# Patient Record
Sex: FEMALE | Race: WHITE | HISPANIC OR LATINO | Employment: FULL TIME | URBAN - METROPOLITAN AREA
[De-identification: names, ages, dates, MRNs, and addresses within clinical notes are randomized per-mention and may not be internally consistent; named-entity substitution may affect disease eponyms.]

---

## 2020-02-10 ENCOUNTER — APPOINTMENT (EMERGENCY)
Dept: RADIOLOGY | Facility: HOSPITAL | Age: 55
End: 2020-02-10
Payer: COMMERCIAL

## 2020-02-10 ENCOUNTER — HOSPITAL ENCOUNTER (OUTPATIENT)
Facility: HOSPITAL | Age: 55
Setting detail: OBSERVATION
Discharge: HOME | End: 2020-02-11
Attending: EMERGENCY MEDICINE | Admitting: INTERNAL MEDICINE
Payer: COMMERCIAL

## 2020-02-10 DIAGNOSIS — E66.01 MORBID OBESITY DUE TO EXCESS CALORIES (HCC): ICD-10-CM

## 2020-02-10 DIAGNOSIS — I10 UNCONTROLLED HYPERTENSION: Primary | ICD-10-CM

## 2020-02-10 DIAGNOSIS — R07.9 CHEST PAIN IN ADULT: ICD-10-CM

## 2020-02-10 DIAGNOSIS — R06.00 DOE (DYSPNEA ON EXERTION): ICD-10-CM

## 2020-02-10 PROBLEM — R68.89 EXERCISE INTOLERANCE: Status: ACTIVE | Noted: 2020-02-10

## 2020-02-10 LAB
ALBUMIN SERPL BCP-MCNC: 3 G/DL (ref 3.5–5)
ALP SERPL-CCNC: 77 U/L (ref 46–116)
ALT SERPL W P-5'-P-CCNC: 17 U/L (ref 12–78)
ANION GAP SERPL CALCULATED.3IONS-SCNC: 5 MMOL/L (ref 4–13)
APTT PPP: 26 SECONDS (ref 23–37)
AST SERPL W P-5'-P-CCNC: 15 U/L (ref 5–45)
BASOPHILS # BLD AUTO: 0.03 THOUSANDS/ΜL (ref 0–0.1)
BASOPHILS NFR BLD AUTO: 0 % (ref 0–1)
BILIRUB SERPL-MCNC: 0.42 MG/DL (ref 0.2–1)
BILIRUB UR QL STRIP: NEGATIVE
BUN SERPL-MCNC: 10 MG/DL (ref 5–25)
CALCIUM SERPL-MCNC: 8.8 MG/DL (ref 8.3–10.1)
CHLORIDE SERPL-SCNC: 107 MMOL/L (ref 100–108)
CLARITY UR: CLEAR
CO2 SERPL-SCNC: 26 MMOL/L (ref 21–32)
COLOR UR: YELLOW
CREAT SERPL-MCNC: 0.69 MG/DL (ref 0.6–1.3)
EOSINOPHIL # BLD AUTO: 0.17 THOUSAND/ΜL (ref 0–0.61)
EOSINOPHIL NFR BLD AUTO: 2 % (ref 0–6)
ERYTHROCYTE [DISTWIDTH] IN BLOOD BY AUTOMATED COUNT: 13.3 % (ref 11.6–15.1)
GFR SERPL CREATININE-BSD FRML MDRD: 99 ML/MIN/1.73SQ M
GLUCOSE SERPL-MCNC: 88 MG/DL (ref 65–140)
GLUCOSE UR STRIP-MCNC: NEGATIVE MG/DL
HCT VFR BLD AUTO: 36.3 % (ref 34.8–46.1)
HGB BLD-MCNC: 11.8 G/DL (ref 11.5–15.4)
HGB UR QL STRIP.AUTO: NEGATIVE
IMM GRANULOCYTES # BLD AUTO: 0.03 THOUSAND/UL (ref 0–0.2)
IMM GRANULOCYTES NFR BLD AUTO: 0 % (ref 0–2)
INR PPP: 0.98 (ref 0.84–1.19)
KETONES UR STRIP-MCNC: NEGATIVE MG/DL
LEUKOCYTE ESTERASE UR QL STRIP: NEGATIVE
LYMPHOCYTES # BLD AUTO: 2.53 THOUSANDS/ΜL (ref 0.6–4.47)
LYMPHOCYTES NFR BLD AUTO: 25 % (ref 14–44)
MCH RBC QN AUTO: 28.2 PG (ref 26.8–34.3)
MCHC RBC AUTO-ENTMCNC: 32.5 G/DL (ref 31.4–37.4)
MCV RBC AUTO: 87 FL (ref 82–98)
MONOCYTES # BLD AUTO: 1.04 THOUSAND/ΜL (ref 0.17–1.22)
MONOCYTES NFR BLD AUTO: 10 % (ref 4–12)
NEUTROPHILS # BLD AUTO: 6.39 THOUSANDS/ΜL (ref 1.85–7.62)
NEUTS SEG NFR BLD AUTO: 63 % (ref 43–75)
NITRITE UR QL STRIP: NEGATIVE
NRBC BLD AUTO-RTO: 0 /100 WBCS
NT-PROBNP SERPL-MCNC: 75 PG/ML
PH UR STRIP.AUTO: 5 [PH] (ref 4.5–8)
PLATELET # BLD AUTO: 254 THOUSANDS/UL (ref 149–390)
PMV BLD AUTO: 9.8 FL (ref 8.9–12.7)
POTASSIUM SERPL-SCNC: 4 MMOL/L (ref 3.5–5.3)
PROT SERPL-MCNC: 7.5 G/DL (ref 6.4–8.2)
PROT UR STRIP-MCNC: NEGATIVE MG/DL
PROTHROMBIN TIME: 12.6 SECONDS (ref 11.6–14.5)
RBC # BLD AUTO: 4.18 MILLION/UL (ref 3.81–5.12)
SODIUM SERPL-SCNC: 138 MMOL/L (ref 136–145)
SP GR UR STRIP.AUTO: 1.02 (ref 1–1.03)
TROPONIN I SERPL-MCNC: <0.02 NG/ML
TSH SERPL DL<=0.05 MIU/L-ACNC: 0.03 UIU/ML (ref 0.36–3.74)
UROBILINOGEN UR QL STRIP.AUTO: 0.2 E.U./DL
WBC # BLD AUTO: 10.19 THOUSAND/UL (ref 4.31–10.16)

## 2020-02-10 PROCEDURE — 81003 URINALYSIS AUTO W/O SCOPE: CPT

## 2020-02-10 PROCEDURE — 85730 THROMBOPLASTIN TIME PARTIAL: CPT | Performed by: EMERGENCY MEDICINE

## 2020-02-10 PROCEDURE — 85610 PROTHROMBIN TIME: CPT | Performed by: EMERGENCY MEDICINE

## 2020-02-10 PROCEDURE — 93005 ELECTROCARDIOGRAM TRACING: CPT

## 2020-02-10 PROCEDURE — 99285 EMERGENCY DEPT VISIT HI MDM: CPT

## 2020-02-10 PROCEDURE — 80053 COMPREHEN METABOLIC PANEL: CPT | Performed by: EMERGENCY MEDICINE

## 2020-02-10 PROCEDURE — 71046 X-RAY EXAM CHEST 2 VIEWS: CPT

## 2020-02-10 PROCEDURE — 83880 ASSAY OF NATRIURETIC PEPTIDE: CPT | Performed by: EMERGENCY MEDICINE

## 2020-02-10 PROCEDURE — 99285 EMERGENCY DEPT VISIT HI MDM: CPT | Performed by: EMERGENCY MEDICINE

## 2020-02-10 PROCEDURE — 84484 ASSAY OF TROPONIN QUANT: CPT | Performed by: EMERGENCY MEDICINE

## 2020-02-10 PROCEDURE — 36415 COLL VENOUS BLD VENIPUNCTURE: CPT | Performed by: EMERGENCY MEDICINE

## 2020-02-10 PROCEDURE — 85025 COMPLETE CBC W/AUTO DIFF WBC: CPT | Performed by: EMERGENCY MEDICINE

## 2020-02-10 PROCEDURE — 84443 ASSAY THYROID STIM HORMONE: CPT | Performed by: EMERGENCY MEDICINE

## 2020-02-10 RX ORDER — OMEGA-3S/DHA/EPA/FISH OIL/D3 300MG-1000
5000 CAPSULE ORAL DAILY
COMMUNITY
End: 2021-01-29

## 2020-02-10 RX ORDER — ACETAMINOPHEN 325 MG/1
975 TABLET ORAL ONCE
Status: COMPLETED | OUTPATIENT
Start: 2020-02-10 | End: 2020-02-10

## 2020-02-10 RX ORDER — LISINOPRIL AND HYDROCHLOROTHIAZIDE 12.5; 1 MG/1; MG/1
1 TABLET ORAL DAILY
COMMUNITY
End: 2020-02-11 | Stop reason: HOSPADM

## 2020-02-10 RX ORDER — NITROGLYCERIN 0.4 MG/1
0.4 TABLET SUBLINGUAL
COMMUNITY
End: 2021-01-29 | Stop reason: SDUPTHER

## 2020-02-10 RX ORDER — HYDROCHLOROTHIAZIDE 25 MG/1
25 TABLET ORAL DAILY
COMMUNITY
End: 2021-01-29 | Stop reason: ALTCHOICE

## 2020-02-10 RX ORDER — PYRIDOXINE HCL (VITAMIN B6) 100 MG
100 TABLET ORAL DAILY
COMMUNITY
End: 2021-01-29 | Stop reason: SDUPTHER

## 2020-02-10 RX ORDER — MULTIVIT WITH MINERALS/LUTEIN
1000 TABLET ORAL DAILY
COMMUNITY
End: 2022-02-22

## 2020-02-10 RX ORDER — FERROUS SULFATE 325(65) MG
325 TABLET ORAL DAILY
COMMUNITY
End: 2021-01-29 | Stop reason: SDUPTHER

## 2020-02-10 RX ORDER — METOPROLOL SUCCINATE 100 MG/1
100 TABLET, EXTENDED RELEASE ORAL DAILY
COMMUNITY
End: 2021-01-29 | Stop reason: DRUGHIGH

## 2020-02-10 RX ADMIN — ACETAMINOPHEN 975 MG: 325 TABLET ORAL at 23:49

## 2020-02-11 VITALS
HEIGHT: 62 IN | WEIGHT: 221.6 LBS | BODY MASS INDEX: 40.78 KG/M2 | RESPIRATION RATE: 20 BRPM | OXYGEN SATURATION: 97 % | SYSTOLIC BLOOD PRESSURE: 155 MMHG | DIASTOLIC BLOOD PRESSURE: 80 MMHG | TEMPERATURE: 97.8 F | HEART RATE: 67 BPM

## 2020-02-11 LAB
ANION GAP SERPL CALCULATED.3IONS-SCNC: 4 MMOL/L (ref 4–13)
ATRIAL RATE: 65 BPM
ATRIAL RATE: 66 BPM
ATRIAL RATE: 68 BPM
BASOPHILS # BLD AUTO: 0.03 THOUSANDS/ΜL (ref 0–0.1)
BASOPHILS NFR BLD AUTO: 0 % (ref 0–1)
BUN SERPL-MCNC: 11 MG/DL (ref 5–25)
CALCIUM SERPL-MCNC: 8.6 MG/DL (ref 8.3–10.1)
CHLORIDE SERPL-SCNC: 108 MMOL/L (ref 100–108)
CHOLEST SERPL-MCNC: 141 MG/DL (ref 50–200)
CO2 SERPL-SCNC: 25 MMOL/L (ref 21–32)
CREAT SERPL-MCNC: 0.7 MG/DL (ref 0.6–1.3)
EOSINOPHIL # BLD AUTO: 0.1 THOUSAND/ΜL (ref 0–0.61)
EOSINOPHIL NFR BLD AUTO: 1 % (ref 0–6)
ERYTHROCYTE [DISTWIDTH] IN BLOOD BY AUTOMATED COUNT: 13.3 % (ref 11.6–15.1)
GFR SERPL CREATININE-BSD FRML MDRD: 99 ML/MIN/1.73SQ M
GLUCOSE SERPL-MCNC: 110 MG/DL (ref 65–140)
GLUCOSE SERPL-MCNC: 77 MG/DL (ref 65–140)
HCT VFR BLD AUTO: 34.4 % (ref 34.8–46.1)
HDLC SERPL-MCNC: 50 MG/DL
HGB BLD-MCNC: 11.1 G/DL (ref 11.5–15.4)
IMM GRANULOCYTES # BLD AUTO: 0.02 THOUSAND/UL (ref 0–0.2)
IMM GRANULOCYTES NFR BLD AUTO: 0 % (ref 0–2)
LDLC SERPL CALC-MCNC: 63 MG/DL (ref 0–100)
LYMPHOCYTES # BLD AUTO: 1.9 THOUSANDS/ΜL (ref 0.6–4.47)
LYMPHOCYTES NFR BLD AUTO: 24 % (ref 14–44)
MAGNESIUM SERPL-MCNC: 1.9 MG/DL (ref 1.6–2.6)
MCH RBC QN AUTO: 28 PG (ref 26.8–34.3)
MCHC RBC AUTO-ENTMCNC: 32.3 G/DL (ref 31.4–37.4)
MCV RBC AUTO: 87 FL (ref 82–98)
MONOCYTES # BLD AUTO: 0.81 THOUSAND/ΜL (ref 0.17–1.22)
MONOCYTES NFR BLD AUTO: 10 % (ref 4–12)
NEUTROPHILS # BLD AUTO: 4.95 THOUSANDS/ΜL (ref 1.85–7.62)
NEUTS SEG NFR BLD AUTO: 65 % (ref 43–75)
NONHDLC SERPL-MCNC: 91 MG/DL
NRBC BLD AUTO-RTO: 0 /100 WBCS
P AXIS: 35 DEGREES
P AXIS: 48 DEGREES
P AXIS: 51 DEGREES
PLATELET # BLD AUTO: 241 THOUSANDS/UL (ref 149–390)
PMV BLD AUTO: 9.9 FL (ref 8.9–12.7)
POTASSIUM SERPL-SCNC: 3.7 MMOL/L (ref 3.5–5.3)
PR INTERVAL: 126 MS
PR INTERVAL: 132 MS
PR INTERVAL: 132 MS
QRS AXIS: -10 DEGREES
QRS AXIS: -11 DEGREES
QRS AXIS: -7 DEGREES
QRSD INTERVAL: 84 MS
QRSD INTERVAL: 86 MS
QRSD INTERVAL: 86 MS
QT INTERVAL: 426 MS
QT INTERVAL: 438 MS
QT INTERVAL: 446 MS
QTC INTERVAL: 446 MS
QTC INTERVAL: 463 MS
QTC INTERVAL: 465 MS
RBC # BLD AUTO: 3.97 MILLION/UL (ref 3.81–5.12)
SODIUM SERPL-SCNC: 137 MMOL/L (ref 136–145)
T WAVE AXIS: 19 DEGREES
T WAVE AXIS: 27 DEGREES
T WAVE AXIS: 33 DEGREES
T4 FREE SERPL-MCNC: 1.24 NG/DL (ref 0.76–1.46)
TRIGL SERPL-MCNC: 140 MG/DL
TROPONIN I SERPL-MCNC: <0.02 NG/ML
TROPONIN I SERPL-MCNC: <0.02 NG/ML
TSH SERPL DL<=0.05 MIU/L-ACNC: 0.02 UIU/ML (ref 0.36–3.74)
VENTRICULAR RATE: 65 BPM
VENTRICULAR RATE: 66 BPM
VENTRICULAR RATE: 68 BPM
WBC # BLD AUTO: 7.81 THOUSAND/UL (ref 4.31–10.16)

## 2020-02-11 PROCEDURE — 93010 ELECTROCARDIOGRAM REPORT: CPT | Performed by: INTERNAL MEDICINE

## 2020-02-11 PROCEDURE — 84443 ASSAY THYROID STIM HORMONE: CPT | Performed by: INTERNAL MEDICINE

## 2020-02-11 PROCEDURE — 80048 BASIC METABOLIC PNL TOTAL CA: CPT | Performed by: INTERNAL MEDICINE

## 2020-02-11 PROCEDURE — 99220 PR INITIAL OBSERVATION CARE/DAY 70 MINUTES: CPT | Performed by: INTERNAL MEDICINE

## 2020-02-11 PROCEDURE — 93005 ELECTROCARDIOGRAM TRACING: CPT

## 2020-02-11 PROCEDURE — 84439 ASSAY OF FREE THYROXINE: CPT | Performed by: NURSE PRACTITIONER

## 2020-02-11 PROCEDURE — 83735 ASSAY OF MAGNESIUM: CPT | Performed by: INTERNAL MEDICINE

## 2020-02-11 PROCEDURE — 82948 REAGENT STRIP/BLOOD GLUCOSE: CPT

## 2020-02-11 PROCEDURE — 80061 LIPID PANEL: CPT | Performed by: INTERNAL MEDICINE

## 2020-02-11 PROCEDURE — 84484 ASSAY OF TROPONIN QUANT: CPT | Performed by: INTERNAL MEDICINE

## 2020-02-11 PROCEDURE — 99217 PR OBSERVATION CARE DISCHARGE MANAGEMENT: CPT | Performed by: NURSE PRACTITIONER

## 2020-02-11 PROCEDURE — 85025 COMPLETE CBC W/AUTO DIFF WBC: CPT | Performed by: INTERNAL MEDICINE

## 2020-02-11 RX ORDER — ASCORBIC ACID 500 MG
1000 TABLET ORAL DAILY
Status: DISCONTINUED | OUTPATIENT
Start: 2020-02-11 | End: 2020-02-11 | Stop reason: HOSPADM

## 2020-02-11 RX ORDER — LISINOPRIL 20 MG/1
20 TABLET ORAL DAILY
Qty: 30 TABLET | Refills: 0 | Status: SHIPPED | OUTPATIENT
Start: 2020-02-12 | End: 2020-09-25

## 2020-02-11 RX ORDER — LISINOPRIL 20 MG/1
20 TABLET ORAL DAILY
Status: DISCONTINUED | OUTPATIENT
Start: 2020-02-11 | End: 2020-02-11 | Stop reason: HOSPADM

## 2020-02-11 RX ORDER — MAGNESIUM HYDROXIDE/ALUMINUM HYDROXICE/SIMETHICONE 120; 1200; 1200 MG/30ML; MG/30ML; MG/30ML
30 SUSPENSION ORAL EVERY 6 HOURS PRN
Status: DISCONTINUED | OUTPATIENT
Start: 2020-02-11 | End: 2020-02-11 | Stop reason: HOSPADM

## 2020-02-11 RX ORDER — NITROGLYCERIN 0.4 MG/1
0.4 TABLET SUBLINGUAL
Status: DISCONTINUED | OUTPATIENT
Start: 2020-02-11 | End: 2020-02-11 | Stop reason: HOSPADM

## 2020-02-11 RX ORDER — ONDANSETRON 2 MG/ML
4 INJECTION INTRAMUSCULAR; INTRAVENOUS EVERY 6 HOURS PRN
Status: DISCONTINUED | OUTPATIENT
Start: 2020-02-11 | End: 2020-02-11 | Stop reason: HOSPADM

## 2020-02-11 RX ORDER — SENNOSIDES 8.6 MG
1 TABLET ORAL DAILY PRN
Status: DISCONTINUED | OUTPATIENT
Start: 2020-02-11 | End: 2020-02-11 | Stop reason: HOSPADM

## 2020-02-11 RX ORDER — FERROUS SULFATE 325(65) MG
325 TABLET ORAL DAILY
Status: DISCONTINUED | OUTPATIENT
Start: 2020-02-11 | End: 2020-02-11 | Stop reason: HOSPADM

## 2020-02-11 RX ORDER — HYDROCHLOROTHIAZIDE 25 MG/1
25 TABLET ORAL DAILY
Status: DISCONTINUED | OUTPATIENT
Start: 2020-02-11 | End: 2020-02-11 | Stop reason: HOSPADM

## 2020-02-11 RX ORDER — HYDRALAZINE HYDROCHLORIDE 20 MG/ML
10 INJECTION INTRAMUSCULAR; INTRAVENOUS EVERY 4 HOURS PRN
Status: DISCONTINUED | OUTPATIENT
Start: 2020-02-11 | End: 2020-02-11 | Stop reason: HOSPADM

## 2020-02-11 RX ORDER — MELATONIN
5000 DAILY
Status: DISCONTINUED | OUTPATIENT
Start: 2020-02-11 | End: 2020-02-11 | Stop reason: HOSPADM

## 2020-02-11 RX ORDER — ACETAMINOPHEN 325 MG/1
650 TABLET ORAL EVERY 6 HOURS PRN
Status: DISCONTINUED | OUTPATIENT
Start: 2020-02-11 | End: 2020-02-11 | Stop reason: HOSPADM

## 2020-02-11 RX ORDER — METOPROLOL SUCCINATE 100 MG/1
100 TABLET, EXTENDED RELEASE ORAL DAILY
Status: DISCONTINUED | OUTPATIENT
Start: 2020-02-11 | End: 2020-02-11 | Stop reason: HOSPADM

## 2020-02-11 RX ORDER — MULTIVITAMIN WITH IRON
100 TABLET ORAL DAILY
Status: DISCONTINUED | OUTPATIENT
Start: 2020-02-11 | End: 2020-02-11 | Stop reason: HOSPADM

## 2020-02-11 RX ADMIN — METOPROLOL SUCCINATE 100 MG: 100 TABLET, EXTENDED RELEASE ORAL at 08:30

## 2020-02-11 RX ADMIN — FERROUS SULFATE TAB 325 MG (65 MG ELEMENTAL FE) 325 MG: 325 (65 FE) TAB at 08:30

## 2020-02-11 RX ADMIN — ONDANSETRON 4 MG: 2 INJECTION INTRAMUSCULAR; INTRAVENOUS at 07:11

## 2020-02-11 RX ADMIN — MELATONIN 5000 UNITS: at 08:29

## 2020-02-11 RX ADMIN — HYDROCHLOROTHIAZIDE 25 MG: 25 TABLET ORAL at 08:30

## 2020-02-11 RX ADMIN — OXYCODONE HYDROCHLORIDE AND ACETAMINOPHEN 1000 MG: 500 TABLET ORAL at 08:29

## 2020-02-11 RX ADMIN — Medication 100 MG: at 08:29

## 2020-02-11 RX ADMIN — LISINOPRIL 20 MG: 20 TABLET ORAL at 08:29

## 2020-02-11 NOTE — ASSESSMENT & PLAN NOTE
The patient's chest pain is very intermittent and is not coincidental with activity  However, will do troponins x3  With the patient's hypertension; chest pain could be worked up with outpatient stress testing? Andres Bailey

## 2020-02-11 NOTE — DISCHARGE SUMMARY
Discharge- Jaydon Ripper 1965, 47 y o  female MRN: [id number]    Unit/Bed#: CW2 212-01 Encounter: [id number]    Primary Care Provider: Chinedu White MD   Date and time admitted to hospital: 2/10/2020  8:00 PM    Chest pain in adult  Assessment & Plan  · The patient's chest pain is very intermittent and is not coincidental with activity  · Troponins negative   · Lipid panel unremarkable   · Electrolytes unremarkable   · TSH low put T4 normal; recommend continued monitoring with PCP  · Outpatient stress test at discharge  · Ambulatory referral to cardiology team  · Patient does smoke; encouraged cessation and provide ambulatory referral to pulmonology team at discharge    * Uncontrolled hypertension  Assessment & Plan  · We continue metoprolol succinate 100 mg daily; hydrochlorothiazide 25 mg tablet daily  She was also on lisinopril hydrochlorothiazide 10/12 5 however with the redundant dosing of hydrochlorothiazide; Changed to lisinopril however increasing it to 20 daily  · As blood pressure has significantly improved with new medication will order echocardiogram outpatient follow-up with Cardiology team  · Hydralazine 10 mg every 4 hours is ordered for systolic blood pressure greater than 165  · At discharge will discontinue combination medication as above in continue lisinopril with close follow-up with PCP and cardiology team     Exercise intolerance  Assessment & Plan  · Patient claims shortness of breath this afternoon but she was also coughing  · BNP noted to be normal   Weight gain approximately 15 lb within the past 6 months  · Most likely shortness of breath is secondary to exercise intolerance and would most likely need weight loss in the future      · TSH relatively low, T4 normal; recommend continued monitoring with PCP  · Lifestyle modifications with continued exercise and healthy diet    Morbid obesity due to excess calories ([address] )  Assessment & Plan  · Lifestyle modifications as above      Discharging Physician / Practitioner: SHARON Kellogg  PCP: Rodolfo Hines MD  Admission Date:   Admission Orders (From admission, onward)     Ordered        02/10/20 5053  Place in Observation  Once                   Discharge Date: 02/11/20    Resolved Problems  Date Reviewed: 2/11/2020    None          Consultations During Hospital Stay:  · none    Procedures Performed:   · none    Significant Findings / Test Results:   · none    Incidental Findings:   · none     Test Results Pending at Discharge (will require follow up):   · none     Outpatient Tests Requested:  · Stress test Exercise  · Echocardiogram    Complications: none    Reason for Admission: shortness of breath, HTN, and chest pain     Hospital Course:     Akila Otoole is a 47 y o  female patient with past medical history of hypertension who originally presented to the hospital on 2/10/2020 due to chest pain and shortness of breath  On admission there was some question about her medication compliance  She also had a notable  weight gain of 15 lb over the past 4-6 months  While seeing her endocrinologist as an outpatient her blood pressure was notably elevated to 180/110  Patient reports that it was only elevated due to her coughing fit  She did endorse orthopnea, leg swelling, and relatively sedentary lifestyle  Troponins negative  Lipid panel unremarkable  Patient does smoke; encouraged cessation  Recommended following up OP with Pulm  Referral given  Given BP elevated titrated medication and it did trend down appropriately  On my exam she was without SOB or chest pain, highly suspect secondary to hypertension  I did provide referral to cardiologist as well as stress test and ECHO for baseline prior to their visits  I recommended BP diary as well to take to visit for further medication titration  Patient endorsed understanding and was agreeable to discharge         Please see above list of diagnoses and related plan for additional information  Condition at Discharge: stable     Discharge Day Visit / Exam:     Subjective:  Denies SOB, chest pain, arm pain, neck pain, or TAVARES  Vitals: Blood Pressure: 143/90 (02/11/20 0710)  Pulse: 79 (02/11/20 0710)  Temperature: 97 8 °F (36 6 °C) (02/11/20 0710)  Temp Source: Oral (02/11/20 0710)  Respirations: 20 (02/11/20 0710)  Height: 5' 2" (157 5 cm) (02/11/20 0025)  Weight - Scale: 101 kg (221 lb 9 6 oz) (02/11/20 0527)  SpO2: 98 % (02/11/20 0710)  Exam:   Physical Exam   Constitutional: She is oriented to person, place, and time  She appears well-nourished  She is cooperative  No distress  Cardiovascular: Normal rate, regular rhythm, normal heart sounds and intact distal pulses  Pulses:       Radial pulses are 2+ on the right side, and 2+ on the left side  Dorsalis pedis pulses are 2+ on the right side, and 2+ on the left side  Posterior tibial pulses are 2+ on the right side, and 2+ on the left side  No peripheral edema noted  Pulmonary/Chest: Breath sounds normal  No respiratory distress  She has no wheezes  Abdominal: Soft  Bowel sounds are normal  She exhibits no distension  There is no tenderness  Neurological: She is alert and oriented to person, place, and time  Skin: Skin is warm and dry  Capillary refill takes less than 2 seconds  She is not diaphoretic  Psychiatric: She has a normal mood and affect  Her speech is normal and behavior is normal  Judgment normal    Vitals reviewed  Discussion with Family: discussed plan of care with son at the bedside as well as daughter over the phone   Discharge instructions/Information to patient and family:   See after visit summary for information provided to patient and family  Provisions for Follow-Up Care:  See after visit summary for information related to follow-up care and any pertinent home health orders        Disposition:     Home    Discharge Statement:  I spent 45 minutes discharging the patient  This time was spent on the day of discharge  I had direct contact with the patient on the day of discharge  Greater than 50% of the total time was spent examining patient, answering all patient questions, arranging and discussing plan of care with patient as well as directly providing post-discharge instructions  Additional time then spent on discharge activities  Discharge Medications:  See after visit summary for reconciled discharge medications provided to patient and family        ** Please Note: This note has been constructed using a voice recognition system **

## 2020-02-11 NOTE — ASSESSMENT & PLAN NOTE
We continue metoprolol succinate 100 mg daily; hydrochlorothiazide 25 mg tablet daily  She is also on lisinopril hydrochlorothiazide 10/12 5 however with the redundant dosing of hydrochlorothiazide; will change to lisinopril however increasing it to 20 daily  Echocardiogram regarding hypertensive heart disease  Hydralazine 10 mg every 4 hours is ordered for systolic blood pressure greater than 165

## 2020-02-11 NOTE — UTILIZATION REVIEW
Initial Clinical Review    Admission: Date/Time/Statement: Admission Orders (From admission, onward)     Ordered        02/10/20 4219  Place in Observation  Once                   Orders Placed This Encounter   Procedures    Place in Observation     Standing Status:   Standing     Number of Occurrences:   1     Order Specific Question:   Admitting Physician     Answer:   Germain  [[id number]]     Order Specific Question:   Level of Care     Answer:   Med Surg [[id number]]     ED Arrival Information     Expected Arrival Acuity Means of Arrival Escorted By Service Admission Type    - 2/10/2020 17:44 Urgent Walk-In Family Member Hospitalist Urgent    Arrival Complaint    blood pressure high        Chief Complaint   Patient presents with    Hypertension     Pt was sent from her doctors office for htn, last reading 188/124  Pt reports she has occasional headaches, and has been taking her BP meds as prescribed      Assessment/Plan: 47year old female, presented to the ED from home via car  Admitted as Observation due to HTN  The patient was in her endocrinologist's office today when her blood pressure was noted to be 180/110  However she was also coughing and therefore the blood pressure seem to be persistently high even as high as 200  That said, the patient was given additional dose of her beta-blocker  Currently her blood pressure stays at approximately 145 to 165  Despite shortness of breath with activity, she is able to lie flat on bed  At home, reportedly she uses 1 pillow at night  Patient does not think that her legs are becoming more swollen  About a 15# weight increase the past 4-6 months Her lifestyle is very sedentary and at work, 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  Continue metoprolol succinate 100 mg daily; hydrochlorothiazide 25 mg tablet daily  Change to lisinopril however increasing it to 20 daily  ECHO    Hydralazine 10 mg every 4 hours is ordered for systolic blood pressure greater than 165  Trop x3  ED Triage Vitals [02/10/20 1817]   Temperature Pulse Respirations Blood Pressure SpO2   98 9 °F (37 2 °C) 79 18 (!) 184/97 98 %      Temp Source Heart Rate Source Patient Position - Orthostatic VS BP Location FiO2 (%)   Oral Monitor Sitting Left arm --      Pain Score       No Pain        Wt Readings from Last 1 Encounters:   20 101 kg (221 lb 9 6 oz)     Additional Vital Signs:   Date/Time  Temp  Pulse  Resp  BP  MAP (mmHg)  SpO2  O2 Device  Patient Position - Orthostatic VS   20 10:02:48    67  20  155/80  105  97 %       20 07:10:25  97 8 °F (36 6 °C)  79  20  143/90  108  98 %  None (Room air)  Sitting   20 03:17:32    72    124/71  89  98 %       20 0100    76        96 %       20 0040              None (Room air)     20 00:25:47  97 9 °F (36 6 °C)  69  18  158/81  107  97 %  None (Room air)     02/10/20 2300    68  20  182/85Abnormal   120  96 %       02/10/20 2240    85  19  153/75    97 %  None (Room air)  Sitting   02/10/20 2021    74  18  152/109Abnormal     97 %    Sitting   02/10/20 2017    74  18  152/109Abnormal     97 %  None (Room air)  Sitting     Date and Time Eye Opening Best Verbal Response Best Motor Response [place] Coma Scale Score   20 0040 4 5 6 15   02/10/20 2021 4 5 6 15     2020 @ 0700  Chest X:  No acute cardiopulmonary disease      02/10/2020 @ 2124  EC, NSR    Pertinent Labs/Diagnostic Test Results:   Results from last 7 days   Lab Units 20  0423 02/10/20  2225   WBC Thousand/uL 7 81 10 19*   HEMOGLOBIN g/dL 11 1* 11 8   HEMATOCRIT % 34 4* 36 3   PLATELETS Thousands/uL 241 254   NEUTROS ABS Thousands/µL 4 95 6 39     Results from last 7 days   Lab Units 20  0423 02/10/20  2225   SODIUM mmol/L 137 138   POTASSIUM mmol/L 3 7 4 0   CHLORIDE mmol/L 108 107   CO2 mmol/L 25 26   ANION GAP mmol/L 4 5   BUN mg/dL 11 10   CREATININE mg/dL 0 70 0 69   EGFR ml/min/1 73sq m 99 99   CALCIUM mg/dL 8 6 8 8   MAGNESIUM mg/dL 1 9  --      Results from last 7 days   Lab Units 02/10/20  2225   AST U/L 15   ALT U/L 17   ALK PHOS U/L 77   TOTAL PROTEIN g/dL 7 5   ALBUMIN g/dL 3 0*   TOTAL BILIRUBIN mg/dL 0 42     Results from last 7 days   Lab Units 02/11/20  0537   POC GLUCOSE mg/dl 77     Results from last 7 days   Lab Units 02/11/20  0423 02/10/20  2225   GLUCOSE RANDOM mg/dL 110 88     Results from last 7 days   Lab Units 02/11/20  0407 02/11/20  0121 02/10/20  2225   TROPONIN I ng/mL <0 02 <0 02 <0 02     Results from last 7 days   Lab Units 02/10/20  2225   PROTIME seconds 12 6   INR  0 98   PTT seconds 26     Results from last 7 days   Lab Units 02/11/20  0423 02/10/20  2225   TSH 3RD GENERATON uIU/mL 0 020* 0 028*     Results from last 7 days   Lab Units 02/10/20  2226   NT-PRO BNP pg/mL 75     Results from last 7 days   Lab Units 02/10/20  2241   CLARITY UA  Clear   COLOR UA  Yellow   SPEC GRAV UA  1 020   PH UA  5 0   GLUCOSE UA mg/dl Negative   KETONES UA mg/dl Negative   BLOOD UA  Negative   PROTEIN UA mg/dl Negative   NITRITE UA  Negative   BILIRUBIN UA  Negative   UROBILINOGEN UA E U /dl 0 2   LEUKOCYTES UA  Negative     ED Treatment:   Medication Administration from 02/10/2020 1744 to 02/11/2020 0019       Date/Time Order Dose Route Action     02/10/2020 2349 acetaminophen (TYLENOL) tablet 975 mg 975 mg Oral Given        Past Medical History:   Diagnosis Date    Hypertension      Admitting Diagnosis: High blood pressure [I10]  TAVARES (dyspnea on exertion) [R06 09]  Uncontrolled hypertension [I10]  Morbid obesity due to excess calories ([address] ) [E66 01]  Age/Sex: 47 y o  female  Admission Orders:  Scheduled Medications:  Medications:  vitamin C 1,000 mg Oral Daily   cholecalciferol 5,000 Units Oral Daily   enoxaparin 40 mg Subcutaneous Daily   ferrous sulfate 325 mg Oral Daily   hydrochlorothiazide 25 mg Oral Daily   lisinopril 20 mg Oral Daily   metoprolol succinate 100 mg Oral Daily   pyridoxine 100 mg Oral Daily   Continuous IV Infusions:   PRN Meds:  acetaminophen 650 mg Oral Q6H PRN   aluminum-magnesium hydroxide-simethicone 30 mL Oral Q6H PRN   hydrALAZINE 10 mg Intravenous Q4H PRN    nitroglycerin 0 4 mg Sublingual Q5 Min PRN   ondansetron 4 mg Intravenous Q6H PRN x 1 dose   senna 1 tablet Oral Daily PRN     VTE Prophylaxis: Enoxaparin (Lovenox)  / sequential compression device   IP CONSULT TO NUTRITION SERVICES    02/11/2020  DC order entered - Home  Network Utilization Review Department  [email or web address]  ATTENTION: Please call with any questions or concerns to [phone] and carefully listen to the prompts so that you are directed to the right person  All voicemails are confidential   Julee Huggins all requests for admission clinical reviews, approved or denied determinations and any other requests to dedicated fax number below belonging to the campus where the patient is receiving treatment   List of dedicated fax numbers for the Facilities:  [address] [place] [address] DENIALS (Administrative/Medical Necessity) [phone]   [address] [place] [address] (Maternity/NICU/Pediatrics) [phone]   Jovany Courser [phone]   [facility] Sole [phone]   [facility] [phone]   [facility] [place] [phone]   [address] [place] [address] [phone]   [facility]  [phone]   [facility]  [address] [facility] [phone]

## 2020-02-11 NOTE — ASSESSMENT & PLAN NOTE
· The patient's chest pain is very intermittent and is not coincidental with activity     · Troponins negative   · Lipid panel unremarkable   · Electrolytes unremarkable   · TSH low put T4 normal; recommend continued monitoring with PCP  · Outpatient stress test at discharge  · Ambulatory referral to cardiology team  · Patient does smoke; encouraged cessation and provide ambulatory referral to pulmonology team at discharge

## 2020-02-11 NOTE — SOCIAL WORK
CM spoke to  pt, explained CM program/CM role  Patient resides w/ family in 2  floor  w/ 3 steps to reach, entrance   bed/bathroom main floor   pt independent with ADL's and ambulation but she feels he needs assistabnce  He  drives  PCP Dr Vivi Hernandez in [place],  pharm[place] for [place] [facility] He  HAS NOT HAD any mental health illness, drug and/or alcohol abuse  Employed  Family will provide transport home  Emergency contact [address] [facility] [phone]    CM reviewed d/c planning process including the following: identifying help at home, patient preference for d/c planning needs, Discharge Lounge, [facility] Meds to Bed program, availability of treatment team to discuss questions or concerns patient and/or family may have regarding understanding medications and recognizing signs and symptoms once discharged  CM also encouraged patient to follow up with all recommended appointments after discharge  Patient advised of importance for patient and family to participate in managing patients medical well being

## 2020-02-11 NOTE — H&P
H&P- Paty Skinner 1965, 47 y o  female MRN: [id number]    Unit/Bed#: ED 05 Encounter: [id number]    Primary Care Provider: Garland Mcleod MD   Date and time admitted to hospital: 2/10/2020  8:00 PM        * Uncontrolled hypertension  Assessment & Plan  We continue metoprolol succinate 100 mg daily; hydrochlorothiazide 25 mg tablet daily  She is also on lisinopril hydrochlorothiazide 10/12 5 however with the redundant dosing of hydrochlorothiazide; will change to lisinopril however increasing it to 20 daily  Echocardiogram regarding hypertensive heart disease  Hydralazine 10 mg every 4 hours is ordered for systolic blood pressure greater than 165  Chest pain in adult  Assessment & Plan  The patient's chest pain is very intermittent and is not coincidental with activity  However, will do troponins x3  With the patient's hypertension; chest pain could be worked up with outpatient stress testing?      Exercise intolerance  Assessment & Plan  Patient claims shortness of breath this afternoon but she was also coughing  BNP noted to be normal   Weight gain approximately 15 lb within the past 6 months  Most likely shortness of breath is secondary to exercise intolerance and would most likely need weight loss in the future  TSH relatively low  Would check free T4 tomorrow  Nutrition consult  Morbid obesity due to excess calories [facility])  Assessment & Plan  Nutrition consult  VTE Prophylaxis: Enoxaparin (Lovenox)  / sequential compression device   Code Status: No Order full Code as discussed  POLST: There is no POLST form on file for this patient (pre-hospital)    Anticipated Length of Stay:  Patient will be admitted on an Observation basis with an anticipated length of stay of  less than 2 midnights  Justification for Hospital Stay: Please see detailed plans noted above      Chief Complaint:     High blood pressure, shortness of breath  History of Present Illness:  Paty Skinner is a 47 y o  female who has a past medical history significant for hypertension however has been questionable about the compliance  Reportedly, she does take all these medications, there is also increase in weight gain approximately 15 lb within the past 4 to 6 months  That said, the patient has been having shortness of breath with note of elevated blood pressure  The patient was in her endocrinologist's office today when her blood pressure was noted to be 180/110  However she was also coughing and therefore the blood pressure seem to be persistently high even as high as 200  That said, the patient was given additional dose of her beta-blocker  Currently her blood pressure stays at approximately 145 to 165  Despite shortness of breath with activity, she is able to lie flat on bed  At home, reportedly she uses 1 pillow at night  Patient does not think that her legs are becoming more swollen  Her lifestyle is very sedentary and at work, 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  Currently, patient is tearful as she is told to stay in hospital   Otherwise, she denies any active shortness of breath  She is also able to speak in long sentences  She does not manifest with the air hunger nor is there is any abdominal breathing  She is able to demonstrate lying flat on bed with good ability to breathe  Review of Systems:    Constitutional:  Denies fever or chills   Eyes:  Denies change in visual acuity   HENT:  Denies nasal congestion or sore throat   Respiratory:  A while ago had some coughing fits while in endocrinology office; shortness of breath with strenuous activity  Cardiovascular:  Chest pain intermittent  Not associated with activity  Denies any increasing edema      GI:  Denies abdominal pain, nausea, vomiting, bloody stools or diarrhea   :  Denies dysuria   Musculoskeletal:  Denies back pain or joint pain   Integument:  Denies rash   Neurologic:  Denies headache, focal weakness or sensory changes   Endocrine:  Denies polyuria or polydipsia   Lymphatic:  Denies swollen glands   Psychiatric:  Denies depression or anxiety     Past Medical and Surgical History:   Past Medical History:   Diagnosis Date    Hypertension      Past Surgical History:   Procedure Laterality Date    KNEE ARTHROSCOPY W/ ACL RECONSTRUCTION         Meds/Allergies:    (Not in a hospital admission)  Ascorbic Acid (VITAMIN C) 1000 MG tablet Take 1,000 mg by mouth daily Wili Velazquez MD Reordered   Ordered as: ascorbic acid (VITAMIN C) tablet 1,000 mg - 1,000 mg, Oral, Daily, First dose on Tue 2/11/20 at 0900   cholecalciferol (VITAMIN D3) 400 units tablet Take 5,000 Units by mouth daily Wili Velazquez MD Reordered   Ordered as: cholecalciferol (VITAMIN D3) tablet 5,000 Units - 5,000 Units, Oral, Daily, First dose on Tue 2/11/20 at 0900 25 mcg = 1000 units of cholecalciferol (Vitamin D3)    ferrous sulfate 325 (65 Fe) mg tablet Take 325 mg by mouth daily Wili Velazquez MD Reordered   Ordered as: ferrous sulfate tablet 325 mg - 325 mg, Oral, Daily, First dose on Tue 2/11/20 at 0900 Food and drug interaction, teaching and documentation must be done; Administer 2 hours before or 4 hours after antacids; Avoid administration w/ cereals,dietary fiber,tea,coffee,eggs,or milk      hydrochlorothiazide (HYDRODIURIL) 25 mg tablet Take 25 mg by mouth daily Wili Velazquez MD Reordered   Ordered as: hydrochlorothiazide (HYDRODIURIL) tablet 25 mg - 25 mg, Oral, Daily, First dose on Tue 2/11/20 at 0900 LOOK ALIKE SOUND ALIKE Hold for systolic blood pressure less than (mmHg): 110   lisinopril-hydrochlorothiazide (PRINZIDE,ZESTORETIC) 10-12 5 MG per tablet Take 1 tablet by mouth daily Wili Velazquez MD Replaced   Ordered as: lisinopril (ZESTRIL) tablet 20 mg - 20 mg, Oral, Daily, First dose on Tue 2/11/20 at [address] [place] [address] for systolic blood pressure less than (mmHg): 110   metoprolol succinate (TOPROL-XL) 100 mg 24 hr tablet Take 100 mg by mouth daily Keyla Deleon MD Reordered   Ordered as: metoprolol succinate (TOPROL-XL) 24 hr tablet 100 mg - 100 mg, Oral, Daily, First dose on Tue 2/11/20 at 0900 Hold for heart rate less than 50 beats per minute  Do not crush or chew  LOOK ALIKE SOUND ALIKE MED Hold for systolic blood pressure less than (mmHg): 110   pyridoxine (B-6) 100 MG tablet Take 100 mg by mouth daily Keyla Deleon MD Reordered   Ordered as: pyridoxine (VITAMIN B6) tablet 100 mg - 100 mg, Oral, Daily, First dose on Tue 2/11/20 at 0900   nitroglycerin (NITROSTAT) 0 4 mg SL tablet Place 0 4 mg under the tongue every 5 (five) minutes as needed for chest pain Keyla Deleon MD Reordered   Ordered as: nitroglycerin (NITROSTAT) SL tablet 0 4 mg - 0 4 mg, Sublingual, Every 5 minutes PRN, chest pain, Starting Tue 2/11/20 at 0000 May administer up to 3 doses, then call physician  Allergies: No Known Allergies  History:  Marital Status:    Occupation:  Managerial work, desk job  Patient Pre-hospital Living Situation:  Lives at home with a roommate friend  Patient Pre-hospital Level of Mobility:  Ambulatory  Patient Pre-hospital Diet Restrictions:  Regular diet  Substance Use History:   Social History     Substance and Sexual Activity   Alcohol Use Not Currently     Social History     Tobacco Use   Smoking Status Never Smoker   Smokeless Tobacco Never Used     Social History     Substance and Sexual Activity   Drug Use Not on file       Family History:  History reviewed  No pertinent family history  Physical Exam:     Vitals:   Blood Pressure: 153/75 (02/10/20 2240)  Pulse: 85 (02/10/20 2240)  Temperature: 98 9 °F (37 2 °C) (02/10/20 1817)  Temp Source: Oral (02/10/20 1817)  Respirations: 19 (02/10/20 2240)  Weight - Scale: 102 kg (224 lb) (02/10/20 1817)  SpO2: 97 % (02/10/20 2240)    Constitutional:  Well developed, morbidly obese, no acute distress, tearful, no abdominal breathing    non-toxic appearance   Eyes:  PERRL, conjunctiva normal   HENT:  Atraumatic, external ears normal, nose normal, oropharynx moist, no pharyngeal exudates  Neck- normal range of motion, no tenderness, supple   Respiratory:  No respiratory distress, normal breath sounds, no rales, no wheezing   Cardiovascular:  Normal rate, normal rhythm, no murmurs, no gallops, no rubs   GI:  Soft, nondistended, normal bowel sounds, nontender, no organomegaly, no mass, no rebound, no guarding   :  No costovertebral angle tenderness   Musculoskeletal:  Not grossly edematous from fluid, no tenderness, no deformities  Back- no tenderness  Integument:  Well hydrated, no rash   Lymphatic:  No lymphadenopathy noted   Neurologic:  Alert &awake, communicative, CN 2-12 normal, normal motor function, normal sensory function, no focal deficits noted   Psychiatric:  Speech and behavior appropriate       Lab Results: I have personally reviewed pertinent reports  Results from last 7 days   Lab Units 02/10/20  2225   WBC Thousand/uL 10 19*   HEMOGLOBIN g/dL 11 8   HEMATOCRIT % 36 3   PLATELETS Thousands/uL 254   NEUTROS PCT % 63   LYMPHS PCT % 25   MONOS PCT % 10   EOS PCT % 2     Results from last 7 days   Lab Units 02/10/20  2225   POTASSIUM mmol/L 4 0   CHLORIDE mmol/L 107   CO2 mmol/L 26   BUN mg/dL 10   CREATININE mg/dL 0 69   CALCIUM mg/dL 8 8   ALK PHOS U/L 77   ALT U/L 17   AST U/L 15     Results from last 7 days   Lab Units 02/10/20  2225   INR  0 98       EKG:  Sinus rhythm with no acute ST T-wave changes however poor progression of QRS which may be indicative of beginning LVH    Imaging: Personally reviewed chest x-ray which is clear  No results found  ** Please Note: Dragon 360 Dictation voice to text software was used in the creation of this document   **

## 2020-02-11 NOTE — PLAN OF CARE
Problem: Potential for Falls  Goal: Patient will remain free of falls  Description  INTERVENTIONS:  - Assess patient frequently for physical needs  -  Identify cognitive and physical deficits and behaviors that affect risk of falls    -  Eldred fall precautions as indicated by assessment   - Educate patient/family on patient safety including physical limitations  - Instruct patient to call for assistance with activity based on assessment  - Modify environment to reduce risk of injury  - Consider OT/PT consult to assist with strengthening/mobility  Outcome: Progressing     Problem: CARDIOVASCULAR - ADULT  Goal: Maintains optimal cardiac output and hemodynamic stability  Description  INTERVENTIONS:  - Monitor I/O, vital signs and rhythm  - Monitor for S/S and trends of decreased cardiac output  - Administer and titrate ordered vasoactive medications to optimize hemodynamic stability  - Assess quality of pulses, skin color and temperature  - Assess for signs of decreased coronary artery perfusion  - Instruct patient to report change in severity of symptoms  Outcome: Progressing

## 2020-02-11 NOTE — ASSESSMENT & PLAN NOTE
· We continue metoprolol succinate 100 mg daily; hydrochlorothiazide 25 mg tablet daily  She was also on lisinopril hydrochlorothiazide 10/12 5 however with the redundant dosing of hydrochlorothiazide; Changed to lisinopril however increasing it to 20 daily  · As blood pressure has significantly improved with new medication will order echocardiogram outpatient follow-up with Cardiology team  · Hydralazine 10 mg every 4 hours is ordered for systolic blood pressure greater than 165     · At discharge will discontinue combination medication as above in continue lisinopril with close follow-up with PCP and cardiology team

## 2020-02-11 NOTE — ASSESSMENT & PLAN NOTE
· Patient claims shortness of breath this afternoon but she was also coughing  · BNP noted to be normal   Weight gain approximately 15 lb within the past 6 months  · Most likely shortness of breath is secondary to exercise intolerance and would most likely need weight loss in the future      · TSH relatively low, T4 normal; recommend continued monitoring with PCP  · Lifestyle modifications with continued exercise and healthy diet

## 2020-02-11 NOTE — ED ATTENDING ATTESTATION
2/10/2020  I, Sanjay Sigala MD, saw and evaluated the patient  I have discussed the patient with the resident/non-physician practitioner and agree with the resident's/non-physician practitioner's findings, Plan of Care, and MDM as documented in the resident's/non-physician practitioner's note, except where noted  All available labs and Radiology studies were reviewed  I was present for key portions of any procedure(s) performed by the resident/non-physician practitioner and I was immediately available to provide assistance  At this point I agree with the current assessment done in the Emergency Department  I have conducted an independent evaluation of this patient a history and physical is as follows:    OA: 46 y/o f with a h/o HTN with subclinical hypothyroidism who presents with HTN  BP was at her endocrinologist's office today and noted to have BP in the 180s/110-120  Pt otherwise denies any current CP but does admit to occasional CP for which she was previously prescribed NTG that she does not routinely take  Also with SOb/TAVARES which she experienced today while walking into the office  Otherwise she denies n/v/dizziness/lighteadheadedness  No visual changes  No focal numbness/weakness/tingling  Notes compliance with medications  Pt also took extra of her BP medications while at the endocrinologist office  Pe, well developed f in NAD, VSS, NC/AT, MMM, neck supple/FROM, RR, lungs CTAb, -w/r, no accessory muscle use, abd soft, NT/ND, +Bs, -r/g, trace b/l LE edema, + 2 distal pulses, capillary refill < 2 sec, AAO   A/p HTN with SOB, concern for HTN urgency, check labs, EKG, CXR, treat accordingly    ED Course         Critical Care Time  Procedures

## 2020-02-11 NOTE — DISCHARGE INSTR - AVS FIRST PAGE
· Please go to outpatient stress test when they call to arrange as they will call and assist with scheduling at discharge  · Recommend using numbers provided in discharge packet to call and establish Pulmonary and Cardiology primary teams; I attempted to look for [facility] affiliated team in [place] but was unable to do so  If you would like to call   [facility] general scheduling and ask if there are practices or look on [facility] Find A Provider online that is fine, but the numbers I provided are closest to you hometown  · Please continue to check BP regularly and keep diary of it to bring to Cardiology and PCP appointments for assistance with further BP medication titration     · Please continue to exercise and eat healthy in attempt for continued weight loss

## 2020-02-11 NOTE — ED PROVIDER NOTES
History  Chief Complaint   Patient presents with    Hypertension     Pt was sent from her doctors office for htn, last reading 188/124  Pt reports she has occasional headaches, and has been taking her BP meds as prescribed      This is a 70-year-old female with a history of chronic hypertension referred to the emergency department from her endocrinology office with concern for malignant hypertension  Reportedly when she showed up for her appointment today she was short of breath and fatigue on arrival   Initial vital signs revealed a blood pressure of 170/110  They reassessed her frequently during her visit and her pressure continued to rise up to 188/124  States that her shortness of breath went away  Patient admits to frequent episodes of shortness of breath, particularly with exertion  Occasional chest pain as well  Chest pain sometimes occurs at rest   She has had increased swelling in her lower extremities over the last several months  Notes weight gain  Denies any orthopnea  Denies any lightheadedness palpitations or near syncope  No abdominal pain or back pain  No other complaints on review of systems  No recent medication changes  Has not seen her primary care physician for any of these complaints  Has not seen a cardiologist in over a year  Has not had an echocardiogram several years  Prior to Admission Medications   Prescriptions Last Dose Informant Patient Reported? Taking?    Ascorbic Acid (VITAMIN C) 1000 MG tablet 2/10/2020 at Unknown time  Yes Yes   Sig: Take 1,000 mg by mouth daily   cholecalciferol (VITAMIN D3) 400 units tablet 2/10/2020 at Unknown time  Yes Yes   Sig: Take 5,000 Units by mouth daily   ferrous sulfate 325 (65 Fe) mg tablet 2/10/2020 at Unknown time  Yes Yes   Sig: Take 325 mg by mouth daily   hydrochlorothiazide (HYDRODIURIL) 25 mg tablet 2/10/2020 at Unknown time  Yes Yes   Sig: Take 25 mg by mouth daily   lisinopril-hydrochlorothiazide (PRINZIDE,ZESTORETIC) 10-12 5 MG per tablet 2/10/2020 at Unknown time  Yes Yes   Sig: Take 1 tablet by mouth daily   metoprolol succinate (TOPROL-XL) 100 mg 24 hr tablet 2/10/2020 at Unknown time  Yes Yes   Sig: Take 100 mg by mouth daily   nitroglycerin (NITROSTAT) 0 4 mg SL tablet Unknown at Unknown time  Yes No   Sig: Place 0 4 mg under the tongue every 5 (five) minutes as needed for chest pain   pyridoxine (B-6) 100 MG tablet 2/10/2020 at Unknown time  Yes Yes   Sig: Take 100 mg by mouth daily      Facility-Administered Medications: None       Past Medical History:   Diagnosis Date    Hypertension        Past Surgical History:   Procedure Laterality Date    KNEE ARTHROSCOPY W/ ACL RECONSTRUCTION         History reviewed  No pertinent family history  I have reviewed and agree with the history as documented  Social History     Tobacco Use    Smoking status: Former Smoker     Packs/day: 0 25     Years: 24 00     Pack years: 6 00     Types: Cigarettes    Smokeless tobacco: Never Used    Tobacco comment: quit 15+ years ago   Substance Use Topics    Alcohol use: Not Currently     Alcohol/week: 2 0 standard drinks     Types: 1 Glasses of wine, 1 Shots of liquor per week     Frequency: Monthly or less     Drinks per session: 1 or 2     Binge frequency: Less than monthly    Drug use: Never        Review of Systems   Constitutional: Negative for chills and fever  HENT: Negative for congestion and sore throat  Eyes: Negative for visual disturbance  Respiratory: Positive for shortness of breath  Negative for cough  Cardiovascular: Negative for chest pain and palpitations  Gastrointestinal: Negative for abdominal pain, diarrhea, nausea and vomiting  Genitourinary: Negative for difficulty urinating and dysuria  Musculoskeletal: Negative for myalgias  Skin: Negative for rash  Neurological: Negative for weakness, light-headedness, numbness and headaches         Physical Exam  ED Triage Vitals [02/10/20 25 125854 Temperature Pulse Respirations Blood Pressure SpO2   98 9 °F (37 2 °C) 79 18 (!) 184/97 98 %      Temp Source Heart Rate Source Patient Position - Orthostatic VS BP Location FiO2 (%)   Oral Monitor Sitting Left arm --      Pain Score       No Pain             Orthostatic Vital Signs  Vitals:    02/10/20 2017 02/10/20 2021 02/10/20 2240 02/11/20 0025   BP: (!) 152/109 (!) 152/109 153/75 158/81   Pulse: 74 74 85 69   Patient Position - Orthostatic VS: Sitting Sitting Sitting        Physical Exam   Constitutional: She is oriented to person, place, and time  She appears well-developed and well-nourished  No distress  HENT:   Head: Normocephalic and atraumatic  Mouth/Throat: Oropharynx is clear and moist    Eyes: Pupils are equal, round, and reactive to light  EOM are normal    Neck: Normal range of motion  Neck supple  Cardiovascular: Normal rate, regular rhythm, normal heart sounds and intact distal pulses  Pulmonary/Chest: Effort normal and breath sounds normal  No respiratory distress  Abdominal: Soft  Bowel sounds are normal  There is no tenderness  Musculoskeletal: Normal range of motion  She exhibits edema (2+)  She exhibits no tenderness  Neurological: She is alert and oriented to person, place, and time  Skin: Skin is warm and dry  Capillary refill takes less than 2 seconds  Psychiatric: She has a normal mood and affect  Nursing note and vitals reviewed        ED Medications  Medications   ascorbic acid (VITAMIN C) tablet 1,000 mg (has no administration in time range)   cholecalciferol (VITAMIN D3) tablet 5,000 Units (has no administration in time range)   ferrous sulfate tablet 325 mg (has no administration in time range)   hydrochlorothiazide (HYDRODIURIL) tablet 25 mg (has no administration in time range)   lisinopril (ZESTRIL) tablet 20 mg (has no administration in time range)   metoprolol succinate (TOPROL-XL) 24 hr tablet 100 mg (has no administration in time range)   nitroglycerin (NITROSTAT) SL tablet 0 4 mg (has no administration in time range)   pyridoxine (VITAMIN B6) tablet 100 mg (has no administration in time range)   hydrALAZINE (APRESOLINE) injection 10 mg (has no administration in time range)   senna (SENOKOT) tablet 8 6 mg (has no administration in time range)   ondansetron (ZOFRAN) injection 4 mg (has no administration in time range)   aluminum-magnesium hydroxide-simethicone (MYLANTA) 200-200-20 mg/5 mL oral suspension 30 mL (has no administration in time range)   enoxaparin (LOVENOX) subcutaneous injection 40 mg (has no administration in time range)   acetaminophen (TYLENOL) tablet 650 mg (has no administration in time range)   acetaminophen (TYLENOL) tablet 975 mg (975 mg Oral Given 2/10/20 2349)       Diagnostic Studies  Results Reviewed     Procedure Component Value Units Date/Time    TSH [[id number]]  (Abnormal) Collected:  02/10/20 2225    Lab Status:  Final result Specimen:  Blood from Arm, Right Updated:  02/10/20 2321     TSH 3RD GENERATON 0 028 uIU/mL     Narrative:       Patients undergoing fluorescein dye angiography may retain small amounts of fluorescein in the body for 48-72 hours post procedure  Samples containing fluorescein can produce falsely depressed TSH values  If the patient had this procedure,a specimen should be resubmitted post fluorescein clearance        Comprehensive metabolic panel [[id number]]  (Abnormal) Collected:  02/10/20 2225    Lab Status:  Final result Specimen:  Blood from Arm, Right Updated:  02/10/20 2315     Sodium 138 mmol/L      Potassium 4 0 mmol/L      Chloride 107 mmol/L      CO2 26 mmol/L      ANION GAP 5 mmol/L      BUN 10 mg/dL      Creatinine 0 69 mg/dL      Glucose 88 mg/dL      Calcium 8 8 mg/dL      AST 15 U/L      ALT 17 U/L      Alkaline Phosphatase 77 U/L      Total Protein 7 5 g/dL      Albumin 3 0 g/dL      Total Bilirubin 0 42 mg/dL      eGFR 99 ml/min/1 73sq m     Narrative:       Lex guidelines for Chronic Kidney Disease (CKD):     Stage 1 with normal or high GFR (GFR > 90 mL/min/1 73 square meters)    Stage 2 Mild CKD (GFR = 60-89 mL/min/1 73 square meters)    Stage 3A Moderate CKD (GFR = 45-59 mL/min/1 73 square meters)    Stage 3B Moderate CKD (GFR = 30-44 mL/min/1 73 square meters)    Stage 4 Severe CKD (GFR = 15-29 mL/min/1 73 square meters)    Stage 5 End Stage CKD (GFR <15 mL/min/1 73 square meters)  Note: GFR calculation is accurate only with a steady state creatinine    Troponin I [[id number]]  (Normal) Collected:  02/10/20 2225    Lab Status:  Final result Specimen:  Blood from Arm, Right Updated:  02/10/20 2306     Troponin I <0 02 ng/mL     NT-BNP PRO [[id number]]  (Normal) Collected:  02/10/20 2226    Lab Status:  Final result Specimen:  Blood from Arm, Right Updated:  02/10/20 2306     NT-proBNP 75 pg/mL     Protime-INR [[id number]]  (Normal) Collected:  02/10/20 2225    Lab Status:  Final result Specimen:  Blood from Arm, Right Updated:  02/10/20 2304     Protime 12 6 seconds      INR 0 98    APTT [[id number]]  (Normal) Collected:  02/10/20 2225    Lab Status:  Final result Specimen:  Blood from Arm, Right Updated:  02/10/20 2304     PTT 26 seconds     POCT urinalysis dipstick [[id number]]  (Normal) Resulted:  02/10/20 2240    Lab Status:  Final result Updated:  02/10/20 2240    Urine Macroscopic, POC [[id number]] Collected:  02/10/20 2241    Lab Status:  Final result Specimen:  Urine Updated:  02/10/20 2238     Color, UA Yellow     Clarity, UA Clear     pH, UA 5 0     Leukocytes, UA Negative     Nitrite, UA Negative     Protein, UA Negative mg/dl      Glucose, UA Negative mg/dl      Ketones, UA Negative mg/dl      Urobilinogen, UA 0 2 E U /dl      Bilirubin, UA Negative     Blood, UA Negative     Specific Gravity, UA 1 020    Narrative:       CLINITEK RESULT    CBC and differential [[id number]]  (Abnormal) Collected:  02/10/20 2225    Lab Status:  Final result Specimen:  Blood from Arm, Right Updated:  02/10/20 2235     WBC 10 19 Thousand/uL      RBC 4 18 Million/uL      Hemoglobin 11 8 g/dL      Hematocrit 36 3 %      MCV 87 fL      MCH 28 2 pg      MCHC 32 5 g/dL      RDW 13 3 %      MPV 9 8 fL      Platelets 389 Thousands/uL      nRBC 0 /100 WBCs      Neutrophils Relative 63 %      Immat GRANS % 0 %      Lymphocytes Relative 25 %      Monocytes Relative 10 %      Eosinophils Relative 2 %      Basophils Relative 0 %      Neutrophils Absolute 6 39 Thousands/µL      Immature Grans Absolute 0 03 Thousand/uL      Lymphocytes Absolute 2 53 Thousands/µL      Monocytes Absolute 1 04 Thousand/µL      Eosinophils Absolute 0 17 Thousand/µL      Basophils Absolute 0 03 Thousands/µL                  XR chest 2 views   ED Interpretation by Irene Hyman MD (02/10 5386)   No acute cardiopulmonary disease  Procedures  ECG 12 Lead Documentation Only  Date/Time: 2/11/2020 1:23 AM  Performed by: Irene Hyman MD  Authorized by: Irene Hyman MD     Comments: This EKG was interpreted by me  The EKG demonstrates Normal sinus rhythm, normal intervals and axis, normal QRS, no acute ST changes present  ED Course                               MDM  Number of Diagnoses or Management Options  TAVARES (dyspnea on exertion): Uncontrolled hypertension:   Diagnosis management comments: 72-year-old female presenting emergency department for evaluation of dyspnea on exertion lower extremity edema and hypertension that has been uncontrolled  Took an extra dose of her Bystolic medication at her endocrinology office prior to arrival to the emergency department  Blood pressure did trend down  Labs relatively unremarkable  Discussed further management with the patient and offered admission which the patient accepted  Discussed with Internal Medicine who agreed to admit          Disposition  Final diagnoses:   Uncontrolled hypertension   TAVARES (dyspnea on exertion)     Time reflects when diagnosis was documented in both MDM as applicable and the Disposition within this note     Time User Action Codes Description Comment    2/10/2020 11:58 PM Silvestre Bumps Add [I10] Uncontrolled hypertension     2/10/2020 11:58 PM Natasha Hollis Add [R06 09] TAVARES (dyspnea on exertion)     2/11/2020 12:04 AM Gennett Boast Add [E66 01] Morbid obesity due to excess calories [facility])       ED Disposition     ED Disposition Condition Date/Time Comment    Admit Stable Mon Feb 10, 2020 11:58 PM Case was discussed with FERNANDO and the patient's admission status was agreed to be Admission Status: observation status to the service of Dr Lea Roper   Follow-up Information    None         Current Discharge Medication List      CONTINUE these medications which have NOT CHANGED    Details   Ascorbic Acid (VITAMIN C) 1000 MG tablet Take 1,000 mg by mouth daily      cholecalciferol (VITAMIN D3) 400 units tablet Take 5,000 Units by mouth daily      ferrous sulfate 325 (65 Fe) mg tablet Take 325 mg by mouth daily      hydrochlorothiazide (HYDRODIURIL) 25 mg tablet Take 25 mg by mouth daily      lisinopril-hydrochlorothiazide (PRINZIDE,ZESTORETIC) 10-12 5 MG per tablet Take 1 tablet by mouth daily      metoprolol succinate (TOPROL-XL) 100 mg 24 hr tablet Take 100 mg by mouth daily      pyridoxine (B-6) 100 MG tablet Take 100 mg by mouth daily      nitroglycerin (NITROSTAT) 0 4 mg SL tablet Place 0 4 mg under the tongue every 5 (five) minutes as needed for chest pain           No discharge procedures on file  ED Provider  Attending physically available and evaluated Paty Skinner I managed the patient along with the ED Attending      Electronically Signed by         Irene Hyman MD  02/11/20 6086

## 2020-02-11 NOTE — PROGRESS NOTES
Pt not feeling too well today    Says she doesn't feel any better than when she came into the hospital     02/11/20 1000   Clinical Encounter Type   Visited With Patient   Routine Visit Introduction   Continue Visiting No   Temple Encounters   Temple Needs Prayer

## 2020-02-12 NOTE — UTILIZATION REVIEW
Notification of Discharge  This is a Notification of Discharge from our facility [address]  Please be advised that this patient has been discharge from our facility  Below you will find the admission and discharge date and time including the patients disposition  PRESENTATION DATE: 2/10/2020  8:00 PM  OBS ADMISSION DATE:   IP ADMISSION DATE: N/A N/A   DISCHARGE DATE: 2/11/2020 11:11 AM  DISPOSITION: Home/Self Care Home/Self Care   Admission Orders listed below:  Admission Orders (From admission, onward)     Ordered        02/10/20 9300  Place in Observation  Once                   Please contact the UR Department if additional information is required to close this patient's authorization/case  [address] Utilization Review Department  Main: [phone] x carefully listen to the prompts  All voicemails are confidential   [email or web address]  Send all requests for admission clinical reviews, approved or denied determinations and any other requests to dedicated fax number below belonging to the campus where the patient is receiving treatment   List of dedicated fax numbers:  [address] DENIALS (Administrative/Medical Necessity) [phone]   [address] (Maternity/NICU/Pediatrics) [phone]   Deneise Notice [phone]   Satinder Powers [phone]   Cornel Spann [phone]   Christiano Townsend [address] [phone]   [facility]  [phone]   [address] [facility]  [address] [facility] [address] [facility] [phone]

## 2020-03-24 LAB — HBA1C MFR BLD HPLC: 5.8 %

## 2020-04-04 LAB
25(OH)D3 SERPL-MCNC: 33 NG/ML (ref 30–100)
ALBUMIN SERPL-MCNC: 3.5 G/DL (ref 3.6–5.1)
ALBUMIN/GLOB SERPL: 1 (CALC) (ref 1–2.5)
ALP SERPL-CCNC: 79 U/L (ref 37–153)
ALT SERPL-CCNC: 16 U/L (ref 6–29)
APPEARANCE UR: CLEAR
AST SERPL-CCNC: 17 U/L (ref 10–35)
BACTERIA UR CULT: NORMAL
BACTERIA UR QL AUTO: NORMAL /HPF
BASOPHILS # BLD AUTO: 8 CELLS/UL (ref 0–200)
BASOPHILS NFR BLD AUTO: 0.1 %
BILIRUB SERPL-MCNC: 0.4 MG/DL (ref 0.2–1.2)
BILIRUB UR QL STRIP: NEGATIVE
BUN SERPL-MCNC: 18 MG/DL (ref 7–25)
BUN/CREAT SERPL: ABNORMAL (CALC) (ref 6–22)
CALCIUM SERPL-MCNC: 9.1 MG/DL (ref 8.6–10.4)
CALCIUM SERPL-MCNC: 9.1 MG/DL (ref 8.6–10.4)
CHLORIDE SERPL-SCNC: 99 MMOL/L (ref 98–110)
CHOLEST SERPL-MCNC: 151 MG/DL
CHOLEST/HDLC SERPL: 3.1 (CALC)
CO2 SERPL-SCNC: 31 MMOL/L (ref 20–32)
COLOR UR: YELLOW
CREAT SERPL-MCNC: 0.76 MG/DL (ref 0.5–1.05)
EOSINOPHIL # BLD AUTO: 108 CELLS/UL (ref 15–500)
EOSINOPHIL NFR BLD AUTO: 1.3 %
ERYTHROCYTE [DISTWIDTH] IN BLOOD BY AUTOMATED COUNT: 13.9 % (ref 11–15)
FERRITIN SERPL-MCNC: 10 NG/ML (ref 16–232)
FOLATE SERPL-MCNC: 8.7 NG/ML
GLOBULIN SER CALC-MCNC: 3.6 G/DL (CALC) (ref 1.9–3.7)
GLUCOSE SERPL-MCNC: 98 MG/DL (ref 65–99)
GLUCOSE UR QL STRIP: NEGATIVE
HAV AB SER QL IA: NORMAL
HBA1C MFR BLD: 5.8 % OF TOTAL HGB
HBV SURFACE AB SER QL IA: NORMAL
HCT VFR BLD AUTO: 37.7 % (ref 35–45)
HCV RNA SERPL NAA+PROBE-ACNC: <15 IU/ML
HCV RNA SERPL NAA+PROBE-LOG IU: <1.18 LOG IU/ML
HDLC SERPL-MCNC: 49 MG/DL
HGB BLD-MCNC: 12.2 G/DL (ref 11.7–15.5)
HGB UR QL STRIP: NEGATIVE
HYALINE CASTS #/AREA URNS LPF: NORMAL /LPF
IRON SATN MFR SERPL: 11 % (CALC) (ref 16–45)
IRON SERPL-MCNC: 42 MCG/DL (ref 45–160)
KETONES UR QL STRIP: NEGATIVE
LDLC SERPL CALC-MCNC: 82 MG/DL (CALC)
LEUKOCYTE ESTERASE UR QL STRIP: NEGATIVE
LYMPHOCYTES # BLD AUTO: 1735 CELLS/UL (ref 850–3900)
LYMPHOCYTES NFR BLD AUTO: 20.9 %
MCH RBC QN AUTO: 28 PG (ref 27–33)
MCHC RBC AUTO-ENTMCNC: 32.4 G/DL (ref 32–36)
MCV RBC AUTO: 86.7 FL (ref 80–100)
MEV IGG SER IA-ACNC: >300 AU/ML
MONOCYTES # BLD AUTO: 855 CELLS/UL (ref 200–950)
MONOCYTES NFR BLD AUTO: 10.3 %
MUV IGG SER IA-ACNC: <9 AU/ML
NEUTROPHILS # BLD AUTO: 5594 CELLS/UL (ref 1500–7800)
NEUTROPHILS NFR BLD AUTO: 67.4 %
NITRITE UR QL STRIP: NEGATIVE
NONHDLC SERPL-MCNC: 102 MG/DL (CALC)
PH UR STRIP: 7 [PH] (ref 5–8)
PLATELET # BLD AUTO: 303 THOUSAND/UL (ref 140–400)
PMV BLD REES-ECKER: 10.5 FL (ref 7.5–12.5)
POTASSIUM SERPL-SCNC: 3.3 MMOL/L (ref 3.5–5.3)
PROT SERPL-MCNC: 7.1 G/DL (ref 6.1–8.1)
PROT UR QL STRIP: NEGATIVE
PTH-INTACT SERPL-MCNC: 48 PG/ML (ref 14–64)
RBC # BLD AUTO: 4.35 MILLION/UL (ref 3.8–5.1)
RBC #/AREA URNS HPF: NORMAL /HPF
RUBV IGG SERPL IA-ACNC: 9.47 INDEX
SL AMB EGFR AFRICAN AMERICAN: 103 ML/MIN/1.73M2
SL AMB EGFR NON AFRICAN AMERICAN: 89 ML/MIN/1.73M2
SODIUM SERPL-SCNC: 138 MMOL/L (ref 135–146)
SP GR UR STRIP: 1.01 (ref 1–1.03)
SQUAMOUS #/AREA URNS HPF: NORMAL /HPF
TIBC SERPL-MCNC: 390 MCG/DL (CALC) (ref 250–450)
TRIGL SERPL-MCNC: 107 MG/DL
VIT B12 SERPL-MCNC: 594 PG/ML (ref 200–1100)
VIT B6 SERPL-MCNC: 5.5 NG/ML (ref 2.1–21.7)
VIT C SERPL-MCNC: 0.6 MG/DL (ref 0.3–2.7)
WBC # BLD AUTO: 8.3 THOUSAND/UL (ref 3.8–10.8)
WBC #/AREA URNS HPF: NORMAL /HPF

## 2020-04-17 RX ORDER — PYRIDOXINE HCL (VITAMIN B6) 100 MG
100 TABLET ORAL DAILY
COMMUNITY
End: 2020-04-20

## 2020-04-17 RX ORDER — AMLODIPINE BESYLATE 10 MG/1
10 TABLET ORAL DAILY
COMMUNITY
End: 2021-01-29 | Stop reason: SDUPTHER

## 2020-04-17 RX ORDER — METOPROLOL SUCCINATE 50 MG/1
1.5 TABLET, EXTENDED RELEASE ORAL DAILY
COMMUNITY
End: 2021-09-30 | Stop reason: SDUPTHER

## 2020-04-17 RX ORDER — FERROUS SULFATE 325(65) MG
325 TABLET ORAL
COMMUNITY
End: 2020-04-20

## 2020-04-17 RX ORDER — POTASSIUM CHLORIDE 750 MG/1
10 CAPSULE, EXTENDED RELEASE ORAL DAILY
COMMUNITY
End: 2020-04-20

## 2020-04-17 RX ORDER — BENZONATATE 100 MG/1
100 CAPSULE ORAL 3 TIMES DAILY PRN
COMMUNITY
End: 2020-06-19

## 2020-04-17 RX ORDER — NITROGLYCERIN 0.4 MG/1
0.4 TABLET SUBLINGUAL
COMMUNITY
End: 2021-01-29 | Stop reason: ALTCHOICE

## 2020-04-17 RX ORDER — CHLORTHALIDONE 25 MG/1
25 TABLET ORAL DAILY
COMMUNITY
End: 2021-01-29 | Stop reason: SDUPTHER

## 2020-04-17 RX ORDER — BUPROPION HYDROCHLORIDE 150 MG/1
150 TABLET ORAL DAILY
COMMUNITY
End: 2020-04-20 | Stop reason: SDUPTHER

## 2020-04-17 RX ORDER — MULTIVIT WITH MINERALS/LUTEIN
1000 TABLET ORAL DAILY
COMMUNITY
End: 2020-04-20

## 2020-04-20 ENCOUNTER — TELEMEDICINE (OUTPATIENT)
Dept: FAMILY MEDICINE CLINIC | Facility: CLINIC | Age: 55
End: 2020-04-20
Payer: COMMERCIAL

## 2020-04-20 DIAGNOSIS — E54 ASCORBIC ACID DEFICIENCY: ICD-10-CM

## 2020-04-20 DIAGNOSIS — Z28.39 IMMUNIZATION DEFICIENCY: ICD-10-CM

## 2020-04-20 DIAGNOSIS — E53.1 PYRIDOXINE DEFICIENCY: ICD-10-CM

## 2020-04-20 DIAGNOSIS — F41.1 GAD (GENERALIZED ANXIETY DISORDER): ICD-10-CM

## 2020-04-20 DIAGNOSIS — E05.90 SUBCLINICAL HYPERTHYROIDISM: Primary | ICD-10-CM

## 2020-04-20 DIAGNOSIS — E55.9 VITAMIN D DEFICIENCY: ICD-10-CM

## 2020-04-20 DIAGNOSIS — J45.41 MODERATE PERSISTENT REACTIVE AIRWAY DISEASE WITH ACUTE EXACERBATION: ICD-10-CM

## 2020-04-20 DIAGNOSIS — E61.1 IRON DEFICIENCY: ICD-10-CM

## 2020-04-20 DIAGNOSIS — E66.9 OBESITY (BMI 30-39.9): ICD-10-CM

## 2020-04-20 DIAGNOSIS — I10 ESSENTIAL HYPERTENSION: ICD-10-CM

## 2020-04-20 DIAGNOSIS — R73.03 PREDIABETES: ICD-10-CM

## 2020-04-20 PROBLEM — A04.8 HELICOBACTER PYLORI GASTROINTESTINAL TRACT INFECTION: Status: ACTIVE | Noted: 2020-04-20

## 2020-04-20 PROBLEM — N20.0 KIDNEY STONE: Status: ACTIVE | Noted: 2020-04-20

## 2020-04-20 PROCEDURE — 99214 OFFICE O/P EST MOD 30 MIN: CPT | Performed by: FAMILY MEDICINE

## 2020-04-20 RX ORDER — MONTELUKAST SODIUM 10 MG/1
10 TABLET ORAL
Qty: 30 TABLET | Refills: 1 | Status: SHIPPED | OUTPATIENT
Start: 2020-04-20 | End: 2020-05-19

## 2020-04-20 RX ORDER — BUPROPION HYDROCHLORIDE 150 MG/1
150 TABLET ORAL DAILY
Qty: 90 TABLET | Refills: 1 | Status: SHIPPED | OUTPATIENT
Start: 2020-04-20 | End: 2020-06-19 | Stop reason: SDUPTHER

## 2020-04-20 RX ORDER — MULTIVIT WITH MINERALS/LUTEIN
1000 TABLET ORAL DAILY
Qty: 90 TABLET | Refills: 1 | Status: SHIPPED | OUTPATIENT
Start: 2020-04-20 | End: 2021-01-29 | Stop reason: SDUPTHER

## 2020-04-20 RX ORDER — TOPIRAMATE 25 MG/1
25 TABLET ORAL
Qty: 30 TABLET | Refills: 1 | Status: SHIPPED | OUTPATIENT
Start: 2020-04-20 | End: 2020-05-19

## 2020-04-20 RX ORDER — FERROUS SULFATE TAB EC 324 MG (65 MG FE EQUIVALENT) 324 (65 FE) MG
324 TABLET DELAYED RESPONSE ORAL
Qty: 90 TABLET | Refills: 0 | Status: SHIPPED | OUTPATIENT
Start: 2020-04-20 | End: 2020-06-19 | Stop reason: SDUPTHER

## 2020-04-20 RX ORDER — PYRIDOXINE HCL (VITAMIN B6) 100 MG
100 TABLET ORAL DAILY
Qty: 90 TABLET | Refills: 1 | Status: SHIPPED | OUTPATIENT
Start: 2020-04-20 | End: 2022-02-22

## 2020-04-20 RX ORDER — POTASSIUM CHLORIDE 20 MEQ/1
20 TABLET, EXTENDED RELEASE ORAL DAILY
Qty: 30 TABLET | Refills: 5 | Status: SHIPPED | OUTPATIENT
Start: 2020-04-20 | End: 2020-10-24

## 2020-05-19 DIAGNOSIS — E66.9 OBESITY (BMI 30-39.9): ICD-10-CM

## 2020-05-19 DIAGNOSIS — J45.41 MODERATE PERSISTENT REACTIVE AIRWAY DISEASE WITH ACUTE EXACERBATION: ICD-10-CM

## 2020-05-19 RX ORDER — MONTELUKAST SODIUM 10 MG/1
TABLET ORAL
Qty: 30 TABLET | Refills: 1 | Status: SHIPPED | OUTPATIENT
Start: 2020-05-19 | End: 2020-08-19

## 2020-05-19 RX ORDER — TOPIRAMATE 25 MG/1
TABLET ORAL
Qty: 30 TABLET | Refills: 1 | Status: SHIPPED | OUTPATIENT
Start: 2020-05-19 | End: 2020-09-25

## 2020-05-20 ENCOUNTER — TELEMEDICINE (OUTPATIENT)
Dept: FAMILY MEDICINE CLINIC | Facility: CLINIC | Age: 55
End: 2020-05-20
Payer: COMMERCIAL

## 2020-05-20 VITALS — WEIGHT: 215 LBS | BODY MASS INDEX: 39.56 KG/M2 | HEIGHT: 62 IN

## 2020-05-20 DIAGNOSIS — J45.41 MODERATE PERSISTENT REACTIVE AIRWAY DISEASE WITH ACUTE EXACERBATION: ICD-10-CM

## 2020-05-20 DIAGNOSIS — E66.9 OBESITY (BMI 30-39.9): Primary | ICD-10-CM

## 2020-05-20 PROBLEM — J45.901 REACTIVE AIRWAY DISEASE WITH ACUTE EXACERBATION: Status: ACTIVE | Noted: 2020-05-20

## 2020-05-20 PROCEDURE — 99213 OFFICE O/P EST LOW 20 MIN: CPT | Performed by: FAMILY MEDICINE

## 2020-05-20 RX ORDER — PHENTERMINE HYDROCHLORIDE 37.5 MG/1
TABLET ORAL
Qty: 30 TABLET | Refills: 0 | Status: SHIPPED | OUTPATIENT
Start: 2020-05-20 | End: 2020-06-19 | Stop reason: SDUPTHER

## 2020-05-28 DIAGNOSIS — F39 MOOD DISORDER (HCC): Primary | ICD-10-CM

## 2020-05-28 RX ORDER — BUPROPION HYDROCHLORIDE 150 MG/1
150 TABLET ORAL DAILY
COMMUNITY
End: 2020-05-28 | Stop reason: SDUPTHER

## 2020-05-28 RX ORDER — BUPROPION HYDROCHLORIDE 150 MG/1
150 TABLET ORAL DAILY
Qty: 30 TABLET | Refills: 0 | Status: SHIPPED | OUTPATIENT
Start: 2020-05-28 | End: 2020-09-25

## 2020-06-04 PROCEDURE — 88305 TISSUE EXAM BY PATHOLOGIST: CPT | Performed by: STUDENT IN AN ORGANIZED HEALTH CARE EDUCATION/TRAINING PROGRAM

## 2020-06-04 PROCEDURE — 88312 SPECIAL STAINS GROUP 1: CPT | Performed by: STUDENT IN AN ORGANIZED HEALTH CARE EDUCATION/TRAINING PROGRAM

## 2020-06-08 DIAGNOSIS — L60.9 NAIL ABNORMALITY: Primary | ICD-10-CM

## 2020-06-08 PROCEDURE — 87102 FUNGUS ISOLATION CULTURE: CPT | Performed by: FAMILY MEDICINE

## 2020-06-12 ENCOUNTER — LAB REQUISITION (OUTPATIENT)
Dept: LAB | Facility: HOSPITAL | Age: 55
End: 2020-06-12
Payer: COMMERCIAL

## 2020-06-12 DIAGNOSIS — L60.9 NAIL DISORDER, UNSPECIFIED: ICD-10-CM

## 2020-06-16 DIAGNOSIS — B35.1 FUNGAL INFECTION OF NAIL: Primary | ICD-10-CM

## 2020-06-16 RX ORDER — CLOTRIMAZOLE 1 G/ML
1 SOLUTION TOPICAL 2 TIMES DAILY
Qty: 30 ML | Refills: 0 | Status: SHIPPED | OUTPATIENT
Start: 2020-06-16 | End: 2020-09-25

## 2020-06-19 ENCOUNTER — OFFICE VISIT (OUTPATIENT)
Dept: FAMILY MEDICINE CLINIC | Facility: CLINIC | Age: 55
End: 2020-06-19
Payer: COMMERCIAL

## 2020-06-19 VITALS
RESPIRATION RATE: 16 BRPM | SYSTOLIC BLOOD PRESSURE: 116 MMHG | DIASTOLIC BLOOD PRESSURE: 74 MMHG | WEIGHT: 209.6 LBS | TEMPERATURE: 97.5 F | HEART RATE: 80 BPM | BODY MASS INDEX: 37.14 KG/M2 | OXYGEN SATURATION: 98 % | HEIGHT: 63 IN

## 2020-06-19 DIAGNOSIS — Z23 ENCOUNTER FOR IMMUNIZATION: Primary | ICD-10-CM

## 2020-06-19 DIAGNOSIS — E66.9 OBESITY (BMI 30-39.9): ICD-10-CM

## 2020-06-19 DIAGNOSIS — E61.1 IRON DEFICIENCY: ICD-10-CM

## 2020-06-19 DIAGNOSIS — F41.1 GAD (GENERALIZED ANXIETY DISORDER): ICD-10-CM

## 2020-06-19 DIAGNOSIS — I10 ESSENTIAL HYPERTENSION: ICD-10-CM

## 2020-06-19 DIAGNOSIS — J45.41 MODERATE PERSISTENT REACTIVE AIRWAY DISEASE WITH ACUTE EXACERBATION: ICD-10-CM

## 2020-06-19 DIAGNOSIS — Z28.39 IMMUNIZATION DEFICIENCY: ICD-10-CM

## 2020-06-19 DIAGNOSIS — E05.90 SUBCLINICAL HYPERTHYROIDISM: ICD-10-CM

## 2020-06-19 DIAGNOSIS — B35.1 ONYCHOMYCOSIS OF TOENAIL: ICD-10-CM

## 2020-06-19 PROCEDURE — 99214 OFFICE O/P EST MOD 30 MIN: CPT | Performed by: FAMILY MEDICINE

## 2020-06-19 PROCEDURE — 90636 HEP A/HEP B VACC ADULT IM: CPT | Performed by: FAMILY MEDICINE

## 2020-06-19 PROCEDURE — 3078F DIAST BP <80 MM HG: CPT | Performed by: FAMILY MEDICINE

## 2020-06-19 PROCEDURE — 3008F BODY MASS INDEX DOCD: CPT | Performed by: FAMILY MEDICINE

## 2020-06-19 PROCEDURE — 3074F SYST BP LT 130 MM HG: CPT | Performed by: FAMILY MEDICINE

## 2020-06-19 PROCEDURE — 90707 MMR VACCINE SC: CPT | Performed by: FAMILY MEDICINE

## 2020-06-19 PROCEDURE — 90471 IMMUNIZATION ADMIN: CPT | Performed by: FAMILY MEDICINE

## 2020-06-19 PROCEDURE — 90472 IMMUNIZATION ADMIN EACH ADD: CPT | Performed by: FAMILY MEDICINE

## 2020-06-19 RX ORDER — TOPIRAMATE 50 MG/1
50 TABLET, FILM COATED ORAL
Qty: 30 TABLET | Refills: 1 | Status: SHIPPED | OUTPATIENT
Start: 2020-06-19 | End: 2020-08-19

## 2020-06-19 RX ORDER — PHENTERMINE HYDROCHLORIDE 37.5 MG/1
TABLET ORAL
Qty: 30 TABLET | Refills: 0 | Status: SHIPPED | OUTPATIENT
Start: 2020-06-19 | End: 2021-01-29 | Stop reason: SDUPTHER

## 2020-06-19 RX ORDER — TERBINAFINE HYDROCHLORIDE 250 MG/1
250 TABLET ORAL DAILY
Qty: 90 TABLET | Refills: 0 | Status: SHIPPED | OUTPATIENT
Start: 2020-06-19 | End: 2020-09-17

## 2020-06-19 RX ORDER — BUPROPION HYDROCHLORIDE 150 MG/1
150 TABLET ORAL DAILY
Qty: 90 TABLET | Refills: 1 | Status: SHIPPED | OUTPATIENT
Start: 2020-06-19 | End: 2021-01-29 | Stop reason: ALTCHOICE

## 2020-06-19 RX ORDER — FERROUS SULFATE TAB EC 324 MG (65 MG FE EQUIVALENT) 324 (65 FE) MG
324 TABLET DELAYED RESPONSE ORAL
Qty: 90 TABLET | Refills: 0 | Status: SHIPPED | OUTPATIENT
Start: 2020-06-19 | End: 2021-01-29

## 2020-07-13 LAB — FUNGUS SPEC CULT: NORMAL

## 2020-08-17 DIAGNOSIS — E66.9 OBESITY (BMI 30-39.9): ICD-10-CM

## 2020-08-17 DIAGNOSIS — J45.41 MODERATE PERSISTENT REACTIVE AIRWAY DISEASE WITH ACUTE EXACERBATION: ICD-10-CM

## 2020-08-19 RX ORDER — TOPIRAMATE 50 MG/1
TABLET, FILM COATED ORAL
Qty: 30 TABLET | Refills: 1 | Status: SHIPPED | OUTPATIENT
Start: 2020-08-19 | End: 2020-09-25 | Stop reason: SDUPTHER

## 2020-08-19 RX ORDER — MONTELUKAST SODIUM 10 MG/1
TABLET ORAL
Qty: 30 TABLET | Refills: 1 | Status: SHIPPED | OUTPATIENT
Start: 2020-08-19 | End: 2020-09-25

## 2020-09-25 ENCOUNTER — OFFICE VISIT (OUTPATIENT)
Dept: FAMILY MEDICINE CLINIC | Facility: CLINIC | Age: 55
End: 2020-09-25
Payer: COMMERCIAL

## 2020-09-25 VITALS
DIASTOLIC BLOOD PRESSURE: 84 MMHG | WEIGHT: 198.2 LBS | BODY MASS INDEX: 36.47 KG/M2 | RESPIRATION RATE: 16 BRPM | HEART RATE: 64 BPM | OXYGEN SATURATION: 98 % | HEIGHT: 62 IN | SYSTOLIC BLOOD PRESSURE: 122 MMHG | TEMPERATURE: 98.1 F

## 2020-09-25 DIAGNOSIS — E66.01 MORBID OBESITY DUE TO EXCESS CALORIES (HCC): ICD-10-CM

## 2020-09-25 DIAGNOSIS — I10 UNCONTROLLED HYPERTENSION: ICD-10-CM

## 2020-09-25 DIAGNOSIS — J45.41 MODERATE PERSISTENT REACTIVE AIRWAY DISEASE WITH ACUTE EXACERBATION: ICD-10-CM

## 2020-09-25 DIAGNOSIS — E66.9 OBESITY (BMI 30-39.9): ICD-10-CM

## 2020-09-25 DIAGNOSIS — E05.90 SUBCLINICAL HYPERTHYROIDISM: ICD-10-CM

## 2020-09-25 DIAGNOSIS — E04.1 THYROID NODULE: ICD-10-CM

## 2020-09-25 DIAGNOSIS — J45.909 ASTHMA DUE TO SEASONAL ALLERGIES: ICD-10-CM

## 2020-09-25 DIAGNOSIS — Z28.39 IMMUNIZATION DEFICIENCY: Primary | ICD-10-CM

## 2020-09-25 PROCEDURE — 3074F SYST BP LT 130 MM HG: CPT | Performed by: FAMILY MEDICINE

## 2020-09-25 PROCEDURE — 90707 MMR VACCINE SC: CPT | Performed by: FAMILY MEDICINE

## 2020-09-25 PROCEDURE — 90472 IMMUNIZATION ADMIN EACH ADD: CPT | Performed by: FAMILY MEDICINE

## 2020-09-25 PROCEDURE — 90471 IMMUNIZATION ADMIN: CPT | Performed by: FAMILY MEDICINE

## 2020-09-25 PROCEDURE — 90746 HEPB VACCINE 3 DOSE ADULT IM: CPT | Performed by: FAMILY MEDICINE

## 2020-09-25 PROCEDURE — 99214 OFFICE O/P EST MOD 30 MIN: CPT | Performed by: FAMILY MEDICINE

## 2020-09-25 PROCEDURE — 1036F TOBACCO NON-USER: CPT | Performed by: FAMILY MEDICINE

## 2020-09-25 PROCEDURE — 3725F SCREEN DEPRESSION PERFORMED: CPT | Performed by: FAMILY MEDICINE

## 2020-09-25 PROCEDURE — 3079F DIAST BP 80-89 MM HG: CPT | Performed by: FAMILY MEDICINE

## 2020-09-25 RX ORDER — POTASSIUM CHLORIDE 20 MEQ/1
TABLET, EXTENDED RELEASE ORAL
COMMUNITY
Start: 2020-08-27 | End: 2021-01-29 | Stop reason: SDUPTHER

## 2020-09-25 RX ORDER — TERBINAFINE HYDROCHLORIDE 250 MG/1
TABLET ORAL
COMMUNITY
Start: 2020-06-19 | End: 2021-01-29 | Stop reason: ALTCHOICE

## 2020-09-25 RX ORDER — AMLODIPINE BESYLATE 10 MG/1
TABLET ORAL
COMMUNITY
Start: 2020-08-30 | End: 2021-04-02 | Stop reason: SDUPTHER

## 2020-09-25 RX ORDER — PHENTERMINE HYDROCHLORIDE 37.5 MG/1
37.5 TABLET ORAL
Qty: 30 TABLET | Refills: 0 | Status: SHIPPED | OUTPATIENT
Start: 2020-09-25 | End: 2020-10-30 | Stop reason: SDUPTHER

## 2020-09-25 RX ORDER — CHLORTHALIDONE 25 MG/1
TABLET ORAL
COMMUNITY
Start: 2020-08-27 | End: 2021-01-29 | Stop reason: ALTCHOICE

## 2020-09-25 RX ORDER — MONTELUKAST SODIUM 10 MG/1
10 TABLET ORAL
Qty: 30 TABLET | Refills: 1 | Status: SHIPPED | OUTPATIENT
Start: 2020-09-25 | End: 2020-11-27

## 2020-09-25 RX ORDER — TOPIRAMATE 50 MG/1
TABLET, FILM COATED ORAL
Qty: 90 TABLET | Refills: 1 | Status: SHIPPED | OUTPATIENT
Start: 2020-09-25 | End: 2020-10-29

## 2020-09-25 NOTE — PROGRESS NOTES
Assessment/Plan:    Patient is doing well current weight is 198 lb  Will increase phentermine to whole tablet 37 5 mg daily topiramate remain at 50 mg since history of kidney stones  Continue terbinafine until she finished will need to recheck her nails and recheck her liver enzymes  Refer back to endocrinologist for evaluation for subclinical hyperthyroidism and history thyroid nodule  Her blood pressure stable  She is on metoprolol that can trigger her asthma attack consideration of stop the metoprolol and substitute that for spironolactone once endocrinology evaluations normal   Goal weight next month is 194 lb  MMR hep B vaccine given to her today 2nd hepatitis a and 3rd hepatitis-B vaccine will be given after January 2021 patient will get flu vaccine at work     I have spent 25 minutes with Patient  today in which greater than 50% of this time was spent in counseling/coordination of care regarding Risks and benefits of tx options  Problem List Items Addressed This Visit        Respiratory    Asthma due to seasonal allergies    Relevant Medications    montelukast (SINGULAIR) 10 mg tablet       Cardiovascular and Mediastinum    Uncontrolled hypertension    Relevant Medications    amLODIPine (NORVASC) 10 mg tablet    chlorthalidone 25 mg tablet       Other    Morbid obesity due to excess calories (HCC)    Immunization deficiency - Primary      Other Visit Diagnoses     Obesity (BMI 30-39  9)        Relevant Medications    topiramate (TOPAMAX) 50 MG tablet    phentermine (ADIPEX-P) 37 5 MG tablet    Moderate persistent reactive airway disease with acute exacerbation        Relevant Medications    montelukast (SINGULAIR) 10 mg tablet            Subjective:      Patient ID: Jeana Gonzalez is a 54 y o  female  26-year-old female follow-up morbid obesity hypertension onychomycosis of her toenails  She is taking terbinafine for 3 months and her nails are growing now nicely    Culture proven fungal infection  Her blood pressure is stable she is on chlorthalidone hydrochlorothiazide metoprolol low and amlodipine  Lisinopril gave her dry cough  She recently have a tickle in her throat and dry cough again but not as much as before  She tolerates phentermine half a tablet daily along with topiramate 50 mg daily her weight gain from 221 lb now down to 198 lb she is happy she complained of feeling fatigued in the morning that she often sleeps in ambulate for work since medication was started  She has not seen endocrinologist for thyroid issues yet  She had seen this subclinical hyperthyroidism she had radioactive uptake and show mildly hot nodule endocrine saw her and was working her up for this but her thyroid level to to freak T for has always been okay except for TSH is low suppressed currently she is not on any replacement for thyroid medication  Patient complain that has not had her period in the past 3 months recently she had her period and It is very heavy      The following portions of the patient's history were reviewed and updated as appropriate:   She has a past medical history of Asthma due to seasonal allergies (9/25/2020), Hypertension, and Immunization deficiency (9/25/2020)  ,  does not have any pertinent problems on file  ,   has a past surgical history that includes Knee arthroscopy w/ ACL reconstruction  ,  family history is not on file  ,   reports that she has quit smoking  Her smoking use included cigarettes  She has a 6 00 pack-year smoking history  She has never used smokeless tobacco  She reports previous alcohol use of about 2 0 standard drinks of alcohol per week  She reports that she does not use drugs  ,  has No Known Allergies     Current Outpatient Medications   Medication Sig Dispense Refill    amLODIPine (NORVASC) 10 mg tablet TK 1 T PO QD      chlorthalidone 25 mg tablet TK 1 T PO QD      hydrochlorothiazide (HYDRODIURIL) 25 mg tablet Take 25 mg by mouth daily      metoprolol succinate (TOPROL-XL) 100 mg 24 hr tablet Take 100 mg by mouth daily      potassium chloride (K-DUR,KLOR-CON) 20 mEq tablet TK 1 T PO D      terbinafine (LamISIL) 250 mg tablet TK 1 T PO D      topiramate (TOPAMAX) 50 MG tablet Take 1 tablet at dinner 90 tablet 1    Ascorbic Acid (VITAMIN C) 1000 MG tablet Take 1,000 mg by mouth daily      cholecalciferol (VITAMIN D3) 400 units tablet Take 5,000 Units by mouth daily      ferrous sulfate 325 (65 Fe) mg tablet Take 325 mg by mouth daily      montelukast (SINGULAIR) 10 mg tablet Take 1 tablet (10 mg total) by mouth daily at bedtime 30 tablet 1    nitroglycerin (NITROSTAT) 0 4 mg SL tablet Place 0 4 mg under the tongue every 5 (five) minutes as needed for chest pain      phentermine (ADIPEX-P) 37 5 MG tablet Take 1 tablet (37 5 mg total) by mouth daily in the early morning 30 tablet 0    pyridoxine (B-6) 100 MG tablet Take 100 mg by mouth daily       No current facility-administered medications for this visit  Review of Systems   Constitutional: Positive for fatigue  Negative for activity change, appetite change and unexpected weight change  HENT: Negative for ear pain, sore throat, trouble swallowing and voice change  Eyes: Negative for photophobia and visual disturbance  Respiratory: Negative for cough, chest tightness, shortness of breath and wheezing  Cardiovascular: Negative for chest pain, palpitations and leg swelling  Gastrointestinal: Negative for abdominal pain, constipation, diarrhea, nausea, rectal pain and vomiting  Endocrine: Negative for cold intolerance, polydipsia and polyuria  Genitourinary: Positive for menstrual problem  Negative for difficulty urinating, dysuria, flank pain and pelvic pain  Musculoskeletal: Negative for arthralgias, joint swelling and myalgias  Skin: Negative for color change and rash  Allergic/Immunologic: Negative for environmental allergies and immunocompromised state     Neurological: Negative for dizziness, weakness, numbness and headaches  Hematological: Negative for adenopathy  Does not bruise/bleed easily  Psychiatric/Behavioral: Negative for decreased concentration, dysphoric mood, self-injury, sleep disturbance and suicidal ideas  The patient is not nervous/anxious  Objective:  Vitals:    09/25/20 1441   BP: 122/84   BP Location: Left arm   Patient Position: Sitting   Cuff Size: Standard   Pulse: 64   Resp: 16   Temp: 98 1 °F (36 7 °C)   TempSrc: Tympanic   SpO2: 98%   Weight: 89 9 kg (198 lb 3 2 oz)   Height: 5' 2" (1 575 m)     Body mass index is 36 25 kg/m²  Physical Exam  Vitals signs and nursing note reviewed  Constitutional:       Appearance: She is well-developed  She is obese  HENT:      Head: Normocephalic and atraumatic  Neck:      Musculoskeletal: Normal range of motion and neck supple  Thyroid: No thyromegaly  Genitourinary:     Vagina: Normal    Neurological:      General: No focal deficit present  Mental Status: She is alert and oriented to person, place, and time  Psychiatric:         Mood and Affect: Mood normal          Behavior: Behavior normal          Thought Content:  Thought content normal          Judgment: Judgment normal

## 2020-10-24 DIAGNOSIS — I10 ESSENTIAL HYPERTENSION: ICD-10-CM

## 2020-10-24 RX ORDER — POTASSIUM CHLORIDE 20 MEQ/1
TABLET, EXTENDED RELEASE ORAL
Qty: 30 TABLET | Refills: 5 | Status: SHIPPED | OUTPATIENT
Start: 2020-10-24 | End: 2021-01-29 | Stop reason: ALTCHOICE

## 2020-10-29 DIAGNOSIS — E66.9 OBESITY (BMI 30-39.9): ICD-10-CM

## 2020-10-29 RX ORDER — TOPIRAMATE 50 MG/1
TABLET, FILM COATED ORAL
Qty: 30 TABLET | Refills: 1 | Status: SHIPPED | OUTPATIENT
Start: 2020-10-29 | End: 2021-04-02 | Stop reason: SDUPTHER

## 2020-10-30 DIAGNOSIS — E66.9 OBESITY (BMI 30-39.9): ICD-10-CM

## 2020-10-30 RX ORDER — PHENTERMINE HYDROCHLORIDE 37.5 MG/1
37.5 TABLET ORAL
Qty: 30 TABLET | Refills: 0 | Status: SHIPPED | OUTPATIENT
Start: 2020-10-30 | End: 2020-12-08 | Stop reason: SDUPTHER

## 2020-11-06 ENCOUNTER — OFFICE VISIT (OUTPATIENT)
Dept: FAMILY MEDICINE CLINIC | Facility: CLINIC | Age: 55
End: 2020-11-06
Payer: COMMERCIAL

## 2020-11-06 VITALS
SYSTOLIC BLOOD PRESSURE: 140 MMHG | OXYGEN SATURATION: 98 % | TEMPERATURE: 98.2 F | DIASTOLIC BLOOD PRESSURE: 90 MMHG | RESPIRATION RATE: 16 BRPM | HEART RATE: 73 BPM | HEIGHT: 62 IN | WEIGHT: 197.8 LBS | BODY MASS INDEX: 36.4 KG/M2

## 2020-11-06 DIAGNOSIS — B35.1 ONYCHOMYCOSIS OF TOENAIL: Primary | ICD-10-CM

## 2020-11-06 DIAGNOSIS — E61.1 IRON DEFICIENCY: ICD-10-CM

## 2020-11-06 DIAGNOSIS — R73.03 PREDIABETES: ICD-10-CM

## 2020-11-06 DIAGNOSIS — E05.90 SUBCLINICAL HYPERTHYROIDISM: ICD-10-CM

## 2020-11-06 DIAGNOSIS — E53.1 PYRIDOXINE DEFICIENCY: ICD-10-CM

## 2020-11-06 DIAGNOSIS — Z12.31 VISIT FOR SCREENING MAMMOGRAM: ICD-10-CM

## 2020-11-06 DIAGNOSIS — E55.9 VITAMIN D DEFICIENCY: ICD-10-CM

## 2020-11-06 DIAGNOSIS — E66.01 MORBID OBESITY DUE TO EXCESS CALORIES (HCC): ICD-10-CM

## 2020-11-06 DIAGNOSIS — E53.8 CYANOCOBALAMIN DEFICIENCY: ICD-10-CM

## 2020-11-06 DIAGNOSIS — E54 ASCORBIC ACID DEFICIENCY: ICD-10-CM

## 2020-11-06 PROCEDURE — 3008F BODY MASS INDEX DOCD: CPT | Performed by: FAMILY MEDICINE

## 2020-11-06 PROCEDURE — 3077F SYST BP >= 140 MM HG: CPT | Performed by: FAMILY MEDICINE

## 2020-11-06 PROCEDURE — 1036F TOBACCO NON-USER: CPT | Performed by: FAMILY MEDICINE

## 2020-11-06 PROCEDURE — 3080F DIAST BP >= 90 MM HG: CPT | Performed by: FAMILY MEDICINE

## 2020-11-06 PROCEDURE — 99214 OFFICE O/P EST MOD 30 MIN: CPT | Performed by: FAMILY MEDICINE

## 2020-11-06 RX ORDER — METFORMIN HYDROCHLORIDE 500 MG/1
500 TABLET, EXTENDED RELEASE ORAL
Qty: 30 TABLET | Refills: 1 | Status: SHIPPED | OUTPATIENT
Start: 2020-11-06 | End: 2021-01-04

## 2020-11-26 DIAGNOSIS — J45.41 MODERATE PERSISTENT REACTIVE AIRWAY DISEASE WITH ACUTE EXACERBATION: ICD-10-CM

## 2020-11-27 RX ORDER — MONTELUKAST SODIUM 10 MG/1
TABLET ORAL
Qty: 30 TABLET | Refills: 1 | Status: SHIPPED | OUTPATIENT
Start: 2020-11-27 | End: 2021-04-02 | Stop reason: ALTCHOICE

## 2020-12-08 DIAGNOSIS — E66.9 OBESITY (BMI 30-39.9): ICD-10-CM

## 2020-12-09 RX ORDER — PHENTERMINE HYDROCHLORIDE 37.5 MG/1
37.5 TABLET ORAL
Qty: 30 TABLET | Refills: 0 | Status: SHIPPED | OUTPATIENT
Start: 2020-12-09 | End: 2021-01-29 | Stop reason: SDUPTHER

## 2020-12-24 ENCOUNTER — LAB (OUTPATIENT)
Dept: LAB | Facility: HOSPITAL | Age: 55
End: 2020-12-24
Payer: COMMERCIAL

## 2021-01-04 DIAGNOSIS — E66.01 MORBID OBESITY DUE TO EXCESS CALORIES (HCC): ICD-10-CM

## 2021-01-04 RX ORDER — METFORMIN HYDROCHLORIDE 500 MG/1
TABLET, EXTENDED RELEASE ORAL
Qty: 30 TABLET | Refills: 1 | Status: SHIPPED | OUTPATIENT
Start: 2021-01-04 | End: 2021-03-04

## 2021-01-29 ENCOUNTER — OFFICE VISIT (OUTPATIENT)
Dept: FAMILY MEDICINE CLINIC | Facility: CLINIC | Age: 56
End: 2021-01-29
Payer: COMMERCIAL

## 2021-01-29 VITALS
SYSTOLIC BLOOD PRESSURE: 118 MMHG | DIASTOLIC BLOOD PRESSURE: 68 MMHG | WEIGHT: 198 LBS | OXYGEN SATURATION: 98 % | HEART RATE: 73 BPM | RESPIRATION RATE: 16 BRPM | BODY MASS INDEX: 36.44 KG/M2 | TEMPERATURE: 97 F | HEIGHT: 62 IN

## 2021-01-29 DIAGNOSIS — R63.8 INCREASED BMI: ICD-10-CM

## 2021-01-29 DIAGNOSIS — I10 ESSENTIAL HYPERTENSION: Primary | ICD-10-CM

## 2021-01-29 DIAGNOSIS — E55.9 VITAMIN D DEFICIENCY: ICD-10-CM

## 2021-01-29 DIAGNOSIS — E66.9 OBESITY (BMI 30-39.9): ICD-10-CM

## 2021-01-29 DIAGNOSIS — E53.1 PYRIDOXINE DEFICIENCY: ICD-10-CM

## 2021-01-29 DIAGNOSIS — E54 ASCORBIC ACID DEFICIENCY: ICD-10-CM

## 2021-01-29 PROBLEM — E05.90 SUBCLINICAL HYPERTHYROIDISM: Status: RESOLVED | Noted: 2020-09-25 | Resolved: 2021-01-29

## 2021-01-29 PROCEDURE — 1036F TOBACCO NON-USER: CPT | Performed by: FAMILY MEDICINE

## 2021-01-29 PROCEDURE — 99214 OFFICE O/P EST MOD 30 MIN: CPT | Performed by: FAMILY MEDICINE

## 2021-01-29 PROCEDURE — 3074F SYST BP LT 130 MM HG: CPT | Performed by: FAMILY MEDICINE

## 2021-01-29 PROCEDURE — 3078F DIAST BP <80 MM HG: CPT | Performed by: FAMILY MEDICINE

## 2021-01-29 PROCEDURE — 3008F BODY MASS INDEX DOCD: CPT | Performed by: FAMILY MEDICINE

## 2021-01-29 RX ORDER — PEN NEEDLE, DIABETIC 32GX 5/32"
NEEDLE, DISPOSABLE MISCELLANEOUS DAILY
Qty: 100 EACH | Refills: 1 | Status: SHIPPED | OUTPATIENT
Start: 2021-01-29 | End: 2021-04-02 | Stop reason: SDUPTHER

## 2021-01-29 RX ORDER — PHENTERMINE HYDROCHLORIDE 37.5 MG/1
37.5 TABLET ORAL
Qty: 30 TABLET | Refills: 0 | Status: SHIPPED | OUTPATIENT
Start: 2021-01-29 | End: 2021-03-03 | Stop reason: SDUPTHER

## 2021-01-29 NOTE — PROGRESS NOTES
BMI Counseling: Body mass index is 36 21 kg/m²  The BMI is above normal  Nutrition recommendations include decreasing portion sizes, encouraging healthy choices of fruits and vegetables, limiting drinks that contain sugar and reducing intake of cholesterol  Exercise recommendations include exercising 3-5 times per week  No pharmacotherapy was ordered  Assessment/Plan:    Reconcile home medication list   Stop potassium and Singulair since she is doing better  Her blood pressure is stable continue same dose regimen medication  In the future consider spironolactone to help lower her blood pressure and her weight  Resume phentermine topiramate  Continue metformin  Will start her on Saxenda to help with weight loss  When she tolerated well and results adequate will stop metformin  Hemoglobin A1c for her right now is 5 5  Will need to supplement B12 vitamin-C B6 vitamin-D in albumin with extra protein intake  Will see her back in 1 month next visit she will need hep a and hep B vaccine  I have spent 25 minutes with Patient  today in which greater than 50% of this time was spent in counseling/coordination of care regarding Diagnostic results  Problem List Items Addressed This Visit        Cardiovascular and Mediastinum    Essential hypertension - Primary       Other    Obesity (BMI 30-39  9)    Relevant Medications    phentermine (ADIPEX-P) 37 5 MG tablet    liraglutide (SAXENDA) injection    Insulin Pen Needle (BD Pen Needle Jaymie U/F) 32G X 4 MM MISC    Vitamin D deficiency    Ascorbic acid deficiency    Pyridoxine deficiency      Other Visit Diagnoses     Increased BMI                Subjective:      Patient ID: Hung Machado is a 54 y o  female  49-year-old female follow-up medical weight loss  She has ran out of phentermine 1 month ago she could get refill despite requesting through the pharmacy in office call  She is doing well on topiramate 50 mg daily    She had kidney stones so unable to tolerate more than 50 mg topiramate  She is on metformin for prediabetes  Discussed blood test results with patient today  Her TSH level remains suppressed but free T4 is normal   Her B12 is 461 is low  Patient has tried supplementation but has not gone up anymore  Her vitamin-C is low at 0 4  B6 is low at 2 6  Vitamin-D is low at 27 6  Her albumin level is low  She has not been able to make an appointment endocrine for thyroid issues  Blood pressure is stable she is on amlodipine and metoprolol  Her potassium level is 3 6  She is off hydrochlorothiazide  Her weight is stable  She did have history of H pylori in the past that was cure  The following portions of the patient's history were reviewed and updated as appropriate:   Past Medical History:  She has a past medical history of 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), Asthma due to seasonal allergies (9/25/2020), Depression, Essential hypertension (4/20/2020), JNAE (generalized anxiety disorder) (5/45/1805), Helicobacter pylori gastrointestinal tract infection (4/20/2020), History of Holter monitoring (12/22/2017), History of stress test (01/05/2018), Hypertension, Immunization deficiency (4/20/2020), Immunization deficiency (9/25/2020), Iron deficiency (4/20/2020), Kidney stone (4/20/2020), Obesity (BMI 30-39 9) (4/20/2020), Onychomycosis of toenail (6/19/2020), Prediabetes (4/20/2020), 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), Reactive airway disease with acute exacerbation (5/20/2020), Subclinical hyperthyroidism (4/20/2020), Subclinical hyperthyroidism (9/25/2020), Thyroid nodule (9/25/2020), and Vitamin D deficiency (4/20/2020)  ,  _______________________________________________________________________  Medical Problems:  does not have any pertinent problems on file ,  _______________________________________________________________________  Past Surgical History:   has a past surgical history that includes Tubal ligation; Colonoscopy (09/08/2017); EGD (09/01/2017); Knee surgery; and Knee arthroscopy w/ ACL reconstruction  ,  _______________________________________________________________________  Family History:  family history includes Cancer in her maternal uncle; Colon cancer in her maternal aunt; Diabetes in her father; Kidney disease in her maternal uncle ,  _______________________________________________________________________  Social History:   reports that she quit smoking about 7 years ago  Her smoking use included cigarettes  She has a 6 00 pack-year smoking history  She has never used smokeless tobacco  She reports previous alcohol use of about 2 0 standard drinks of alcohol per week  She reports that she does not use drugs  ,  _______________________________________________________________________  Allergies:  has No Known Allergies     _______________________________________________________________________  Current Outpatient Medications   Medication Sig Dispense Refill    amLODIPine (NORVASC) 10 mg tablet TK 1 T PO QD      metFORMIN (GLUCOPHAGE-XR) 500 mg 24 hr tablet TAKE 1 TABLET(500 MG) BY MOUTH DAILY WITH DINNER 30 tablet 1    metoprolol succinate (TOPROL-XL) 50 mg 24 hr tablet Take 1 5 tablets by mouth daily      montelukast (SINGULAIR) 10 mg tablet TAKE 1 TABLET(10 MG) BY MOUTH DAILY AT BEDTIME 30 tablet 1    topiramate (TOPAMAX) 50 MG tablet TAKE 1 TABLET BY MOUTH AT BEDTIME 30 tablet 1    Ascorbic Acid (VITAMIN C) 1000 MG tablet Take 1,000 mg by mouth daily      Insulin Pen Needle (BD Pen Needle Jaymie U/F) 32G X 4 MM MISC Use daily With saxenda 100 each 1    liraglutide (SAXENDA) injection Inject 0 1 mL (0 6 mg total) under the skin daily 3 mL 1    phentermine (ADIPEX-P) 37 5 MG tablet Take 1 tablet (37 5 mg total) by mouth daily in the early morning 30 tablet 0    pyridoxine (B-6) 100 MG tablet Take 1 tablet (100 mg total) by mouth daily (Patient not taking: Reported on 1/29/2021) 90 tablet 1     No current facility-administered medications for this visit       _______________________________________________________________________  Review of Systems   Constitutional: Negative for activity change, appetite change, fatigue and unexpected weight change  HENT: Negative for ear pain, sore throat, trouble swallowing and voice change  Eyes: Negative for photophobia and visual disturbance  Respiratory: Negative for cough, chest tightness, shortness of breath and wheezing  Cardiovascular: Negative for chest pain, palpitations and leg swelling  Gastrointestinal: Negative for abdominal pain, constipation, diarrhea, nausea, rectal pain and vomiting  Endocrine: Negative for cold intolerance, polydipsia and polyuria  Genitourinary: Negative for difficulty urinating, dysuria, flank pain, menstrual problem and pelvic pain  Musculoskeletal: Negative for arthralgias, joint swelling and myalgias  Skin: Negative for color change and rash  Allergic/Immunologic: Negative for environmental allergies and immunocompromised state  Neurological: Negative for dizziness, weakness, numbness and headaches  Hematological: Negative for adenopathy  Does not bruise/bleed easily  Psychiatric/Behavioral: Negative for decreased concentration, dysphoric mood, self-injury, sleep disturbance and suicidal ideas  The patient is not nervous/anxious  Objective:  Vitals:    01/29/21 1531   BP: 118/68   BP Location: Left arm   Patient Position: Sitting   Cuff Size: Large   Pulse: 73   Resp: 16   Temp: (!) 97 °F (36 1 °C)   TempSrc: Temporal   SpO2: 98%   Weight: 89 8 kg (198 lb)   Height: 5' 2" (1 575 m)     Body mass index is 36 21 kg/m²  Physical Exam  Vitals signs and nursing note reviewed  Constitutional:       Appearance: She is obese  Neurological:      General: No focal deficit present  Mental Status: She is alert and oriented to person, place, and time  Mental status is at baseline     Psychiatric: Mood and Affect: Mood normal          Behavior: Behavior normal          Thought Content:  Thought content normal          Judgment: Judgment normal

## 2021-02-05 ENCOUNTER — TELEPHONE (OUTPATIENT)
Dept: FAMILY MEDICINE CLINIC | Facility: CLINIC | Age: 56
End: 2021-02-05

## 2021-02-05 NOTE — TELEPHONE ENCOUNTER
Dean faxed request for refill of Monetlukast 10 mg tablets OD at bedtime #30  Last refilled 12/30/2020

## 2021-02-08 ENCOUNTER — TELEPHONE (OUTPATIENT)
Dept: FAMILY MEDICINE CLINIC | Facility: CLINIC | Age: 56
End: 2021-02-08

## 2021-02-08 NOTE — TELEPHONE ENCOUNTER
Prior authorization initiated and fax should be sent to our office in next 4 hours to complete for patient

## 2021-03-03 DIAGNOSIS — E66.9 OBESITY (BMI 30-39.9): ICD-10-CM

## 2021-03-03 RX ORDER — PHENTERMINE HYDROCHLORIDE 37.5 MG/1
37.5 TABLET ORAL
Qty: 30 TABLET | Refills: 0 | Status: SHIPPED | OUTPATIENT
Start: 2021-03-03 | End: 2021-04-02 | Stop reason: SDUPTHER

## 2021-03-04 DIAGNOSIS — E66.01 MORBID OBESITY DUE TO EXCESS CALORIES (HCC): ICD-10-CM

## 2021-03-04 RX ORDER — METFORMIN HYDROCHLORIDE 500 MG/1
TABLET, EXTENDED RELEASE ORAL
Qty: 30 TABLET | Refills: 1 | Status: SHIPPED | OUTPATIENT
Start: 2021-03-04 | End: 2021-04-02 | Stop reason: SDUPTHER

## 2021-03-05 ENCOUNTER — OFFICE VISIT (OUTPATIENT)
Dept: FAMILY MEDICINE CLINIC | Facility: CLINIC | Age: 56
End: 2021-03-05
Payer: COMMERCIAL

## 2021-03-05 VITALS
HEART RATE: 71 BPM | WEIGHT: 195.8 LBS | BODY MASS INDEX: 36.03 KG/M2 | OXYGEN SATURATION: 99 % | HEIGHT: 62 IN | TEMPERATURE: 97 F | SYSTOLIC BLOOD PRESSURE: 112 MMHG | DIASTOLIC BLOOD PRESSURE: 70 MMHG | RESPIRATION RATE: 16 BRPM

## 2021-03-05 DIAGNOSIS — Z23 NEED FOR VACCINATION: Primary | ICD-10-CM

## 2021-03-05 DIAGNOSIS — E66.9 OBESITY (BMI 30-39.9): ICD-10-CM

## 2021-03-05 DIAGNOSIS — R63.8 INCREASED BMI: ICD-10-CM

## 2021-03-05 DIAGNOSIS — Z28.39 IMMUNIZATION DEFICIENCY: ICD-10-CM

## 2021-03-05 DIAGNOSIS — E05.90 SUBCLINICAL HYPERTHYROIDISM: ICD-10-CM

## 2021-03-05 DIAGNOSIS — L70.0 ACNE VULGARIS: ICD-10-CM

## 2021-03-05 PROCEDURE — 90471 IMMUNIZATION ADMIN: CPT | Performed by: FAMILY MEDICINE

## 2021-03-05 PROCEDURE — 3074F SYST BP LT 130 MM HG: CPT | Performed by: FAMILY MEDICINE

## 2021-03-05 PROCEDURE — 90632 HEPA VACCINE ADULT IM: CPT | Performed by: FAMILY MEDICINE

## 2021-03-05 PROCEDURE — 99214 OFFICE O/P EST MOD 30 MIN: CPT | Performed by: FAMILY MEDICINE

## 2021-03-05 PROCEDURE — 3078F DIAST BP <80 MM HG: CPT | Performed by: FAMILY MEDICINE

## 2021-03-05 PROCEDURE — 90472 IMMUNIZATION ADMIN EACH ADD: CPT | Performed by: FAMILY MEDICINE

## 2021-03-05 PROCEDURE — 3008F BODY MASS INDEX DOCD: CPT | Performed by: FAMILY MEDICINE

## 2021-03-05 PROCEDURE — 90746 HEPB VACCINE 3 DOSE ADULT IM: CPT | Performed by: FAMILY MEDICINE

## 2021-03-05 RX ORDER — CLINDAMYCIN PHOSPHATE 10 MG/G
GEL TOPICAL 2 TIMES DAILY
Qty: 30 G | Refills: 0 | Status: SHIPPED | OUTPATIENT
Start: 2021-03-05 | End: 2021-06-04 | Stop reason: ALTCHOICE

## 2021-03-05 NOTE — PROGRESS NOTES
Assessment/Plan:  Goal weight is 180 lb right now she is 195 lb  Continue same plan will add on Saxenda  She patient has a coupon activated  Game plan once you hit 180 lb we will take her off metformin  Ideally with he off all oral medication and continue her on Saxenda  Hep a and hep B vaccine given to her today  Patient advised to follow-up with endocrinologist   Next visit she will need Pneumovax and Shingrix vaccine  I have spent 25 minutes with Patient  today in which greater than 50% of this time was spent in counseling/coordination of care regarding Risks and benefits of tx options  Problem List Items Addressed This Visit        Endocrine    Subclinical hyperthyroidism       Other    Obesity (BMI 30-39  9)    Immunization deficiency      Other Visit Diagnoses     Need for vaccination    -  Primary    Relevant Orders    HEPATITIS A VACCINE ADULT IM (Completed)    HEPATITIS B VACCINE ADULT IM (Completed)    Increased BMI        Acne vulgaris        Relevant Medications    clindamycin (CLINDAGEL) 1 % gel            Subjective:      Patient ID: Zenia Calvert is a 54 y o  female  27-year-old female follow-up medical weight loss  She lost 3 lb more from last visit  She is on metformin 500 mg daily  Topiramate 50 mg daily  Phentermine 37 5 mg daily  She has prediabetes  She was not able to get Saxenda coverage  Presentation was approved she had to pay 200 dollars out of pocket a month  She did not use a coupon she has yet  She complained of a cyst on her left upper eyelid  She start to have acne from wearing mask  She is not immune to hep a and hep B infection  She has subclinical hyperthyroidism still did not get to see endocrinologist due to busy schedule        The following portions of the patient's history were reviewed and updated as appropriate:   Past Medical History:  She has a past medical history of 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), Asthma due to seasonal allergies (9/25/2020), Depression, Essential hypertension (4/20/2020), JANE (generalized anxiety disorder) (5/11/0015), Helicobacter pylori gastrointestinal tract infection (4/20/2020), History of Holter monitoring (12/22/2017), History of stress test (01/05/2018), Hypertension, Immunization deficiency (4/20/2020), Immunization deficiency (9/25/2020), Iron deficiency (4/20/2020), Kidney stone (4/20/2020), Obesity (BMI 30-39 9) (4/20/2020), Onychomycosis of toenail (6/19/2020), Prediabetes (4/20/2020), 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), Reactive airway disease with acute exacerbation (5/20/2020), Subclinical hyperthyroidism (4/20/2020), Subclinical hyperthyroidism (9/25/2020), Thyroid nodule (9/25/2020), and Vitamin D deficiency (4/20/2020)  ,  _______________________________________________________________________  Medical Problems:  does not have any pertinent problems on file ,  _______________________________________________________________________  Past Surgical History:   has a past surgical history that includes Tubal ligation; Colonoscopy (09/08/2017); EGD (09/01/2017); Knee surgery; and Knee arthroscopy w/ ACL reconstruction  ,  _______________________________________________________________________  Family History:  family history includes Cancer in her maternal uncle; Colon cancer in her maternal aunt; Diabetes in her father; Kidney disease in her maternal uncle ,  _______________________________________________________________________  Social History:   reports that she quit smoking about 7 years ago  Her smoking use included cigarettes  She has a 6 00 pack-year smoking history  She has never used smokeless tobacco  She reports previous alcohol use of about 2 0 standard drinks of alcohol per week  She reports that she does not use drugs  ,  _______________________________________________________________________  Allergies:  has No Known Allergies     _______________________________________________________________________  Current Outpatient Medications   Medication Sig Dispense Refill    amLODIPine (NORVASC) 10 mg tablet TK 1 T PO QD      Ascorbic Acid (VITAMIN C) 1000 MG tablet Take 1,000 mg by mouth daily      Insulin Pen Needle (BD Pen Needle Jaymie U/F) 32G X 4 MM MISC Use daily With saxenda 100 each 1    metFORMIN (GLUCOPHAGE-XR) 500 mg 24 hr tablet TAKE 1 TABLET(500 MG) BY MOUTH DAILY WITH DINNER 30 tablet 1    metoprolol succinate (TOPROL-XL) 50 mg 24 hr tablet Take 1 5 tablets by mouth daily      phentermine (ADIPEX-P) 37 5 MG tablet Take 1 tablet (37 5 mg total) by mouth daily in the early morning 30 tablet 0    topiramate (TOPAMAX) 50 MG tablet TAKE 1 TABLET BY MOUTH AT BEDTIME 30 tablet 1    clindamycin (CLINDAGEL) 1 % gel Apply topically 2 (two) times a day 30 g 0    liraglutide (SAXENDA) injection Inject 0 1 mL (0 6 mg total) under the skin daily (Patient not taking: Reported on 3/5/2021) 3 mL 1    montelukast (SINGULAIR) 10 mg tablet TAKE 1 TABLET(10 MG) BY MOUTH DAILY AT BEDTIME (Patient not taking: Reported on 3/5/2021) 30 tablet 1    pyridoxine (B-6) 100 MG tablet Take 1 tablet (100 mg total) by mouth daily (Patient not taking: Reported on 1/29/2021) 90 tablet 1     No current facility-administered medications for this visit       _______________________________________________________________________  Review of Systems   Constitutional: Negative for activity change, appetite change, fatigue and unexpected weight change  HENT: Negative for ear pain, sore throat, trouble swallowing and voice change  Eyes: Positive for pain  Negative for photophobia and visual disturbance  Respiratory: Negative for cough, chest tightness, shortness of breath and wheezing  Cardiovascular: Negative for chest pain, palpitations and leg swelling     Gastrointestinal: Negative for abdominal pain, constipation, diarrhea, nausea, rectal pain and vomiting  Endocrine: Negative for cold intolerance, polydipsia and polyuria  Genitourinary: Negative for difficulty urinating, dysuria, flank pain, menstrual problem and pelvic pain  Musculoskeletal: Negative for arthralgias, joint swelling and myalgias  Skin: Negative for color change and rash  Allergic/Immunologic: Negative for environmental allergies and immunocompromised state  Neurological: Negative for dizziness, weakness, numbness and headaches  Hematological: Negative for adenopathy  Does not bruise/bleed easily  Psychiatric/Behavioral: Negative for decreased concentration, dysphoric mood, self-injury, sleep disturbance and suicidal ideas  The patient is not nervous/anxious  Objective:  Vitals:    03/05/21 1617   BP: 112/70   BP Location: Right arm   Patient Position: Sitting   Cuff Size: Standard   Pulse: 71   Resp: 16   Temp: (!) 97 °F (36 1 °C)   TempSrc: Temporal   SpO2: 99%   Weight: 88 8 kg (195 lb 12 8 oz)   Height: 5' 2" (1 575 m)     Body mass index is 35 81 kg/m²  Physical Exam  Vitals signs and nursing note reviewed  Constitutional:       Appearance: She is obese  HENT:      Head: Normocephalic  Ears:      Comments: Left upper eye lid raised follicle, mild tender on palpation  Pulmonary:      Effort: Pulmonary effort is normal    Skin:     General: Skin is warm and dry  Comments: Acne on face where mask touches face   Neurological:      General: No focal deficit present  Mental Status: She is alert and oriented to person, place, and time  Psychiatric:         Mood and Affect: Mood normal          Behavior: Behavior normal          Thought Content:  Thought content normal          Judgment: Judgment normal

## 2021-03-09 ENCOUNTER — TELEPHONE (OUTPATIENT)
Dept: FAMILY MEDICINE CLINIC | Facility: CLINIC | Age: 56
End: 2021-03-09

## 2021-03-09 NOTE — TELEPHONE ENCOUNTER
Pharmacist called and stated patient was trying to use coupon for [address] [facility] however it will only work if we change the quantity to 5 ml  Can you please send them a new rx with Dispense of 5 ml?

## 2021-03-10 DIAGNOSIS — E66.9 OBESITY (BMI 30-39.9): ICD-10-CM

## 2021-03-29 ENCOUNTER — RA CDI HCC (OUTPATIENT)
Dept: OTHER | Facility: HOSPITAL | Age: 56
End: 2021-03-29

## 2021-03-29 NOTE — PROGRESS NOTES
[facility]  coding oppertunities          Chart reviewed, no opportunity found: CHART REVIEWED, NO OPPORTUNITY FOUND

## 2021-04-02 ENCOUNTER — OFFICE VISIT (OUTPATIENT)
Dept: FAMILY MEDICINE CLINIC | Facility: CLINIC | Age: 56
End: 2021-04-02
Payer: COMMERCIAL

## 2021-04-02 VITALS
BODY MASS INDEX: 36.22 KG/M2 | WEIGHT: 196.8 LBS | OXYGEN SATURATION: 98 % | HEIGHT: 62 IN | HEART RATE: 72 BPM | TEMPERATURE: 97.2 F | DIASTOLIC BLOOD PRESSURE: 82 MMHG | SYSTOLIC BLOOD PRESSURE: 126 MMHG

## 2021-04-02 DIAGNOSIS — E66.01 MORBID OBESITY DUE TO EXCESS CALORIES (HCC): ICD-10-CM

## 2021-04-02 DIAGNOSIS — R73.03 PREDIABETES: ICD-10-CM

## 2021-04-02 DIAGNOSIS — E66.9 OBESITY (BMI 30-39.9): ICD-10-CM

## 2021-04-02 DIAGNOSIS — R63.8 INCREASED BMI: ICD-10-CM

## 2021-04-02 DIAGNOSIS — I10 ESSENTIAL HYPERTENSION: Primary | ICD-10-CM

## 2021-04-02 PROCEDURE — 1036F TOBACCO NON-USER: CPT | Performed by: FAMILY MEDICINE

## 2021-04-02 PROCEDURE — 3008F BODY MASS INDEX DOCD: CPT | Performed by: FAMILY MEDICINE

## 2021-04-02 PROCEDURE — 3079F DIAST BP 80-89 MM HG: CPT | Performed by: FAMILY MEDICINE

## 2021-04-02 PROCEDURE — 3074F SYST BP LT 130 MM HG: CPT | Performed by: FAMILY MEDICINE

## 2021-04-02 PROCEDURE — 99214 OFFICE O/P EST MOD 30 MIN: CPT | Performed by: FAMILY MEDICINE

## 2021-04-02 RX ORDER — METFORMIN HYDROCHLORIDE 500 MG/1
1000 TABLET, EXTENDED RELEASE ORAL
Qty: 30 TABLET | Refills: 2 | Status: SHIPPED | OUTPATIENT
Start: 2021-04-02 | End: 2021-05-13 | Stop reason: SDUPTHER

## 2021-04-02 RX ORDER — AMLODIPINE BESYLATE 5 MG/1
5 TABLET ORAL DAILY
Qty: 90 TABLET | Refills: 1 | Status: SHIPPED | OUTPATIENT
Start: 2021-04-02 | End: 2021-10-29 | Stop reason: SDUPTHER

## 2021-04-02 RX ORDER — TOPIRAMATE 50 MG/1
TABLET, FILM COATED ORAL
Qty: 30 TABLET | Refills: 1 | Status: SHIPPED | OUTPATIENT
Start: 2021-04-02 | End: 2021-06-04 | Stop reason: SDUPTHER

## 2021-04-02 RX ORDER — PEN NEEDLE, DIABETIC 32GX 5/32"
NEEDLE, DISPOSABLE MISCELLANEOUS DAILY
Qty: 100 EACH | Refills: 1 | Status: SHIPPED | OUTPATIENT
Start: 2021-04-02 | End: 2022-02-22

## 2021-04-02 RX ORDER — PHENTERMINE HYDROCHLORIDE 37.5 MG/1
37.5 TABLET ORAL
Qty: 30 TABLET | Refills: 0 | Status: SHIPPED | OUTPATIENT
Start: 2021-04-02 | End: 2021-05-13 | Stop reason: SDUPTHER

## 2021-04-02 NOTE — PROGRESS NOTES
Assessment/Plan:    Patient's weight gain 1 lb  Increase metformin to 1000 mg daily  Again will try for Saxenda  If insurance does not cover will recommend patient to see weight management for options  Continue phentermine topiramate for now  Also advised to follow-up with endocrinologist   Renew amlodipine 5 mg daily  Next visit after her COVID vaccine will offer shingle vaccine and Pneumovax  I have spent 25 minutes with Patient  today in which greater than 50% of this time was spent in counseling/coordination of care regarding Risks and benefits of tx options  Problem List Items Addressed This Visit        Cardiovascular and Mediastinum    Essential hypertension - Primary    Relevant Medications    amLODIPine (NORVASC) 5 mg tablet       Other    Morbid obesity due to excess calories (HCC)    Relevant Medications    metFORMIN (GLUCOPHAGE-XR) 500 mg 24 hr tablet    Obesity (BMI 30-39  9)    Relevant Medications    liraglutide (SAXENDA) injection    Insulin Pen Needle (BD Pen Needle Jaymie U/F) 32G X 4 MM MISC    phentermine (ADIPEX-P) 37 5 MG tablet    topiramate (TOPAMAX) 50 MG tablet    Prediabetes      Other Visit Diagnoses     Increased BMI                Subjective:      Patient ID: Mari Alamo is a 54 y o  female  77-year-old female follow-up medical weight loss  She gained 1 lb since last visit  Insurance never cover for [employer] she was not able to pick it up  She is on metformin 500 mg once a day phentermine 37 5 mg daily topiramate 50 mg daily  She has not been able to see endocrinologist for subclinical hyperthyroidism  No side effect with medication  She has prediabetes  She also ran out amlodipine for blood pressure  No headaches blood pressure is stable  She has had 1st dose COVID vaccine pfizer done today        The following portions of the patient's history were reviewed and updated as appropriate:   Past Medical History:  She has a past medical history of 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), Asthma due to seasonal allergies (9/25/2020), Depression, Essential hypertension (4/20/2020), JANE (generalized anxiety disorder) (4/83/9771), Helicobacter pylori gastrointestinal tract infection (4/20/2020), History of Holter monitoring (12/22/2017), History of stress test (01/05/2018), Hypertension, Immunization deficiency (4/20/2020), Immunization deficiency (9/25/2020), Iron deficiency (4/20/2020), Kidney stone (4/20/2020), Obesity (BMI 30-39 9) (4/20/2020), Onychomycosis of toenail (6/19/2020), Prediabetes (4/20/2020), 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), Reactive airway disease with acute exacerbation (5/20/2020), Subclinical hyperthyroidism (4/20/2020), Subclinical hyperthyroidism (9/25/2020), Thyroid nodule (9/25/2020), and Vitamin D deficiency (4/20/2020)  ,  _______________________________________________________________________  Medical Problems:  does not have any pertinent problems on file ,  _______________________________________________________________________  Past Surgical History:   has a past surgical history that includes Tubal ligation; Colonoscopy (09/08/2017); EGD (09/01/2017); Knee surgery; and Knee arthroscopy w/ ACL reconstruction  ,  _______________________________________________________________________  Family History:  family history includes Cancer in her maternal uncle; Colon cancer in her maternal aunt; Diabetes in her father; Kidney disease in her maternal uncle ,  _______________________________________________________________________  Social History:   reports that she quit smoking about 7 years ago  Her smoking use included cigarettes  She has a 6 00 pack-year smoking history  She has never used smokeless tobacco  She reports previous alcohol use of about 2 0 standard drinks of alcohol per week  She reports that she does not use drugs  ,  _______________________________________________________________________  Allergies:  has No Known Allergies     _______________________________________________________________________  Current Outpatient Medications   Medication Sig Dispense Refill    Ascorbic Acid (VITAMIN C) 1000 MG tablet Take 1,000 mg by mouth daily      clindamycin (CLINDAGEL) 1 % gel Apply topically 2 (two) times a day 30 g 0    Insulin Pen Needle (BD Pen Needle Jaymie U/F) 32G X 4 MM MISC Use daily With saxenda 100 each 1    metFORMIN (GLUCOPHAGE-XR) 500 mg 24 hr tablet Take 2 tablets (1,000 mg total) by mouth daily with dinner 30 tablet 2    metoprolol succinate (TOPROL-XL) 50 mg 24 hr tablet Take 1 5 tablets by mouth daily      phentermine (ADIPEX-P) 37 5 MG tablet Take 1 tablet (37 5 mg total) by mouth daily in the early morning 30 tablet 0    topiramate (TOPAMAX) 50 MG tablet TAKE 1 TABLET BY MOUTH AT BEDTIME 30 tablet 1    amLODIPine (NORVASC) 5 mg tablet Take 1 tablet (5 mg total) by mouth daily 90 tablet 1    liraglutide (SAXENDA) injection Inject 0 5 mL (3 mg total) under the skin daily 5 pen 3    pyridoxine (B-6) 100 MG tablet Take 1 tablet (100 mg total) by mouth daily (Patient not taking: Reported on 1/29/2021) 90 tablet 1     No current facility-administered medications for this visit       _______________________________________________________________________  Review of Systems   Constitutional: Positive for unexpected weight change  Negative for activity change, appetite change and fatigue  HENT: Negative for ear pain, sore throat, trouble swallowing and voice change  Eyes: Negative for photophobia and visual disturbance  Respiratory: Negative for cough, chest tightness, shortness of breath and wheezing  Cardiovascular: Negative for chest pain, palpitations and leg swelling  Gastrointestinal: Negative for abdominal pain, constipation, diarrhea, nausea, rectal pain and vomiting  Endocrine: Negative for cold intolerance, polydipsia and polyuria     Genitourinary: Negative for difficulty urinating, dysuria, flank pain, menstrual problem and pelvic pain  Musculoskeletal: Negative for arthralgias, joint swelling and myalgias  Skin: Negative for color change and rash  Allergic/Immunologic: Negative for environmental allergies and immunocompromised state  Neurological: Negative for dizziness, weakness, numbness and headaches  Hematological: Negative for adenopathy  Does not bruise/bleed easily  Psychiatric/Behavioral: Negative for decreased concentration, dysphoric mood, self-injury, sleep disturbance and suicidal ideas  The patient is not nervous/anxious  Objective:  Vitals:    04/02/21 1525   BP: 126/82   BP Location: Left arm   Patient Position: Sitting   Cuff Size: Standard   Pulse: 72   Temp: (!) 97 2 °F (36 2 °C)   TempSrc: Temporal   SpO2: 98%   Weight: 89 3 kg (196 lb 12 8 oz)   Height: 5' 2" (1 575 m)     Body mass index is 36 kg/m²  Physical Exam  Vitals signs and nursing note reviewed  Constitutional:       Appearance: She is obese  Cardiovascular:      Pulses: Normal pulses  Pulmonary:      Effort: Pulmonary effort is normal    Musculoskeletal: Normal range of motion  Neurological:      Mental Status: She is alert and oriented to person, place, and time  Psychiatric:         Mood and Affect: Mood normal          Behavior: Behavior normal          Thought Content:  Thought content normal          Judgment: Judgment normal

## 2021-04-07 ENCOUNTER — TELEPHONE (OUTPATIENT)
Dept: FAMILY MEDICINE CLINIC | Facility: CLINIC | Age: 56
End: 2021-04-07

## 2021-04-07 DIAGNOSIS — R11.2 NAUSEA AND VOMITING, INTRACTABILITY OF VOMITING NOT SPECIFIED, UNSPECIFIED VOMITING TYPE: Primary | ICD-10-CM

## 2021-04-07 RX ORDER — ONDANSETRON 4 MG/1
4 TABLET, FILM COATED ORAL EVERY 8 HOURS PRN
Qty: 20 TABLET | Refills: 0 | Status: SHIPPED | OUTPATIENT
Start: 2021-04-07 | End: 2021-05-13 | Stop reason: SDUPTHER

## 2021-04-07 NOTE — TELEPHONE ENCOUNTER
Thank you Juan All! I usually send this to my clinical team but I like to cc to original sender for thoroughness so if you are not comfortable relay message moving forward, just let clinical know  Thanks again!

## 2021-04-07 NOTE — TELEPHONE ENCOUNTER
I'll call in med to help prevent the nausea and vomitting  Decrease saxenda to every 2 days instead  Esthela tea also helps

## 2021-04-26 ENCOUNTER — RA CDI HCC (OUTPATIENT)
Dept: OTHER | Facility: HOSPITAL | Age: 56
End: 2021-04-26

## 2021-04-26 NOTE — PROGRESS NOTES
[facility]  coding opportunities          Chart reviewed, no opportunity found: CHART REVIEWED, NO OPPORTUNITY FOUND              Patients insurance company: [employer] (Medicare Advantage and Commercial)

## 2021-05-13 DIAGNOSIS — E66.9 OBESITY (BMI 30-39.9): ICD-10-CM

## 2021-05-13 DIAGNOSIS — R11.2 NAUSEA AND VOMITING, INTRACTABILITY OF VOMITING NOT SPECIFIED, UNSPECIFIED VOMITING TYPE: ICD-10-CM

## 2021-05-13 DIAGNOSIS — E66.01 MORBID OBESITY DUE TO EXCESS CALORIES (HCC): ICD-10-CM

## 2021-05-13 RX ORDER — ONDANSETRON 4 MG/1
4 TABLET, FILM COATED ORAL EVERY 8 HOURS PRN
Qty: 20 TABLET | Refills: 0 | Status: SHIPPED | OUTPATIENT
Start: 2021-05-13 | End: 2022-02-22

## 2021-05-13 RX ORDER — METFORMIN HYDROCHLORIDE 500 MG/1
1000 TABLET, EXTENDED RELEASE ORAL
Qty: 30 TABLET | Refills: 0 | Status: SHIPPED | OUTPATIENT
Start: 2021-05-13 | End: 2021-05-28

## 2021-05-13 RX ORDER — PHENTERMINE HYDROCHLORIDE 37.5 MG/1
37.5 TABLET ORAL
Qty: 30 TABLET | Refills: 0 | Status: SHIPPED | OUTPATIENT
Start: 2021-05-13 | End: 2021-06-04 | Stop reason: SDUPTHER

## 2021-05-19 DIAGNOSIS — E66.9 OBESITY (BMI 30-39.9): ICD-10-CM

## 2021-05-28 DIAGNOSIS — E66.01 MORBID OBESITY DUE TO EXCESS CALORIES (HCC): ICD-10-CM

## 2021-05-28 RX ORDER — METFORMIN HYDROCHLORIDE 500 MG/1
TABLET, EXTENDED RELEASE ORAL
Qty: 30 TABLET | Refills: 0 | Status: SHIPPED | OUTPATIENT
Start: 2021-05-28 | End: 2021-06-04 | Stop reason: SDUPTHER

## 2021-06-04 ENCOUNTER — TELEPHONE (OUTPATIENT)
Dept: OTHER | Facility: OTHER | Age: 56
End: 2021-06-04

## 2021-06-04 ENCOUNTER — OFFICE VISIT (OUTPATIENT)
Dept: FAMILY MEDICINE CLINIC | Facility: CLINIC | Age: 56
End: 2021-06-04
Payer: COMMERCIAL

## 2021-06-04 VITALS
OXYGEN SATURATION: 98 % | HEART RATE: 83 BPM | BODY MASS INDEX: 35.11 KG/M2 | WEIGHT: 190.8 LBS | SYSTOLIC BLOOD PRESSURE: 110 MMHG | TEMPERATURE: 98.5 F | HEIGHT: 62 IN | DIASTOLIC BLOOD PRESSURE: 72 MMHG

## 2021-06-04 DIAGNOSIS — Z23 NEED FOR PNEUMOCOCCAL VACCINATION: ICD-10-CM

## 2021-06-04 DIAGNOSIS — E66.01 MORBID OBESITY DUE TO EXCESS CALORIES (HCC): ICD-10-CM

## 2021-06-04 DIAGNOSIS — E05.90 SUBCLINICAL HYPERTHYROIDISM: Primary | ICD-10-CM

## 2021-06-04 DIAGNOSIS — R73.03 PREDIABETES: ICD-10-CM

## 2021-06-04 DIAGNOSIS — Z23 NEED FOR VACCINATION: ICD-10-CM

## 2021-06-04 DIAGNOSIS — Z23 NEED FOR SHINGLES VACCINE: ICD-10-CM

## 2021-06-04 DIAGNOSIS — J45.41 MODERATE PERSISTENT REACTIVE AIRWAY DISEASE WITH ACUTE EXACERBATION: ICD-10-CM

## 2021-06-04 DIAGNOSIS — E66.9 OBESITY (BMI 30-39.9): ICD-10-CM

## 2021-06-04 DIAGNOSIS — I10 ESSENTIAL HYPERTENSION: ICD-10-CM

## 2021-06-04 PROCEDURE — 90750 HZV VACC RECOMBINANT IM: CPT | Performed by: FAMILY MEDICINE

## 2021-06-04 PROCEDURE — 1036F TOBACCO NON-USER: CPT | Performed by: FAMILY MEDICINE

## 2021-06-04 PROCEDURE — 3074F SYST BP LT 130 MM HG: CPT | Performed by: FAMILY MEDICINE

## 2021-06-04 PROCEDURE — 3008F BODY MASS INDEX DOCD: CPT | Performed by: FAMILY MEDICINE

## 2021-06-04 PROCEDURE — 99214 OFFICE O/P EST MOD 30 MIN: CPT | Performed by: FAMILY MEDICINE

## 2021-06-04 PROCEDURE — 90732 PPSV23 VACC 2 YRS+ SUBQ/IM: CPT | Performed by: FAMILY MEDICINE

## 2021-06-04 PROCEDURE — 3078F DIAST BP <80 MM HG: CPT | Performed by: FAMILY MEDICINE

## 2021-06-04 PROCEDURE — 90471 IMMUNIZATION ADMIN: CPT | Performed by: FAMILY MEDICINE

## 2021-06-04 PROCEDURE — 90472 IMMUNIZATION ADMIN EACH ADD: CPT | Performed by: FAMILY MEDICINE

## 2021-06-04 RX ORDER — PHENTERMINE HYDROCHLORIDE 37.5 MG/1
37.5 TABLET ORAL
Qty: 30 TABLET | Refills: 0 | Status: SHIPPED | OUTPATIENT
Start: 2021-06-04 | End: 2021-07-08 | Stop reason: SDUPTHER

## 2021-06-04 RX ORDER — METFORMIN HYDROCHLORIDE 500 MG/1
1000 TABLET, EXTENDED RELEASE ORAL
Qty: 60 TABLET | Refills: 3 | Status: SHIPPED | OUTPATIENT
Start: 2021-06-04 | End: 2022-01-04

## 2021-06-04 RX ORDER — TOPIRAMATE 50 MG/1
TABLET, FILM COATED ORAL
Qty: 30 TABLET | Refills: 1 | Status: SHIPPED | OUTPATIENT
Start: 2021-06-04 | End: 2021-08-30

## 2021-06-04 NOTE — PROGRESS NOTES
Assessment/Plan:    Advised follow-up endocrinologist for subclinical hyperthyroidism  Her blood pressure is stable  Weight is down appropriately  Advised to resume Saxenda at 1 2 mg daily after 1 week if she does well then was 1 8 mg daily  Advised a smooth move tea to help with laxative  Will see her back in 1 month goal weight next month is 186 lb  Will need 2nd shingle vaccine in 2 months  Pneumovax every 5 years for asthma  I have spent 25 minutes with Patient  today in which greater than 50% of this time was spent in counseling/coordination of care regarding Risks and benefits of tx options, Intructions for management, Importance of tx compliance, Risk factor reductions and Impressions  Problem List Items Addressed This Visit        Endocrine    Subclinical hyperthyroidism - Primary       Respiratory    Reactive airway disease with acute exacerbation       Cardiovascular and Mediastinum    Essential hypertension       Other    Morbid obesity due to excess calories (HCC)    Relevant Medications    metFORMIN (GLUCOPHAGE-XR) 500 mg 24 hr tablet    Obesity (BMI 30-39  9)    Relevant Medications    topiramate (TOPAMAX) 50 MG tablet    phentermine (ADIPEX-P) 37 5 MG tablet    liraglutide (SAXENDA) injection    Prediabetes    Need for shingles vaccine    Need for pneumococcal vaccination      Other Visit Diagnoses     Need for vaccination        Relevant Orders    Zoster Vaccine Recombinant IM (Completed)    PNEUMOCOCCAL POLYSACCHARIDE VACCINE 23-VALENT =>1YO SQ IM (Completed)            Subjective:      Patient ID: Rommel Grimes is a 54 y o  female  24-year-old female follow-up medical weight loss  She is on metformin 1000 mg daily topiramate 50 mg daily phentermine 37 5 mg daily  She is on Saxenda 3 mg daily however the past 2 weeks she has been very constipated and loading and feeling sick so she stopped  Her weight now is 190 lb  Start weight is 219 lb    She has not seen endocrinologist yet scheduled for be in August   She needs shingle vaccine pneumonia vaccine  Her blood pressure is stable  The following portions of the patient's history were reviewed and updated as appropriate:   Past Medical History:  She has a past medical history of 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), Asthma due to seasonal allergies (9/25/2020), Depression, Essential hypertension (4/20/2020), JANE (generalized anxiety disorder) (2/88/7233), Helicobacter pylori gastrointestinal tract infection (4/20/2020), History of Holter monitoring (12/22/2017), History of stress test (01/05/2018), Hypertension, Immunization deficiency (4/20/2020), Immunization deficiency (9/25/2020), Iron deficiency (4/20/2020), Kidney stone (4/20/2020), Need for pneumococcal vaccination (6/4/2021), Need for shingles vaccine (6/4/2021), Obesity (BMI 30-39 9) (4/20/2020), Onychomycosis of toenail (6/19/2020), Prediabetes (4/20/2020), 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), Reactive airway disease with acute exacerbation (5/20/2020), Subclinical hyperthyroidism (4/20/2020), Subclinical hyperthyroidism (9/25/2020), Thyroid nodule (9/25/2020), and Vitamin D deficiency (4/20/2020)  ,  _______________________________________________________________________  Medical Problems:  does not have any pertinent problems on file ,  _______________________________________________________________________  Past Surgical History:   has a past surgical history that includes Tubal ligation; Colonoscopy (09/08/2017); EGD (09/01/2017); Knee surgery; and Knee arthroscopy w/ ACL reconstruction  ,  _______________________________________________________________________  Family History:  family history includes Cancer in her maternal uncle; Colon cancer in her maternal aunt; Diabetes in her father; Kidney disease in her maternal uncle ,  _______________________________________________________________________  Social History:   reports that she quit smoking about 7 years ago   Her smoking use included cigarettes  She has a 6 00 pack-year smoking history  She has never used smokeless tobacco  She reports previous alcohol use of about 2 0 standard drinks of alcohol per week  She reports that she does not use drugs  ,  _______________________________________________________________________  Allergies:  has No Known Allergies     _______________________________________________________________________  Current Outpatient Medications   Medication Sig Dispense Refill    amLODIPine (NORVASC) 5 mg tablet Take 1 tablet (5 mg total) by mouth daily 90 tablet 1    Ascorbic Acid (VITAMIN C) 1000 MG tablet Take 1,000 mg by mouth daily      Insulin Pen Needle (BD Pen Needle Jaymie U/F) 32G X 4 MM MISC Use daily With saxenda 100 each 1    liraglutide (SAXENDA) injection Inject 0 5 mL (3 mg total) under the skin daily 15 mL 3    metFORMIN (GLUCOPHAGE-XR) 500 mg 24 hr tablet Take 2 tablets (1,000 mg total) by mouth daily with dinner 60 tablet 3    metoprolol succinate (TOPROL-XL) 50 mg 24 hr tablet Take 1 5 tablets by mouth daily      ondansetron (ZOFRAN) 4 mg tablet Take 1 tablet (4 mg total) by mouth every 8 (eight) hours as needed for nausea or vomiting 20 tablet 0    phentermine (ADIPEX-P) 37 5 MG tablet Take 1 tablet (37 5 mg total) by mouth daily in the early morning 30 tablet 0    topiramate (TOPAMAX) 50 MG tablet TAKE 1 TABLET BY MOUTH AT BEDTIME 30 tablet 1    pyridoxine (B-6) 100 MG tablet Take 1 tablet (100 mg total) by mouth daily (Patient not taking: Reported on 1/29/2021) 90 tablet 1     No current facility-administered medications for this visit       _______________________________________________________________________  Review of Systems      Objective:  Vitals:    06/04/21 1433   BP: 110/72   BP Location: Left arm   Patient Position: Sitting   Cuff Size: Standard   Pulse: 83   Temp: 98 5 °F (36 9 °C)   TempSrc: Tympanic   SpO2: 98%   Weight: 86 5 kg (190 lb 12 8 oz)   Height: 5' 2" (1 575 m)     Body mass index is 34 9 kg/m²       Physical Exam

## 2021-06-04 NOTE — TELEPHONE ENCOUNTER
Pharmacy called saying it's too soon to refill medication and can someone in the office give us a call because the patient is new to us and we need more information about the patient

## 2021-07-08 ENCOUNTER — TELEPHONE (OUTPATIENT)
Dept: ADMINISTRATIVE | Facility: OTHER | Age: 56
End: 2021-07-08

## 2021-07-08 DIAGNOSIS — E66.9 OBESITY (BMI 30-39.9): ICD-10-CM

## 2021-07-08 RX ORDER — PHENTERMINE HYDROCHLORIDE 37.5 MG/1
37.5 TABLET ORAL
Qty: 30 TABLET | Refills: 0 | Status: SHIPPED | OUTPATIENT
Start: 2021-07-08 | End: 2021-08-26

## 2021-07-08 NOTE — LETTER
Procedure Request Form: Colonoscopy      Date Requested: 21  Patient: Tamara Lozano  Patient : 1965   Referring Provider: Arnulfo Caro, MD        Date of Procedure ______________________________       The above patient has informed us that they have completed their   most recent Colonoscopy at your facility  Please complete   this form and attach all corresponding procedure reports/results  Comments __________________________________________________________  ____________________________________________________________________  ____________________________________________________________________  ____________________________________________________________________    Facility Completing Procedure _________________________________________    Form Completed By (print name) _______________________________________      Signature __________________________________________________________      These reports are needed for  compliance    Please fax this completed form and a copy of the procedure report to our office located at [place] [address] as soon as possible to [phone] attention Veronica Willoughby: Phone [phone]    We thank you for your assistance in treating our mutual patient

## 2021-07-08 NOTE — LETTER
Procedure Request Form: Colonoscopy      Date Requested: 21  Patient: Chandrika Dye  Patient : 1965   Referring Provider: Patriciaann Forget, MD        Date of Procedure ______________________________       The above patient has informed us that they have completed their   most recent Colonoscopy at your facility  Please complete   this form and attach all corresponding procedure reports/results  Comments __________________________________________________________  ____________________________________________________________________  ____________________________________________________________________  ____________________________________________________________________    Facility Completing Procedure _________________________________________    Form Completed By (print name) _______________________________________      Signature __________________________________________________________      These reports are needed for  compliance    Please fax this completed form and a copy of the procedure report to our office located at [place] [address] as soon as possible to [phone] tony Madera: Phone [phone]    We thank you for your assistance in treating our mutual patient

## 2021-07-08 NOTE — LETTER
[facility] Medical Records Request for Care Gap Closure    Medical Records Fax: [phone]    Date Requested: 21  Patient: Luda Singletary  Patient : 1965   Requesting on behalf of Provider: MD Jorge Leung MD has requested the retrieval and updating of CRC: Colonoscopy  The office staff has provided us with the following information listed below  Prior to sending this request I have reviewed Epic Chart Review, completed an Epic Chart Search, and reviewed Care Everywhere documents  Estimated Date of Service: [id number]  Facility: [facility]  Specialty: Gastroenterlogy  Performing Provider: N/a  Additional Comments: N/a     Your assistance in completing this request is greatly appreciated  Please send document to [phone] tony Johnson: Phone [phone]       Sincerely,      Jan Schwartz MA

## 2021-07-08 NOTE — TELEPHONE ENCOUNTER
----- Message from Jhoana Allison sent at 7/7/2021 11:22 AM EDT -----  Regarding: care gap request  07/07/21 11:22 AM    Hello, our patient attached above has had CRC: Colonoscopy completed/performed  Please assist in updating the patient chart by making an External outreach to [facility] facility located in [place]  The date of service is [id number]      Thank you,  Abdoulaye May  [address] [place] [address]

## 2021-07-09 NOTE — TELEPHONE ENCOUNTER
Upon review of the In Basket request and the patient's chart, initial outreach has been made via fax, please see Contacts section for details       Thank you  Nadege Rand MA

## 2021-07-19 NOTE — TELEPHONE ENCOUNTER
Upon review of the In Basket request and the patient's chart, initial outreach has been made via fax, please see Contacts section for details       Thank you  James Diego MA

## 2021-07-27 NOTE — TELEPHONE ENCOUNTER
Upon review of the In Basket request and the patient's chart, initial outreach has been made via fax, please see Contacts section for details       Thank you  Francisco Orr MA

## 2021-07-28 NOTE — TELEPHONE ENCOUNTER
Upon review of the In Basket request we have found this is a duplicate request and no further action is needed  This request was completed upon initial request, the patient chart is up to date, and this message will now be closed  Any additional questions or concerns should be emailed to the Practice Liaisons via [email or web address] email, please do not reply via In Basket      Thank you  Francisco Orr MA

## 2021-07-30 ENCOUNTER — RA CDI HCC (OUTPATIENT)
Dept: OTHER | Facility: HOSPITAL | Age: 56
End: 2021-07-30

## 2021-07-30 NOTE — PROGRESS NOTES
[facility] 75  coding opportunities          Chart reviewed, no opportunity found: CHART REVIEWED, NO OPPORTUNITY FOUND                     Patients insurance company: [employer] (Medicare Advantage and Commercial)

## 2021-08-10 ENCOUNTER — VBI (OUTPATIENT)
Dept: ADMINISTRATIVE | Facility: OTHER | Age: 56
End: 2021-08-10

## 2021-08-19 DIAGNOSIS — E66.9 OBESITY (BMI 30-39.9): ICD-10-CM

## 2021-08-19 RX ORDER — PHENTERMINE HYDROCHLORIDE 37.5 MG/1
TABLET ORAL
Qty: 30 TABLET | Refills: 0 | OUTPATIENT
Start: 2021-08-19

## 2021-08-19 NOTE — TELEPHONE ENCOUNTER
Patient needs follow-up eval  Med order 7/8 currently 8/19  Quality 131: Pain Assessment And Follow-Up: Pain assessment using a standardized tool is documented as negative, no follow-up plan required Quality 130: Documentation Of Current Medications In The Medical Record: Current Medications Documented Additional Notes: Pt states on a scale from 0-10, pain level is 0. Pt declines singles vaccine. Quality 474: Zoster Vaccination Status: Shingrix vaccine was not administered for reasons documented by clinician (e.g. patient administered vaccine other than Shingrix, patient allergy or other medical reasons, patient declined or other patient reasons, vaccine not available or other system reasons) Detail Level: Detailed

## 2021-08-21 DIAGNOSIS — E66.9 OBESITY (BMI 30-39.9): ICD-10-CM

## 2021-08-23 NOTE — TELEPHONE ENCOUNTER
Please call this patient as per Dr Elisabeth Gómez and have them schedule an appt to be seen  Thanks!

## 2021-08-26 RX ORDER — PHENTERMINE HYDROCHLORIDE 37.5 MG/1
TABLET ORAL
Qty: 30 TABLET | Refills: 0 | Status: SHIPPED | OUTPATIENT
Start: 2021-08-26 | End: 2021-10-04

## 2021-08-27 DIAGNOSIS — E66.9 OBESITY (BMI 30-39.9): ICD-10-CM

## 2021-08-30 RX ORDER — TOPIRAMATE 50 MG/1
TABLET, FILM COATED ORAL
Qty: 30 TABLET | Refills: 1 | Status: SHIPPED | OUTPATIENT
Start: 2021-08-30 | End: 2022-01-06

## 2021-09-17 ENCOUNTER — VBI (OUTPATIENT)
Dept: ADMINISTRATIVE | Facility: OTHER | Age: 56
End: 2021-09-17

## 2021-09-30 DIAGNOSIS — I10 ESSENTIAL HYPERTENSION: Primary | ICD-10-CM

## 2021-09-30 RX ORDER — METOPROLOL SUCCINATE 50 MG/1
75 TABLET, EXTENDED RELEASE ORAL DAILY
Qty: 135 TABLET | Refills: 1 | Status: SHIPPED | OUTPATIENT
Start: 2021-09-30 | End: 2022-02-25 | Stop reason: SDUPTHER

## 2021-10-02 DIAGNOSIS — E66.9 OBESITY (BMI 30-39.9): ICD-10-CM

## 2021-10-04 RX ORDER — PHENTERMINE HYDROCHLORIDE 37.5 MG/1
TABLET ORAL
Qty: 30 TABLET | Refills: 0 | Status: SHIPPED | OUTPATIENT
Start: 2021-10-04 | End: 2021-12-30 | Stop reason: SDUPTHER

## 2021-10-12 ENCOUNTER — VBI (OUTPATIENT)
Dept: ADMINISTRATIVE | Facility: OTHER | Age: 56
End: 2021-10-12

## 2021-10-13 ENCOUNTER — TELEPHONE (OUTPATIENT)
Dept: GASTROENTEROLOGY | Facility: CLINIC | Age: 56
End: 2021-10-13

## 2021-10-21 ENCOUNTER — RA CDI HCC (OUTPATIENT)
Dept: OTHER | Facility: HOSPITAL | Age: 56
End: 2021-10-21

## 2021-10-29 ENCOUNTER — OFFICE VISIT (OUTPATIENT)
Dept: FAMILY MEDICINE CLINIC | Facility: CLINIC | Age: 56
End: 2021-10-29
Payer: COMMERCIAL

## 2021-10-29 VITALS
OXYGEN SATURATION: 98 % | TEMPERATURE: 97.6 F | DIASTOLIC BLOOD PRESSURE: 84 MMHG | HEIGHT: 62 IN | SYSTOLIC BLOOD PRESSURE: 126 MMHG | BODY MASS INDEX: 34.67 KG/M2 | HEART RATE: 93 BPM | WEIGHT: 188.4 LBS

## 2021-10-29 DIAGNOSIS — I10 ESSENTIAL HYPERTENSION: ICD-10-CM

## 2021-10-29 DIAGNOSIS — E66.01 MORBID OBESITY DUE TO EXCESS CALORIES (HCC): Primary | ICD-10-CM

## 2021-10-29 DIAGNOSIS — J45.41 MODERATE PERSISTENT REACTIVE AIRWAY DISEASE WITH ACUTE EXACERBATION: ICD-10-CM

## 2021-10-29 DIAGNOSIS — R63.8 INCREASED BMI: ICD-10-CM

## 2021-10-29 DIAGNOSIS — Z00.00 ANNUAL PHYSICAL EXAM: ICD-10-CM

## 2021-10-29 DIAGNOSIS — F43.0 ACUTE STRESS DISORDER: ICD-10-CM

## 2021-10-29 DIAGNOSIS — Z23 ENCOUNTER FOR IMMUNIZATION: ICD-10-CM

## 2021-10-29 DIAGNOSIS — E66.9 OBESITY (BMI 30-39.9): ICD-10-CM

## 2021-10-29 DIAGNOSIS — Z23 NEED FOR SHINGLES VACCINE: ICD-10-CM

## 2021-10-29 DIAGNOSIS — F33.9 DEPRESSION, RECURRENT (HCC): ICD-10-CM

## 2021-10-29 PROCEDURE — 90471 IMMUNIZATION ADMIN: CPT | Performed by: FAMILY MEDICINE

## 2021-10-29 PROCEDURE — 3725F SCREEN DEPRESSION PERFORMED: CPT | Performed by: FAMILY MEDICINE

## 2021-10-29 PROCEDURE — 90750 HZV VACC RECOMBINANT IM: CPT | Performed by: FAMILY MEDICINE

## 2021-10-29 PROCEDURE — 1036F TOBACCO NON-USER: CPT | Performed by: FAMILY MEDICINE

## 2021-10-29 PROCEDURE — 99396 PREV VISIT EST AGE 40-64: CPT | Performed by: FAMILY MEDICINE

## 2021-10-29 PROCEDURE — 3008F BODY MASS INDEX DOCD: CPT | Performed by: FAMILY MEDICINE

## 2021-10-29 RX ORDER — FERROUS SULFATE 325(65) MG
325 TABLET ORAL
COMMUNITY
End: 2022-02-22

## 2021-10-29 RX ORDER — AMLODIPINE BESYLATE 5 MG/1
5 TABLET ORAL DAILY
Qty: 90 TABLET | Refills: 1 | Status: SHIPPED | OUTPATIENT
Start: 2021-10-29 | End: 2022-06-10 | Stop reason: SDUPTHER

## 2021-11-18 ENCOUNTER — VBI (OUTPATIENT)
Dept: ADMINISTRATIVE | Facility: OTHER | Age: 56
End: 2021-11-18

## 2021-12-10 NOTE — ASSESSMENT & PLAN NOTE
1. I don't see any joint swelling currently. I think most of your pains are related to osteoarthritis and trochanteric bursitis (tenderness in the upper/outer hips). 2. Start taking meloxicam 15mg daily. Take it with food, every day. If it upsets your stomach, stop it and let me know. Make sure you stay hydrated while taking this. 3. Labs before starting the meloxicam. Sign up for [employer] and send me a message once you've had these done, so I can close the loop with you on next steps. 4. Let me know if you develop new joint swelling, breathing problems, progressive rashes, or other concerns. You're a return patient to us for up to 3 years. Patient claims shortness of breath this afternoon but she was also coughing  BNP noted to be normal   Weight gain approximately 15 lb within the past 6 months  Most likely shortness of breath is secondary to exercise intolerance and would most likely need weight loss in the future  TSH relatively low  Would check free T4 tomorrow  Nutrition consult

## 2021-12-29 ENCOUNTER — VBI (OUTPATIENT)
Dept: ADMINISTRATIVE | Facility: OTHER | Age: 56
End: 2021-12-29

## 2021-12-30 DIAGNOSIS — E66.9 OBESITY (BMI 30-39.9): ICD-10-CM

## 2021-12-30 RX ORDER — PHENTERMINE HYDROCHLORIDE 37.5 MG/1
37.5 TABLET ORAL EVERY MORNING
Qty: 30 TABLET | Refills: 0 | Status: SHIPPED | OUTPATIENT
Start: 2021-12-30 | End: 2022-02-25 | Stop reason: SDUPTHER

## 2022-01-03 DIAGNOSIS — E66.01 MORBID OBESITY DUE TO EXCESS CALORIES (HCC): ICD-10-CM

## 2022-01-04 RX ORDER — METFORMIN HYDROCHLORIDE 500 MG/1
TABLET, EXTENDED RELEASE ORAL
Qty: 60 TABLET | Refills: 3 | Status: SHIPPED | OUTPATIENT
Start: 2022-01-04 | End: 2022-06-10 | Stop reason: SDUPTHER

## 2022-01-06 DIAGNOSIS — E66.9 OBESITY (BMI 30-39.9): ICD-10-CM

## 2022-01-06 RX ORDER — TOPIRAMATE 50 MG/1
TABLET, FILM COATED ORAL
Qty: 30 TABLET | Refills: 1 | Status: SHIPPED | OUTPATIENT
Start: 2022-01-06 | End: 2022-02-08 | Stop reason: SDUPTHER

## 2022-01-14 ENCOUNTER — RA CDI HCC (OUTPATIENT)
Dept: OTHER | Facility: HOSPITAL | Age: 57
End: 2022-01-14

## 2022-01-14 NOTE — PROGRESS NOTES
[facility] 75  coding opportunities          Number of diagnosis code(s) already on the problem list added to FYI fla               Number of suggestions used: 1      Number of suggestions NOT actually used: 1     Patients insurance company: [employer] (Medicare Advantage and Commercial)     Visit status: Patient arrived for their scheduled appointment        [facility] 75  coding opportunities    Number of diagnosis code(s) already on the problem list added to [employer] fla       Patients insurance company: [id number] (Medicare Advantage and Commercial)       Appt on 22  Found in active problem list - Please review/recertify the BPA diagnoses for     E66 01: Morbid obesity due to excess calories ([place] [address] )    F33 9: Depression, recurrent ([place] [address] )  ---------------------------------  Telemedicine note - video visit

## 2022-01-21 ENCOUNTER — TELEMEDICINE (OUTPATIENT)
Dept: FAMILY MEDICINE CLINIC | Facility: CLINIC | Age: 57
End: 2022-01-21
Payer: COMMERCIAL

## 2022-01-21 VITALS — HEIGHT: 62 IN | BODY MASS INDEX: 34.6 KG/M2 | WEIGHT: 188 LBS

## 2022-01-21 DIAGNOSIS — E54 ASCORBIC ACID DEFICIENCY: ICD-10-CM

## 2022-01-21 DIAGNOSIS — E55.9 VITAMIN D DEFICIENCY: ICD-10-CM

## 2022-01-21 DIAGNOSIS — N20.0 KIDNEY STONE: ICD-10-CM

## 2022-01-21 DIAGNOSIS — E66.01 MORBID OBESITY DUE TO EXCESS CALORIES (HCC): ICD-10-CM

## 2022-01-21 DIAGNOSIS — R73.03 PREDIABETES: ICD-10-CM

## 2022-01-21 DIAGNOSIS — E53.8 CYANOCOBALAMIN DEFICIENCY: ICD-10-CM

## 2022-01-21 DIAGNOSIS — I10 ESSENTIAL HYPERTENSION: Primary | ICD-10-CM

## 2022-01-21 DIAGNOSIS — Z12.31 ENCOUNTER FOR SCREENING MAMMOGRAM FOR BREAST CANCER: ICD-10-CM

## 2022-01-21 DIAGNOSIS — E61.1 IRON DEFICIENCY: ICD-10-CM

## 2022-01-21 DIAGNOSIS — E53.1 PYRIDOXINE DEFICIENCY: ICD-10-CM

## 2022-01-21 DIAGNOSIS — E05.90 SUBCLINICAL HYPERTHYROIDISM: ICD-10-CM

## 2022-01-21 DIAGNOSIS — Z11.4 SCREENING FOR HIV (HUMAN IMMUNODEFICIENCY VIRUS): ICD-10-CM

## 2022-01-21 PROCEDURE — 3008F BODY MASS INDEX DOCD: CPT | Performed by: FAMILY MEDICINE

## 2022-01-21 PROCEDURE — 1036F TOBACCO NON-USER: CPT | Performed by: FAMILY MEDICINE

## 2022-01-21 PROCEDURE — 99214 OFFICE O/P EST MOD 30 MIN: CPT | Performed by: FAMILY MEDICINE

## 2022-01-21 RX ORDER — HYDROCODONE BITARTRATE AND ACETAMINOPHEN 5; 325 MG/1; MG/1
TABLET ORAL
COMMUNITY
End: 2022-02-22

## 2022-01-21 RX ORDER — NAPROXEN 500 MG/1
TABLET ORAL
COMMUNITY
End: 2022-04-29

## 2022-01-21 RX ORDER — ALBUTEROL SULFATE 90 UG/1
AEROSOL, METERED RESPIRATORY (INHALATION)
COMMUNITY
End: 2022-02-22

## 2022-01-21 RX ORDER — GUAIFENESIN AND CODEINE PHOSPHATE 100; 10 MG/5ML; MG/5ML
SOLUTION ORAL
COMMUNITY
End: 2022-02-22

## 2022-01-21 RX ORDER — TAMSULOSIN HYDROCHLORIDE 0.4 MG/1
CAPSULE ORAL
COMMUNITY
End: 2022-02-22

## 2022-01-21 NOTE — PROGRESS NOTES
Virtual Regular Visit    Verification of patient location:    Patient is located in the following state in which I hold an active license [place]      Assessment/Plan:      Advised to get blood work to check her thyroid cholesterol vitamins level  Advised to follow-up endocrinologist   Can do Saxenda alternate 2 4 mg daily and 3 mg daily twice a week  Continue phentermine topiramate  Try nasal saline spray and then step up to nasal steroid spray  Will need to see her back in the office in a month  Ultimate goal weight is 150 lb    Problem List Items Addressed This Visit        Endocrine    Subclinical hyperthyroidism    Relevant Orders    TSH, 3rd generation with Free T4 reflex    T3, free       Cardiovascular and Mediastinum    Essential hypertension - Primary    Relevant Orders    Comprehensive metabolic panel    Microalbumin,Urine       Genitourinary    Kidney stone    Relevant Medications    HYDROcodone-acetaminophen (NORCO) 5-325 mg per tablet    Other Relevant Orders    UA w Reflex to Microscopic w Reflex to Culture    Uric acid    PTH, intact       Other    Morbid obesity due to excess calories (HCC)    Relevant Orders    Lipid Panel with Direct LDL reflex    Vitamin D deficiency    Relevant Orders    Vitamin D 25 hydroxy    Ascorbic acid deficiency    Relevant Orders    Vitamin C    Iron deficiency    Relevant Orders    CBC and differential    Iron Panel (Includes Ferritin, Iron Sat%, Iron, and TIBC)    Prediabetes    Relevant Orders    Hemoglobin A1C    Pyridoxine deficiency    Relevant Orders    Vitamin B6      Other Visit Diagnoses     Screening for HIV (human immunodeficiency virus)        Relevant Orders    HIV 1/2 Antigen/Antibody (4th Generation) w Reflex [facility]    Encounter for screening mammogram for breast cancer        Relevant Orders    Mammo screening bilateral w 3d & cad    Cyanocobalamin deficiency        Relevant Orders    Vitamin B12               Reason for visit is   Chief Complaint Patient presents with    Follow-up     Weight     Virtual Regular Visit        Encounter provider Jeffrey Vincent MD    Provider located at [address] [place] [address]  [facility] [address] [facility] [address] [id number] [phone]      Recent Visits  No visits were found meeting these conditions  Showing recent visits within past 7 days and meeting all other requirements  Today's Visits  Date Type Provider Dept   01/21/22 Telemedicine Jeffrey Vincent MD Pg [id number] John Carl today's visits and meeting all other requirements  Future Appointments  No visits were found meeting these conditions  Showing future appointments within next 150 days and meeting all other requirements       The patient was identified by name and date of birth  Chelle Gonzalez was informed that this is a telemedicine visit and that the visit is being conducted through [facility] and patient was informed that this is a secure, HIPAA-compliant platform  She agrees to proceed     My office door was closed  No one else was in the room  She acknowledged consent and understanding of privacy and security of the video platform  The patient has agreed to participate and understands they can discontinue the visit at any time  Patient is aware this is a billable service  Subjective  Chelle Gonzalez is a 64 y o  female    71-year-old female follow-up medical weight loss  She is on phentermine 37 5 mg topiramate 50 Saxenda 2 4 mg daily  Higher dose it gave her GI upset  Her weight is steady 190 lb  She has not seen endocrinologist for subclinical hypothyroidism  She has what house in [facility]  Up-to-date with COVID vaccine  Has high blood pressure on amlodipine  She complained of runny nose constant when she blows her nose nothing coming out  No fever no chills no pressure         Past Medical History:   Diagnosis Date    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    Asthma due to seasonal allergies 9/25/2020    Depression     Essential hypertension 4/20/2020    JANE (generalized anxiety disorder) 9/32/6627    Helicobacter pylori gastrointestinal tract infection 4/20/2020    10/02/2017=egd 9/2017 Dr Brittaney Stewart History of Holter monitoring 12/22/2017    The baseline rhythm was of sinus origin with an average heart rate of 90 bpm (range: 72 - 129 bpm)  There were rare single APCs adn VPCs (less than 0 1 % total beats)  There were no sustained cardiac dysrhythmais  There were no significant symptomatic pauses   History of stress test 01/05/2018    Mildly abnormal exercise Myoview SPECT study with evidence of a fixed anterior defect  This is most likely related to breast attenuation artifact  There was no evidence of reversible myocardial ischemia  Gated wall motion analysis was normal with well preserved systolic function  The left ventricle was normal in size with calculated EF of 73%  No prior similar study is available for comparison       Hypertension     Immunization deficiency 4/20/2020    Hep a/b/mumps nonimmune    Immunization deficiency 9/25/2020    Hep a/b/mmr nonimmune 6/19/2020, 9/25/2020, 2/2021=hep a/b    Iron deficiency 4/20/2020    Kidney stone 4/20/2020    Need for pneumococcal vaccination 6/4/2021    Need for shingles vaccine 6/4/2021    Obesity (BMI 30-39 9) 4/20/2020    39 4, start wt 219 lb=goal 150 lb    Onychomycosis of toenail 6/19/2020    Prediabetes 4/20/2020    5 8    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    5    Reactive airway disease with acute exacerbation 5/20/2020    Subclinical hyperthyroidism 4/20/2020    Subclinical hyperthyroidism 9/25/2020    Thyroid nodule 9/25/2020    Vitamin D deficiency 4/20/2020    33       Past Surgical History:   Procedure Laterality Date    COLONOSCOPY  09/08/2017    3 polyps=next 2020 w Dr Brittaney Stewart EGD  09/01/2017    egd biopsy     KNEE ARTHROSCOPY W/ ACL RECONSTRUCTION      KNEE SURGERY      torn mcl, acl, meniscus left knee 2016  TUBAL LIGATION         Current Outpatient Medications   Medication Sig Dispense Refill    albuterol (PROVENTIL HFA,VENTOLIN HFA) 90 mcg/act inhaler Inhale      amLODIPine (NORVASC) 5 mg tablet Take 1 tablet (5 mg total) by mouth daily 90 tablet 1    Ascorbic Acid (VITAMIN C) 1000 MG tablet Take 1,000 mg by mouth daily (Patient not taking: Reported on 10/29/2021)      ferrous sulfate 325 (65 Fe) mg tablet Take 325 mg by mouth (Patient not taking: Reported on 10/29/2021)      guaifenesin-codeine (GUAIFENESIN AC) 100-10 MG/5ML liquid       HYDROcodone-acetaminophen (NORCO) 5-325 mg per tablet Take by mouth      Insulin Pen Needle (BD Pen Needle Jaymie U/F) 32G X 4 MM MISC Use daily With saxenda (Patient not taking: Reported on 10/29/2021) 100 each 1    liraglutide (SAXENDA) injection Inject 0 5 mL (3 mg total) under the skin daily 15 mL 3    metFORMIN (GLUCOPHAGE-XR) 500 mg 24 hr tablet TAKE 2 TABLETS(1000 MG) BY MOUTH DAILY WITH DINNER 60 tablet 3    metoprolol succinate (TOPROL-XL) 50 mg 24 hr tablet Take 1 5 tablets (75 mg total) by mouth daily 135 tablet 1    naproxen (NAPROSYN) 500 mg tablet Take by mouth      ondansetron (ZOFRAN) 4 mg tablet Take 1 tablet (4 mg total) by mouth every 8 (eight) hours as needed for nausea or vomiting (Patient not taking: Reported on 10/29/2021) 20 tablet 0    phentermine (ADIPEX-P) 37 5 MG tablet Take 1 tablet (37 5 mg total) by mouth every morning 30 tablet 0    pyridoxine (B-6) 100 MG tablet Take 1 tablet (100 mg total) by mouth daily (Patient not taking: Reported on 1/29/2021) 90 tablet 1    tamsulosin (FLOMAX) 0 4 mg       topiramate (TOPAMAX) 50 MG tablet TAKE 1 TABLET BY MOUTH AT BEDTIME 30 tablet 1     No current facility-administered medications for this visit  No Known Allergies    Review of Systems   Constitutional: Negative for activity change, appetite change, fatigue and unexpected weight change  HENT: Positive for rhinorrhea   Negative for ear pain, sore throat, trouble swallowing and voice change  Eyes: Negative for photophobia and visual disturbance  Respiratory: Negative for cough, chest tightness, shortness of breath and wheezing  Cardiovascular: Negative for chest pain, palpitations and leg swelling  Gastrointestinal: Negative for abdominal pain, constipation, diarrhea, nausea, rectal pain and vomiting  Endocrine: Negative for cold intolerance, polydipsia and polyuria  Genitourinary: Negative for difficulty urinating, dysuria, flank pain, menstrual problem and pelvic pain  Musculoskeletal: Negative for arthralgias, joint swelling and myalgias  Skin: Negative for color change and rash  Allergic/Immunologic: Negative for environmental allergies and immunocompromised state  Neurological: Negative for dizziness, weakness, numbness and headaches  Hematological: Negative for adenopathy  Does not bruise/bleed easily  Psychiatric/Behavioral: Negative for decreased concentration, dysphoric mood, self-injury, sleep disturbance and suicidal ideas  The patient is not nervous/anxious  Video Exam    Vitals:    01/21/22 1518   Weight: 85 3 kg (188 lb)   Height: 5' 2" (1 575 m)       Physical Exam  Vitals and nursing note reviewed  Constitutional:       Appearance: Normal appearance  She is obese  HENT:      Head: Normocephalic and atraumatic  Pulmonary:      Effort: Pulmonary effort is normal    Neurological:      Mental Status: She is alert and oriented to person, place, and time  Psychiatric:         Mood and Affect: Mood normal          Behavior: Behavior normal          Thought Content: Thought content normal          Judgment: Judgment normal           I spent 30 minutes directly with the patient during this visit    Jacinto Galeano verbally agrees to participate in [employer]   Pt is aware that [employer] could be limited without vital signs or the ability to perform a full hands-on physical Rigo Turner understands she or the provider may request at any time to terminate the video visit and request the patient to seek care or treatment in person

## 2022-02-03 DIAGNOSIS — F41.1 GAD (GENERALIZED ANXIETY DISORDER): ICD-10-CM

## 2022-02-03 DIAGNOSIS — N92.1 MENORRHAGIA WITH IRREGULAR CYCLE: ICD-10-CM

## 2022-02-03 DIAGNOSIS — F43.10 PTSD (POST-TRAUMATIC STRESS DISORDER): Primary | ICD-10-CM

## 2022-02-08 DIAGNOSIS — E66.9 OBESITY (BMI 30-39.9): ICD-10-CM

## 2022-02-08 NOTE — TELEPHONE ENCOUNTER
Pharmacy requests refill on patients behalf for topiramate 50 mg  Medication pended for provider approval

## 2022-02-09 RX ORDER — TOPIRAMATE 50 MG/1
50 TABLET, FILM COATED ORAL
Qty: 30 TABLET | Refills: 1 | Status: SHIPPED | OUTPATIENT
Start: 2022-02-09 | End: 2022-03-25 | Stop reason: SDUPTHER

## 2022-02-17 ENCOUNTER — RA CDI HCC (OUTPATIENT)
Dept: OTHER | Facility: HOSPITAL | Age: 57
End: 2022-02-17

## 2022-02-17 NOTE — PROGRESS NOTES
[place] [id number]  coding opportunities             Chart Reviewed * (Number of) Inbasket suggestions sent to Provider: 1                  Patients insurance company: [employer] (Medicare Advantage and Commercial)           F33 9: Depression, recurrent ([place] [id number] )

## 2022-02-21 NOTE — PROGRESS NOTES
Assessment/Plan:    No problem-specific Assessment & Plan notes found for this encounter  Problem List Items Addressed This Visit     None      Visit Diagnoses     Irregular bleeding    -  Primary    Relevant Medications    medroxyPROGESTERone (PROVERA) 10 mg tablet    Other Relevant Orders    Liquid-based pap, screening    Tissue Exam    hCG, quantitative (Completed)    US pelvis complete w transvaginal          We will also plan Provera 10 mg x 21 days to stop the bleeding then allow a withdrawal bleed  Will then plan pelvis US as well to evaluate uterine muscle and cavity  Pt had labs drawn this am including TFTs and CBC w iron studies  Will plan to add quant  Will follow up as needed based on results and pt to update us if having continued bleeding on the Provera  Subjective:      Patient ID: Dallin Babcock is a 64 y o  female  Pt complaint: Pt presents with bleeding complaints  Pt menstrual history:menarche 15years old  Periods over the last year since spring would be more sporadic now come and go and are very heavy and irregular since then  Now currently bleeding since late Decemeber  Flow varies from heavier to light  Today bleeding is very heavy  Changing a pad q 2 hours  Pt is having cramping  She is currently using Aleve without much relief of sx  Pt does note heavy clotting with menses as well  We reviewed possible etiologies of DUB and perimenopausal state  We discussed the option to proceed with an end biopsy today which pt would like to do  We will also plan Provera 10 mg x 21 days to stop the bleeding then allow a withdrawal bleed  Will then plan pelvis US as well to evaluate uterine muscle and cavity  Pt had labs drawn this am including TFTs and CBC w iron studies  Will plan to add quant  Sexually active:yes, new relationship    Current BC: none   Complaints w IC:none     Vaginal concerns/discharge:none     Previous paps: approx 10 years ago  Previous STDs:      :5Para:5  Types of deliveries:            Endometrial biopsy    Date/Time: 2022 2:35 PM  Performed by: Andres Castro PA-C  Authorized by: Andres Castro PA-C   Universal Protocol:  Consent: Verbal consent obtained  Risks and benefits: risks, benefits and alternatives were discussed  Consent given by: patient  Time out: Immediately prior to procedure a "time out" was called to verify the correct patient, procedure, equipment, support staff and site/side marked as required  Patient understanding: patient states understanding of the procedure being performed  Patient consent: the patient's understanding of the procedure matches consent given  Test results: test results available and properly labeled  Required items: required blood products, implants, devices, and special equipment available      Indication:     Indications: Other disorder of menstruation and other abnormal bleeding from female genital tract    Procedure:     Procedure: endometrial biopsy with Pipelle      A bivalve speculum was placed in the vagina: yes      Cervix cleaned and prepped: yes      A paracervical block was performed: no      An intracervical block was performed: no      The cervix was dilated: no      Uterus sounded: yes      Uterus sound depth (cm):  8    Specimen collected: specimen collected and sent to pathology      Patient tolerated procedure well with no complications: yes    Comments:     Procedure comments:  Pt counseled on need for endometrial biopsy  Aware of risk of risk of infection, uterine perforation and scarring, inability to obtain a sufficient sample and possible need for additional procedures  Pt aware I will call her and review results once available and determine follow up plan as needed  Pt agrees to procedure  All questions answered  Will plan to f/u based on results     Reviewed w pt possible etiologies including hormonal surge and normal endometrial proliferation, hyperplasia and carcinoma  The following portions of the patient's history were reviewed and updated as appropriate:   She  has a past medical history of 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), Asthma due to seasonal allergies (9/25/2020), Depression, Essential hypertension (4/20/2020), JANE (generalized anxiety disorder) (8/68/1033), Helicobacter pylori gastrointestinal tract infection (4/20/2020), History of Holter monitoring (12/22/2017), History of stress test (01/05/2018), Hypertension, Immunization deficiency (4/20/2020), Immunization deficiency (9/25/2020), Iron deficiency (4/20/2020), Kidney stone (4/20/2020), Need for pneumococcal vaccination (6/4/2021), Need for shingles vaccine (6/4/2021), Obesity (BMI 30-39 9) (4/20/2020), Onychomycosis of toenail (6/19/2020), Prediabetes (4/20/2020), 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), Reactive airway disease with acute exacerbation (5/20/2020), Subclinical hyperthyroidism (4/20/2020), Subclinical hyperthyroidism (9/25/2020), Thyroid nodule (9/25/2020), and Vitamin D deficiency (4/20/2020)    She   Patient Active Problem List    Diagnosis Date Noted    Acute stress disorder 10/29/2021    Depression, recurrent ([facility] [address] ) 10/29/2021    Need for shingles vaccine 06/04/2021    Need for pneumococcal vaccination 06/04/2021    Immunization deficiency 09/25/2020    Asthma due to seasonal allergies 09/25/2020    Thyroid nodule 09/25/2020    Onychomycosis of toenail 06/19/2020    Reactive airway disease with acute exacerbation 05/20/2020    Subclinical hyperthyroidism 04/20/2020    Obesity (BMI 30-39 9) 04/20/2020    Vitamin D deficiency 04/20/2020    Immunization deficiency 04/20/2020    Essential hypertension 04/20/2020    Kidney stone 04/20/2020    Ascorbic acid deficiency 04/20/2020    Iron deficiency 50/37/7099    Helicobacter pylori gastrointestinal tract infection 04/20/2020    JANE (generalized anxiety disorder) 04/20/2020    Prediabetes 04/20/2020    Pyridoxine deficiency 04/20/2020    Exercise intolerance 02/10/2020    Chest pain in adult 02/10/2020    Morbid obesity due to excess calories ([facility] [address] ) 02/10/2020    Primary osteoarthritis of right knee 05/29/2019    Other specified health status (CODE) 09/15/2016     She  has a past surgical history that includes Tubal ligation; Colonoscopy (09/08/2017); EGD (09/01/2017); Knee surgery; and Knee arthroscopy w/ ACL reconstruction  Her family history includes Cancer in her maternal uncle; Colon cancer in her maternal aunt; Diabetes in her father; Kidney disease in her maternal uncle  She  reports that she quit smoking about 8 years ago  Her smoking use included cigarettes  She has a 6 00 pack-year smoking history  She has never used smokeless tobacco  She reports previous alcohol use of about 2 0 standard drinks of alcohol per week  She reports that she does not use drugs  Current Outpatient Medications   Medication Sig Dispense Refill    liraglutide (SAXENDA) injection Inject 0 5 mL (3 mg total) under the skin daily 15 mL 3    metFORMIN (GLUCOPHAGE-XR) 500 mg 24 hr tablet TAKE 2 TABLETS(1000 MG) BY MOUTH DAILY WITH DINNER 60 tablet 3    naproxen (NAPROSYN) 500 mg tablet Take by mouth      phentermine (ADIPEX-P) 37 5 MG tablet Take 1 tablet (37 5 mg total) by mouth every morning 30 tablet 0    topiramate (TOPAMAX) 50 MG tablet Take 1 tablet (50 mg total) by mouth daily at bedtime 30 tablet 1    amLODIPine (NORVASC) 5 mg tablet Take 1 tablet (5 mg total) by mouth daily 90 tablet 1    medroxyPROGESTERone (PROVERA) 10 mg tablet Take 1 tablet (10 mg total) by mouth daily 21 tablet 0    metoprolol succinate (TOPROL-XL) 50 mg 24 hr tablet Take 1 5 tablets (75 mg total) by mouth daily 135 tablet 1     No current facility-administered medications for this visit       Current Outpatient Medications on File Prior to Visit   Medication Sig    liraglutide (SAXENDA) injection Inject 0 5 mL (3 mg total) under the skin daily    metFORMIN (GLUCOPHAGE-XR) 500 mg 24 hr tablet TAKE 2 TABLETS(1000 MG) BY MOUTH DAILY WITH DINNER    naproxen (NAPROSYN) 500 mg tablet Take by mouth    phentermine (ADIPEX-P) 37 5 MG tablet Take 1 tablet (37 5 mg total) by mouth every morning    topiramate (TOPAMAX) 50 MG tablet Take 1 tablet (50 mg total) by mouth daily at bedtime    amLODIPine (NORVASC) 5 mg tablet Take 1 tablet (5 mg total) by mouth daily    metoprolol succinate (TOPROL-XL) 50 mg 24 hr tablet Take 1 5 tablets (75 mg total) by mouth daily    [DISCONTINUED] albuterol (PROVENTIL HFA,VENTOLIN HFA) 90 mcg/act inhaler Inhale (Patient not taking: Reported on 2/22/2022 )    [DISCONTINUED] Ascorbic Acid (VITAMIN C) 1000 MG tablet Take 1,000 mg by mouth daily (Patient not taking: Reported on 10/29/2021)    [DISCONTINUED] clotrimazole 1 % external solution Apply 1 application topically 2 (two) times a day for 28 days Apply to affected nail (Patient not taking: Reported on 9/25/2020)    [DISCONTINUED] ferrous sulfate 325 (65 Fe) mg tablet Take 325 mg by mouth (Patient not taking: Reported on 10/29/2021)    [DISCONTINUED] guaifenesin-codeine (GUAIFENESIN AC) 100-10 MG/5ML liquid  (Patient not taking: Reported on 2/22/2022 )    [DISCONTINUED] HYDROcodone-acetaminophen (NORCO) 5-325 mg per tablet Take by mouth (Patient not taking: Reported on 2/22/2022 )    [DISCONTINUED] Insulin Pen Needle (BD Pen Needle Jaymie U/F) 32G X 4 MM MISC Use daily With saxenda (Patient not taking: Reported on 10/29/2021)    [DISCONTINUED] lisinopril (ZESTRIL) 20 mg tablet Take 1 tablet (20 mg total) by mouth daily    [DISCONTINUED] ondansetron (ZOFRAN) 4 mg tablet Take 1 tablet (4 mg total) by mouth every 8 (eight) hours as needed for nausea or vomiting (Patient not taking: Reported on 10/29/2021)    [DISCONTINUED] pyridoxine (B-6) 100 MG tablet Take 1 tablet (100 mg total) by mouth daily (Patient not taking: Reported on 1/29/2021)    [DISCONTINUED] tamsulosin [facility]) 0 4 mg  (Patient not taking: Reported on 2/22/2022 )     No current facility-administered medications on file prior to visit  She has No Known Allergies       Review of Systems   Respiratory: Negative  Gastrointestinal: Negative  Genitourinary: Positive for menstrual problem and pelvic pain  Neurological: Negative  Psychiatric/Behavioral: Negative  Objective:      /82 (BP Location: Right arm, Patient Position: Sitting, Cuff Size: Standard)   Ht 5' 2" (1 575 m)   Wt 86 2 kg (190 lb)   LMP  (LMP Unknown)   BMI 34 75 kg/m²          Physical Exam  Constitutional:       Appearance: She is normal weight  HENT:      Head: Normocephalic and atraumatic  Cardiovascular:      Rate and Rhythm: Normal rate and regular rhythm  Pulmonary:      Effort: Pulmonary effort is normal       Breath sounds: Normal breath sounds  Skin:     General: Skin is warm and dry  Neurological:      Mental Status: She is alert  Psychiatric:         Mood and Affect: Mood normal          Behavior: Behavior normal          Thought Content:  Thought content normal          Judgment: Judgment normal

## 2022-02-22 ENCOUNTER — APPOINTMENT (OUTPATIENT)
Dept: LAB | Facility: CLINIC | Age: 57
End: 2022-02-22
Payer: COMMERCIAL

## 2022-02-22 ENCOUNTER — HOSPITAL ENCOUNTER (OUTPATIENT)
Dept: ULTRASOUND IMAGING | Facility: HOSPITAL | Age: 57
Discharge: HOME | End: 2022-02-22
Payer: COMMERCIAL

## 2022-02-22 ENCOUNTER — OFFICE VISIT (OUTPATIENT)
Dept: OBGYN CLINIC | Facility: CLINIC | Age: 57
End: 2022-02-22
Payer: COMMERCIAL

## 2022-02-22 VITALS
DIASTOLIC BLOOD PRESSURE: 82 MMHG | WEIGHT: 190 LBS | HEIGHT: 62 IN | BODY MASS INDEX: 34.96 KG/M2 | SYSTOLIC BLOOD PRESSURE: 136 MMHG

## 2022-02-22 DIAGNOSIS — N20.0 KIDNEY STONE: ICD-10-CM

## 2022-02-22 DIAGNOSIS — N92.6 IRREGULAR BLEEDING: ICD-10-CM

## 2022-02-22 DIAGNOSIS — E55.9 VITAMIN D DEFICIENCY: ICD-10-CM

## 2022-02-22 DIAGNOSIS — E05.90 SUBCLINICAL HYPERTHYROIDISM: ICD-10-CM

## 2022-02-22 DIAGNOSIS — I10 ESSENTIAL HYPERTENSION: ICD-10-CM

## 2022-02-22 DIAGNOSIS — E66.01 MORBID OBESITY DUE TO EXCESS CALORIES (HCC): ICD-10-CM

## 2022-02-22 DIAGNOSIS — R73.03 PREDIABETES: ICD-10-CM

## 2022-02-22 DIAGNOSIS — E53.8 CYANOCOBALAMIN DEFICIENCY: ICD-10-CM

## 2022-02-22 DIAGNOSIS — E53.1 PYRIDOXINE DEFICIENCY: ICD-10-CM

## 2022-02-22 DIAGNOSIS — N92.6 IRREGULAR BLEEDING: Primary | ICD-10-CM

## 2022-02-22 DIAGNOSIS — E54 ASCORBIC ACID DEFICIENCY: ICD-10-CM

## 2022-02-22 DIAGNOSIS — Z11.4 SCREENING FOR HIV (HUMAN IMMUNODEFICIENCY VIRUS): ICD-10-CM

## 2022-02-22 DIAGNOSIS — E61.1 IRON DEFICIENCY: ICD-10-CM

## 2022-02-22 PROBLEM — M17.11 PRIMARY OSTEOARTHRITIS OF RIGHT KNEE: Status: ACTIVE | Noted: 2019-05-29

## 2022-02-22 LAB
25(OH)D3 SERPL-MCNC: 22.6 NG/ML (ref 30–100)
ALBUMIN SERPL BCP-MCNC: 2.9 G/DL (ref 3.5–5)
ALP SERPL-CCNC: 86 U/L (ref 46–116)
ALT SERPL W P-5'-P-CCNC: 20 U/L (ref 12–78)
ANION GAP SERPL CALCULATED.3IONS-SCNC: 3 MMOL/L (ref 4–13)
AST SERPL W P-5'-P-CCNC: 20 U/L (ref 5–45)
B-HCG SERPL-ACNC: <2 MIU/ML
BASOPHILS # BLD AUTO: 0.02 THOUSANDS/ΜL (ref 0–0.1)
BASOPHILS NFR BLD AUTO: 0 % (ref 0–1)
BILIRUB SERPL-MCNC: 0.55 MG/DL (ref 0.2–1)
BUN SERPL-MCNC: 17 MG/DL (ref 5–25)
CALCIUM ALBUM COR SERPL-MCNC: 9.4 MG/DL (ref 8.3–10.1)
CALCIUM SERPL-MCNC: 8.5 MG/DL (ref 8.3–10.1)
CHLORIDE SERPL-SCNC: 110 MMOL/L (ref 100–108)
CHOLEST SERPL-MCNC: 157 MG/DL
CO2 SERPL-SCNC: 25 MMOL/L (ref 21–32)
CREAT SERPL-MCNC: 0.77 MG/DL (ref 0.6–1.3)
EOSINOPHIL # BLD AUTO: 0.18 THOUSAND/ΜL (ref 0–0.61)
EOSINOPHIL NFR BLD AUTO: 3 % (ref 0–6)
ERYTHROCYTE [DISTWIDTH] IN BLOOD BY AUTOMATED COUNT: 12.8 % (ref 11.6–15.1)
EST. AVERAGE GLUCOSE BLD GHB EST-MCNC: 111 MG/DL
FERRITIN SERPL-MCNC: 6 NG/ML (ref 8–388)
GFR SERPL CREATININE-BSD FRML MDRD: 86 ML/MIN/1.73SQ M
GLUCOSE P FAST SERPL-MCNC: 108 MG/DL (ref 65–99)
HBA1C MFR BLD: 5.5 %
HCT VFR BLD AUTO: 32.1 % (ref 34.8–46.1)
HDLC SERPL-MCNC: 53 MG/DL
HGB BLD-MCNC: 10.3 G/DL (ref 11.5–15.4)
IMM GRANULOCYTES # BLD AUTO: 0.02 THOUSAND/UL (ref 0–0.2)
IMM GRANULOCYTES NFR BLD AUTO: 0 % (ref 0–2)
IRON SATN MFR SERPL: 13 % (ref 15–50)
IRON SERPL-MCNC: 49 UG/DL (ref 50–170)
LDLC SERPL CALC-MCNC: 83 MG/DL (ref 0–100)
LYMPHOCYTES # BLD AUTO: 1.33 THOUSANDS/ΜL (ref 0.6–4.47)
LYMPHOCYTES NFR BLD AUTO: 23 % (ref 14–44)
MCH RBC QN AUTO: 28.9 PG (ref 26.8–34.3)
MCHC RBC AUTO-ENTMCNC: 32.1 G/DL (ref 31.4–37.4)
MCV RBC AUTO: 90 FL (ref 82–98)
MONOCYTES # BLD AUTO: 0.54 THOUSAND/ΜL (ref 0.17–1.22)
MONOCYTES NFR BLD AUTO: 9 % (ref 4–12)
NEUTROPHILS # BLD AUTO: 3.66 THOUSANDS/ΜL (ref 1.85–7.62)
NEUTS SEG NFR BLD AUTO: 65 % (ref 43–75)
NRBC BLD AUTO-RTO: 0 /100 WBCS
PLATELET # BLD AUTO: 309 THOUSANDS/UL (ref 149–390)
PMV BLD AUTO: 10.2 FL (ref 8.9–12.7)
POTASSIUM SERPL-SCNC: 3.9 MMOL/L (ref 3.5–5.3)
PROT SERPL-MCNC: 7.2 G/DL (ref 6.4–8.2)
PTH-INTACT SERPL-MCNC: 111.3 PG/ML (ref 18.4–80.1)
RBC # BLD AUTO: 3.57 MILLION/UL (ref 3.81–5.12)
SODIUM SERPL-SCNC: 138 MMOL/L (ref 136–145)
T3FREE SERPL-MCNC: 2.68 PG/ML (ref 2.3–4.2)
T4 FREE SERPL-MCNC: 1.29 NG/DL (ref 0.76–1.46)
TIBC SERPL-MCNC: 378 UG/DL (ref 250–450)
TRIGL SERPL-MCNC: 105 MG/DL
TSH SERPL DL<=0.05 MIU/L-ACNC: 0.01 UIU/ML (ref 0.36–3.74)
URATE SERPL-MCNC: 4.4 MG/DL (ref 2–6.8)
VIT B12 SERPL-MCNC: 594 PG/ML (ref 100–900)
WBC # BLD AUTO: 5.75 THOUSAND/UL (ref 4.31–10.16)

## 2022-02-22 PROCEDURE — 76856 US EXAM PELVIC COMPLETE: CPT

## 2022-02-22 PROCEDURE — 87389 HIV-1 AG W/HIV-1&-2 AB AG IA: CPT

## 2022-02-22 PROCEDURE — 80061 LIPID PANEL: CPT

## 2022-02-22 PROCEDURE — 84702 CHORIONIC GONADOTROPIN TEST: CPT

## 2022-02-22 PROCEDURE — 82728 ASSAY OF FERRITIN: CPT

## 2022-02-22 PROCEDURE — 36415 COLL VENOUS BLD VENIPUNCTURE: CPT

## 2022-02-22 PROCEDURE — G0145 SCR C/V CYTO,THINLAYER,RESCR: HCPCS | Performed by: PATHOLOGY

## 2022-02-22 PROCEDURE — 83540 ASSAY OF IRON: CPT

## 2022-02-22 PROCEDURE — G0476 HPV COMBO ASSAY CA SCREEN: HCPCS | Performed by: PHYSICIAN ASSISTANT

## 2022-02-22 PROCEDURE — 85025 COMPLETE CBC W/AUTO DIFF WBC: CPT

## 2022-02-22 PROCEDURE — 82306 VITAMIN D 25 HYDROXY: CPT

## 2022-02-22 PROCEDURE — 82180 ASSAY OF ASCORBIC ACID: CPT

## 2022-02-22 PROCEDURE — 84207 ASSAY OF VITAMIN B-6: CPT

## 2022-02-22 PROCEDURE — 84550 ASSAY OF BLOOD/URIC ACID: CPT

## 2022-02-22 PROCEDURE — 84439 ASSAY OF FREE THYROXINE: CPT

## 2022-02-22 PROCEDURE — 83970 ASSAY OF PARATHORMONE: CPT

## 2022-02-22 PROCEDURE — 58100 BIOPSY OF UTERUS LINING: CPT | Performed by: PHYSICIAN ASSISTANT

## 2022-02-22 PROCEDURE — 83550 IRON BINDING TEST: CPT

## 2022-02-22 PROCEDURE — 76830 TRANSVAGINAL US NON-OB: CPT

## 2022-02-22 PROCEDURE — 88305 TISSUE EXAM BY PATHOLOGIST: CPT | Performed by: PATHOLOGY

## 2022-02-22 PROCEDURE — 80053 COMPREHEN METABOLIC PANEL: CPT

## 2022-02-22 PROCEDURE — G0124 SCREEN C/V THIN LAYER BY MD: HCPCS | Performed by: PATHOLOGY

## 2022-02-22 PROCEDURE — 82607 VITAMIN B-12: CPT

## 2022-02-22 PROCEDURE — 84443 ASSAY THYROID STIM HORMONE: CPT

## 2022-02-22 PROCEDURE — 83036 HEMOGLOBIN GLYCOSYLATED A1C: CPT

## 2022-02-22 PROCEDURE — 84481 FREE ASSAY (FT-3): CPT

## 2022-02-22 RX ORDER — MEDROXYPROGESTERONE ACETATE 10 MG/1
10 TABLET ORAL DAILY
Qty: 21 TABLET | Refills: 0 | Status: SHIPPED | OUTPATIENT
Start: 2022-02-22 | End: 2022-03-25 | Stop reason: SDUPTHER

## 2022-02-23 LAB — HIV 1+2 AB+HIV1 P24 AG SERPL QL IA: NORMAL

## 2022-02-24 LAB
HPV HR 12 DNA CVX QL NAA+PROBE: NEGATIVE
HPV16 DNA CVX QL NAA+PROBE: NEGATIVE
HPV18 DNA CVX QL NAA+PROBE: NEGATIVE

## 2022-02-25 ENCOUNTER — OFFICE VISIT (OUTPATIENT)
Dept: FAMILY MEDICINE CLINIC | Facility: CLINIC | Age: 57
End: 2022-02-25
Payer: COMMERCIAL

## 2022-02-25 VITALS
HEIGHT: 62 IN | RESPIRATION RATE: 17 BRPM | HEART RATE: 96 BPM | WEIGHT: 191 LBS | SYSTOLIC BLOOD PRESSURE: 136 MMHG | DIASTOLIC BLOOD PRESSURE: 82 MMHG | BODY MASS INDEX: 35.15 KG/M2 | TEMPERATURE: 98.3 F | OXYGEN SATURATION: 98 %

## 2022-02-25 DIAGNOSIS — E66.01 MORBID OBESITY DUE TO EXCESS CALORIES (HCC): ICD-10-CM

## 2022-02-25 DIAGNOSIS — F41.1 GAD (GENERALIZED ANXIETY DISORDER): ICD-10-CM

## 2022-02-25 DIAGNOSIS — E05.90 SUBCLINICAL HYPERTHYROIDISM: Primary | ICD-10-CM

## 2022-02-25 DIAGNOSIS — E66.9 OBESITY (BMI 30-39.9): ICD-10-CM

## 2022-02-25 DIAGNOSIS — N92.0 MENORRHAGIA WITH REGULAR CYCLE: ICD-10-CM

## 2022-02-25 DIAGNOSIS — J45.41 MODERATE PERSISTENT REACTIVE AIRWAY DISEASE WITH ACUTE EXACERBATION: ICD-10-CM

## 2022-02-25 DIAGNOSIS — D50.9 IRON DEFICIENCY ANEMIA, UNSPECIFIED IRON DEFICIENCY ANEMIA TYPE: ICD-10-CM

## 2022-02-25 DIAGNOSIS — L02.92 FURUNCLE: ICD-10-CM

## 2022-02-25 DIAGNOSIS — F43.0 ACUTE STRESS DISORDER: ICD-10-CM

## 2022-02-25 DIAGNOSIS — F33.9 DEPRESSION, RECURRENT (HCC): ICD-10-CM

## 2022-02-25 DIAGNOSIS — R79.89 HIGH SERUM PARATHYROID HORMONE (PTH): ICD-10-CM

## 2022-02-25 DIAGNOSIS — E61.1 IRON DEFICIENCY: ICD-10-CM

## 2022-02-25 DIAGNOSIS — I10 ESSENTIAL HYPERTENSION: ICD-10-CM

## 2022-02-25 PROCEDURE — 1036F TOBACCO NON-USER: CPT | Performed by: FAMILY MEDICINE

## 2022-02-25 PROCEDURE — 3008F BODY MASS INDEX DOCD: CPT | Performed by: FAMILY MEDICINE

## 2022-02-25 PROCEDURE — 99215 OFFICE O/P EST HI 40 MIN: CPT | Performed by: FAMILY MEDICINE

## 2022-02-25 RX ORDER — METOPROLOL SUCCINATE 50 MG/1
75 TABLET, EXTENDED RELEASE ORAL DAILY
Qty: 135 TABLET | Refills: 1 | Status: SHIPPED | OUTPATIENT
Start: 2022-02-25 | End: 2022-06-10 | Stop reason: SDUPTHER

## 2022-02-25 RX ORDER — PHENTERMINE HYDROCHLORIDE 37.5 MG/1
37.5 TABLET ORAL EVERY MORNING
Qty: 30 TABLET | Refills: 0 | Status: SHIPPED | OUTPATIENT
Start: 2022-02-25 | End: 2022-03-25 | Stop reason: SDUPTHER

## 2022-02-25 RX ORDER — BUPROPION HYDROCHLORIDE 150 MG/1
150 TABLET ORAL EVERY MORNING
Qty: 30 TABLET | Refills: 1 | Status: SHIPPED | OUTPATIENT
Start: 2022-02-25 | End: 2022-03-25 | Stop reason: SDUPTHER

## 2022-02-25 RX ORDER — CLINDAMYCIN PHOSPHATE 10 MG/G
GEL TOPICAL 2 TIMES DAILY
Qty: 30 G | Refills: 0 | Status: SHIPPED | OUTPATIENT
Start: 2022-02-25 | End: 2022-08-05

## 2022-02-25 NOTE — PROGRESS NOTES
BMI Counseling: Body mass index is 34 93 kg/m²  The BMI is above normal  Nutrition recommendations include decreasing portion sizes, encouraging healthy choices of fruits and vegetables, limiting drinks that contain sugar and reducing intake of cholesterol  Exercise recommendations include exercising 3-5 times per week  No pharmacotherapy was ordered  Rationale for BMI follow-up plan is due to patient being overweight or obese  Assessment/Plan:    Discussed blood test results patient detail  Hemoglobin is 10 3 low iron is low  Vitamin-D also low PTH level is high TSH level is suppressed but free T4-T3 is normal   B12 is normal 594 LDL is 83 triglycerides 105 A1c is 5 5  Recommendation to follow-up with gyn for evaluation  Advised iron supplementation twice a day if not improved recommendation for GI for iron infusion  Continue blood pressure medicine  Add on Wellbutrin for her mood advised to follow up Psychiatry she has PTSD  For social support  Will monitor her weight  Will start antibiotic ointment for her infected hair follicle  Close monitor needed  Follow-up with endocrinologist for subclinical hyperthyroidism and high PTH level  I have spent 40 minutes with Patient  today in which greater than 50% of this time was spent in counseling/coordination of care regarding Diagnostic results, Prognosis, Risks and benefits of tx options and Intructions for management  Problem List Items Addressed This Visit        Endocrine    Subclinical hyperthyroidism - Primary    Relevant Medications    metoprolol succinate (TOPROL-XL) 50 mg 24 hr tablet       Respiratory    Reactive airway disease with acute exacerbation       Cardiovascular and Mediastinum    Essential hypertension    Relevant Medications    metoprolol succinate (TOPROL-XL) 50 mg 24 hr tablet       Other    Morbid obesity due to excess calories (HCC)    Obesity (BMI 30-39  9)    Relevant Medications    phentermine (ADIPEX-P) 37 5 MG tablet    Iron deficiency    JANE (generalized anxiety disorder)    Relevant Medications    buPROPion (Wellbutrin XL) 150 mg 24 hr tablet    phentermine (ADIPEX-P) 37 5 MG tablet    Acute stress disorder    Relevant Medications    buPROPion (Wellbutrin XL) 150 mg 24 hr tablet    phentermine (ADIPEX-P) 37 5 MG tablet    Depression, recurrent (HCC)    Relevant Medications    buPROPion (Wellbutrin XL) 150 mg 24 hr tablet    phentermine (ADIPEX-P) 37 5 MG tablet    High serum parathyroid hormone (PTH)    Menorrhagia with regular cycle    Iron deficiency anemia      Other Visit Diagnoses     Furuncle        Relevant Medications    clindamycin (CLINDAGEL) 1 % gel            Subjective:      Patient ID: Juan Daniel Faust is a 64 y o  female  59-year-old female follow-up mood medical weight loss hypertension anemia subclinical hyperthyroidism  Since last visit there is a lot going on she in a lot of stress at work  She was on sertraline the past did not do well with medication and then she was on Wellbutrin she did well but then she went off medication when she felt better  She has poor social support she has no friends and she can not talk to anyone about how she feels  She was referred to Psychiatry but was not able to get an appointment  She works very far driving is a lot for her  She has subclinical hyperthyroidism used to slowly endocrinologist in the past but has not been follow-up  Has upcoming appointment soon  She is on Saxenda 2 4 mg daily for weight loss it make her stomach upset so she has not been using as much  She continue on phentermine topiramate metformin  Weight steady at 190 lb  She is up-to-date with COVID vaccine  The past month she has heavy menstrual cycle she saw gyn recently was placed on Provera and that helped she has upcoming ultrasound of the pelvic  She is on amlodipine and metoprolol for blood pressure    History of kidney stone in the past   History of prediabetes in the past  Patient complained of infected hair follicle her right abdomen has been draining no fever no chills      The following portions of the patient's history were reviewed and updated as appropriate:   Past Medical History:  She has a past medical history of 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), Asthma due to seasonal allergies (9/25/2020), Depression, Essential hypertension (4/20/2020), JANE (generalized anxiety disorder) (0/43/8182), Helicobacter pylori gastrointestinal tract infection (4/20/2020), History of Holter monitoring (12/22/2017), History of stress test (01/05/2018), Hypertension, Immunization deficiency (4/20/2020), Immunization deficiency (9/25/2020), Iron deficiency (4/20/2020), Kidney stone (4/20/2020), Need for pneumococcal vaccination (6/4/2021), Need for shingles vaccine (6/4/2021), Obesity (BMI 30-39 9) (4/20/2020), Onychomycosis of toenail (6/19/2020), Prediabetes (4/20/2020), 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), Reactive airway disease with acute exacerbation (5/20/2020), Subclinical hyperthyroidism (4/20/2020), Subclinical hyperthyroidism (9/25/2020), Thyroid nodule (9/25/2020), and Vitamin D deficiency (4/20/2020)  ,  _______________________________________________________________________  Medical Problems:  does not have any pertinent problems on file ,  _______________________________________________________________________  Past Surgical History:   has a past surgical history that includes Tubal ligation; Colonoscopy (09/08/2017); EGD (09/01/2017); Knee surgery; and Knee arthroscopy w/ ACL reconstruction  ,  _______________________________________________________________________  Family History:  family history includes Cancer in her maternal uncle; Colon cancer in her maternal aunt; Diabetes in her father; Kidney disease in her maternal uncle ,  _______________________________________________________________________  Social History:   reports that she quit smoking about 8 years ago   Her smoking use included cigarettes  She has a 6 00 pack-year smoking history  She has never used smokeless tobacco  She reports previous alcohol use of about 2 0 standard drinks of alcohol per week  She reports that she does not use drugs  ,  _______________________________________________________________________  Allergies:  has No Known Allergies     _______________________________________________________________________  Current Outpatient Medications   Medication Sig Dispense Refill    amLODIPine (NORVASC) 5 mg tablet Take 1 tablet (5 mg total) by mouth daily 90 tablet 1    buPROPion (Wellbutrin XL) 150 mg 24 hr tablet Take 1 tablet (150 mg total) by mouth every morning 30 tablet 1    clindamycin (CLINDAGEL) 1 % gel Apply topically 2 (two) times a day Apply to furuncle on abdomen 30 g 0    liraglutide (SAXENDA) injection Inject 0 5 mL (3 mg total) under the skin daily 15 mL 3    medroxyPROGESTERone (PROVERA) 10 mg tablet Take 1 tablet (10 mg total) by mouth daily 21 tablet 0    metFORMIN (GLUCOPHAGE-XR) 500 mg 24 hr tablet TAKE 2 TABLETS(1000 MG) BY MOUTH DAILY WITH DINNER 60 tablet 3    metoprolol succinate (TOPROL-XL) 50 mg 24 hr tablet Take 1 5 tablets (75 mg total) by mouth daily 135 tablet 1    naproxen (NAPROSYN) 500 mg tablet Take by mouth      phentermine (ADIPEX-P) 37 5 MG tablet Take 1 tablet (37 5 mg total) by mouth every morning 30 tablet 0    topiramate (TOPAMAX) 50 MG tablet Take 1 tablet (50 mg total) by mouth daily at bedtime 30 tablet 1     No current facility-administered medications for this visit      _______________________________________________________________________  Review of Systems   Constitutional: Negative for activity change, appetite change, fatigue and unexpected weight change  HENT: Negative for ear pain, sore throat, trouble swallowing and voice change  Eyes: Negative for photophobia and visual disturbance     Respiratory: Negative for cough, chest tightness, shortness of breath and wheezing  Cardiovascular: Negative for chest pain, palpitations and leg swelling  Gastrointestinal: Negative for abdominal pain, constipation, diarrhea, nausea, rectal pain and vomiting  Endocrine: Negative for cold intolerance, polydipsia and polyuria  Genitourinary: Negative for difficulty urinating, dysuria, flank pain, menstrual problem and pelvic pain  Musculoskeletal: Negative for arthralgias, joint swelling and myalgias  Skin: Negative for color change and rash  Allergic/Immunologic: Negative for environmental allergies and immunocompromised state  Neurological: Negative for dizziness, weakness, numbness and headaches  Hematological: Negative for adenopathy  Does not bruise/bleed easily  Psychiatric/Behavioral: Positive for decreased concentration and sleep disturbance  Negative for dysphoric mood, self-injury and suicidal ideas  The patient is nervous/anxious  Objective:  Vitals:    02/25/22 1343   BP: 136/82   BP Location: Left arm   Patient Position: Sitting   Cuff Size: Standard   Pulse: 96   Resp: 17   Temp: 98 3 °F (36 8 °C)   TempSrc: Tympanic   SpO2: 98%   Weight: 86 6 kg (191 lb)   Height: 5' 2" (1 575 m)     Body mass index is 34 93 kg/m²  Physical Exam  Vitals and nursing note reviewed  Constitutional:       Appearance: Normal appearance  She is well-developed  She is obese  HENT:      Head: Normocephalic and atraumatic  Right Ear: Tympanic membrane, ear canal and external ear normal       Left Ear: Tympanic membrane, ear canal and external ear normal       Nose: Nose normal       Mouth/Throat:      Mouth: Mucous membranes are dry  Pharynx: Oropharynx is clear  No oropharyngeal exudate  Eyes:      Pupils: Pupils are equal, round, and reactive to light  Neck:      Thyroid: No thyromegaly  Cardiovascular:      Rate and Rhythm: Normal rate and regular rhythm  Heart sounds: Normal heart sounds  No murmur heard        Pulmonary:      Effort: Pulmonary effort is normal  No respiratory distress  Breath sounds: Normal breath sounds  No wheezing or rales  Abdominal:      General: Abdomen is flat  Bowel sounds are normal  There is no distension  Palpations: Abdomen is soft  Tenderness: There is no abdominal tenderness  There is no guarding  Genitourinary:     Vagina: Normal    Musculoskeletal:         General: No tenderness  Normal range of motion  Cervical back: Normal range of motion and neck supple  Skin:     General: Skin is warm and dry  Findings: No rash  Comments: Furuncle noted on the right lower quadrant abdomen   Neurological:      General: No focal deficit present  Mental Status: She is alert and oriented to person, place, and time  Mental status is at baseline  Psychiatric:         Mood and Affect: Mood normal          Behavior: Behavior normal          Thought Content:  Thought content normal          Judgment: Judgment normal

## 2022-02-26 LAB — VIT C SERPL-MCNC: 0.3 MG/DL (ref 0.4–2)

## 2022-02-27 LAB — VIT B6 SERPL-MCNC: 5.5 UG/L (ref 2–32.8)

## 2022-03-03 LAB
LAB AP GYN PRIMARY INTERPRETATION: NORMAL
Lab: NORMAL
PATH INTERP SPEC-IMP: NORMAL

## 2022-03-07 ENCOUNTER — TELEMEDICINE (OUTPATIENT)
Dept: FAMILY MEDICINE CLINIC | Facility: CLINIC | Age: 57
End: 2022-03-07
Payer: COMMERCIAL

## 2022-03-07 ENCOUNTER — TELEPHONE (OUTPATIENT)
Dept: FAMILY MEDICINE CLINIC | Facility: CLINIC | Age: 57
End: 2022-03-07

## 2022-03-07 VITALS — BODY MASS INDEX: 35.15 KG/M2 | HEIGHT: 62 IN | WEIGHT: 191 LBS

## 2022-03-07 DIAGNOSIS — Z20.828 EXPOSURE TO INFLUENZA: Primary | ICD-10-CM

## 2022-03-07 DIAGNOSIS — J20.9 BRONCHITIS WITH BRONCHOSPASM: ICD-10-CM

## 2022-03-07 PROCEDURE — 3008F BODY MASS INDEX DOCD: CPT | Performed by: FAMILY MEDICINE

## 2022-03-07 PROCEDURE — 99213 OFFICE O/P EST LOW 20 MIN: CPT | Performed by: FAMILY MEDICINE

## 2022-03-07 RX ORDER — PREDNISONE 20 MG/1
40 TABLET ORAL DAILY
Qty: 14 TABLET | Refills: 0 | Status: SHIPPED | OUTPATIENT
Start: 2022-03-07 | End: 2022-03-14

## 2022-03-07 RX ORDER — OSELTAMIVIR PHOSPHATE 75 MG/1
75 CAPSULE ORAL 2 TIMES DAILY
Qty: 10 CAPSULE | Refills: 0 | Status: SHIPPED | OUTPATIENT
Start: 2022-03-07 | End: 2022-03-12

## 2022-03-07 RX ORDER — ALBUTEROL SULFATE 90 UG/1
2 AEROSOL, METERED RESPIRATORY (INHALATION) EVERY 4 HOURS PRN
Qty: 18 G | Refills: 1 | Status: SHIPPED | OUTPATIENT
Start: 2022-03-07

## 2022-03-07 NOTE — LETTER
March 7, 2022     Patient: Lesvia Lomax   YOB: 1965   Date of Visit: 3/7/2022       To Whom it May Concern:    Lesvia Lomax is under my professional care  She was seen in my office on 3/7/2022  She may return to work on 3/14/2022  please excuse Violetta Driver from work 3/7 to 3/11  If you have any questions or concerns, please don't hesitate to call           Sincerely,          Cristin Hayes MD        CC: No Recipients
normal...

## 2022-03-08 NOTE — PROGRESS NOTES
Virtual Regular Visit    Verification of patient location:    Patient is located in the following state in which I hold an active license [place]      Assessment/Plan:    Pt w bronchitis/bronchospasm, exposure to grand daughter w influenza A +  Pt w risk factor, prone to bronchitis/pneumonia=start on tamflue/inhaler/prednisone  Recheck in 72 h if not improved will need oral abx  Up todate w influenza and covid vaccine  Note out of work 3/7 to 3/11=back to work 3/14  Problem List Items Addressed This Visit        Other    Exposure to influenza - Primary    Relevant Medications    oseltamivir (TAMIFLU) 75 mg capsule      Other Visit Diagnoses     Bronchitis with bronchospasm        Relevant Medications    predniSONE 20 mg tablet    albuterol (Proventil HFA) 90 mcg/act inhaler    fluticasone-salmeterol (Advair Diskus) 100-50 mcg/dose inhaler               Reason for visit is   Chief Complaint   Patient presents with    Headache    Nasal Congestion    Virtual Regular Visit        Encounter provider Adrianne Wright MD    Provider located at [address] [place] [address] [place] [address] [phone]      Recent Visits  No visits were found meeting these conditions  Showing recent visits within past 7 days and meeting all other requirements  Today's Visits  Date Type Provider Dept   03/07/22 Telemedicine Adrianne Wright MD [facility]   03/07/22 Telephone Adrianne Wright MD [facility] [address] [facility] today's visits and meeting all other requirements  Future Appointments  No visits were found meeting these conditions  Showing future appointments within next 150 days and meeting all other requirements       The patient was identified by name and date of birth  Lory Lovely was informed that this is a telemedicine visit and that the visit is being conducted through [address] [place] [address] Now and patient was informed that this is a secure, HIPAA-compliant platform  She agrees to proceed     My office door was closed  No one else was in the room  She acknowledged consent and understanding of privacy and security of the video platform  The patient has agreed to participate and understands they can discontinue the visit at any time  Patient is aware this is a billable service  Subjective  Dallin Babcock is a 64 y o  female    Headache   This is a new problem  The current episode started yesterday  The problem occurs constantly  The problem has been rapidly worsening  The pain is located in the bilateral region  The pain radiates to the face  The pain quality is not similar to prior headaches  The quality of the pain is described as throbbing  The pain is at a severity of 8/10  The pain is mild  Associated symptoms include coughing, a fever and sinus pressure  Pertinent negatives include no abdominal pain, dizziness, ear pain, nausea, numbness, photophobia, sore throat, vomiting or weakness  The symptoms are aggravated by coughing and activity  She has tried acetaminophen for the symptoms  The treatment provided no relief  Her past medical history is significant for hypertension  Past Medical History:   Diagnosis Date    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    Asthma due to seasonal allergies 9/25/2020    Depression     Essential hypertension 4/20/2020    JANE (generalized anxiety disorder) 7/97/9622    Helicobacter pylori gastrointestinal tract infection 4/20/2020    10/02/2017=egd 9/2017 Dr Blayne Bright History of Holter monitoring 12/22/2017    The baseline rhythm was of sinus origin with an average heart rate of 90 bpm (range: 72 - 129 bpm)  There were rare single APCs adn VPCs (less than 0 1 % total beats)  There were no sustained cardiac dysrhythmais  There were no significant symptomatic pauses   History of stress test 01/05/2018    Mildly abnormal exercise Myoview SPECT study with evidence of a fixed anterior defect   This is most likely related to breast attenuation artifact  There was no evidence of reversible myocardial ischemia  Gated wall motion analysis was normal with well preserved systolic function  The left ventricle was normal in size with calculated EF of 73%  No prior similar study is available for comparison   Hypertension     Immunization deficiency 4/20/2020    Hep a/b/mumps nonimmune    Immunization deficiency 9/25/2020    Hep a/b/mmr nonimmune 6/19/2020, 9/25/2020, 2/2021=hep a/b    Iron deficiency 4/20/2020    Kidney stone 4/20/2020    Need for pneumococcal vaccination 6/4/2021    Need for shingles vaccine 6/4/2021    Obesity (BMI 30-39 9) 4/20/2020    39 4, start wt 219 lb=goal 150 lb    Onychomycosis of toenail 6/19/2020    Prediabetes 4/20/2020    5 8    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    5    Reactive airway disease with acute exacerbation 5/20/2020    Subclinical hyperthyroidism 4/20/2020    Subclinical hyperthyroidism 9/25/2020    Thyroid nodule 9/25/2020    Vitamin D deficiency 4/20/2020    33       Past Surgical History:   Procedure Laterality Date    COLONOSCOPY  09/08/2017    3 polyps=next 2020 w Dr Kwaku Marquez EGD  09/01/2017    egd biopsy     KNEE ARTHROSCOPY W/ ACL RECONSTRUCTION      KNEE SURGERY      torn mcl, acl, meniscus left knee 2016    TUBAL LIGATION         Current Outpatient Medications   Medication Sig Dispense Refill    albuterol (Proventil HFA) 90 mcg/act inhaler Inhale 2 puffs every 4 (four) hours as needed for wheezing or shortness of breath 18 g 1    amLODIPine (NORVASC) 5 mg tablet Take 1 tablet (5 mg total) by mouth daily 90 tablet 1    buPROPion (Wellbutrin XL) 150 mg 24 hr tablet Take 1 tablet (150 mg total) by mouth every morning 30 tablet 1    clindamycin (CLINDAGEL) 1 % gel Apply topically 2 (two) times a day Apply to furuncle on abdomen 30 g 0    fluticasone-salmeterol (Advair Diskus) 100-50 mcg/dose inhaler Inhale 1 puff 2 (two) times a day Rinse mouth after use   60 blister 1  liraglutide (SAXENDA) injection Inject 0 5 mL (3 mg total) under the skin daily 15 mL 3    medroxyPROGESTERone (PROVERA) 10 mg tablet Take 1 tablet (10 mg total) by mouth daily 21 tablet 0    metFORMIN (GLUCOPHAGE-XR) 500 mg 24 hr tablet TAKE 2 TABLETS(1000 MG) BY MOUTH DAILY WITH DINNER 60 tablet 3    metoprolol succinate (TOPROL-XL) 50 mg 24 hr tablet Take 1 5 tablets (75 mg total) by mouth daily 135 tablet 1    naproxen (NAPROSYN) 500 mg tablet Take by mouth      oseltamivir (TAMIFLU) 75 mg capsule Take 1 capsule (75 mg total) by mouth 2 (two) times a day for 5 days 10 capsule 0    phentermine (ADIPEX-P) 37 5 MG tablet Take 1 tablet (37 5 mg total) by mouth every morning 30 tablet 0    predniSONE 20 mg tablet Take 2 tablets (40 mg total) by mouth daily for 7 days 14 tablet 0    topiramate (TOPAMAX) 50 MG tablet Take 1 tablet (50 mg total) by mouth daily at bedtime 30 tablet 1     No current facility-administered medications for this visit  No Known Allergies    Review of Systems   Constitutional: Positive for fatigue and fever  Negative for activity change, appetite change and unexpected weight change  HENT: Positive for postnasal drip, sinus pressure and sinus pain  Negative for ear pain, sore throat, trouble swallowing and voice change  Eyes: Negative for photophobia and visual disturbance  Respiratory: Positive for cough, shortness of breath and wheezing  Negative for chest tightness  Cardiovascular: Negative for chest pain, palpitations and leg swelling  Gastrointestinal: Negative for abdominal pain, constipation, diarrhea, nausea, rectal pain and vomiting  Endocrine: Negative for cold intolerance, polydipsia and polyuria  Genitourinary: Negative for difficulty urinating, dysuria, flank pain, menstrual problem and pelvic pain  Musculoskeletal: Negative for arthralgias, joint swelling and myalgias  Skin: Negative for color change and rash     Allergic/Immunologic: Negative for environmental allergies and immunocompromised state  Neurological: Negative for dizziness, weakness, numbness and headaches  Hematological: Negative for adenopathy  Does not bruise/bleed easily  Psychiatric/Behavioral: Negative for decreased concentration, dysphoric mood, self-injury, sleep disturbance and suicidal ideas  The patient is not nervous/anxious  Video Exam    Vitals:    03/07/22 1608   Weight: 86 6 kg (191 lb)   Height: 5' 2" (1 575 m)       Physical Exam  Vitals and nursing note reviewed  Constitutional:       Appearance: She is ill-appearing  HENT:      Head: Normocephalic and atraumatic  Pulmonary:      Effort: Pulmonary effort is normal    Neurological:      Mental Status: She is oriented to person, place, and time  Psychiatric:         Mood and Affect: Mood normal          Behavior: Behavior normal          Thought Content: Thought content normal          Judgment: Judgment normal           I spent 20 minutes directly with the patient during this visit    Jacinto Galeano verbally agrees to participate in [employer]  Pt is aware that [employer] could be limited without vital signs or the ability to perform a full hands-on physical [place] Amaya understands she or the provider may request at any time to terminate the video visit and request the patient to seek care or treatment in person

## 2022-03-18 ENCOUNTER — RA CDI HCC (OUTPATIENT)
Dept: OTHER | Facility: HOSPITAL | Age: 57
End: 2022-03-18

## 2022-03-18 NOTE — PROGRESS NOTES
[facility]  coding opportunities       Chart reviewed, no opportunity found: CHART REVIEWED, NO OPPORTUNITY FOUND        Patients Insurance        Commercial Insurance: [address] [place] [address]

## 2022-03-25 ENCOUNTER — TELEMEDICINE (OUTPATIENT)
Dept: FAMILY MEDICINE CLINIC | Facility: CLINIC | Age: 57
End: 2022-03-25
Payer: COMMERCIAL

## 2022-03-25 DIAGNOSIS — N92.0 MENORRHAGIA WITH REGULAR CYCLE: ICD-10-CM

## 2022-03-25 DIAGNOSIS — Z20.828 EXPOSURE TO INFLUENZA: ICD-10-CM

## 2022-03-25 DIAGNOSIS — N92.6 IRREGULAR BLEEDING: ICD-10-CM

## 2022-03-25 DIAGNOSIS — F41.1 GAD (GENERALIZED ANXIETY DISORDER): ICD-10-CM

## 2022-03-25 DIAGNOSIS — E66.9 OBESITY (BMI 30-39.9): ICD-10-CM

## 2022-03-25 DIAGNOSIS — E66.01 MORBID OBESITY DUE TO EXCESS CALORIES (HCC): ICD-10-CM

## 2022-03-25 DIAGNOSIS — F43.0 ACUTE STRESS DISORDER: ICD-10-CM

## 2022-03-25 DIAGNOSIS — D50.9 IRON DEFICIENCY ANEMIA, UNSPECIFIED IRON DEFICIENCY ANEMIA TYPE: ICD-10-CM

## 2022-03-25 DIAGNOSIS — J32.9 SINUSITIS, UNSPECIFIED CHRONICITY, UNSPECIFIED LOCATION: Primary | ICD-10-CM

## 2022-03-25 PROCEDURE — 1036F TOBACCO NON-USER: CPT | Performed by: FAMILY MEDICINE

## 2022-03-25 PROCEDURE — 99214 OFFICE O/P EST MOD 30 MIN: CPT | Performed by: FAMILY MEDICINE

## 2022-03-25 RX ORDER — IPRATROPIUM BROMIDE 42 UG/1
2 SPRAY, METERED NASAL 4 TIMES DAILY
Qty: 15 ML | Refills: 1 | Status: SHIPPED | OUTPATIENT
Start: 2022-03-25

## 2022-03-25 RX ORDER — MEDROXYPROGESTERONE ACETATE 10 MG/1
10 TABLET ORAL DAILY
Qty: 21 TABLET | Refills: 0 | Status: SHIPPED | OUTPATIENT
Start: 2022-03-25 | End: 2022-06-10

## 2022-03-25 RX ORDER — PHENTERMINE HYDROCHLORIDE 37.5 MG/1
37.5 TABLET ORAL EVERY MORNING
Qty: 30 TABLET | Refills: 0 | Status: SHIPPED | OUTPATIENT
Start: 2022-03-25 | End: 2022-06-10

## 2022-03-25 RX ORDER — TOPIRAMATE 50 MG/1
50 TABLET, FILM COATED ORAL
Qty: 30 TABLET | Refills: 1 | Status: SHIPPED | OUTPATIENT
Start: 2022-03-25

## 2022-03-25 RX ORDER — BUPROPION HYDROCHLORIDE 300 MG/1
300 TABLET ORAL EVERY MORNING
Qty: 30 TABLET | Refills: 1 | Status: SHIPPED | OUTPATIENT
Start: 2022-03-25 | End: 2022-04-29 | Stop reason: SDUPTHER

## 2022-03-25 NOTE — Clinical Note
Please have pt seen in office in 1 m f/u mood/weight Subjective   HPI Comments: This patient is a very nice 53-year-old female who states that she walked into a coworker's office today and was stretching by placing her hands up towards the ceiling when she suddenly became somewhat dizzy.  She had no headache no vision changes no neck pain or discomfort.  No chest pain, no palpitations, no shortness of breath.  She reports that she was able to lower herself down to a seated position and that the episode lasted only 5 seconds.  Afterwards the dizziness went away.  She is not had an episode like this in the past.  She denies any history of coronary artery disease, dysrhythmias, palpitations.  She has a family history of CAD but no history of Brugada's syndrome.  Patient reports that she currently feels well and actually apologized for being here for such a silly complaint.  The patient know that this was perfectly reasonable and that he would take care of her.  Patient is had no recent fevers chills, change in medication, abdominal pain, nausea vomiting or diarrhea.  No recent travel.  She reports that she works in a very stressful environment.  She woke up approximately 2 days ago with an injected right eye that is nonpainful and has not caused vision abnormalities.  No recent vomiting.  In summary, this is a 53-year-old female who complains of a dizzy episode without syncope that occurred earlier this morning and resolved after approximate 5 seconds.  She is here for evaluation accordingly.    Past Medical Hx: HTN, DM, HLD, anxiety    Family Hx: CAD (maternal and paternal), negative for brugada syndrome    Patient is a 53 y.o. female presenting with dizziness.   History provided by:  Patient  Dizziness   Quality:  Lightheadedness  Severity:  Moderate  Onset quality:  Sudden  Duration: lasted 10 seconds.  Progression:  Resolved  Context: standing up    Associated symptoms: no blood in stool, no chest pain, no diarrhea, no headaches, no hearing loss, no nausea, no  palpitations, no shortness of breath and no vomiting        Review of Systems   Constitutional: Negative.  Negative for chills, fatigue, fever and unexpected weight change.   HENT: Negative for dental problem, ear pain, hearing loss and sinus pressure.    Eyes: Positive for redness (right). Negative for pain and visual disturbance.   Respiratory: Negative for chest tightness and shortness of breath.    Cardiovascular: Negative for chest pain, palpitations and leg swelling.   Gastrointestinal: Negative for blood in stool, diarrhea, nausea and vomiting.   Genitourinary: Negative for difficulty urinating, dysuria, frequency, hematuria and urgency.   Musculoskeletal: Negative for myalgias, neck pain and neck stiffness.   Neurological: Positive for dizziness. Negative for seizures, syncope, speech difficulty, light-headedness and headaches.   Psychiatric/Behavioral: Negative for confusion.   All other systems reviewed and are negative.      Past Medical History   Diagnosis Date   • Anxiety    • Benign essential hypertension 6/9/2016   • Cervical cancer      NO TS -HYSTERECTOMY    • Depression    • Diabetes mellitus    • Hyperlipidemia    • Hypertension    • Malignant neoplasm of cervix uteri    • Tobacco abuse 8/31/2016   • Vitamin D deficiency 6/9/2016       Allergies   Allergen Reactions   • Tetracycline        Past Surgical History   Procedure Laterality Date   • Hysterectomy     • Hemorrhoidectomy     • Oophorectomy         Family History   Problem Relation Age of Onset   • Diabetes Mother    • Hypertension Mother    • Hyperlipidemia Mother    • Coronary artery disease Mother      coronary arteriosclerosis   • Hyperlipidemia Father    • Hypertension Father    • Colon cancer Other        Social History     Social History   • Marital status:      Spouse name: N/A   • Number of children: N/A   • Years of education: N/A     Social History Main Topics   • Smoking status: Current Every Day Smoker     Packs/day: 1.00      Years: 36.00     Types: Cigarettes   • Smokeless tobacco: None   • Alcohol use 12.6 oz/week     21 Cans of beer per week   • Drug use: No   • Sexual activity: Not Asked     Other Topics Concern   • None     Social History Narrative         Objective   Physical Exam   Constitutional: She is oriented to person, place, and time. She appears well-developed.  Non-toxic appearance. No distress.   HENT:   Head: Normocephalic and atraumatic.   Right Ear: Tympanic membrane, external ear and ear canal normal.   Left Ear: Tympanic membrane, external ear and ear canal normal.   Nose: Nose normal. No nasal septal hematoma.   Mouth/Throat: Oropharynx is clear and moist. Mucous membranes are not dry. No oral lesions. No trismus in the jaw. No dental abscesses or uvula swelling. No posterior oropharyngeal erythema or tonsillar abscesses. No tonsillar exudate.   Eyes: EOM are normal. Pupils are equal, round, and reactive to light. Right conjunctiva is not injected. Left conjunctiva is not injected.   Subconjunctival hemorrhage on the right.   Neck: Normal range of motion. Neck supple. No JVD present. No tracheal tenderness present. No rigidity. Normal range of motion present.   Cardiovascular: Normal rate, regular rhythm and normal heart sounds.  Exam reveals no gallop and no friction rub.    Pulmonary/Chest: Effort normal and breath sounds normal. She has no wheezes. She has no rales. She exhibits no tenderness.   Abdominal: Soft. Bowel sounds are normal. She exhibits no distension, no pulsatile midline mass and no mass. There is no tenderness. There is no rigidity, no rebound, no guarding and no tenderness at McBurney's point.   No signs of acute abdomen.  No pain at McBurney's point.  No pulsatile abdominal mass.   Musculoskeletal: Normal range of motion. She exhibits no edema, tenderness or deformity.   Lymphadenopathy:     She has no cervical adenopathy.   Neurological: She is alert and oriented to person, place, and time.  She has normal strength. She displays no tremor. No cranial nerve deficit. She exhibits normal muscle tone.   5/5 strength bilaterally with flexion and extension of fingers, wrist, elbows, knees and hips as well as plantar and dorsiflexion of the foot.   Skin: Skin is warm and dry. No rash noted. No erythema.   No diaphoresis, lesions, nevi, petechia, purpura   Psychiatric: She has a normal mood and affect. Her speech is normal and behavior is normal. Judgment and thought content normal. She is attentive.   Nursing note and vitals reviewed.      Procedures         ED Course  ED Course   Comment By Time   I had a very nice conversation with the patient.  We talked about presyncope in the multiple potential causes.  We talked about the differential diagnosis and the extreme importance of following up with her primary care physician and also with cardiology as instructed should symptoms recur.  We talked about dysrhythmias, CNS related etiologies and other potential causes.  Patient very happy with the plan and able to articulate an understanding of the workup and the likely disposition. Sumit Servin MD 02/08 1140   Dr. Servin at bedside, reexamining patient. Updated patient on all available labs and studies. Treasure Ross 02/08 1259   I reexamined the patient personally at 1300.  She is feeling very well and is quite anxious to be discharged home.  I talked to her about her CMP which showed an albumin of 4.9 and a glucose of 234, otherwise no normalities.  We talked about her normal CBC her normal troponin her magnesium 1.6.  We talked about her EKG findings as well as her normal chest x-ray.  The patient will be referred to cardiology and also instructed to follow-up with her primary care physician Dr. Chandni Perales this week.  Patient is an extremely appreciative for care without question or complaint.  She'll be diagnosis presyncope and hyperglycemia.  She will also be diagnosed with subconjunctival  hemorrhage Sumit Servin MD 02/08 1307       Recent Results (from the past 24 hour(s))   POC Glucose Fingerstick    Collection Time: 02/08/17 11:45 AM   Result Value Ref Range    Glucose 250 (H) 70 - 130 mg/dL   Comprehensive Metabolic Panel    Collection Time: 02/08/17 11:46 AM   Result Value Ref Range    Glucose 234 (H) 70 - 100 mg/dL    BUN 18 9 - 23 mg/dL    Creatinine 0.80 0.60 - 1.30 mg/dL    Sodium 138 132 - 146 mmol/L    Potassium 3.8 3.5 - 5.5 mmol/L    Chloride 104 99 - 109 mmol/L    CO2 27.0 20.0 - 31.0 mmol/L    Calcium 10.0 8.7 - 10.4 mg/dL    Total Protein 7.6 5.7 - 8.2 g/dL    Albumin 4.90 (H) 3.20 - 4.80 g/dL    ALT (SGPT) 20 7 - 40 U/L    AST (SGOT) 20 0 - 33 U/L    Alkaline Phosphatase 68 25 - 100 U/L    Total Bilirubin 0.4 0.3 - 1.2 mg/dL    eGFR Non African Amer 75 >60 mL/min/1.73    Globulin 2.7 gm/dL    A/G Ratio 1.8 1.5 - 2.5 g/dL    BUN/Creatinine Ratio 22.5 7.0 - 25.0    Anion Gap 7.0 3.0 - 11.0 mmol/L   Magnesium    Collection Time: 02/08/17 11:46 AM   Result Value Ref Range    Magnesium 1.6 1.3 - 2.7 mg/dL   Light Blue Top    Collection Time: 02/08/17 11:46 AM   Result Value Ref Range    Extra Tube hold for add-on    Green Top (Gel)    Collection Time: 02/08/17 11:46 AM   Result Value Ref Range    Extra Tube Hold for add-ons.    Lavender Top    Collection Time: 02/08/17 11:46 AM   Result Value Ref Range    Extra Tube hold for add-on    Gold Top - SST    Collection Time: 02/08/17 11:46 AM   Result Value Ref Range    Extra Tube Hold for add-ons.    CBC Auto Differential    Collection Time: 02/08/17 11:46 AM   Result Value Ref Range    WBC 6.66 3.50 - 10.80 10*3/mm3    RBC 4.46 3.89 - 5.14 10*6/mm3    Hemoglobin 14.4 11.5 - 15.5 g/dL    Hematocrit 42.7 34.5 - 44.0 %    MCV 95.7 80.0 - 99.0 fL    MCH 32.3 (H) 27.0 - 31.0 pg    MCHC 33.7 32.0 - 36.0 g/dL    RDW 12.6 11.3 - 14.5 %    RDW-SD 43.6 37.0 - 54.0 fl    MPV 9.8 6.0 - 12.0 fL    Platelets 324 150 - 450 10*3/mm3    Neutrophil % 52.2  41.0 - 71.0 %    Lymphocyte % 36.8 24.0 - 44.0 %    Monocyte % 7.7 0.0 - 12.0 %    Eosinophil % 2.7 0.0 - 3.0 %    Basophil % 0.6 0.0 - 1.0 %    Immature Grans % 0.0 0.0 - 0.6 %    Neutrophils, Absolute 3.48 1.50 - 8.30 10*3/mm3    Lymphocytes, Absolute 2.45 0.60 - 4.80 10*3/mm3    Monocytes, Absolute 0.51 0.00 - 1.00 10*3/mm3    Eosinophils, Absolute 0.18 0.10 - 0.30 10*3/mm3    Basophils, Absolute 0.04 0.00 - 0.20 10*3/mm3    Immature Grans, Absolute 0.00 0.00 - 0.03 10*3/mm3   POC Troponin, Rapid    Collection Time: 02/08/17 11:51 AM   Result Value Ref Range    Troponin I 0.00 0.00 - 0.07 ng/mL     Note: In addition to lab results from this visit, the labs listed above may include labs taken at another facility or during a different encounter within the last 24 hours. Please correlate lab times with ED admission and discharge times for further clarification of the services performed during this visit.    XR Chest 2 View   Final Result   No active disease.       D:  02/08/2017   E:  02/08/2017       This report was finalized on 2/8/2017 1:22 PM by Dr. Alex Weiss MD.            Vitals:    02/08/17 1127 02/08/17 1200 02/08/17 1230 02/08/17 1348   BP: (!) 165/102 144/91 138/90 140/89   BP Location:    Left arm   Patient Position: Standing   Lying   Pulse: 105   96   Resp:    16   Temp:    98.6 °F (37 °C)   TempSrc:    Oral   SpO2:  93% 97% 98%   Weight:       Height:         Medications - No data to display  ECG/EMG Results (last 24 hours)     Procedure Component Value Units Date/Time    ECG 12 Lead [[id number]] Collected:  02/08/17 1125     Updated:  02/08/17 1132                      [facility]    Final diagnoses:   Pre-syncope   Subconjunctival hemorrhage of right eye   Hyperglycemia     EMR Dragon/Transcription disclaimer:   Much of this encounter note is an electronic transcription/translation of spoken language to printed text. The electronic translation of spoken language may permit erroneous, or at times,  nonsensical words or phrases to be inadvertently transcribed; Although I have reviewed the note for such errors, some may still exist.     Documentation assistance provided by anderson Ross.  Information recorded by the anderson was done at my direction and has been verified and validated by me.     Treasure Ross  02/08/17 1138       Treasure Ross  02/08/17 1145       Treasure Ross  02/08/17 1300       Sumit Servin MD  02/08/17 9097

## 2022-03-25 NOTE — PROGRESS NOTES
Virtual Regular Visit    Verification of patient location:    Patient is located in the following state in which I hold an active license [place]      Assessment/Plan:    Notify patient's gyn about patient's heavy menses  Will start patient again on Provera 10 milligram daily for 21 days  Patient advised to call gyn office for long-term treatment  Will increase Wellbutrin to 300 milligram daily in the morning to help with energy level depression and weight loss  In a month if not improved will try Lexapro to help with sleep and mood  Will try Atrovent for her nose to help with sinus and congestion  Patient follow-up in office in 1 month for weight  Ideal weight is 150 pounds  Problem List Items Addressed This Visit        Other    Morbid obesity due to excess calories ([facility] [address] )    Obesity (BMI 30-39  9)    Relevant Medications    phentermine (ADIPEX-P) 37 5 MG tablet    liraglutide (SAXENDA) injection    topiramate (TOPAMAX) 50 MG tablet    JANE (generalized anxiety disorder)    Relevant Medications    phentermine (ADIPEX-P) 37 5 MG tablet    liraglutide (SAXENDA) injection    buPROPion (Wellbutrin XL) 300 mg 24 hr tablet    Acute stress disorder    Relevant Medications    phentermine (ADIPEX-P) 37 5 MG tablet    liraglutide (SAXENDA) injection    buPROPion (Wellbutrin XL) 300 mg 24 hr tablet    Menorrhagia with regular cycle    Iron deficiency anemia    Exposure to influenza      Other Visit Diagnoses     Sinusitis, unspecified chronicity, unspecified location    -  Primary    Relevant Medications    ipratropium (ATROVENT) 0 06 % nasal spray               Reason for visit is   Chief Complaint   Patient presents with    Follow-up    Virtual Regular Visit        Encounter provider Ayleen Triplett MD    Provider located at [address] [place] [address] [place] [address] [phone]      Recent Visits  No visits were found meeting these conditions    Showing recent visits within past 7 days and meeting all other requirements  Today's Visits  Date Type Provider Dept   03/25/22 Telemedicine Marlyn Cano MD [id number] [place] today's visits and meeting all other requirements  Future Appointments  No visits were found meeting these conditions  Showing future appointments within next 150 days and meeting all other requirements       The patient was identified by name and date of birth  Rosalino Arvizu was informed that this is a telemedicine visit and that the visit is being conducted through [address] [employer] and patient was informed that this is a secure, HIPAA-compliant platform  She agrees to proceed     My office door was closed  No one else was in the room  She acknowledged consent and understanding of privacy and security of the video platform  The patient has agreed to participate and understands they can discontinue the visit at any time  Patient is aware this is a billable service  Subjective  Rosalino Arvizu is a 64 y o  female    59-year-old female follow-up medical weight loss  She is on Saxenda 2 4 milligram daily  At higher doses cause nausea and vomiting  She is on phentermine topiramate  She just recovered from influenza flu both  her grandchildren have it  Now residual sinus does not improved with Flonase  Her mood is still the same despite being on Wellbutrin 150 milligram daily  No side effect with medication  She still feeling very nervous anxious and crying a lot  She was not able to get in to see psychiatrist at all because there was no availability  She is on waiting list   No suicidal homicide ideation  She start having her menses again very heavy soak through a heavy pad every 15 minutes  She saw gyn in the past had  endometrial biopsy that was benign ultrasound pelvic also benign  Menses ceases After using Provera 10 milligram daily for 21 days but now her menses returned  She was not able to get in to see gyn    She has very low iron and she is taking iron supplement  Past Medical History:   Diagnosis Date    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    Asthma due to seasonal allergies 9/25/2020    Depression     Essential hypertension 4/20/2020    JANE (generalized anxiety disorder) 1/06/0496    Helicobacter pylori gastrointestinal tract infection 4/20/2020    10/02/2017=egd 9/2017 Dr Osiel Cardozo History of Holter monitoring 12/22/2017    The baseline rhythm was of sinus origin with an average heart rate of 90 bpm (range: 72 - 129 bpm)  There were rare single APCs adn VPCs (less than 0 1 % total beats)  There were no sustained cardiac dysrhythmais  There were no significant symptomatic pauses   History of stress test 01/05/2018    Mildly abnormal exercise Myoview SPECT study with evidence of a fixed anterior defect  This is most likely related to breast attenuation artifact  There was no evidence of reversible myocardial ischemia  Gated wall motion analysis was normal with well preserved systolic function  The left ventricle was normal in size with calculated EF of 73%  No prior similar study is available for comparison       Hypertension     Immunization deficiency 4/20/2020    Hep a/b/mumps nonimmune    Immunization deficiency 9/25/2020    Hep a/b/mmr nonimmune 6/19/2020, 9/25/2020, 2/2021=hep a/b    Iron deficiency 4/20/2020    Kidney stone 4/20/2020    Need for pneumococcal vaccination 6/4/2021    Need for shingles vaccine 6/4/2021    Obesity (BMI 30-39 9) 4/20/2020    39 4, start wt 219 lb=goal 150 lb    Onychomycosis of toenail 6/19/2020    Prediabetes 4/20/2020    5 8    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    5    Reactive airway disease with acute exacerbation 5/20/2020    Subclinical hyperthyroidism 4/20/2020    Subclinical hyperthyroidism 9/25/2020    Thyroid nodule 9/25/2020    Vitamin D deficiency 4/20/2020    33       Past Surgical History:   Procedure Laterality Date    COLONOSCOPY  09/08/2017    3 polyps=next 2020 w Dr Vipul Gomez EGD  09/01/2017    egd biopsy     KNEE ARTHROSCOPY W/ ACL RECONSTRUCTION      KNEE SURGERY      torn mcl, acl, meniscus left knee 2016    TUBAL LIGATION         Current Outpatient Medications   Medication Sig Dispense Refill    albuterol (Proventil HFA) 90 mcg/act inhaler Inhale 2 puffs every 4 (four) hours as needed for wheezing or shortness of breath 18 g 1    amLODIPine (NORVASC) 5 mg tablet Take 1 tablet (5 mg total) by mouth daily 90 tablet 1    buPROPion (Wellbutrin XL) 300 mg 24 hr tablet Take 1 tablet (300 mg total) by mouth every morning 30 tablet 1    clindamycin (CLINDAGEL) 1 % gel Apply topically 2 (two) times a day Apply to furuncle on abdomen 30 g 0    fluticasone-salmeterol (Advair Diskus) 100-50 mcg/dose inhaler Inhale 1 puff 2 (two) times a day Rinse mouth after use  60 blister 1    ipratropium (ATROVENT) 0 06 % nasal spray 2 sprays into each nostril 4 (four) times a day 15 mL 1    liraglutide (SAXENDA) injection Inject 0 5 mL (3 mg total) under the skin daily 15 mL 3    medroxyPROGESTERone (PROVERA) 10 mg tablet Take 1 tablet (10 mg total) by mouth daily 21 tablet 0    metFORMIN (GLUCOPHAGE-XR) 500 mg 24 hr tablet TAKE 2 TABLETS(1000 MG) BY MOUTH DAILY WITH DINNER 60 tablet 3    metoprolol succinate (TOPROL-XL) 50 mg 24 hr tablet Take 1 5 tablets (75 mg total) by mouth daily 135 tablet 1    naproxen (NAPROSYN) 500 mg tablet Take by mouth      phentermine (ADIPEX-P) 37 5 MG tablet Take 1 tablet (37 5 mg total) by mouth every morning 30 tablet 0    topiramate (TOPAMAX) 50 MG tablet Take 1 tablet (50 mg total) by mouth daily at bedtime 30 tablet 1     No current facility-administered medications for this visit  No Known Allergies    Review of Systems   Constitutional: Negative for activity change, appetite change, fatigue and unexpected weight change  HENT: Negative for ear pain, sore throat, trouble swallowing and voice change      Eyes: Negative for photophobia and visual disturbance  Respiratory: Negative for cough, chest tightness, shortness of breath and wheezing  Cardiovascular: Negative for chest pain, palpitations and leg swelling  Gastrointestinal: Negative for abdominal pain, constipation, diarrhea, nausea, rectal pain and vomiting  Endocrine: Negative for cold intolerance, polydipsia and polyuria  Genitourinary: Positive for menstrual problem  Negative for difficulty urinating, dysuria, flank pain and pelvic pain  Musculoskeletal: Negative for arthralgias, joint swelling and myalgias  Skin: Negative for color change and rash  Allergic/Immunologic: Negative for environmental allergies and immunocompromised state  Neurological: Negative for dizziness, weakness, numbness and headaches  Hematological: Negative for adenopathy  Does not bruise/bleed easily  Psychiatric/Behavioral: Positive for sleep disturbance  Negative for decreased concentration, dysphoric mood, self-injury and suicidal ideas  The patient is nervous/anxious  Video Exam    There were no vitals filed for this visit  Physical Exam  Vitals and nursing note reviewed  Constitutional:       Appearance: She is obese  HENT:      Head: Normocephalic and atraumatic  Pulmonary:      Effort: Pulmonary effort is normal       Breath sounds: Normal breath sounds  Neurological:      General: No focal deficit present  Mental Status: She is alert and oriented to person, place, and time  Mental status is at baseline  Psychiatric:         Mood and Affect: Mood normal          Behavior: Behavior normal          Thought Content: Thought content normal          Judgment: Judgment normal           I spent 30 minutes directly with the patient during this visit    Jacinto Galeano verbally agrees to participate in [employer]   Pt is aware that Virtual Care Services could be limited without vital signs or the ability to perform a full hands-on physical Mindy Brush understands she or the provider may request at any time to terminate the video visit and request the patient to seek care or treatment in person

## 2022-03-28 NOTE — PROGRESS NOTES
Spoke w pt this am  She states that her bleeding has almost stopped on the Provera  We reviewed options to decreased likelihood of recurrence of menorrhagia  We reviewed previous US and biopsy results  At this point will plan to transition to a progesterone only pill for longer term usage  Will call abck with additional questions or recurrence of bleeding

## 2022-04-01 ENCOUNTER — OFFICE VISIT (OUTPATIENT)
Dept: ENDOCRINOLOGY | Facility: CLINIC | Age: 57
End: 2022-04-01
Payer: COMMERCIAL

## 2022-04-01 VITALS
SYSTOLIC BLOOD PRESSURE: 134 MMHG | BODY MASS INDEX: 35.77 KG/M2 | WEIGHT: 194.4 LBS | HEART RATE: 84 BPM | HEIGHT: 62 IN | DIASTOLIC BLOOD PRESSURE: 90 MMHG

## 2022-04-01 DIAGNOSIS — E66.9 OBESITY (BMI 30-39.9): ICD-10-CM

## 2022-04-01 DIAGNOSIS — E05.90 SUBCLINICAL HYPERTHYROIDISM: ICD-10-CM

## 2022-04-01 DIAGNOSIS — E21.3 HYPERPARATHYROIDISM (HCC): ICD-10-CM

## 2022-04-01 DIAGNOSIS — E55.9 VITAMIN D DEFICIENCY: ICD-10-CM

## 2022-04-01 DIAGNOSIS — E04.1 THYROID NODULE: Primary | ICD-10-CM

## 2022-04-01 DIAGNOSIS — R73.03 PREDIABETES: ICD-10-CM

## 2022-04-01 PROCEDURE — 1036F TOBACCO NON-USER: CPT | Performed by: INTERNAL MEDICINE

## 2022-04-01 PROCEDURE — 99204 OFFICE O/P NEW MOD 45 MIN: CPT | Performed by: INTERNAL MEDICINE

## 2022-04-01 NOTE — PROGRESS NOTES
New consult note      CC: hypothyroidism    History of Present Illness:   64 yr female with HTN, HLD, perimenopausal symptoms, Obesity on weight loss medications, 1 episode of nephrolithiasis s/p laparoscopic extraction 2020, bilateral thyroid nodules associated with subclinical hyperthyroidism and elevated PTH  No History of external radiation, recent Iodine loading or radiological diagnostic studies  No difficulty with swallowing or breathing or change in voice  Thyroid nodule Hx: ENAMORADO uptake - 2018 and 2/3/20 nml uptake at 6 hrs and 24 hrs but potential hot nodules noted  US thyroid 2013 showed multiple sub-centimeter thyroid nodules bilaterally but only 1 nodule 1cm rt side  Family Hx of thyroid cancers:    Patient Active Problem List   Diagnosis    Exercise intolerance    Chest pain in adult    Morbid obesity due to excess calories ([address] )    Immunization deficiency    Asthma due to seasonal allergies    Thyroid nodule    Subclinical hyperthyroidism    Obesity (BMI 30-39  9)    Vitamin D deficiency    Immunization deficiency    Essential hypertension    Kidney stone    Ascorbic acid deficiency    Iron deficiency    Helicobacter pylori gastrointestinal tract infection    JANE (generalized anxiety disorder)    Prediabetes    Pyridoxine deficiency    Reactive airway disease with acute exacerbation    Onychomycosis of toenail    Need for shingles vaccine    Need for pneumococcal vaccination    Acute stress disorder    Depression, recurrent ([address] )    Other specified health status (CODE)    Primary osteoarthritis of right knee    High serum parathyroid hormone (PTH)    Menorrhagia with regular cycle    Iron deficiency anemia    Exposure to influenza     Past Medical History:   Diagnosis Date    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    Asthma due to seasonal allergies 9/25/2020    Depression     Essential hypertension 4/20/2020    JANE (generalized anxiety disorder) 4/20/2020    Helicobacter pylori gastrointestinal tract infection 4/20/2020    10/02/2017=egd 9/2017 Dr Man Rogers History of Holter monitoring 12/22/2017    The baseline rhythm was of sinus origin with an average heart rate of 90 bpm (range: 72 - 129 bpm)  There were rare single APCs adn VPCs (less than 0 1 % total beats)  There were no sustained cardiac dysrhythmais  There were no significant symptomatic pauses   History of stress test 01/05/2018    Mildly abnormal exercise Myoview SPECT study with evidence of a fixed anterior defect  This is most likely related to breast attenuation artifact  There was no evidence of reversible myocardial ischemia  Gated wall motion analysis was normal with well preserved systolic function  The left ventricle was normal in size with calculated EF of 73%  No prior similar study is available for comparison       Hypertension     Immunization deficiency 4/20/2020    Hep a/b/mumps nonimmune    Immunization deficiency 9/25/2020    Hep a/b/mmr nonimmune 6/19/2020, 9/25/2020, 2/2021=hep a/b    Iron deficiency 4/20/2020    Kidney stone 4/20/2020    Need for pneumococcal vaccination 6/4/2021    Need for shingles vaccine 6/4/2021    Obesity (BMI 30-39 9) 4/20/2020    39 4, start wt 219 lb=goal 150 lb    Onychomycosis of toenail 6/19/2020    Prediabetes 4/20/2020    5 8    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    5    Reactive airway disease with acute exacerbation 5/20/2020    Subclinical hyperthyroidism 4/20/2020    Subclinical hyperthyroidism 9/25/2020    Thyroid nodule 9/25/2020    Vitamin D deficiency 4/20/2020    33      Past Surgical History:   Procedure Laterality Date    COLONOSCOPY  09/08/2017    3 polyps=next 2020 w Dr Man Rogers EGD  09/01/2017    egd biopsy     KNEE ARTHROSCOPY W/ ACL RECONSTRUCTION      KNEE SURGERY      torn mcl, acl, meniscus left knee 2016    TUBAL LIGATION        Family History   Problem Relation Age of Onset    Diabetes Father     Colon cancer Maternal Aunt     Cancer Maternal Uncle     Kidney disease Maternal Uncle      Social History     Tobacco Use    Smoking status: Former Smoker     Packs/day: 0 25     Years: 24 00     Pack years: 6 00     Types: Cigarettes     Quit date:      Years since quittin 2    Smokeless tobacco: Never Used    Tobacco comment: Has smoked since age: 16- As per [place]    Substance Use Topics    Alcohol use: Not Currently     Alcohol/week: 2 0 standard drinks     Types: 1 Glasses of wine, 1 Shots of liquor per week     Comment: Occasional      No Known Allergies      Review of Systems   Constitutional: Positive for fatigue  HENT: Negative  Eyes: Negative  Respiratory: Negative  Cardiovascular: Negative  Gastrointestinal: Negative  Endocrine: Negative  Musculoskeletal: Negative  Skin: Negative  Allergic/Immunologic: Negative  Neurological: Negative  Hematological: Negative  Psychiatric/Behavioral: Negative            Current Outpatient Medications:     albuterol (Proventil HFA) 90 mcg/act inhaler, Inhale 2 puffs every 4 (four) hours as needed for wheezing or shortness of breath, Disp: 18 g, Rfl: 1    amLODIPine (NORVASC) 5 mg tablet, Take 1 tablet (5 mg total) by mouth daily, Disp: 90 tablet, Rfl: 1    clindamycin (CLINDAGEL) 1 % gel, Apply topically 2 (two) times a day Apply to furuncle on abdomen, Disp: 30 g, Rfl: 0    ipratropium (ATROVENT) 0 06 % nasal spray, 2 sprays into each nostril 4 (four) times a day, Disp: 15 mL, Rfl: 1    metFORMIN (GLUCOPHAGE-XR) 500 mg 24 hr tablet, TAKE 2 TABLETS(1000 MG) BY MOUTH DAILY WITH DINNER, Disp: 60 tablet, Rfl: 3    metoprolol succinate (TOPROL-XL) 50 mg 24 hr tablet, Take 1 5 tablets (75 mg total) by mouth daily, Disp: 135 tablet, Rfl: 1    naproxen (NAPROSYN) 500 mg tablet, Take by mouth, Disp: , Rfl:     norethindrone (Shellie) 0 35 MG tablet, Take 1 tablet (0 35 mg total) by mouth daily, Disp: 90 tablet, Rfl: 1    phentermine (ADIPEX-P) 37 5 MG tablet, Take 1 tablet (37 5 mg total) by mouth every morning, Disp: 30 tablet, Rfl: 0    topiramate (TOPAMAX) 50 MG tablet, Take 1 tablet (50 mg total) by mouth daily at bedtime, Disp: 30 tablet, Rfl: 1    buPROPion (Wellbutrin XL) 300 mg 24 hr tablet, Take 1 tablet (300 mg total) by mouth every morning, Disp: 30 tablet, Rfl: 1    fluticasone-salmeterol (Advair Diskus) 100-50 mcg/dose inhaler, Inhale 1 puff 2 (two) times a day Rinse mouth after use  (Patient not taking: Reported on 4/1/2022 ), Disp: 60 blister, Rfl: 1    liraglutide (SAXENDA) injection, Inject 0 5 mL (3 mg total) under the skin daily, Disp: 15 mL, Rfl: 3    medroxyPROGESTERone (PROVERA) 10 mg tablet, Take 1 tablet (10 mg total) by mouth daily (Patient not taking: Reported on 4/1/2022 ), Disp: 21 tablet, Rfl: 0    Physical Exam:  Body mass index is 35 56 kg/m²  /90   Pulse 84   Ht 5' 2" (1 575 m)   Wt 88 2 kg (194 lb 6 4 oz)   LMP  (LMP Unknown)   BMI 35 56 kg/m²        Physical Exam  Constitutional:       Appearance: She is well-developed  HENT:      Head: Normocephalic  Eyes:      Pupils: Pupils are equal, round, and reactive to light  Neck:      Thyroid: No thyromegaly  Comments: No thyromegaly  Cardiovascular:      Rate and Rhythm: Normal rate  Heart sounds: Normal heart sounds  Pulmonary:      Effort: Pulmonary effort is normal       Breath sounds: Normal breath sounds  Abdominal:      General: Bowel sounds are normal       Palpations: Abdomen is soft  Musculoskeletal:         General: No deformity  Cervical back: Normal range of motion  Skin:     Capillary Refill: Capillary refill takes less than 2 seconds  Coloration: Skin is not pale  Findings: No rash  Neurological:      Mental Status: She is alert and oriented to person, place, and time           Labs:     Lab Results   Component Value Date    TQD5KNFLWZTH 0 010 (L) 02/22/2022       Lab Results   Component Value Date    CREATININE 0 77 02/22/2022    CREATININE 0 92 12/24/2020    CREATININE 0 76 03/24/2020    BUN 17 02/22/2022    K 3 9 02/22/2022     (H) 02/22/2022    CO2 25 02/22/2022     eGFR   Date Value Ref Range Status   02/22/2022 86 ml/min/1 73sq m Final       Lab Results   Component Value Date    ALT 20 02/22/2022    AST 20 02/22/2022    ALKPHOS 86 02/22/2022       Lab Results   Component Value Date    CHOLESTEROL 157 02/22/2022    CHOLESTEROL 151 03/24/2020    CHOLESTEROL 141 02/11/2020     Lab Results   Component Value Date    HDL 53 02/22/2022    HDL 49 (L) 03/24/2020    HDL 50 02/11/2020     Lab Results   Component Value Date    TRIG 105 02/22/2022    TRIG 107 03/24/2020    TRIG 140 02/11/2020     Lab Results   Component Value Date    [place] 91 02/11/2020         Impression:  1  Thyroid nodule    2  Subclinical hyperthyroidism    3  Vitamin D deficiency    4  Prediabetes    5  Obesity (BMI 30-39 9)    6  Hyperparathyroidism ([facility] [address] )         Plan:    Diagnoses and all orders for this visit:    Thyroid nodule associated with Subclinical hyperthyroidism  She has previously documented hot nodules in 2018 associated with suppressed TSH for a few years with normal fT4 and T3  Most recent TSH was 0 010uIU/mL  She is asymptomatic  Today we discussed differentials and complications associated with subclinical hyperthyroid state  Will repeat listed labs to r/o Graves and iatrogenic hyperthyroidism  Stimulants can suppress TSH  She may need a repeat uptake scan with or without ENAMORADO ablation  Follow up in 3 months with repeat testing as listed  -     T4, free; Future  -     TSH, 3rd generation; Future  -     Thyroglobulin; Future  -     Thyroid Antibodies Panel; Future  -     US thyroid; Future  -     Thyroid stimulating immunoglobulin; Future    Vitamin D deficiency  Advised to take 2000IU daily and repeat labs in 3 months  Obesity (BMI 30-39 9)   on weight loss medications saxenda and phentermine/topamax combination being followed by PCP  Hyperparathyroidism ([facility] [address] )  Suspect secondary as PTH was elevated in context of low vitD  Advised to take 1200-1500mg oral calcium and 2000IU vitD3 daily and repeat labs in 3 months  Note hx of nephrolithiasis unclear if it was a calcium stone  No HCTZ use presently  If PTH remains elevated associated with a normal calcium and vitaminD in spite of adequate intake, will test for primary hyperparathyroidism      -     Phosphorus; Future  -     PTH, intact; Future  -     Vitamin D 25 hydroxy; Future  -     Comprehensive metabolic panel; Future      I have spent 60 minutes with patient today in which greater than 50% of this time was spent in counseling/coordination of care  All questioned fully answered  She will call me if any problems arise  Educated/ Counseled patient on diagnostic test results, prognosis, risk vs benefit of treatment options, importance of treatment compliance, healthy life and lifestyle choices        [address]

## 2022-04-29 ENCOUNTER — OFFICE VISIT (OUTPATIENT)
Dept: FAMILY MEDICINE CLINIC | Facility: CLINIC | Age: 57
End: 2022-04-29
Payer: COMMERCIAL

## 2022-04-29 VITALS
HEART RATE: 92 BPM | HEIGHT: 62 IN | SYSTOLIC BLOOD PRESSURE: 146 MMHG | DIASTOLIC BLOOD PRESSURE: 98 MMHG | TEMPERATURE: 98.2 F | BODY MASS INDEX: 35.33 KG/M2 | OXYGEN SATURATION: 99 % | WEIGHT: 192 LBS

## 2022-04-29 DIAGNOSIS — I10 ESSENTIAL HYPERTENSION: Primary | ICD-10-CM

## 2022-04-29 DIAGNOSIS — F43.0 ACUTE STRESS DISORDER: ICD-10-CM

## 2022-04-29 DIAGNOSIS — F33.9 DEPRESSION, RECURRENT (HCC): ICD-10-CM

## 2022-04-29 DIAGNOSIS — F41.1 GAD (GENERALIZED ANXIETY DISORDER): ICD-10-CM

## 2022-04-29 DIAGNOSIS — R11.0 NAUSEA: ICD-10-CM

## 2022-04-29 DIAGNOSIS — J01.90 SUBACUTE SINUSITIS, UNSPECIFIED LOCATION: ICD-10-CM

## 2022-04-29 PROBLEM — Z28.39 IMMUNIZATION DEFICIENCY: Status: RESOLVED | Noted: 2020-09-25 | Resolved: 2022-04-29

## 2022-04-29 PROBLEM — R07.9 CHEST PAIN IN ADULT: Status: RESOLVED | Noted: 2020-02-10 | Resolved: 2022-04-29

## 2022-04-29 PROBLEM — Z23 NEED FOR PNEUMOCOCCAL VACCINATION: Status: RESOLVED | Noted: 2021-06-04 | Resolved: 2022-04-29

## 2022-04-29 PROBLEM — Z28.39 IMMUNIZATION DEFICIENCY: Status: RESOLVED | Noted: 2020-04-20 | Resolved: 2022-04-29

## 2022-04-29 PROBLEM — Z20.828 EXPOSURE TO INFLUENZA: Status: RESOLVED | Noted: 2022-03-07 | Resolved: 2022-04-29

## 2022-04-29 PROBLEM — Z23 NEED FOR SHINGLES VACCINE: Status: RESOLVED | Noted: 2021-06-04 | Resolved: 2022-04-29

## 2022-04-29 PROBLEM — R68.89 EXERCISE INTOLERANCE: Status: RESOLVED | Noted: 2020-02-10 | Resolved: 2022-04-29

## 2022-04-29 PROCEDURE — 99215 OFFICE O/P EST HI 40 MIN: CPT | Performed by: FAMILY MEDICINE

## 2022-04-29 PROCEDURE — 3008F BODY MASS INDEX DOCD: CPT | Performed by: INTERNAL MEDICINE

## 2022-04-29 RX ORDER — ONDANSETRON 4 MG/1
4 TABLET, ORALLY DISINTEGRATING ORAL EVERY 6 HOURS PRN
Qty: 20 TABLET | Refills: 1 | Status: SHIPPED | OUTPATIENT
Start: 2022-04-29

## 2022-04-29 RX ORDER — AZITHROMYCIN 250 MG/1
TABLET, FILM COATED ORAL
Qty: 6 TABLET | Refills: 0 | Status: SHIPPED | OUTPATIENT
Start: 2022-04-29 | End: 2022-05-03

## 2022-04-29 RX ORDER — BUPROPION HYDROCHLORIDE 300 MG/1
300 TABLET ORAL EVERY MORNING
Qty: 30 TABLET | Refills: 1 | Status: SHIPPED | OUTPATIENT
Start: 2022-04-29 | End: 2022-06-10 | Stop reason: SDUPTHER

## 2022-04-29 NOTE — PROGRESS NOTES
Assessment/Plan:    Put patient on antibiotic InCase sinus get worse  Will call in Zofran to help with nausea so she can go up to 3 mg daily  Saxenda  Stop phentermine since that can in interfere with results of blood work and ultrasound  Advice   patient to resume Wellbutrin for her mood  If not better will recommend to switch to Lexapro  Will see her back in 6 weeks med check for weight loss  If not improved recommend to see medical weight loss team   Patient's blood pressure is high in the office today  She work night shift did not get to sleep get  Patient advised to be compliant with medication blood pressure will monitor for her  I have spent 40 minutes with Patient  today in which greater than 50% of this time was spent in counseling/coordination of care regarding Prognosis, Risks and benefits of tx options and Intructions for management  Problem List Items Addressed This Visit        Cardiovascular and Mediastinum    Essential hypertension - Primary       Other    JANE (generalized anxiety disorder)    Relevant Medications    buPROPion (Wellbutrin XL) 300 mg 24 hr tablet    Acute stress disorder    Relevant Medications    buPROPion (Wellbutrin XL) 300 mg 24 hr tablet    Depression, recurrent (HCC)    Relevant Medications    buPROPion (Wellbutrin XL) 300 mg 24 hr tablet      Other Visit Diagnoses     Subacute sinusitis, unspecified location        Relevant Medications    azithromycin (ZITHROMAX) 250 mg tablet    Nausea        Relevant Medications    ondansetron (Zofran ODT) 4 mg disintegrating tablet            Subjective:      Patient ID: Jaydon Roper is a 64 y o  female  59-year-old female follow-up medical weight loss  She is on saxenda 2 4 mg daily  Patient is also on phentermine topiramate metformin  Last visit Wellbutrin was increased to 300 mg daily to help with her mood 30 she level and weight loss  She did not  the medicine the pharmacy that was not available    She recently follow-up with endocrinologist for subclinical hypothyroidism and nodule  She has upcoming blood work and ultrasound  Her menstrual cycle has stop being heavy but she still have bleeding every day  She only took progesterone for 1 day and then switch over to no S3 drawn  She complained of feeling congested closes drip and sinus drainage yellow green for the past week  She has asthma prone for bronchitis  The following portions of the patient's history were reviewed and updated as appropriate:   Past Medical History:  She has a past medical history of 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), Asthma due to seasonal allergies (9/25/2020), Depression, Essential hypertension (4/20/2020), JANE (generalized anxiety disorder) (4/04/9074), Helicobacter pylori gastrointestinal tract infection (4/20/2020), History of Holter monitoring (12/22/2017), History of stress test (01/05/2018), Hypertension, Immunization deficiency (4/20/2020), Immunization deficiency (9/25/2020), Iron deficiency (4/20/2020), Kidney stone (4/20/2020), Need for pneumococcal vaccination (6/4/2021), Need for shingles vaccine (6/4/2021), Obesity (BMI 30-39 9) (4/20/2020), Onychomycosis of toenail (6/19/2020), Prediabetes (4/20/2020), 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), Reactive airway disease with acute exacerbation (5/20/2020), Subclinical hyperthyroidism (4/20/2020), Subclinical hyperthyroidism (9/25/2020), Thyroid nodule (9/25/2020), and Vitamin D deficiency (4/20/2020)  ,  _______________________________________________________________________  Medical Problems:  does not have any pertinent problems on file ,  _______________________________________________________________________  Past Surgical History:   has a past surgical history that includes Tubal ligation; Colonoscopy (09/08/2017); EGD (09/01/2017);  Knee surgery; and Knee arthroscopy w/ ACL reconstruction  ,  _______________________________________________________________________  Family History:  family history includes Cancer in her maternal uncle; Colon cancer in her maternal aunt; Diabetes in her father; Kidney disease in her maternal uncle ,  _______________________________________________________________________  Social History:   reports that she quit smoking about 8 years ago  Her smoking use included cigarettes  She has a 6 00 pack-year smoking history  She has never used smokeless tobacco  She reports previous alcohol use of about 2 0 standard drinks of alcohol per week  She reports that she does not use drugs  ,  _______________________________________________________________________  Allergies:  has No Known Allergies     _______________________________________________________________________  Current Outpatient Medications   Medication Sig Dispense Refill    amLODIPine (NORVASC) 5 mg tablet Take 1 tablet (5 mg total) by mouth daily 90 tablet 1    buPROPion (Wellbutrin XL) 300 mg 24 hr tablet Take 1 tablet (300 mg total) by mouth every morning 30 tablet 1    clindamycin (CLINDAGEL) 1 % gel Apply topically 2 (two) times a day Apply to furuncle on abdomen 30 g 0    ipratropium (ATROVENT) 0 06 % nasal spray 2 sprays into each nostril 4 (four) times a day 15 mL 1    liraglutide (SAXENDA) injection Inject 0 5 mL (3 mg total) under the skin daily 15 mL 3    metFORMIN (GLUCOPHAGE-XR) 500 mg 24 hr tablet TAKE 2 TABLETS(1000 MG) BY MOUTH DAILY WITH DINNER 60 tablet 3    metoprolol succinate (TOPROL-XL) 50 mg 24 hr tablet Take 1 5 tablets (75 mg total) by mouth daily 135 tablet 1    norethindrone (Shellie) 0 35 MG tablet Take 1 tablet (0 35 mg total) by mouth daily 90 tablet 1    phentermine (ADIPEX-P) 37 5 MG tablet Take 1 tablet (37 5 mg total) by mouth every morning 30 tablet 0    topiramate (TOPAMAX) 50 MG tablet Take 1 tablet (50 mg total) by mouth daily at bedtime 30 tablet 1    albuterol (Proventil HFA) 90 mcg/act inhaler Inhale 2 puffs every 4 (four) hours as needed for wheezing or shortness of breath (Patient not taking: Reported on 4/29/2022 ) 18 g 1    azithromycin (ZITHROMAX) 250 mg tablet 2 tabs day 1, 1 tab day 2 to day 5 6 tablet 0    medroxyPROGESTERone (PROVERA) 10 mg tablet Take 1 tablet (10 mg total) by mouth daily (Patient not taking: Reported on 4/1/2022 ) 21 tablet 0    ondansetron (Zofran ODT) 4 mg disintegrating tablet Take 1 tablet (4 mg total) by mouth every 6 (six) hours as needed for nausea or vomiting 20 tablet 1     No current facility-administered medications for this visit      _______________________________________________________________________  Review of Systems   Constitutional: Negative for activity change, appetite change, fatigue and unexpected weight change  HENT: Positive for postnasal drip, sinus pressure and sinus pain  Negative for ear pain, sore throat, trouble swallowing and voice change  Eyes: Negative for photophobia and visual disturbance  Respiratory: Positive for cough  Negative for chest tightness, shortness of breath and wheezing  Cardiovascular: Negative for chest pain, palpitations and leg swelling  Gastrointestinal: Negative for abdominal pain, constipation, diarrhea, nausea, rectal pain and vomiting  Endocrine: Negative for cold intolerance, polydipsia and polyuria  Genitourinary: Negative for difficulty urinating, dysuria, flank pain, menstrual problem and pelvic pain  Musculoskeletal: Negative for arthralgias, joint swelling and myalgias  Skin: Negative for color change and rash  Allergic/Immunologic: Negative for environmental allergies and immunocompromised state  Neurological: Negative for dizziness, weakness, numbness and headaches  Hematological: Negative for adenopathy  Does not bruise/bleed easily     Psychiatric/Behavioral: Negative for decreased concentration, dysphoric mood, self-injury, sleep disturbance and suicidal ideas  The patient is not nervous/anxious  Objective:  Vitals:    04/29/22 1556   BP: 146/98   BP Location: Left arm   Patient Position: Sitting   Cuff Size: Large   Pulse: 92   Temp: 98 2 °F (36 8 °C)   TempSrc: Tympanic   SpO2: 99%   Weight: 87 1 kg (192 lb)   Height: 5' 2" (1 575 m)     Body mass index is 35 12 kg/m²  Physical Exam  Vitals and nursing note reviewed  Constitutional:       Appearance: Normal appearance  She is well-developed  She is obese  HENT:      Head: Normocephalic and atraumatic  Right Ear: Tympanic membrane, ear canal and external ear normal       Left Ear: Tympanic membrane, ear canal and external ear normal       Nose: Congestion and rhinorrhea present  Mouth/Throat:      Mouth: Mucous membranes are dry  Pharynx: No oropharyngeal exudate  Eyes:      Extraocular Movements: Extraocular movements intact  Pupils: Pupils are equal, round, and reactive to light  Neck:      Thyroid: No thyromegaly  Cardiovascular:      Rate and Rhythm: Normal rate and regular rhythm  Pulses: Normal pulses  Heart sounds: Normal heart sounds  No murmur heard  Pulmonary:      Effort: Pulmonary effort is normal  No respiratory distress  Breath sounds: Normal breath sounds  No wheezing or rales  Abdominal:      General: Bowel sounds are normal  There is no distension  Palpations: Abdomen is soft  Tenderness: There is no abdominal tenderness  There is no guarding  Genitourinary:     Vagina: Normal    Musculoskeletal:         General: No tenderness  Normal range of motion  Cervical back: Normal range of motion and neck supple  Skin:     General: Skin is warm and dry  Capillary Refill: Capillary refill takes less than 2 seconds  Findings: No rash  Neurological:      General: No focal deficit present  Mental Status: She is alert and oriented to person, place, and time  Mental status is at baseline     Psychiatric: Mood and Affect: Mood normal          Behavior: Behavior normal          Thought Content:  Thought content normal          Judgment: Judgment normal

## 2022-05-27 ENCOUNTER — VBI (OUTPATIENT)
Dept: ADMINISTRATIVE | Facility: OTHER | Age: 57
End: 2022-05-27

## 2022-06-03 ENCOUNTER — TELEPHONE (OUTPATIENT)
Dept: FAMILY MEDICINE CLINIC | Facility: CLINIC | Age: 57
End: 2022-06-03

## 2022-06-06 ENCOUNTER — TELEMEDICINE (OUTPATIENT)
Dept: FAMILY MEDICINE CLINIC | Facility: CLINIC | Age: 57
End: 2022-06-06
Payer: COMMERCIAL

## 2022-06-06 DIAGNOSIS — R49.1 LOST VOICE: ICD-10-CM

## 2022-06-06 DIAGNOSIS — J02.0 PHARYNGITIS DUE TO STREPTOCOCCUS SPECIES: Primary | ICD-10-CM

## 2022-06-06 DIAGNOSIS — R05.9 COUGH: ICD-10-CM

## 2022-06-06 PROCEDURE — 99442 PR PHYS/QHP TELEPHONE EVALUATION 11-20 MIN: CPT | Performed by: NURSE PRACTITIONER

## 2022-06-06 RX ORDER — AMOXICILLIN 875 MG/1
875 TABLET, COATED ORAL 2 TIMES DAILY
Qty: 10 TABLET | Refills: 0 | Status: SHIPPED | OUTPATIENT
Start: 2022-06-06 | End: 2022-06-11

## 2022-06-06 NOTE — PROGRESS NOTES
Virtual Brief Visit    Patient is located in the following state in which I hold an active license [place]      Assessment/Plan:    Pt presents in office via virtual visit   C/o sore throat congestion and lost her voice   Symptoms started about 1 week ago   Seemed like allergies   Nasal congestion and now has a cough   Denies any chest pain or shortness of breath denies any issues swallowing no rashes or hives   She has tested for COVID and negative     Discussed salt water gargles   Tylenol as needed for pain and headaches   I have added Amoxicillin   Allergy medications   Hydrate well and take meds with food   Add vitamin C D zinc and follow up in 1 week if note better   If worsening of symptoms call us or ER     Problem List Items Addressed This Visit    None     Visit Diagnoses     Pharyngitis due to Streptococcus species    -  Primary    Relevant Medications    amoxicillin (AMOXIL) 875 mg tablet    Cough        Lost voice              Recent Visits  Date Type Provider Dept   06/03/22 Telephone Luis Mireles MD [id number] [place] recent visits within past 7 days and meeting all other requirements  Today's Visits  Date Type Provider Dept   06/06/22 Telemedicine Marline Zuluaga, [address] [facility] today's visits and meeting all other requirements  Future Appointments  No visits were found meeting these conditions    Showing future appointments within next 150 days and meeting all other requirements         I spent 15 minutes directly with the patient during this visit          Virtual Brief Visit    Patient is located in the following state in which I hold an active license [place]      Assessment/Plan:    Problem List Items Addressed This Visit    None     Visit Diagnoses     Pharyngitis due to Streptococcus species    -  Primary    Relevant Medications    amoxicillin (AMOXIL) 875 mg tablet    Cough        Lost voice              Recent Visits  Date Type Provider Dept   06/03/22 Telephone Dima Man MD  [facility] [place]   Showing recent visits within past 7 days and meeting all other requirements  Today's Visits  Date Type Provider Dept   06/06/22 Telemedicine Jannette Vides, [address] [facility] today's visits and meeting all other requirements  Future Appointments  No visits were found meeting these conditions    Showing future appointments within next 150 days and meeting all other requirements         I spent 15 minutes directly with the patient during this visit

## 2022-06-10 ENCOUNTER — OFFICE VISIT (OUTPATIENT)
Dept: FAMILY MEDICINE CLINIC | Facility: CLINIC | Age: 57
End: 2022-06-10
Payer: COMMERCIAL

## 2022-06-10 VITALS
RESPIRATION RATE: 18 BRPM | SYSTOLIC BLOOD PRESSURE: 144 MMHG | HEIGHT: 62 IN | OXYGEN SATURATION: 98 % | TEMPERATURE: 98.8 F | DIASTOLIC BLOOD PRESSURE: 86 MMHG | HEART RATE: 73 BPM | WEIGHT: 195 LBS | BODY MASS INDEX: 35.88 KG/M2

## 2022-06-10 DIAGNOSIS — E66.01 MORBID OBESITY DUE TO EXCESS CALORIES (HCC): Primary | ICD-10-CM

## 2022-06-10 DIAGNOSIS — F41.1 GAD (GENERALIZED ANXIETY DISORDER): ICD-10-CM

## 2022-06-10 DIAGNOSIS — F33.9 DEPRESSION, RECURRENT (HCC): ICD-10-CM

## 2022-06-10 DIAGNOSIS — I10 ESSENTIAL HYPERTENSION: ICD-10-CM

## 2022-06-10 DIAGNOSIS — R42 VERTIGO: ICD-10-CM

## 2022-06-10 DIAGNOSIS — F43.0 ACUTE STRESS DISORDER: ICD-10-CM

## 2022-06-10 PROCEDURE — 99215 OFFICE O/P EST HI 40 MIN: CPT | Performed by: FAMILY MEDICINE

## 2022-06-10 RX ORDER — PREDNISONE 20 MG/1
20 TABLET ORAL DAILY
Qty: 7 TABLET | Refills: 0 | Status: SHIPPED | OUTPATIENT
Start: 2022-06-10 | End: 2022-06-17

## 2022-06-10 RX ORDER — ESCITALOPRAM OXALATE 5 MG/1
5 TABLET ORAL DAILY
Qty: 30 TABLET | Refills: 1 | Status: SHIPPED | OUTPATIENT
Start: 2022-06-10 | End: 2022-08-05 | Stop reason: SDUPTHER

## 2022-06-10 RX ORDER — AMLODIPINE BESYLATE 10 MG/1
10 TABLET ORAL DAILY
Qty: 90 TABLET | Refills: 1 | Status: SHIPPED | OUTPATIENT
Start: 2022-06-10 | End: 2022-09-08

## 2022-06-10 RX ORDER — BUPROPION HYDROCHLORIDE 300 MG/1
300 TABLET ORAL EVERY MORNING
Qty: 30 TABLET | Refills: 1
Start: 2022-06-10

## 2022-06-10 RX ORDER — METOPROLOL SUCCINATE 50 MG/1
75 TABLET, EXTENDED RELEASE ORAL DAILY
Qty: 135 TABLET | Refills: 1 | Status: SHIPPED | OUTPATIENT
Start: 2022-06-10 | End: 2022-09-08

## 2022-06-10 RX ORDER — METFORMIN HYDROCHLORIDE 500 MG/1
1000 TABLET, EXTENDED RELEASE ORAL
Qty: 60 TABLET | Refills: 3 | Status: SHIPPED | OUTPATIENT
Start: 2022-06-10

## 2022-06-10 NOTE — PROGRESS NOTES
Assessment/Plan:    Patient with vertigo nystagmus on exam   Will put on prednisone to help since she has pharyngitis suspect eustachian tube dysfunction  When she fly for vacation she needs to take allergy medicine  Advised increased saxenda to 1 8+ does up to 3 mg  Increase metformin to 1000 mg daily  For her mood taper off Wellbutrin in 2 weeks and start Lexapro 5 mg daily at night to help her sleep and mood  Increase amlodipine to 10 mg daily continue metoprolol her blood pressure will see her back in 1 month sooner if needed  I have spent 40 minutes with Patient  today in which greater than 50% of this time was spent in counseling/coordination of care regarding Prognosis, Risks and benefits of tx options and Intructions for management  Problem List Items Addressed This Visit        Cardiovascular and Mediastinum    Essential hypertension    Relevant Medications    amLODIPine (NORVASC) 10 mg tablet    metoprolol succinate (TOPROL-XL) 50 mg 24 hr tablet       Other    Morbid obesity due to excess calories (HCC) - Primary    Relevant Medications    metFORMIN (GLUCOPHAGE-XR) 500 mg 24 hr tablet    JANE (generalized anxiety disorder)    Relevant Medications    buPROPion (Wellbutrin XL) 300 mg 24 hr tablet    escitalopram (LEXAPRO) 5 mg tablet    Acute stress disorder    Relevant Medications    buPROPion (Wellbutrin XL) 300 mg 24 hr tablet    escitalopram (LEXAPRO) 5 mg tablet    Depression, recurrent (HCC)    Relevant Medications    buPROPion (Wellbutrin XL) 300 mg 24 hr tablet    escitalopram (LEXAPRO) 5 mg tablet    Vertigo    Relevant Medications    predniSONE 20 mg tablet            Subjective:      Patient ID: Rosalva Quintana is a 64 y o  female  59-year-old female follow-up medical weight loss and anxiety and depression  She is on Wellbutrin 300 mg daily did not notice any improvement in her mood she is to have trouble with sleeping at night    She follow-up with endocrinologist for subclinical hyperthyroidism  For medical weight loss she stop the phentermine because it may impair with thyroid ultrasound and testing  She unable to increase Saxenda to more than 1 8 mg without causing nausea  She is on topiramate and metformin  Her weight is going up  For blood pressures she is on metoprolol and amlodipine  She has pharyngitis last week she was put on amoxicillin yesterday she had a vertigo when she gets up the room is spinning no fever no chills  Today nothing with vertigo  No numbness tingling no weakness in her arms or legs no facial droop  She planned to go vacation [facility] next week  She is up-to-date with COVID vaccine and booster  She is taking medication for contraception to help with menstrual bleeding  It is improving and she no longer had heavy menstruation        The following portions of the patient's history were reviewed and updated as appropriate:   Past Medical History:  She has a past medical history of 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), Asthma due to seasonal allergies (9/25/2020), Depression, Essential hypertension (4/20/2020), JANE (generalized anxiety disorder) (2/82/8447), Helicobacter pylori gastrointestinal tract infection (4/20/2020), History of Holter monitoring (12/22/2017), History of stress test (01/05/2018), Hypertension, Immunization deficiency (4/20/2020), Immunization deficiency (9/25/2020), Iron deficiency (4/20/2020), Kidney stone (4/20/2020), Need for pneumococcal vaccination (6/4/2021), Need for shingles vaccine (6/4/2021), Obesity (BMI 30-39 9) (4/20/2020), Onychomycosis of toenail (6/19/2020), Prediabetes (4/20/2020), 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), Reactive airway disease with acute exacerbation (5/20/2020), Subclinical hyperthyroidism (4/20/2020), Subclinical hyperthyroidism (9/25/2020), Thyroid nodule (9/25/2020), and Vitamin D deficiency (4/20/2020)  ,  _______________________________________________________________________  Medical Problems:  does not have any pertinent problems on file ,  _______________________________________________________________________  Past Surgical History:   has a past surgical history that includes Tubal ligation; Colonoscopy (09/08/2017); EGD (09/01/2017); Knee surgery; and Knee arthroscopy w/ ACL reconstruction  ,  _______________________________________________________________________  Family History:  family history includes Cancer in her maternal uncle; Colon cancer in her maternal aunt; Diabetes in her father; Kidney disease in her maternal uncle ,  _______________________________________________________________________  Social History:   reports that she quit smoking about 8 years ago  Her smoking use included cigarettes  She has a 6 00 pack-year smoking history  She has never used smokeless tobacco  She reports previous alcohol use of about 2 0 standard drinks of alcohol per week  She reports that she does not use drugs  ,  _______________________________________________________________________  Allergies:  has No Known Allergies     _______________________________________________________________________  Current Outpatient Medications   Medication Sig Dispense Refill    amLODIPine (NORVASC) 10 mg tablet Take 1 tablet (10 mg total) by mouth daily 90 tablet 1    buPROPion (Wellbutrin XL) 300 mg 24 hr tablet Take 1 tablet (300 mg total) by mouth every morning 1 tab every other day then stop after 2 weeks 30 tablet 1    escitalopram (LEXAPRO) 5 mg tablet Take 1 tablet (5 mg total) by mouth daily At bedtime for mood/sleep 30 tablet 1    metFORMIN (GLUCOPHAGE-XR) 500 mg 24 hr tablet Take 2 tablets (1,000 mg total) by mouth daily with dinner 60 tablet 3    metoprolol succinate (TOPROL-XL) 50 mg 24 hr tablet Take 1 5 tablets (75 mg total) by mouth daily 135 tablet 1    predniSONE 20 mg tablet Take 1 tablet (20 mg total) by mouth daily for 7 days For vertigo 7 tablet 0    albuterol (Proventil HFA) 90 mcg/act inhaler Inhale 2 puffs every 4 (four) hours as needed for wheezing or shortness of breath (Patient not taking: Reported on 4/29/2022 ) 18 g 1    amoxicillin (AMOXIL) 875 mg tablet Take 1 tablet (875 mg total) by mouth 2 (two) times a day for 5 days 10 tablet 0    clindamycin (CLINDAGEL) 1 % gel Apply topically 2 (two) times a day Apply to furuncle on abdomen 30 g 0    ipratropium (ATROVENT) 0 06 % nasal spray 2 sprays into each nostril 4 (four) times a day 15 mL 1    liraglutide (SAXENDA) injection Inject 0 5 mL (3 mg total) under the skin daily 15 mL 3    norethindrone (Shellie) 0 35 MG tablet Take 1 tablet (0 35 mg total) by mouth daily 90 tablet 1    ondansetron (Zofran ODT) 4 mg disintegrating tablet Take 1 tablet (4 mg total) by mouth every 6 (six) hours as needed for nausea or vomiting 20 tablet 1    topiramate (TOPAMAX) 50 MG tablet Take 1 tablet (50 mg total) by mouth daily at bedtime 30 tablet 1     No current facility-administered medications for this visit      _______________________________________________________________________  Review of Systems   Constitutional: Negative for activity change, appetite change, fatigue and unexpected weight change  HENT: Negative for ear pain, sore throat, trouble swallowing and voice change  Eyes: Negative for photophobia and visual disturbance  Respiratory: Negative for cough, chest tightness, shortness of breath and wheezing  Cardiovascular: Negative for chest pain, palpitations and leg swelling  Gastrointestinal: Negative for abdominal pain, constipation, diarrhea, nausea, rectal pain and vomiting  Endocrine: Negative for cold intolerance, polydipsia and polyuria  Genitourinary: Negative for difficulty urinating, dysuria, flank pain, menstrual problem and pelvic pain  Musculoskeletal: Negative for arthralgias, joint swelling and myalgias     Skin: Negative for color change and rash  Allergic/Immunologic: Negative for environmental allergies and immunocompromised state  Neurological: Positive for dizziness  Negative for weakness, numbness and headaches  Hematological: Negative for adenopathy  Does not bruise/bleed easily  Psychiatric/Behavioral: Negative for decreased concentration, dysphoric mood, self-injury, sleep disturbance and suicidal ideas  The patient is not nervous/anxious  Objective:  Vitals:    06/10/22 1610   BP: 144/86   Pulse: 73   Resp: 18   Temp: 98 8 °F (37 1 °C)   SpO2: 98%   Weight: 88 5 kg (195 lb)   Height: 5' 2" (1 575 m)     Body mass index is 35 67 kg/m²  Physical Exam  Vitals and nursing note reviewed  Constitutional:       Appearance: Normal appearance  She is well-developed  She is obese  HENT:      Head: Normocephalic and atraumatic  Right Ear: Tympanic membrane, ear canal and external ear normal       Left Ear: Tympanic membrane, ear canal and external ear normal       Nose: Nose normal       Mouth/Throat:      Mouth: Mucous membranes are dry  Pharynx: Oropharynx is clear  No oropharyngeal exudate  Eyes:      Extraocular Movements: Extraocular movements intact  Pupils: Pupils are equal, round, and reactive to light  Comments: Nystagmus noted BL   Neck:      Thyroid: No thyromegaly  Cardiovascular:      Rate and Rhythm: Normal rate and regular rhythm  Heart sounds: Normal heart sounds  No murmur heard  Pulmonary:      Effort: Pulmonary effort is normal  No respiratory distress  Breath sounds: Normal breath sounds  No wheezing or rales  Abdominal:      General: Bowel sounds are normal  There is no distension  Palpations: Abdomen is soft  Tenderness: There is no abdominal tenderness  There is no guarding  Genitourinary:     Vagina: Normal    Musculoskeletal:         General: No tenderness  Normal range of motion        Cervical back: Normal range of motion and neck supple  Skin:     General: Skin is warm and dry  Capillary Refill: Capillary refill takes less than 2 seconds  Findings: No rash  Neurological:      General: No focal deficit present  Mental Status: She is alert and oriented to person, place, and time  Mental status is at baseline  Psychiatric:         Mood and Affect: Mood normal          Behavior: Behavior normal          Thought Content:  Thought content normal          Judgment: Judgment normal

## 2022-06-23 ENCOUNTER — TELEMEDICINE (OUTPATIENT)
Dept: FAMILY MEDICINE CLINIC | Facility: CLINIC | Age: 57
End: 2022-06-23
Payer: COMMERCIAL

## 2022-06-23 VITALS — WEIGHT: 195 LBS | HEIGHT: 62 IN | BODY MASS INDEX: 35.88 KG/M2

## 2022-06-23 DIAGNOSIS — J45.909 ASTHMA DUE TO SEASONAL ALLERGIES: ICD-10-CM

## 2022-06-23 DIAGNOSIS — U07.1 COVID-19: ICD-10-CM

## 2022-06-23 DIAGNOSIS — E05.90 SUBCLINICAL HYPERTHYROIDISM: ICD-10-CM

## 2022-06-23 DIAGNOSIS — E66.01 MORBID OBESITY DUE TO EXCESS CALORIES (HCC): ICD-10-CM

## 2022-06-23 DIAGNOSIS — R73.03 PREDIABETES: ICD-10-CM

## 2022-06-23 DIAGNOSIS — R79.89 HIGH SERUM PARATHYROID HORMONE (PTH): ICD-10-CM

## 2022-06-23 DIAGNOSIS — I10 ESSENTIAL HYPERTENSION: ICD-10-CM

## 2022-06-23 DIAGNOSIS — U07.1 COVID: Primary | ICD-10-CM

## 2022-06-23 DIAGNOSIS — J45.41 MODERATE PERSISTENT REACTIVE AIRWAY DISEASE WITH ACUTE EXACERBATION: ICD-10-CM

## 2022-06-23 DIAGNOSIS — D50.9 IRON DEFICIENCY ANEMIA, UNSPECIFIED IRON DEFICIENCY ANEMIA TYPE: ICD-10-CM

## 2022-06-23 PROCEDURE — 1036F TOBACCO NON-USER: CPT | Performed by: FAMILY MEDICINE

## 2022-06-23 PROCEDURE — 3008F BODY MASS INDEX DOCD: CPT | Performed by: FAMILY MEDICINE

## 2022-06-23 PROCEDURE — 99214 OFFICE O/P EST MOD 30 MIN: CPT | Performed by: FAMILY MEDICINE

## 2022-06-23 RX ORDER — AZITHROMYCIN 250 MG/1
500 TABLET, FILM COATED ORAL EVERY 24 HOURS
Qty: 6 TABLET | Refills: 0 | Status: SHIPPED | OUTPATIENT
Start: 2022-06-23 | End: 2022-06-26

## 2022-06-23 RX ORDER — ACETAMINOPHEN 325 MG/1
650 TABLET ORAL ONCE AS NEEDED
Status: CANCELLED | OUTPATIENT
Start: 2022-06-25

## 2022-06-23 RX ORDER — SODIUM CHLORIDE 9 MG/ML
20 INJECTION, SOLUTION INTRAVENOUS ONCE
Status: CANCELLED | OUTPATIENT
Start: 2022-06-25

## 2022-06-23 RX ORDER — ONDANSETRON 2 MG/ML
4 INJECTION INTRAMUSCULAR; INTRAVENOUS ONCE AS NEEDED
Status: CANCELLED | OUTPATIENT
Start: 2022-06-25

## 2022-06-23 RX ORDER — FLUTICASONE PROPIONATE AND SALMETEROL XINAFOATE 115; 21 UG/1; UG/1
2 AEROSOL, METERED RESPIRATORY (INHALATION) 2 TIMES DAILY
Qty: 12 G | Refills: 1 | Status: SHIPPED | OUTPATIENT
Start: 2022-06-23 | End: 2022-06-23 | Stop reason: SDUPTHER

## 2022-06-23 RX ORDER — ALBUTEROL SULFATE 90 UG/1
3 AEROSOL, METERED RESPIRATORY (INHALATION) ONCE AS NEEDED
Status: CANCELLED | OUTPATIENT
Start: 2022-06-25

## 2022-06-23 RX ORDER — BEBTELOVIMAB 87.5 MG/ML
175 INJECTION, SOLUTION INTRAVENOUS ONCE
Status: CANCELLED | OUTPATIENT
Start: 2022-06-25

## 2022-06-23 RX ORDER — FLUTICASONE PROPIONATE AND SALMETEROL XINAFOATE 115; 21 UG/1; UG/1
2 AEROSOL, METERED RESPIRATORY (INHALATION) 2 TIMES DAILY
Qty: 12 G | Refills: 1 | Status: SHIPPED | OUTPATIENT
Start: 2022-06-23 | End: 2022-08-05

## 2022-06-23 NOTE — PROGRESS NOTES
COVID-19 Outpatient Progress Note    Assessment/Plan:      covid + rapid home test today, [place], start symptoms on 6/20/2022 chills/headaches/sweating/cough/fatigue/myalgia  Came home this morning=home test +covid  Had covid vaccine and 1st booster  Has multiple comorbidies  Offer paxlovid vs MAB  Pt has gastritis/malabsorption=wants MAB for best absorption=aware cost  Schedule for mab at [facility]=6/25 at 8 am  Start asa 325 mg daily and azith/ advair bid X 30 days, protein/hydration  Out of work until 6/29    Problem List Items Addressed This Visit        Endocrine    Subclinical hyperthyroidism       Respiratory    Reactive airway disease with acute exacerbation    Relevant Medications    azithromycin (ZITHROMAX) 250 mg tablet    fluticasone-salmeterol (Advair HFA) 115-21 MCG/ACT inhaler    Asthma due to seasonal allergies    Relevant Medications    fluticasone-salmeterol (Advair HFA) 115-21 MCG/ACT inhaler       Cardiovascular and Mediastinum    Essential hypertension       Other    Morbid obesity due to excess calories (HCC)    Prediabetes    High serum parathyroid hormone (PTH)    Iron deficiency anemia    COVID - Primary    Relevant Medications    azithromycin (ZITHROMAX) 250 mg tablet    fluticasone-salmeterol (Advair HFA) 115-21 MCG/ACT inhaler    COVID-19         Disposition:       Patient meets criteria for Bebtelovimab infusion  They were counseled in regards to risks, benefits, and side effects of this infusion  Levell Julee is an investigational medicine used to treat mild-to-moderate symptoms of COVID-19 in adults and children (15years of age and older weighing at least 80 pounds (40 kg)) with positive results of direct SARS-CoV-2 viral testing, and who are at high risk of progression to severe COVID-19, including hospitalization or death, and for whom other COVID-19 treatment options approved or authorized by FDA are not available or clinically appropriate   Levell Julee is investigational because it is still being studied  There is limited information about the safety and effectiveness of using bebtelovimab to treat people with mild-to-moderate COVID-19  The FDA has authorized the emergency use of bebtelovimab for the treatment of COVID-19 under an Emergency Use Authorization (EUA)  Smitha Pals is not authorized for use in people who:  - are likely to be infected with a SARS-CoV-2 variant that is not able to be treated by bebtelovimab based on the circulating variants in your area (ask your health care provider about FDA and CDCs latest information on circulating variants by geographic area), or  - are hospitalized due to COVID-19, or  - require oxygen therapy and/or respiratory support due to COVID-19, or  - require an increase in baseline oxygen flow rate and/or respiratory support due to [address] and are on chronic oxygen therapy and/or respiratory support due to underlying nonCOVID-19 related comorbidity  How will I receive Bebtelovimab? Smitha Pals will be given as an injection through a vein (intravenously or IV) over at least 30 seconds  You will be observed by your healthcare provider for at least 1 hour after you receive bebtelovimab  Possible side effects of Bebtelovimab: Allergic reactions can happen during and after infusion with bebtelovimab  Possible reactions include: fever, chills, nausea, headache, shortness of breath, low or high blood pressure, rapid or slow heart rate, chest discomfort or pain, weakness, confusion, feeling tired, wheezing, swelling of your lips, face, or throat, rash including hives, itching, muscle aches, dizziness, and sweating  These reactions may be severe or life threatening  Worsening symptoms after treatment: You may experience new or worsening symptoms after infusion, including fever, difficulty breathing, rapid or slow heart rate, tiredness, weakness or confusion   If these occur, contact your healthcare provider or seek immediate medical attention as some of these events have required hospitalization  It is unknown if these events are related to treatment or are due to the progression of COVID19  The side effects of getting any medicine by vein may include brief pain, bleeding, bruising of the skin, soreness, swelling, and possible infection at the infusion site  These are not all the possible side effects of bebtelovimab  Not a lot of people have been given bebtelovimab  Serious and unexpected side effects may happen  Bebtelovimab are still being studied so it is possible that all of the risks are not known at this time  It is possible that bebtelovimab could interfere with your body's own ability to fight off a future infection of SARS-CoV-2  Similarly, bebtelovimab may reduce your bodys immune response to a vaccine for SARS-CoV-2  Specific studies have not been conducted to address these possible risks  Talk to your healthcare provider if you have any questions  Emergency Use Authorization:    [facility] FDA has made bebtelovimab available under an emergency access mechanism called an EUA  The EUA is supported by a [place] of Health and Human Service (HHS) declaration that circumstances exist to justify the emergency use of drugs and biological products during the COVID-19 pandemic  Bebtelovimab have not undergone the same type of review as an FDA-approved or cleared product  The FDA may issue an EUA when certain criteria are met, which includes that there are no adequate, approved, and available alternatives  In addition, the FDA decision is based on the totality of scientific evidence available showing that it is reasonable to believe that the product meets certain criteria for safety, performance, and labeling and may be effective in treatment of patients during the COVID-19 pandemic   All of these criteria must be met to allow for the product to be used in the treatment of patients during the COVID-19 pandemic  The EUA for bebtelovimab together is in effect for the duration of the COVID-19 declaration justifying emergency use of these products, unless terminated or revoked (after which the product may no longer be used)  What if I am pregnant or breastfeeding? There is no experience treating pregnant women or breastfeeding mothers with bebtelovimab  For a mother and unborn baby, the benefit of receiving bebtelovimab may be greater than the risk from the treatment  If you are pregnant or breastfeeding, discuss your options and specific situation with your healthcare provider  How do I report side effects with Bebtelovimab? Contact your healthcare provider if you have any side effects that bother you or do not go away  Report side effects to [employer] at www [employer] gov/[email or web address], or call 6-309-VWS-3266 or to [address] [place] [address]  as shown below  Email: [email or web address] [email or web address]   Fax number: [phone]   Telephone number: [phone] ([phone])    Full fact sheet document for patients can be found at: [employer]    The patient consents to proceed with bebtelovimab infusion  I have spent 30 minutes directly with the patient  Greater than 50% of this time was spent in counseling/coordination of care regarding: diagnostic results, prognosis, risks and benefits of treatment options, instructions for management, patient and family education, importance of treatment compliance, risk factor reductions and impressions  Encounter provider Rodney Alvarez MD    Provider located at [address] [place] [id number] [phone]    Recent Visits  No visits were found meeting these conditions    Showing recent visits within past 7 days and meeting all other requirements  Today's Visits  Date Type Provider Dept   06/23/22 Telemedicine Rodney Alvarez MD  Primary Care [address] [facility] today's visits and meeting all other requirements  Future Appointments  No visits were found meeting these conditions  Showing future appointments within next 150 days and meeting all other requirements     This virtual check-in was done via [email or web address] and patient was informed that this is a secure, HIPAA-compliant platform  She agrees to proceed  Patient agrees to participate in a virtual check in via telephone or video visit instead of presenting to the office to address urgent/immediate medical needs  Patient is aware this is a billable service  After connecting through [facility], the patient was identified by name and date of birth  Anayeli Aviles was informed that this was a telemedicine visit and that the exam was being conducted confidentially over secure lines  My office door was closed  Anayeli Aviles acknowledged consent and understanding of privacy and security of the telemedicine visit  I informed the patient that I have reviewed her record in Epic and presented the opportunity for her to ask any questions regarding the visit today  The patient agreed to participate  Verification of patient location:  Patient is located in the following state in which I hold an active license: [place]    Subjective:   Anayeli Aviles is a 64 y o  female who is concerned about COVID-19   Patient's symptoms include fever, chills, fatigue, malaise, nasal congestion, rhinorrhea, sore throat, cough, shortness of breath, myalgias and headache      - Date of symptom onset: 6/20/2022  - Date of exposure: 6/19/2022      COVID-19 vaccination status: Fully vaccinated with booster    Exposure:   Contact with a person who is under investigation (PUI) for or who is positive for COVID-19 within the last 14 days?: Yes    Hospitalized recently for fever and/or lower respiratory symptoms?: No      Currently a healthcare worker that is involved in direct patient care?: No      Works in a special setting where the risk of COVID-19 transmission may be high? (this may include long-term care, correctional and intermediate facilities; homeless shelters; assisted-living facilities and group homes ): No      Resident in a special setting where the risk of COVID-19 transmission may be high? 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    Asthma due to seasonal allergies 9/25/2020    Depression     Essential hypertension 4/20/2020    JANE (generalized anxiety disorder) 0/65/2963    Helicobacter pylori gastrointestinal tract infection 4/20/2020    10/02/2017=egd 9/2017 Dr Adamaris Reyes History of Holter monitoring 12/22/2017    The baseline rhythm was of sinus origin with an average heart rate of 90 bpm (range: 72 - 129 bpm)  There were rare single APCs adn VPCs (less than 0 1 % total beats)  There were no sustained cardiac dysrhythmais  There were no significant symptomatic pauses   History of stress test 01/05/2018    Mildly abnormal exercise Myoview SPECT study with evidence of a fixed anterior defect  This is most likely related to breast attenuation artifact  There was no evidence of reversible myocardial ischemia  Gated wall motion analysis was normal with well preserved systolic function  The left ventricle was normal in size with calculated EF of 73%  No prior similar study is available for comparison   Hypertension     Immunization deficiency 4/20/2020    Hep a/b/mumps nonimmune    Immunization deficiency 9/25/2020    Hep a/b/mmr nonimmune 6/19/2020, 9/25/2020, 2/2021=hep a/b    Iron deficiency 4/20/2020    Kidney stone 4/20/2020    Need for pneumococcal vaccination 6/4/2021    Need for shingles vaccine 6/4/2021    Obesity (BMI 30-39  9) 4/20/2020    39 4, start wt 219 lb=goal 150 lb    Onychomycosis of toenail 6/19/2020    Prediabetes 4/20/2020    5 8    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    5    Reactive airway disease with acute exacerbation 5/20/2020    Subclinical hyperthyroidism 4/20/2020    Subclinical hyperthyroidism 9/25/2020    Thyroid nodule 9/25/2020    Vitamin D deficiency 4/20/2020    33     Past Surgical History:   Procedure Laterality Date    COLONOSCOPY  09/08/2017    3 polyps=next 2020 w Dr Mayra Shukla EGD  09/01/2017    egd biopsy     KNEE ARTHROSCOPY W/ ACL RECONSTRUCTION      KNEE SURGERY      torn mcl, acl, meniscus left knee 2016    TUBAL LIGATION       Current Outpatient Medications   Medication Sig Dispense Refill    azithromycin (ZITHROMAX) 250 mg tablet Take 2 tablets (500 mg total) by mouth every 24 hours for 3 days 6 tablet 0    fluticasone-salmeterol (Advair HFA) 115-21 MCG/ACT inhaler Inhale 2 puffs 2 (two) times a day Rinse mouth after use   12 g 1    albuterol (Proventil HFA) 90 mcg/act inhaler Inhale 2 puffs every 4 (four) hours as needed for wheezing or shortness of breath (Patient not taking: Reported on 4/29/2022 ) 18 g 1    amLODIPine (NORVASC) 10 mg tablet Take 1 tablet (10 mg total) by mouth daily 90 tablet 1    buPROPion (Wellbutrin XL) 300 mg 24 hr tablet Take 1 tablet (300 mg total) by mouth every morning 1 tab every other day then stop after 2 weeks 30 tablet 1    clindamycin (CLINDAGEL) 1 % gel Apply topically 2 (two) times a day Apply to furuncle on abdomen 30 g 0    escitalopram (LEXAPRO) 5 mg tablet Take 1 tablet (5 mg total) by mouth daily At bedtime for mood/sleep 30 tablet 1    ipratropium (ATROVENT) 0 06 % nasal spray 2 sprays into each nostril 4 (four) times a day 15 mL 1    liraglutide (SAXENDA) injection Inject 0 5 mL (3 mg total) under the skin daily 15 mL 3    metFORMIN (GLUCOPHAGE-XR) 500 mg 24 hr tablet Take 2 tablets (1,000 mg total) by mouth daily with dinner 60 tablet 3  metoprolol succinate (TOPROL-XL) 50 mg 24 hr tablet Take 1 5 tablets (75 mg total) by mouth daily 135 tablet 1    norethindrone (Shellie) 0 35 MG tablet Take 1 tablet (0 35 mg total) by mouth daily 90 tablet 1    ondansetron (Zofran ODT) 4 mg disintegrating tablet Take 1 tablet (4 mg total) by mouth every 6 (six) hours as needed for nausea or vomiting 20 tablet 1    topiramate (TOPAMAX) 50 MG tablet Take 1 tablet (50 mg total) by mouth daily at bedtime 30 tablet 1     No current facility-administered medications for this visit  No Known Allergies    Review of Systems   Constitutional: Positive for chills, fatigue and fever  HENT: Positive for congestion, rhinorrhea and sore throat  Respiratory: Positive for cough and shortness of breath  Musculoskeletal: Positive for myalgias  Neurological: Positive for headaches  Objective:    Vitals:    06/23/22 1131   Weight: 88 5 kg (195 lb)   Height: 5' 2" (1 575 m)       Physical Exam  Vitals and nursing note reviewed  Constitutional:       Appearance: Normal appearance  She is obese  She is ill-appearing  HENT:      Head: Normocephalic and atraumatic  Pulmonary:      Effort: Pulmonary effort is normal    Neurological:      General: No focal deficit present  Mental Status: She is alert and oriented to person, place, and time  Mental status is at baseline  Psychiatric:         Mood and Affect: Mood normal          Behavior: Behavior normal          Thought Content: Thought content normal          Judgment: Judgment normal          VIRTUAL VISIT DISCLAIMER    Goran Moore verbally agrees to participate in [employer]  Pt is aware that [employer] could be limited without vital signs or the ability to perform a full hands-on physical Holly Rivas understands she or the provider may request at any time to terminate the video visit and request the patient to seek care or treatment in person

## 2022-06-23 NOTE — LETTER
June 23, 2022    Patient: Maurine Habermann  YOB: 1965  Date of Last Encounter: 6/10/2022      To whom it may concern:     Maurine Habermann has tested positive for COVID-19 (Coronavirus)   She may return to work on 6/29/2022,   Sincerely,         Dominga Sebastian MD

## 2022-06-24 DIAGNOSIS — U07.1 COVID: Primary | ICD-10-CM

## 2022-06-24 RX ORDER — PREDNISONE 20 MG/1
40 TABLET ORAL DAILY
Qty: 14 TABLET | Refills: 0 | Status: SHIPPED | OUTPATIENT
Start: 2022-06-24 | End: 2022-07-01

## 2022-06-25 ENCOUNTER — HOSPITAL ENCOUNTER (OUTPATIENT)
Dept: INFUSION CENTER | Facility: HOSPITAL | Age: 57
Discharge: HOME | End: 2022-06-25
Attending: FAMILY MEDICINE
Payer: COMMERCIAL

## 2022-06-25 VITALS
SYSTOLIC BLOOD PRESSURE: 115 MMHG | HEART RATE: 68 BPM | RESPIRATION RATE: 18 BRPM | DIASTOLIC BLOOD PRESSURE: 71 MMHG | OXYGEN SATURATION: 98 % | TEMPERATURE: 98 F

## 2022-06-25 DIAGNOSIS — I10 ESSENTIAL HYPERTENSION: Primary | ICD-10-CM

## 2022-06-25 DIAGNOSIS — U07.1 COVID: ICD-10-CM

## 2022-06-25 DIAGNOSIS — J45.909 ASTHMA DUE TO SEASONAL ALLERGIES: ICD-10-CM

## 2022-06-25 DIAGNOSIS — D50.9 IRON DEFICIENCY ANEMIA, UNSPECIFIED IRON DEFICIENCY ANEMIA TYPE: ICD-10-CM

## 2022-06-25 DIAGNOSIS — U07.1 COVID-19: ICD-10-CM

## 2022-06-25 DIAGNOSIS — E66.01 MORBID OBESITY DUE TO EXCESS CALORIES (HCC): ICD-10-CM

## 2022-06-25 DIAGNOSIS — J45.41 MODERATE PERSISTENT REACTIVE AIRWAY DISEASE WITH ACUTE EXACERBATION: ICD-10-CM

## 2022-06-25 DIAGNOSIS — E05.90 SUBCLINICAL HYPERTHYROIDISM: ICD-10-CM

## 2022-06-25 DIAGNOSIS — R79.89 HIGH SERUM PARATHYROID HORMONE (PTH): ICD-10-CM

## 2022-06-25 PROCEDURE — M0222 HB BEBTELOVIMAB INJECTION: HCPCS | Performed by: FAMILY MEDICINE

## 2022-06-25 RX ORDER — BEBTELOVIMAB 87.5 MG/ML
175 INJECTION, SOLUTION INTRAVENOUS ONCE
Status: COMPLETED | OUTPATIENT
Start: 2022-06-25 | End: 2022-06-25

## 2022-06-25 RX ORDER — SODIUM CHLORIDE 9 MG/ML
20 INJECTION, SOLUTION INTRAVENOUS ONCE
Status: CANCELLED | OUTPATIENT
Start: 2022-06-25

## 2022-06-25 RX ORDER — ONDANSETRON 2 MG/ML
4 INJECTION INTRAMUSCULAR; INTRAVENOUS ONCE AS NEEDED
Status: DISCONTINUED | OUTPATIENT
Start: 2022-06-25 | End: 2022-06-28 | Stop reason: HOSPADM

## 2022-06-25 RX ORDER — BEBTELOVIMAB 87.5 MG/ML
175 INJECTION, SOLUTION INTRAVENOUS ONCE
Status: CANCELLED | OUTPATIENT
Start: 2022-06-25

## 2022-06-25 RX ORDER — ACETAMINOPHEN 325 MG/1
650 TABLET ORAL ONCE AS NEEDED
Status: DISCONTINUED | OUTPATIENT
Start: 2022-06-25 | End: 2022-06-28 | Stop reason: HOSPADM

## 2022-06-25 RX ORDER — SODIUM CHLORIDE 9 MG/ML
20 INJECTION, SOLUTION INTRAVENOUS ONCE
Status: DISCONTINUED | OUTPATIENT
Start: 2022-06-25 | End: 2022-06-28 | Stop reason: HOSPADM

## 2022-06-25 RX ORDER — ONDANSETRON 2 MG/ML
4 INJECTION INTRAMUSCULAR; INTRAVENOUS ONCE AS NEEDED
Status: CANCELLED | OUTPATIENT
Start: 2022-06-25

## 2022-06-25 RX ORDER — ACETAMINOPHEN 325 MG/1
650 TABLET ORAL ONCE AS NEEDED
Status: CANCELLED | OUTPATIENT
Start: 2022-06-25

## 2022-06-25 RX ORDER — ALBUTEROL SULFATE 90 UG/1
3 AEROSOL, METERED RESPIRATORY (INHALATION) ONCE AS NEEDED
Status: DISCONTINUED | OUTPATIENT
Start: 2022-06-25 | End: 2022-06-28 | Stop reason: HOSPADM

## 2022-06-25 RX ORDER — ALBUTEROL SULFATE 90 UG/1
3 AEROSOL, METERED RESPIRATORY (INHALATION) ONCE AS NEEDED
Status: CANCELLED | OUTPATIENT
Start: 2022-06-25

## 2022-06-25 RX ADMIN — BEBTELOVIMAB 175 MG: 87.5 INJECTION, SOLUTION INTRAVENOUS at 08:17

## 2022-07-11 ENCOUNTER — HOSPITAL ENCOUNTER (OUTPATIENT)
Dept: RADIOLOGY | Facility: HOSPITAL | Age: 57
Discharge: HOME | End: 2022-07-11
Attending: INTERNAL MEDICINE
Payer: COMMERCIAL

## 2022-07-11 DIAGNOSIS — E04.1 THYROID NODULE: ICD-10-CM

## 2022-07-11 PROCEDURE — 76536 US EXAM OF HEAD AND NECK: CPT

## 2022-07-16 ENCOUNTER — APPOINTMENT (OUTPATIENT)
Dept: LAB | Age: 57
End: 2022-07-16
Payer: COMMERCIAL

## 2022-07-16 DIAGNOSIS — E55.9 VITAMIN D DEFICIENCY: ICD-10-CM

## 2022-07-16 DIAGNOSIS — E21.3 HYPERPARATHYROIDISM (HCC): ICD-10-CM

## 2022-07-16 DIAGNOSIS — E04.1 THYROID NODULE: ICD-10-CM

## 2022-07-16 LAB
25(OH)D3 SERPL-MCNC: 39.9 NG/ML (ref 30–100)
ALBUMIN SERPL BCP-MCNC: 3.2 G/DL (ref 3.5–5)
ALP SERPL-CCNC: 101 U/L (ref 46–116)
ALT SERPL W P-5'-P-CCNC: 28 U/L (ref 12–78)
ANION GAP SERPL CALCULATED.3IONS-SCNC: 5 MMOL/L (ref 4–13)
AST SERPL W P-5'-P-CCNC: 24 U/L (ref 5–45)
BILIRUB SERPL-MCNC: 0.34 MG/DL (ref 0.2–1)
BILIRUB UR QL STRIP: NEGATIVE
BUN SERPL-MCNC: 19 MG/DL (ref 5–25)
CALCIUM ALBUM COR SERPL-MCNC: 9.8 MG/DL (ref 8.3–10.1)
CALCIUM SERPL-MCNC: 9.2 MG/DL (ref 8.3–10.1)
CHLORIDE SERPL-SCNC: 109 MMOL/L (ref 100–108)
CLARITY UR: CLEAR
CO2 SERPL-SCNC: 25 MMOL/L (ref 21–32)
COLOR UR: NORMAL
CREAT SERPL-MCNC: 0.97 MG/DL (ref 0.6–1.3)
CREAT UR-MCNC: 115 MG/DL
GFR SERPL CREATININE-BSD FRML MDRD: 65 ML/MIN/1.73SQ M
GLUCOSE P FAST SERPL-MCNC: 92 MG/DL (ref 65–99)
GLUCOSE UR STRIP-MCNC: NEGATIVE MG/DL
HGB UR QL STRIP.AUTO: NEGATIVE
KETONES UR STRIP-MCNC: NEGATIVE MG/DL
LEUKOCYTE ESTERASE UR QL STRIP: NEGATIVE
MICROALBUMIN UR-MCNC: 5.8 MG/L (ref 0–20)
MICROALBUMIN/CREAT 24H UR: 5 MG/G CREATININE (ref 0–30)
NITRITE UR QL STRIP: NEGATIVE
PH UR STRIP.AUTO: 6 [PH]
PHOSPHATE SERPL-MCNC: 3.7 MG/DL (ref 2.7–4.5)
POTASSIUM SERPL-SCNC: 4.2 MMOL/L (ref 3.5–5.3)
PROT SERPL-MCNC: 7.8 G/DL (ref 6.4–8.2)
PROT UR STRIP-MCNC: NEGATIVE MG/DL
PTH-INTACT SERPL-MCNC: 71.3 PG/ML (ref 18.4–80.1)
SODIUM SERPL-SCNC: 139 MMOL/L (ref 136–145)
SP GR UR STRIP.AUTO: 1.01 (ref 1–1.03)
T4 FREE SERPL-MCNC: 1.15 NG/DL (ref 0.76–1.46)
TSH SERPL DL<=0.05 MIU/L-ACNC: <0.007 UIU/ML (ref 0.45–4.5)
UROBILINOGEN UR STRIP-ACNC: <2 MG/DL

## 2022-07-16 PROCEDURE — 84100 ASSAY OF PHOSPHORUS: CPT

## 2022-07-16 PROCEDURE — 36415 COLL VENOUS BLD VENIPUNCTURE: CPT

## 2022-07-16 PROCEDURE — 84439 ASSAY OF FREE THYROXINE: CPT

## 2022-07-16 PROCEDURE — 84443 ASSAY THYROID STIM HORMONE: CPT

## 2022-07-16 PROCEDURE — 83970 ASSAY OF PARATHORMONE: CPT

## 2022-07-16 PROCEDURE — 84432 ASSAY OF THYROGLOBULIN: CPT

## 2022-07-16 PROCEDURE — 86800 THYROGLOBULIN ANTIBODY: CPT

## 2022-07-16 PROCEDURE — 86376 MICROSOMAL ANTIBODY EACH: CPT

## 2022-07-16 PROCEDURE — 80053 COMPREHEN METABOLIC PANEL: CPT

## 2022-07-16 PROCEDURE — 84445 ASSAY OF TSI GLOBULIN: CPT

## 2022-07-16 PROCEDURE — 82043 UR ALBUMIN QUANTITATIVE: CPT

## 2022-07-16 PROCEDURE — 82306 VITAMIN D 25 HYDROXY: CPT

## 2022-07-16 PROCEDURE — 81003 URINALYSIS AUTO W/O SCOPE: CPT | Performed by: FAMILY MEDICINE

## 2022-07-16 PROCEDURE — 82570 ASSAY OF URINE CREATININE: CPT

## 2022-07-17 LAB
THYROPEROXIDASE AB SERPL-ACNC: 9 IU/ML (ref 0–34)
TSI SER-ACNC: <0.1 IU/L (ref 0–0.55)

## 2022-07-19 LAB
THYROGLOB AB SERPL-ACNC: <1 IU/ML (ref 0–0.9)
THYROGLOB AB SERPL-ACNC: <1 IU/ML (ref 0–0.9)
THYROGLOB SERPL-MCNC: 19.3 NG/ML (ref 1.5–38.5)

## 2022-07-25 ENCOUNTER — RA CDI HCC (OUTPATIENT)
Dept: OTHER | Facility: HOSPITAL | Age: 57
End: 2022-07-25

## 2022-08-01 ENCOUNTER — OFFICE VISIT (OUTPATIENT)
Dept: ENDOCRINOLOGY | Facility: CLINIC | Age: 57
End: 2022-08-01
Payer: COMMERCIAL

## 2022-08-01 VITALS
HEIGHT: 62 IN | DIASTOLIC BLOOD PRESSURE: 70 MMHG | WEIGHT: 206 LBS | HEART RATE: 76 BPM | BODY MASS INDEX: 37.91 KG/M2 | SYSTOLIC BLOOD PRESSURE: 122 MMHG

## 2022-08-01 DIAGNOSIS — E55.9 VITAMIN D DEFICIENCY: ICD-10-CM

## 2022-08-01 DIAGNOSIS — E05.90 SUBCLINICAL HYPERTHYROIDISM: ICD-10-CM

## 2022-08-01 DIAGNOSIS — E66.01 MORBID OBESITY DUE TO EXCESS CALORIES (HCC): ICD-10-CM

## 2022-08-01 DIAGNOSIS — R53.83 FATIGUE, UNSPECIFIED TYPE: ICD-10-CM

## 2022-08-01 DIAGNOSIS — E04.1 THYROID NODULE: Primary | ICD-10-CM

## 2022-08-01 PROCEDURE — 99214 OFFICE O/P EST MOD 30 MIN: CPT | Performed by: INTERNAL MEDICINE

## 2022-08-01 NOTE — PROGRESS NOTES
Follow-up Patient Progress Note      CC: hypothyroidism     History of Present Illness:   64 yr female with HTN, HLD, perimenopausal symptoms, Obesity on weight loss medications, 1 episode of nephrolithiasis s/p laparoscopic extraction 2020, bilateral thyroid nodules associated with subclinical hyperthyroidism and elevated PTH  Last visit was 4/1/22  Since last visit, she gained 10 lbs  Today she is mostly concerned about persistent fatigue  No History of external radiation, recent Iodine loading or radiological diagnostic studies  No difficulty with swallowing or breathing or change in voice      Thyroid nodule Hx: ENAMORADO uptake - 2018 and 2/3/20 nml uptake at 6 hrs and 24 hrs but potential hot nodules noted  US thyroid 2013 showed multiple sub-centimeter thyroid nodules bilaterally but only 1 nodule 1cm rt side  Patient Active Problem List   Diagnosis    Morbid obesity due to excess calories ([address] )    Asthma due to seasonal allergies    Thyroid nodule    Subclinical hyperthyroidism    Obesity (BMI 30-39  9)    Vitamin D deficiency    Essential hypertension    Kidney stone    Ascorbic acid deficiency    Iron deficiency    Helicobacter pylori gastrointestinal tract infection    JANE (generalized anxiety disorder)    Prediabetes    Pyridoxine deficiency    Reactive airway disease with acute exacerbation    Onychomycosis of toenail    Acute stress disorder    Depression, recurrent (HCC)    Primary osteoarthritis of right knee    High serum parathyroid hormone (PTH)    Menorrhagia with regular cycle    Iron deficiency anemia    Vertigo    COVID    COVID-19     Past Medical History:   Diagnosis Date    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    Asthma due to seasonal allergies 9/25/2020    Depression     Essential hypertension 4/20/2020    JANE (generalized anxiety disorder) 2/10/7278    Helicobacter pylori gastrointestinal tract infection 4/20/2020    10/02/2017=egd 9/2017 Dr Sheldon Gallo History of Holter monitoring 12/22/2017    The baseline rhythm was of sinus origin with an average heart rate of 90 bpm (range: 72 - 129 bpm)  There were rare single APCs adn VPCs (less than 0 1 % total beats)  There were no sustained cardiac dysrhythmais  There were no significant symptomatic pauses   History of stress test 01/05/2018    Mildly abnormal exercise Myoview SPECT study with evidence of a fixed anterior defect  This is most likely related to breast attenuation artifact  There was no evidence of reversible myocardial ischemia  Gated wall motion analysis was normal with well preserved systolic function  The left ventricle was normal in size with calculated EF of 73%  No prior similar study is available for comparison       Hypertension     Immunization deficiency 4/20/2020    Hep a/b/mumps nonimmune    Immunization deficiency 9/25/2020    Hep a/b/mmr nonimmune 6/19/2020, 9/25/2020, 2/2021=hep a/b    Iron deficiency 4/20/2020    Kidney stone 4/20/2020    Need for pneumococcal vaccination 6/4/2021    Need for shingles vaccine 6/4/2021    Obesity (BMI 30-39 9) 4/20/2020    39 4, start wt 219 lb=goal 150 lb    Onychomycosis of toenail 6/19/2020    Prediabetes 4/20/2020    5 8    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    5    Reactive airway disease with acute exacerbation 5/20/2020    Subclinical hyperthyroidism 4/20/2020    Subclinical hyperthyroidism 9/25/2020    Thyroid nodule 9/25/2020    Vitamin D deficiency 4/20/2020    33      Past Surgical History:   Procedure Laterality Date    COLONOSCOPY  09/08/2017    3 polyps=next 2020 w Dr Machelle Mendosa EGD  09/01/2017    egd biopsy     KNEE ARTHROSCOPY W/ ACL RECONSTRUCTION      KNEE SURGERY      torn mcl, acl, meniscus left knee 2016    TUBAL LIGATION        Family History   Problem Relation Age of Onset    Diabetes Father     Colon cancer Maternal Aunt     Cancer Maternal Uncle     Kidney disease Maternal Uncle      Social History     Tobacco Use    Smoking status: Former Smoker     Packs/day: 0 25     Years: 24 00     Pack years: 6 00     Types: Cigarettes     Quit date:      Years since quittin 5    Smokeless tobacco: Never Used    Tobacco comment: Has smoked since age: 16- As per [place]    Substance Use Topics    Alcohol use: Not Currently     Alcohol/week: 2 0 standard drinks     Types: 1 Glasses of wine, 1 Shots of liquor per week     Comment: Occasional      No Known Allergies      Current Outpatient Medications:     amLODIPine (NORVASC) 10 mg tablet, Take 1 tablet (10 mg total) by mouth daily, Disp: 90 tablet, Rfl: 1    buPROPion (Wellbutrin XL) 300 mg 24 hr tablet, Take 1 tablet (300 mg total) by mouth every morning 1 tab every other day then stop after 2 weeks, Disp: 30 tablet, Rfl: 1    escitalopram (LEXAPRO) 5 mg tablet, Take 1 tablet (5 mg total) by mouth daily At bedtime for mood/sleep, Disp: 30 tablet, Rfl: 1    ipratropium (ATROVENT) 0 06 % nasal spray, 2 sprays into each nostril 4 (four) times a day, Disp: 15 mL, Rfl: 1    liraglutide (SAXENDA) injection, Inject 0 5 mL (3 mg total) under the skin daily, Disp: 15 mL, Rfl: 3    metFORMIN (GLUCOPHAGE-XR) 500 mg 24 hr tablet, Take 2 tablets (1,000 mg total) by mouth daily with dinner, Disp: 60 tablet, Rfl: 3    metoprolol succinate (TOPROL-XL) 50 mg 24 hr tablet, Take 1 5 tablets (75 mg total) by mouth daily, Disp: 135 tablet, Rfl: 1    norethindrone (Shellie) 0 35 MG tablet, Take 1 tablet (0 35 mg total) by mouth daily, Disp: 90 tablet, Rfl: 1    ondansetron (Zofran ODT) 4 mg disintegrating tablet, Take 1 tablet (4 mg total) by mouth every 6 (six) hours as needed for nausea or vomiting, Disp: 20 tablet, Rfl: 1    topiramate (TOPAMAX) 50 MG tablet, Take 1 tablet (50 mg total) by mouth daily at bedtime, Disp: 30 tablet, Rfl: 1    albuterol (Proventil HFA) 90 mcg/act inhaler, Inhale 2 puffs every 4 (four) hours as needed for wheezing or shortness of breath (Patient not taking: No sig reported), Disp: 18 g, Rfl: 1    clindamycin (CLINDAGEL) 1 % gel, Apply topically 2 (two) times a day Apply to furuncle on abdomen, Disp: 30 g, Rfl: 0    fluticasone-salmeterol (Advair HFA) 115-21 MCG/ACT inhaler, Inhale 2 puffs 2 (two) times a day Rinse mouth after use , Disp: 12 g, Rfl: 1    Review of Systems   Constitutional: Positive for fatigue  HENT: Negative  Eyes: Negative  Respiratory: Negative  Cardiovascular: Negative  Gastrointestinal: Negative  Endocrine: Negative  Musculoskeletal: Negative  Skin: Negative  Allergic/Immunologic: Negative  Neurological: Negative  Hematological: Negative  Psychiatric/Behavioral: Negative  Physical Exam:  Body mass index is 37 68 kg/m²  /70 (BP Location: Left arm, Patient Position: Sitting, Cuff Size: Large)   Pulse 76   Ht 5' 2" (1 575 m)   Wt 93 4 kg (206 lb)   LMP  (LMP Unknown)   BMI 37 68 kg/m²    Vitals:    08/01/22 0820   Weight: 93 4 kg (206 lb)        Physical Exam  Constitutional:       Appearance: She is well-developed  HENT:      Head: Normocephalic  Eyes:      Pupils: Pupils are equal, round, and reactive to light  Neck:      Thyroid: No thyromegaly  Cardiovascular:      Rate and Rhythm: Normal rate  Heart sounds: Normal heart sounds  Pulmonary:      Effort: Pulmonary effort is normal       Breath sounds: Normal breath sounds  Abdominal:      General: Bowel sounds are normal       Palpations: Abdomen is soft  Musculoskeletal:         General: No deformity  Cervical back: Normal range of motion  Skin:     Capillary Refill: Capillary refill takes less than 2 seconds  Coloration: Skin is not pale  Findings: No rash  Neurological:      Mental Status: She is alert and oriented to person, place, and time           Labs:   Lab Results   Component Value Date    HGBA1C 5 5 02/22/2022       Lab Results   Component Value Date    ODX9AZXGAUWG <0 007 (L) 07/16/2022 Lab Results   Component Value Date    CREATININE 0 97 07/16/2022    CREATININE 0 77 02/22/2022    CREATININE 0 92 12/24/2020    BUN 19 07/16/2022    K 4 2 07/16/2022     (H) 07/16/2022    CO2 25 07/16/2022     eGFR   Date Value Ref Range Status   07/16/2022 65 ml/min/1 73sq m Final       Lab Results   Component Value Date    ALT 28 07/16/2022    AST 24 07/16/2022    ALKPHOS 101 07/16/2022       Lab Results   Component Value Date    CHOLESTEROL 157 02/22/2022    CHOLESTEROL 151 03/24/2020    CHOLESTEROL 141 02/11/2020     Lab Results   Component Value Date    HDL 53 02/22/2022    HDL 49 (L) 03/24/2020    HDL 50 02/11/2020     Lab Results   Component Value Date    TRIG 105 02/22/2022    TRIG 107 03/24/2020    TRIG 140 02/11/2020     Lab Results   Component Value Date    [place] 91 02/11/2020         Impression:  1  Thyroid nodule    2  Subclinical hyperthyroidism    3  Vitamin D deficiency    4  Morbid obesity due to excess calories ([facility] [address] )         Plan:    Diagnoses and all orders for this visit:    Thyroid nodule  Stable subcentimeter nodules bilateral thyroid lobes  No nodule meets criteria for biopsy  Monitor with US surveillance and repeat labs  -     T3; Future  -     TSH, 3rd generation; Future  -     T4, free; Future    Subclinical hyperthyroidism  She has labs suggesting hyperthyroidism with a suppressed TSH but normal fT4 and T3  TSI was negative  Thyroid antibodies negative  Prior uptake scans suggestive of toxic adenomas but not clear  Last uptake was 2020  Discussed options of repeat testing vs ablation with ENAMORADO using I131 for suspected toxic nodules  Patient does not have clear symptoms of hyperthyroidism and reports more fatigue and weight gain  Will review next visit after repeat labs  Vitamin D deficiency  Improved with replacement and associated with improved PTH to normal range as well      Morbid obesity due to excess calories ([address] )  -     SALIVARY CORTISOL,THREE SPECIMENS; Future  -     Ambulatory referral to Sleep Medicine; Future    Fatigue, unspecified type  -     Iron Panel (Includes Ferritin, Iron Sat%, Iron, and TIBC); Future  -     CBC and differential; Future  -     Comprehensive metabolic panel; Future  -     C-reactive protein- Clinic Collect  -     Sedimentation rate, automated; Future        I have spent 35  minutes with patient today in which greater than 50% of this time was spent in counseling/coordination of care  Discussed with the patient and all questioned fully answered  She will call me if any problems arise  Educated/ Counseled patient on diagnostic test results, prognosis, risk vs benefit of treatment options, importance of treatment compliance, healthy life and lifestyle choices        [address]

## 2022-08-05 ENCOUNTER — OFFICE VISIT (OUTPATIENT)
Dept: FAMILY MEDICINE CLINIC | Facility: CLINIC | Age: 57
End: 2022-08-05
Payer: COMMERCIAL

## 2022-08-05 VITALS
WEIGHT: 204.6 LBS | OXYGEN SATURATION: 96 % | HEIGHT: 62 IN | HEART RATE: 80 BPM | RESPIRATION RATE: 16 BRPM | TEMPERATURE: 96.9 F | SYSTOLIC BLOOD PRESSURE: 140 MMHG | BODY MASS INDEX: 37.65 KG/M2 | DIASTOLIC BLOOD PRESSURE: 80 MMHG

## 2022-08-05 DIAGNOSIS — E66.9 OBESITY (BMI 30-39.9): Primary | ICD-10-CM

## 2022-08-05 DIAGNOSIS — F41.1 GAD (GENERALIZED ANXIETY DISORDER): ICD-10-CM

## 2022-08-05 DIAGNOSIS — F43.0 ACUTE STRESS DISORDER: ICD-10-CM

## 2022-08-05 DIAGNOSIS — I10 ESSENTIAL HYPERTENSION: ICD-10-CM

## 2022-08-05 DIAGNOSIS — F33.9 DEPRESSION, RECURRENT (HCC): ICD-10-CM

## 2022-08-05 DIAGNOSIS — U07.1 COVID-19: ICD-10-CM

## 2022-08-05 DIAGNOSIS — E66.01 MORBID OBESITY DUE TO EXCESS CALORIES (HCC): ICD-10-CM

## 2022-08-05 DIAGNOSIS — E05.90 SUBCLINICAL HYPERTHYROIDISM: ICD-10-CM

## 2022-08-05 PROCEDURE — 99214 OFFICE O/P EST MOD 30 MIN: CPT | Performed by: FAMILY MEDICINE

## 2022-08-05 RX ORDER — PHENTERMINE HYDROCHLORIDE 37.5 MG/1
TABLET ORAL
Qty: 30 TABLET | Refills: 0 | Status: SHIPPED | OUTPATIENT
Start: 2022-08-05 | End: 2022-09-23 | Stop reason: SDUPTHER

## 2022-08-05 RX ORDER — ESCITALOPRAM OXALATE 10 MG/1
10 TABLET ORAL DAILY
Qty: 30 TABLET | Refills: 1 | Status: SHIPPED | OUTPATIENT
Start: 2022-08-05 | End: 2022-09-23 | Stop reason: SDUPTHER

## 2022-08-05 NOTE — PROGRESS NOTES
Assessment/Plan:    Advised patient to take more protein to help with fatigue from COVID  Increase Lexapro to 10 mg daily to help with mood  Refer to medical weight loss to help with losing weight  Add on phentermine half a tab in the morning to help with weight loss  Will see her back in 6 weeks follow-up patient needs to see gyn for birth control option to help with heavy menstruation  I have spent 30 minutes with Patient  today in which greater than 50% of this time was spent in counseling/coordination of care regarding Diagnostic results, Prognosis, Risks and benefits of tx options and Intructions for management  Problem List Items Addressed This Visit        Endocrine    Subclinical hyperthyroidism       Cardiovascular and Mediastinum    Essential hypertension       Other    Morbid obesity due to excess calories (HCC)    Obesity (BMI 30-39 9) - Primary    Relevant Medications    phentermine (ADIPEX-P) 37 5 MG tablet    Other Relevant Orders    Ambulatory Referral to Weight Management    JANE (generalized anxiety disorder)    Relevant Medications    escitalopram (LEXAPRO) 10 mg tablet    phentermine (ADIPEX-P) 37 5 MG tablet    Acute stress disorder    Relevant Medications    escitalopram (LEXAPRO) 10 mg tablet    phentermine (ADIPEX-P) 37 5 MG tablet    Depression, recurrent (HCC)    Relevant Medications    escitalopram (LEXAPRO) 10 mg tablet    phentermine (ADIPEX-P) 37 5 MG tablet    COVID-19            Subjective:      Patient ID: Juju Snyder is a 64 y o  female  40-year-old female follow-up medical weight loss mood and COVID infection  Due to her risk factor she was given monoclonal antibody  Since then she has been having lot of joint pain  And fatigue  She also follow-up with endocrinologist hyperthyroidism subclinical   There was no medication recommended follow-up in 6 months  Thyroid ultrasound shows small nodules-stable for the past 12 years    Her mood is better on Lexapro 5 mg daily  Insurance stopped covering for [address] [place] [address] because she has not been able to lose weight  Her blood pressure is stable on amlodipine and metoprolol  She has heavy menstruation follow-up with gyn she is on birth control progesterone base and her bleeding has stopped  Endocrine attribute weight gain from birth control  The following portions of the patient's history were reviewed and updated as appropriate:   Past Medical History:  She has a past medical history of 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), Asthma due to seasonal allergies (9/25/2020), Depression, Essential hypertension (4/20/2020), JANE (generalized anxiety disorder) (9/06/5066), Helicobacter pylori gastrointestinal tract infection (4/20/2020), History of Holter monitoring (12/22/2017), History of stress test (01/05/2018), Hypertension, Immunization deficiency (4/20/2020), Immunization deficiency (9/25/2020), Iron deficiency (4/20/2020), Kidney stone (4/20/2020), Need for pneumococcal vaccination (6/4/2021), Need for shingles vaccine (6/4/2021), Obesity (BMI 30-39 9) (4/20/2020), Onychomycosis of toenail (6/19/2020), Prediabetes (4/20/2020), 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), Reactive airway disease with acute exacerbation (5/20/2020), Subclinical hyperthyroidism (4/20/2020), Subclinical hyperthyroidism (9/25/2020), Thyroid nodule (9/25/2020), and Vitamin D deficiency (4/20/2020)  ,  _______________________________________________________________________  Medical Problems:  does not have any pertinent problems on file ,  _______________________________________________________________________  Past Surgical History:   has a past surgical history that includes Tubal ligation; Colonoscopy (09/08/2017); EGD (09/01/2017); Knee surgery; and Knee arthroscopy w/ ACL reconstruction  ,  _______________________________________________________________________  Family History:  family history includes Cancer in her maternal uncle; Colon cancer in her maternal aunt; Diabetes in her father; Kidney disease in her maternal uncle ,  _______________________________________________________________________  Social History:   reports that she quit smoking about 8 years ago  Her smoking use included cigarettes  She has a 6 00 pack-year smoking history  She has never used smokeless tobacco  She reports previous alcohol use of about 2 0 standard drinks of alcohol per week  She reports that she does not use drugs  ,  _______________________________________________________________________  Allergies:  has No Known Allergies     _______________________________________________________________________  Current Outpatient Medications   Medication Sig Dispense Refill    amLODIPine (NORVASC) 10 mg tablet Take 1 tablet (10 mg total) by mouth daily 90 tablet 1    buPROPion (Wellbutrin XL) 300 mg 24 hr tablet Take 1 tablet (300 mg total) by mouth every morning 1 tab every other day then stop after 2 weeks 30 tablet 1    escitalopram (LEXAPRO) 10 mg tablet Take 1 tablet (10 mg total) by mouth daily At bedtime for mood/sleep 30 tablet 1    ipratropium (ATROVENT) 0 06 % nasal spray 2 sprays into each nostril 4 (four) times a day 15 mL 1    liraglutide (SAXENDA) injection Inject 0 5 mL (3 mg total) under the skin daily 15 mL 3    metFORMIN (GLUCOPHAGE-XR) 500 mg 24 hr tablet Take 2 tablets (1,000 mg total) by mouth daily with dinner 60 tablet 3    metoprolol succinate (TOPROL-XL) 50 mg 24 hr tablet Take 1 5 tablets (75 mg total) by mouth daily 135 tablet 1    norethindrone (Shellie) 0 35 MG tablet Take 1 tablet (0 35 mg total) by mouth daily 90 tablet 1    ondansetron (Zofran ODT) 4 mg disintegrating tablet Take 1 tablet (4 mg total) by mouth every 6 (six) hours as needed for nausea or vomiting 20 tablet 1    phentermine (ADIPEX-P) 37 5 MG tablet 0 5 tab daily in morning 30 tablet 0    topiramate (TOPAMAX) 50 MG tablet Take 1 tablet (50 mg total) by mouth daily at bedtime 30 tablet 1    albuterol (Proventil HFA) 90 mcg/act inhaler Inhale 2 puffs every 4 (four) hours as needed for wheezing or shortness of breath (Patient not taking: No sig reported) 18 g 1     No current facility-administered medications for this visit      _______________________________________________________________________  Review of Systems   Constitutional: Positive for fatigue  Negative for activity change, appetite change and unexpected weight change  HENT: Negative for ear pain, sore throat, trouble swallowing and voice change  Eyes: Negative for photophobia and visual disturbance  Respiratory: Negative for cough, chest tightness, shortness of breath and wheezing  Cardiovascular: Negative for chest pain, palpitations and leg swelling  Gastrointestinal: Negative for abdominal pain, constipation, diarrhea, nausea, rectal pain and vomiting  Endocrine: Negative for cold intolerance, polydipsia and polyuria  Genitourinary: Negative for difficulty urinating, dysuria, flank pain, menstrual problem and pelvic pain  Musculoskeletal: Positive for arthralgias  Negative for joint swelling and myalgias  Skin: Negative for color change and rash  Allergic/Immunologic: Negative for environmental allergies and immunocompromised state  Neurological: Negative for dizziness, weakness, numbness and headaches  Hematological: Negative for adenopathy  Does not bruise/bleed easily  Psychiatric/Behavioral: Negative for decreased concentration, dysphoric mood, self-injury, sleep disturbance and suicidal ideas  The patient is not nervous/anxious  Objective:  Vitals:    08/05/22 1457   BP: 140/80   BP Location: Left arm   Patient Position: Sitting   Cuff Size: Large   Pulse: 80   Resp: 16   Temp: (!) 96 9 °F (36 1 °C)   TempSrc: Temporal   SpO2: 96%   Weight: 92 8 kg (204 lb 9 6 oz)   Height: 5' 2" (1 575 m)     Body mass index is 37 42 kg/m²  Physical Exam  Vitals and nursing note reviewed  Constitutional:       Appearance: Normal appearance  She is obese  HENT:      Head: Normocephalic and atraumatic  Pulmonary:      Effort: Pulmonary effort is normal    Neurological:      Mental Status: She is alert and oriented to person, place, and time  Psychiatric:         Mood and Affect: Mood normal          Behavior: Behavior normal          Thought Content:  Thought content normal          Judgment: Judgment normal

## 2022-08-15 NOTE — PROGRESS NOTES
[facility]  coding opportunities       Chart reviewed, no opportunity found: CHART REVIEWED, NO OPPORTUNITY FOUND        Patients Insurance        Commercial Insurance: [address] [place] [id number] Render Risk Assessment In Note?: no Recommendation Preamble: The following recommendations were made during the visit: Detail Level: Zone Recommendations (Free Text): [employer]

## 2022-09-01 ENCOUNTER — VBI (OUTPATIENT)
Dept: ADMINISTRATIVE | Facility: OTHER | Age: 57
End: 2022-09-01

## 2022-09-23 ENCOUNTER — TELEMEDICINE (OUTPATIENT)
Dept: FAMILY MEDICINE CLINIC | Facility: CLINIC | Age: 57
End: 2022-09-23
Payer: COMMERCIAL

## 2022-09-23 DIAGNOSIS — E66.9 OBESITY (BMI 30-39.9): ICD-10-CM

## 2022-09-23 DIAGNOSIS — E66.01 MORBID OBESITY DUE TO EXCESS CALORIES (HCC): ICD-10-CM

## 2022-09-23 DIAGNOSIS — I10 ESSENTIAL HYPERTENSION: Primary | ICD-10-CM

## 2022-09-23 DIAGNOSIS — E05.90 SUBCLINICAL HYPERTHYROIDISM: ICD-10-CM

## 2022-09-23 DIAGNOSIS — F43.0 ACUTE STRESS DISORDER: ICD-10-CM

## 2022-09-23 DIAGNOSIS — F33.9 DEPRESSION, RECURRENT (HCC): ICD-10-CM

## 2022-09-23 DIAGNOSIS — F41.1 GAD (GENERALIZED ANXIETY DISORDER): ICD-10-CM

## 2022-09-23 PROCEDURE — 99214 OFFICE O/P EST MOD 30 MIN: CPT | Performed by: FAMILY MEDICINE

## 2022-09-23 RX ORDER — ESCITALOPRAM OXALATE 20 MG/1
20 TABLET ORAL DAILY
Qty: 30 TABLET | Refills: 1 | Status: SHIPPED | OUTPATIENT
Start: 2022-09-23

## 2022-09-23 RX ORDER — TOPIRAMATE 100 MG/1
100 TABLET, FILM COATED ORAL
Qty: 30 TABLET | Refills: 1 | Status: SHIPPED | OUTPATIENT
Start: 2022-09-23

## 2022-09-23 RX ORDER — PHENTERMINE HYDROCHLORIDE 37.5 MG/1
TABLET ORAL
Qty: 30 TABLET | Refills: 0 | Status: SHIPPED | OUTPATIENT
Start: 2022-09-23

## 2022-09-23 NOTE — PROGRESS NOTES
Virtual Regular Visit    Verification of patient location:    Patient is located in the following state in which I hold an active license [place]      Assessment/Plan:      Patient advised to check her blood pressure if normal then we can increase phentermine dosage to 1 tablet daily  Right now will continue phentermine half a tab in the morning  Increase topiramate 100 mg daily continue metformin 1000 mg daily  Increase Lexapro to 20 mg daily to help with mood  Will see her back in 1 month at that time if her mood still not improved will switch her to Trintellix  Refer back to medical  weight loss  Problem List Items Addressed This Visit        Endocrine    Subclinical hyperthyroidism       Cardiovascular and Mediastinum    Essential hypertension - Primary       Other    Morbid obesity due to excess calories (HCC)    Obesity (BMI 30-39  9)    Relevant Medications    topiramate (TOPAMAX) 100 mg tablet    phentermine (ADIPEX-P) 37 5 MG tablet    JANE (generalized anxiety disorder)    Relevant Medications    escitalopram (LEXAPRO) 20 mg tablet    phentermine (ADIPEX-P) 37 5 MG tablet    Acute stress disorder    Relevant Medications    escitalopram (LEXAPRO) 20 mg tablet    phentermine (ADIPEX-P) 37 5 MG tablet    Depression, recurrent (HCC)    Relevant Medications    escitalopram (LEXAPRO) 20 mg tablet    phentermine (ADIPEX-P) 37 5 MG tablet               Reason for visit is   Chief Complaint   Patient presents with    Virtual Regular Visit        Encounter provider Cristin Moran MD    Provider located at [address] [place] [address] [id number] [phone]      Recent Visits  No visits were found meeting these conditions    Showing recent visits within past 7 days and meeting all other requirements  Today's Visits  Date Type Provider Dept   09/23/22 Telemedicine Crsitin Moran [place] [id number] [place] today's visits and meeting all other requirements  Future Appointments  No visits were found meeting these conditions  Showing future appointments within next 150 days and meeting all other requirements       The patient was identified by name and date of birth  Lesvia Lomax was informed that this is a telemedicine visit and that the visit is being conducted through [address] and patient was informed this is a secure, HIPAA-complaint platform  She agrees to proceed     My office door was closed  No one else was in the room  She acknowledged consent and understanding of privacy and security of the video platform  The patient has agreed to participate and understands they can discontinue the visit at any time  Patient is aware this is a billable service  Subjective  Lesvia Lomax is a 62 y o  female    59-year-old female follow-up medical weight loss  Insurance did not cover for [address] [place] [address] because she did not achieve the 5% weight loss in 3 months  She is on metformin 1000 mg daily topiramate 50 mg daily phentermine half a tablet daily in the morning  She did not notice any change in weight  She is on  Lexapro 10 mg daily for mood did not notice any improvement either  She try Wellbutrin did not help  Her work schedule has been hectic she has not been able to follow-up with gyn and  weight management  Past Medical History:   Diagnosis Date    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    Asthma due to seasonal allergies 9/25/2020    Depression     Essential hypertension 4/20/2020    JANE (generalized anxiety disorder) 4/20/8210    Helicobacter pylori gastrointestinal tract infection 4/20/2020    10/02/2017=egd 9/2017 Dr Riaz Bauman History of Holter monitoring 12/22/2017    The baseline rhythm was of sinus origin with an average heart rate of 90 bpm (range: 72 - 129 bpm)  There were rare single APCs adn VPCs (less than 0 1 % total beats)  There were no sustained cardiac dysrhythmais   There were no significant symptomatic pauses   History of stress test 01/05/2018    Mildly abnormal exercise Myoview SPECT study with evidence of a fixed anterior defect  This is most likely related to breast attenuation artifact  There was no evidence of reversible myocardial ischemia  Gated wall motion analysis was normal with well preserved systolic function  The left ventricle was normal in size with calculated EF of 73%  No prior similar study is available for comparison       Hypertension     Immunization deficiency 4/20/2020    Hep a/b/mumps nonimmune    Immunization deficiency 9/25/2020    Hep a/b/mmr nonimmune 6/19/2020, 9/25/2020, 2/2021=hep a/b    Iron deficiency 4/20/2020    Kidney stone 4/20/2020    Need for pneumococcal vaccination 6/4/2021    Need for shingles vaccine 6/4/2021    Obesity (BMI 30-39 9) 4/20/2020    39 4, start wt 219 lb=goal 150 lb    Onychomycosis of toenail 6/19/2020    Prediabetes 4/20/2020    5 8    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    5    Reactive airway disease with acute exacerbation 5/20/2020    Subclinical hyperthyroidism 4/20/2020    Subclinical hyperthyroidism 9/25/2020    Thyroid nodule 9/25/2020    Vitamin D deficiency 4/20/2020    33       Past Surgical History:   Procedure Laterality Date    COLONOSCOPY  09/08/2017    3 polyps=next 2020 w Dr Chung Racer EGD  09/01/2017    egd biopsy     KNEE ARTHROSCOPY W/ ACL RECONSTRUCTION      KNEE SURGERY      torn mcl, acl, meniscus left knee 2016    TUBAL LIGATION         Current Outpatient Medications   Medication Sig Dispense Refill    escitalopram (LEXAPRO) 20 mg tablet Take 1 tablet (20 mg total) by mouth daily At bedtime for mood/sleep 30 tablet 1    phentermine (ADIPEX-P) 37 5 MG tablet 0 5 tab daily in morning 30 tablet 0    topiramate (TOPAMAX) 100 mg tablet Take 1 tablet (100 mg total) by mouth daily at bedtime 30 tablet 1    albuterol (Proventil HFA) 90 mcg/act inhaler Inhale 2 puffs every 4 (four) hours as needed for wheezing or shortness of breath (Patient not taking: No sig reported) 18 g 1    amLODIPine (NORVASC) 10 mg tablet Take 1 tablet (10 mg total) by mouth daily 90 tablet 1    ipratropium (ATROVENT) 0 06 % nasal spray 2 sprays into each nostril 4 (four) times a day 15 mL 1    liraglutide (SAXENDA) injection Inject 0 5 mL (3 mg total) under the skin daily 15 mL 3    metFORMIN (GLUCOPHAGE-XR) 500 mg 24 hr tablet Take 2 tablets (1,000 mg total) by mouth daily with dinner 60 tablet 3    metoprolol succinate (TOPROL-XL) 50 mg 24 hr tablet Take 1 5 tablets (75 mg total) by mouth daily 135 tablet 1    norethindrone (Shellie) 0 35 MG tablet Take 1 tablet (0 35 mg total) by mouth daily 90 tablet 1    ondansetron (Zofran ODT) 4 mg disintegrating tablet Take 1 tablet (4 mg total) by mouth every 6 (six) hours as needed for nausea or vomiting 20 tablet 1     No current facility-administered medications for this visit  No Known Allergies    Review of Systems   Constitutional: Positive for unexpected weight change  Negative for activity change, appetite change and fatigue  HENT: Negative for ear pain, sore throat, trouble swallowing and voice change  Eyes: Negative for photophobia and visual disturbance  Respiratory: Negative for cough, chest tightness, shortness of breath and wheezing  Cardiovascular: Negative for chest pain, palpitations and leg swelling  Gastrointestinal: Negative for abdominal pain, constipation, diarrhea, nausea, rectal pain and vomiting  Endocrine: Negative for cold intolerance, polydipsia and polyuria  Genitourinary: Negative for difficulty urinating, dysuria, flank pain, menstrual problem and pelvic pain  Musculoskeletal: Negative for arthralgias, joint swelling and myalgias  Skin: Negative for color change and rash  Allergic/Immunologic: Negative for environmental allergies and immunocompromised state  Neurological: Negative for dizziness, weakness, numbness and headaches  Hematological: Negative for adenopathy  Does not bruise/bleed easily  Psychiatric/Behavioral: Positive for sleep disturbance  Negative for decreased concentration, dysphoric mood, self-injury and suicidal ideas  The patient is nervous/anxious  Video Exam    There were no vitals filed for this visit  Physical Exam  Vitals and nursing note reviewed  Constitutional:       Appearance: Normal appearance  HENT:      Head: Normocephalic and atraumatic  Neurological:      Mental Status: She is alert and oriented to person, place, and time  Psychiatric:         Mood and Affect: Mood normal          Behavior: Behavior normal          Thought Content:  Thought content normal          Judgment: Judgment normal           I spent 30 minutes directly with the patient during this visit

## 2022-10-09 DIAGNOSIS — E66.01 MORBID OBESITY DUE TO EXCESS CALORIES (HCC): ICD-10-CM

## 2022-10-10 RX ORDER — METFORMIN HYDROCHLORIDE 500 MG/1
TABLET, EXTENDED RELEASE ORAL
Qty: 60 TABLET | Refills: 3 | Status: SHIPPED | OUTPATIENT
Start: 2022-10-10

## 2022-10-11 ENCOUNTER — VBI (OUTPATIENT)
Dept: ADMINISTRATIVE | Facility: OTHER | Age: 57
End: 2022-10-11

## 2022-10-27 ENCOUNTER — TELEPHONE (OUTPATIENT)
Dept: PSYCHIATRY | Facility: CLINIC | Age: 57
End: 2022-10-27

## 2022-10-27 NOTE — TELEPHONE ENCOUNTER
Called Patient in regards to determine if services are still needed for med mgmt   Unable to lvm to voicemail is full,

## 2022-11-02 NOTE — TELEPHONE ENCOUNTER
Called Patient in regards to determine if services are still needed for med mgmt   Lvm for patient to contact intake department to updated wait list

## 2022-11-15 ENCOUNTER — CONSULT (OUTPATIENT)
Dept: PULMONOLOGY | Facility: CLINIC | Age: 57
End: 2022-11-15

## 2022-11-15 ENCOUNTER — APPOINTMENT (OUTPATIENT)
Dept: LAB | Facility: CLINIC | Age: 57
End: 2022-11-15

## 2022-11-15 VITALS
SYSTOLIC BLOOD PRESSURE: 124 MMHG | HEIGHT: 62 IN | HEART RATE: 72 BPM | DIASTOLIC BLOOD PRESSURE: 70 MMHG | WEIGHT: 204 LBS | TEMPERATURE: 97.4 F | BODY MASS INDEX: 37.54 KG/M2 | OXYGEN SATURATION: 98 %

## 2022-11-15 DIAGNOSIS — J45.30 MILD PERSISTENT ASTHMA WITHOUT COMPLICATION: ICD-10-CM

## 2022-11-15 DIAGNOSIS — I10 ESSENTIAL HYPERTENSION: ICD-10-CM

## 2022-11-15 DIAGNOSIS — E66.01 MORBID OBESITY DUE TO EXCESS CALORIES (HCC): ICD-10-CM

## 2022-11-15 DIAGNOSIS — G47.33 OSA (OBSTRUCTIVE SLEEP APNEA): Primary | ICD-10-CM

## 2022-11-15 DIAGNOSIS — R53.83 FATIGUE, UNSPECIFIED TYPE: ICD-10-CM

## 2022-11-15 DIAGNOSIS — E66.09 CLASS 2 OBESITY DUE TO EXCESS CALORIES WITHOUT SERIOUS COMORBIDITY WITH BODY MASS INDEX (BMI) OF 37.0 TO 37.9 IN ADULT: ICD-10-CM

## 2022-11-15 DIAGNOSIS — E04.1 THYROID NODULE: ICD-10-CM

## 2022-11-15 PROBLEM — E66.812 CLASS 2 OBESITY DUE TO EXCESS CALORIES WITHOUT SERIOUS COMORBIDITY WITH BODY MASS INDEX (BMI) OF 37.0 TO 37.9 IN ADULT: Status: ACTIVE | Noted: 2020-02-10

## 2022-11-15 LAB
ALBUMIN SERPL BCP-MCNC: 2.9 G/DL (ref 3.5–5)
ALP SERPL-CCNC: 106 U/L (ref 46–116)
ALT SERPL W P-5'-P-CCNC: 22 U/L (ref 12–78)
ANION GAP SERPL CALCULATED.3IONS-SCNC: 8 MMOL/L (ref 4–13)
AST SERPL W P-5'-P-CCNC: 22 U/L (ref 5–45)
BASOPHILS # BLD AUTO: 0.02 THOUSANDS/ÂΜL (ref 0–0.1)
BASOPHILS NFR BLD AUTO: 0 % (ref 0–1)
BILIRUB SERPL-MCNC: 0.45 MG/DL (ref 0.2–1)
BUN SERPL-MCNC: 18 MG/DL (ref 5–25)
CALCIUM ALBUM COR SERPL-MCNC: 9.6 MG/DL (ref 8.3–10.1)
CALCIUM SERPL-MCNC: 8.7 MG/DL (ref 8.3–10.1)
CHLORIDE SERPL-SCNC: 110 MMOL/L (ref 96–108)
CO2 SERPL-SCNC: 20 MMOL/L (ref 21–32)
CREAT SERPL-MCNC: 0.92 MG/DL (ref 0.6–1.3)
CRP SERPL QL: <3 MG/L
EOSINOPHIL # BLD AUTO: 0.19 THOUSAND/ÂΜL (ref 0–0.61)
EOSINOPHIL NFR BLD AUTO: 3 % (ref 0–6)
ERYTHROCYTE [DISTWIDTH] IN BLOOD BY AUTOMATED COUNT: 15.9 % (ref 11.6–15.1)
ERYTHROCYTE [SEDIMENTATION RATE] IN BLOOD: 37 MM/HOUR (ref 0–29)
FERRITIN SERPL-MCNC: 5 NG/ML (ref 8–388)
GFR SERPL CREATININE-BSD FRML MDRD: 69 ML/MIN/1.73SQ M
GLUCOSE P FAST SERPL-MCNC: 87 MG/DL (ref 65–99)
HCT VFR BLD AUTO: 36.5 % (ref 34.8–46.1)
HGB BLD-MCNC: 11.6 G/DL (ref 11.5–15.4)
IMM GRANULOCYTES # BLD AUTO: 0.01 THOUSAND/UL (ref 0–0.2)
IMM GRANULOCYTES NFR BLD AUTO: 0 % (ref 0–2)
IRON SATN MFR SERPL: 15 % (ref 15–50)
IRON SERPL-MCNC: 60 UG/DL (ref 50–170)
LYMPHOCYTES # BLD AUTO: 2.06 THOUSANDS/ÂΜL (ref 0.6–4.47)
LYMPHOCYTES NFR BLD AUTO: 32 % (ref 14–44)
MCH RBC QN AUTO: 26.4 PG (ref 26.8–34.3)
MCHC RBC AUTO-ENTMCNC: 31.8 G/DL (ref 31.4–37.4)
MCV RBC AUTO: 83 FL (ref 82–98)
MONOCYTES # BLD AUTO: 0.83 THOUSAND/ÂΜL (ref 0.17–1.22)
MONOCYTES NFR BLD AUTO: 13 % (ref 4–12)
NEUTROPHILS # BLD AUTO: 3.3 THOUSANDS/ÂΜL (ref 1.85–7.62)
NEUTS SEG NFR BLD AUTO: 52 % (ref 43–75)
NRBC BLD AUTO-RTO: 0 /100 WBCS
PLATELET # BLD AUTO: 321 THOUSANDS/UL (ref 149–390)
PMV BLD AUTO: 10.2 FL (ref 8.9–12.7)
POTASSIUM SERPL-SCNC: 4 MMOL/L (ref 3.5–5.3)
PROT SERPL-MCNC: 7.8 G/DL (ref 6.4–8.4)
RBC # BLD AUTO: 4.4 MILLION/UL (ref 3.81–5.12)
SODIUM SERPL-SCNC: 138 MMOL/L (ref 135–147)
T3 SERPL-MCNC: 0.9 NG/ML (ref 0.6–1.8)
T4 FREE SERPL-MCNC: 1.02 NG/DL (ref 0.76–1.46)
TIBC SERPL-MCNC: 406 UG/DL (ref 250–450)
TSH SERPL DL<=0.05 MIU/L-ACNC: 0.02 UIU/ML (ref 0.45–4.5)
WBC # BLD AUTO: 6.41 THOUSAND/UL (ref 4.31–10.16)

## 2022-11-15 NOTE — PROGRESS NOTES
Assessment/Plan:    CAMERON (obstructive sleep apnea)  Dina Terrazas has history of snoring and daytime sleepiness and tiredness  Her sleep is interrupted and she wakes up frequently  She has increased frequency of micturition  She has occasional headache  She wakes up not refreshed  She usually goes to sleep around 8:00 a m  and wakes up around 2:30  She has history of hypertension and hypothyroidism as comorbidities  She has not been sleep tested before  On clinical examination, she was obese and had oropharyngeal crowding  She likely has significant obstructive sleep apnea  I have ordered on overnight home sleep study to confirm this  I had a long discussion with her and her daughter and answered all their questions  I have advised her regarding driving  Mild persistent asthma without complication  She has history of asthma and uses albuterol inhaler on an as-needed basis  Currently her exercise tolerance is unrestricted and she has occasional cough and no significant wheeze  Her chest was clear to auscultation  She has history of allergies  Class 2 obesity due to excess calories without serious comorbidity with body mass index (BMI) of 37 0 to 37 9 in adult  She is obese and understands the need for weight reduction  She understands that weight reduction can significantly improve her sleep apnea and other symptoms  Essential hypertension  She has history of hypertension and currently this is controlled with amlodipine  Diagnoses and all orders for this visit:    CAMERON (obstructive sleep apnea)  -     Home Study; Future    Mild persistent asthma without complication    Class 2 obesity due to excess calories without serious comorbidity with body mass index (BMI) of 37 0 to 37 9 in adult    Essential hypertension    Morbid obesity due to excess calories [facility])  -     Ambulatory referral to Sleep Medicine          Subjective:      Patient ID: Juju Snyder is a 62 y o  female  Clive Taylor was referred for evaluation for sleep breathing disorder  She has history of snoring and daytime sleepiness and tiredness  Her sleep is interrupted and she wakes up frequently  She has increased frequency of micturition  She has occasional headache  She wakes up not refreshed  She usually goes to sleep around 8:00 a m  and wakes up around 2:30  She has history of hypertension and hypothyroidism as comorbidities  She has not been sleep tested before  No chest pain or palpitations  She has history of mild asthma and allergies  She has been using albuterol inhaler on an as-needed basis  Currently her exercise tolerance is unrestricted  She has occasional cough and no significant wheeze  No chest pain or palpitations  No swelling of feet  She is obese and understands the need for weight reduction  The following portions of the patient's history were reviewed and updated as appropriate: allergies, current medications, past family history, past medical history, past social history, past surgical history and problem list     Review of Systems   Constitutional: Positive for fatigue  Negative for fever  HENT: Positive for trouble swallowing  Negative for hearing loss, rhinorrhea, sneezing, sore throat and voice change  Eyes: Negative for visual disturbance  Respiratory: Positive for cough and shortness of breath  Negative for wheezing  Cardiovascular: Positive for palpitations  Negative for chest pain and leg swelling  Gastrointestinal: Negative for abdominal pain, constipation, diarrhea, nausea and vomiting  Heart burn   Genitourinary: Negative for dysuria, frequency and urgency  Musculoskeletal: Positive for arthralgias  Negative for gait problem  Skin: Negative for rash  Allergic/Immunologic: Negative for environmental allergies  Neurological: Positive for headaches  Negative for dizziness, syncope and light-headedness  Psychiatric/Behavioral: Positive for sleep disturbance  Negative for agitation and confusion  The patient is not nervous/anxious  Objective:      /70   Pulse 72   Temp (!) 97 4 °F (36 3 °C)   Ht 5' 2" (1 575 m)   Wt 92 5 kg (204 lb)   LMP  (LMP Unknown)   SpO2 98%   BMI 37 31 kg/m²          Physical Exam  Vitals reviewed  Constitutional:       General: She is not in acute distress  Appearance: She is obese  She is not ill-appearing, toxic-appearing or diaphoretic  HENT:      Head: Normocephalic  Mouth/Throat:      Mouth: Mucous membranes are moist    Eyes:      General: No scleral icterus  Conjunctiva/sclera: Conjunctivae normal    Cardiovascular:      Rate and Rhythm: Normal rate and regular rhythm  Heart sounds: Normal heart sounds  No murmur heard  Pulmonary:      Effort: Pulmonary effort is normal  No respiratory distress  Breath sounds: Normal breath sounds  No stridor  No wheezing, rhonchi or rales  Chest:      Chest wall: No tenderness  Abdominal:      General: Bowel sounds are normal       Palpations: Abdomen is soft  Tenderness: There is no abdominal tenderness  There is no guarding  Musculoskeletal:      Cervical back: No rigidity  Right lower leg: No edema  Left lower leg: No edema  Lymphadenopathy:      Cervical: No cervical adenopathy  Skin:     Coloration: Skin is not jaundiced or pale  Findings: No rash  Neurological:      Mental Status: She is alert and oriented to person, place, and time  Gait: Gait normal    Psychiatric:         Mood and Affect: Mood normal          Behavior: Behavior normal          Thought Content:  Thought content normal          Judgment: Judgment normal

## 2022-11-15 NOTE — ASSESSMENT & PLAN NOTE
She is obese and understands the need for weight reduction  She understands that weight reduction can significantly improve her sleep apnea and other symptoms

## 2022-11-15 NOTE — ASSESSMENT & PLAN NOTE
She has history of asthma and uses albuterol inhaler on an as-needed basis  Currently her exercise tolerance is unrestricted and she has occasional cough and no significant wheeze  Her chest was clear to auscultation  She has history of allergies

## 2022-11-15 NOTE — ASSESSMENT & PLAN NOTE
Irlanda Worley has history of snoring and daytime sleepiness and tiredness  Her sleep is interrupted and she wakes up frequently  She has increased frequency of micturition  She has occasional headache  She wakes up not refreshed  She usually goes to sleep around 8:00 a m  and wakes up around 2:30  She has history of hypertension and hypothyroidism as comorbidities  She has not been sleep tested before  On clinical examination, she was obese and had oropharyngeal crowding  She likely has significant obstructive sleep apnea  I have ordered on overnight home sleep study to confirm this  I had a long discussion with her and her daughter and answered all their questions  I have advised her regarding driving

## 2022-11-16 ENCOUNTER — HOSPITAL ENCOUNTER (OUTPATIENT)
Dept: SLEEP CENTER | Facility: CLINIC | Age: 57
Discharge: HOME | End: 2022-11-16

## 2022-11-16 DIAGNOSIS — G47.33 OSA (OBSTRUCTIVE SLEEP APNEA): ICD-10-CM

## 2022-11-19 DIAGNOSIS — N93.8 DUB (DYSFUNCTIONAL UTERINE BLEEDING): ICD-10-CM

## 2022-11-20 RX ORDER — ACETAMINOPHEN AND CODEINE PHOSPHATE 120; 12 MG/5ML; MG/5ML
SOLUTION ORAL
Qty: 28 TABLET | Refills: 5 | Status: SHIPPED | OUTPATIENT
Start: 2022-11-20

## 2022-11-21 DIAGNOSIS — F41.1 GAD (GENERALIZED ANXIETY DISORDER): ICD-10-CM

## 2022-11-21 DIAGNOSIS — E66.9 OBESITY (BMI 30-39.9): ICD-10-CM

## 2022-11-21 DIAGNOSIS — F33.9 DEPRESSION, RECURRENT (HCC): ICD-10-CM

## 2022-11-21 DIAGNOSIS — F43.0 ACUTE STRESS DISORDER: ICD-10-CM

## 2022-11-22 RX ORDER — ESCITALOPRAM OXALATE 20 MG/1
20 TABLET ORAL DAILY
Qty: 30 TABLET | Refills: 1 | Status: SHIPPED | OUTPATIENT
Start: 2022-11-22

## 2022-11-22 RX ORDER — TOPIRAMATE 100 MG/1
TABLET, FILM COATED ORAL
Qty: 30 TABLET | Refills: 1 | Status: SHIPPED | OUTPATIENT
Start: 2022-11-22

## 2022-11-23 DIAGNOSIS — G47.33 OSA (OBSTRUCTIVE SLEEP APNEA): Primary | ICD-10-CM

## 2022-11-23 LAB

## 2022-11-23 NOTE — PROGRESS NOTES
The sleep study results discussed with patient  Auto CPAP ordered  Advised to use the CPAP regularly and adequately  Advised to follow up in the pulmonary office after 45 days of CPAP use

## 2022-11-30 LAB

## 2022-12-06 ENCOUNTER — TELEPHONE (OUTPATIENT)
Dept: PULMONOLOGY | Facility: CLINIC | Age: 57
End: 2022-12-06

## 2022-12-06 NOTE — TELEPHONE ENCOUNTER
Abdirahman Portillo from [facility] called stating that they got the order but did not receive the home sleep study results   Please fax them to

## 2022-12-08 ENCOUNTER — VBI (OUTPATIENT)
Dept: ADMINISTRATIVE | Facility: OTHER | Age: 57
End: 2022-12-08

## 2022-12-08 DIAGNOSIS — I10 ESSENTIAL HYPERTENSION: ICD-10-CM

## 2022-12-08 RX ORDER — METOPROLOL SUCCINATE 50 MG/1
TABLET, EXTENDED RELEASE ORAL
Qty: 135 TABLET | Refills: 1 | Status: SHIPPED | OUTPATIENT
Start: 2022-12-08

## 2022-12-09 LAB

## 2022-12-15 LAB

## 2022-12-19 LAB

## 2022-12-27 ENCOUNTER — APPOINTMENT (EMERGENCY)
Dept: RADIOLOGY | Facility: HOSPITAL | Age: 57
End: 2022-12-27

## 2022-12-27 ENCOUNTER — HOSPITAL ENCOUNTER (EMERGENCY)
Facility: HOSPITAL | Age: 57
Discharge: HOME | End: 2022-12-27
Attending: EMERGENCY MEDICINE

## 2022-12-27 ENCOUNTER — APPOINTMENT (EMERGENCY)
Dept: VASCULAR ULTRASOUND | Facility: HOSPITAL | Age: 57
End: 2022-12-27

## 2022-12-27 VITALS
TEMPERATURE: 99.1 F | OXYGEN SATURATION: 97 % | DIASTOLIC BLOOD PRESSURE: 79 MMHG | HEART RATE: 96 BPM | RESPIRATION RATE: 18 BRPM | WEIGHT: 204 LBS | SYSTOLIC BLOOD PRESSURE: 162 MMHG | BODY MASS INDEX: 37.31 KG/M2

## 2022-12-27 DIAGNOSIS — J10.1 INFLUENZA A: Primary | ICD-10-CM

## 2022-12-27 LAB
ANION GAP SERPL CALCULATED.3IONS-SCNC: 7 MMOL/L (ref 4–13)
BASOPHILS # BLD AUTO: 0.01 THOUSANDS/ÂΜL (ref 0–0.1)
BASOPHILS NFR BLD AUTO: 0 % (ref 0–1)
BUN SERPL-MCNC: 10 MG/DL (ref 5–25)
CALCIUM SERPL-MCNC: 8.8 MG/DL (ref 8.4–10.2)
CHLORIDE SERPL-SCNC: 105 MMOL/L (ref 96–108)
CO2 SERPL-SCNC: 24 MMOL/L (ref 21–32)
CREAT SERPL-MCNC: 0.74 MG/DL (ref 0.6–1.3)
EOSINOPHIL # BLD AUTO: 0.07 THOUSAND/ÂΜL (ref 0–0.61)
EOSINOPHIL NFR BLD AUTO: 1 % (ref 0–6)
ERYTHROCYTE [DISTWIDTH] IN BLOOD BY AUTOMATED COUNT: 16.7 % (ref 11.6–15.1)
FLUAV RNA RESP QL NAA+PROBE: POSITIVE
FLUBV RNA RESP QL NAA+PROBE: NEGATIVE
GFR SERPL CREATININE-BSD FRML MDRD: 90 ML/MIN/1.73SQ M
GLUCOSE SERPL-MCNC: 102 MG/DL (ref 65–140)
HCT VFR BLD AUTO: 34.6 % (ref 34.8–46.1)
HGB BLD-MCNC: 11.4 G/DL (ref 11.5–15.4)
IMM GRANULOCYTES # BLD AUTO: 0.01 THOUSAND/UL (ref 0–0.2)
IMM GRANULOCYTES NFR BLD AUTO: 0 % (ref 0–2)
LYMPHOCYTES # BLD AUTO: 1.39 THOUSANDS/ÂΜL (ref 0.6–4.47)
LYMPHOCYTES NFR BLD AUTO: 23 % (ref 14–44)
MCH RBC QN AUTO: 26.7 PG (ref 26.8–34.3)
MCHC RBC AUTO-ENTMCNC: 32.9 G/DL (ref 31.4–37.4)
MCV RBC AUTO: 81 FL (ref 82–98)
MONOCYTES # BLD AUTO: 1.22 THOUSAND/ÂΜL (ref 0.17–1.22)
MONOCYTES NFR BLD AUTO: 21 % (ref 4–12)
NEUTROPHILS # BLD AUTO: 3.23 THOUSANDS/ÂΜL (ref 1.85–7.62)
NEUTS SEG NFR BLD AUTO: 55 % (ref 43–75)
NRBC BLD AUTO-RTO: 0 /100 WBCS
PLATELET # BLD AUTO: 237 THOUSANDS/UL (ref 149–390)
PMV BLD AUTO: 9.8 FL (ref 8.9–12.7)
POTASSIUM SERPL-SCNC: 3.6 MMOL/L (ref 3.5–5.3)
RBC # BLD AUTO: 4.27 MILLION/UL (ref 3.81–5.12)
RSV RNA RESP QL NAA+PROBE: NEGATIVE
SARS-COV-2 RNA RESP QL NAA+PROBE: NEGATIVE
SODIUM SERPL-SCNC: 136 MMOL/L (ref 135–147)
WBC # BLD AUTO: 5.93 THOUSAND/UL (ref 4.31–10.16)

## 2022-12-27 NOTE — DISCHARGE INSTRUCTIONS
Use Tylenol every 4 hours or Motrin every 6 hours; you can alternate the 2 medications taking something every 3 hours for pain or fever  Get rest and drink plenty of fluids  You can use over-the-counter antihistamines like Claritin, Zyrtec with Flonase for nasal congestion symptoms  Dayquil, Nyquil may help with symptoms  If no improvement follow-up with your doctor in next few days

## 2022-12-27 NOTE — ED PROVIDER NOTES
History  Chief Complaint   Patient presents with   • Cough     Pt reports cough, body aches, chills and congestion since yesterday  Past Medical History:  Asthma, Depression, HTN, JANE, Obesity, Reactive airway disease with acute exacerbation, Subclinical hyperthyroidism, Thyroid nodule   Past Surgical History: KNEE ARTHROSCOPY W/ ACL RECONSTRUCTION-torn mcl, acl, meniscus left knee , TUBAL LIGATION      Presents to ED c/o 2-day history of congestion, sore throat, dry cough, tactile fever/chills, myalgias  On exam patient's left lower extremity/calf was swollen compared to the right, when I asked patient about it she said approximately 4 days ago she was walking around the mall and began having leg pain/cramping,  but it seemed better after rest; then started to have URI sx, myalgias yesterday  NO cp, no sob, no abd pain, no NVD, no bowel changes            Prior to Admission Medications   Prescriptions Last Dose Informant Patient Reported? Taking?    albuterol (Proventil HFA) 90 mcg/act inhaler   No No   Sig: Inhale 2 puffs every 4 (four) hours as needed for wheezing or shortness of breath   amLODIPine (NORVASC) 10 mg tablet   No No   Sig: Take 1 tablet (10 mg total) by mouth daily   escitalopram (LEXAPRO) 20 mg tablet   No No   Sig: TAKE 1 TABLET (20 MG TOTAL) BY MOUTH DAILY AT BEDTIME FOR MOOD/SLEEP   ipratropium (ATROVENT) 0 06 % nasal spray   No No   Si sprays into each nostril 4 (four) times a day   liraglutide (SAXENDA) injection   No No   Sig: Inject 0 5 mL (3 mg total) under the skin daily   Patient not taking: Reported on 11/15/2022   metFORMIN (GLUCOPHAGE-XR) 500 mg 24 hr tablet   No No   Sig: TAKE 2 TABLETS BY MOUTH DAILY WITH DINNER   metoprolol succinate (TOPROL-XL) 50 mg 24 hr tablet   No No   Sig: TAKE 1 AND 1/2 TABLETS BY MOUTH DAILY   norethindrone (MICRONOR) 0 35 MG tablet   No No   Sig: TAKE 1 TABLET BY MOUTH EVERY DAY   ondansetron (Zofran ODT) 4 mg disintegrating tablet   No No Sig: Take 1 tablet (4 mg total) by mouth every 6 (six) hours as needed for nausea or vomiting   phentermine (ADIPEX-P) 37 5 MG tablet   No No   Si 5 tab daily in morning   topiramate (TOPAMAX) 100 mg tablet   No No   Sig: TAKE 1 TABLET BY MOUTH DAILY AT BEDTIME      Facility-Administered Medications: None       Past Medical History:   Diagnosis Date   • Ascorbic acid deficiency 2020   • Asthma due to seasonal allergies 2020   • Depression    • Essential hypertension 2020   • JANE (generalized anxiety disorder) 0/15/5829   • Helicobacter pylori gastrointestinal tract infection 2020    10/02/2017=egd 2017 Dr Emily Robledo   • History of Holter monitoring 2017    The baseline rhythm was of sinus origin with an average heart rate of 90 bpm (range: 72 - 129 bpm)  There were rare single APCs adn VPCs (less than 0 1 % total beats)  There were no sustained cardiac dysrhythmais  There were no significant symptomatic pauses  • History of stress test 2018    Mildly abnormal exercise Myoview SPECT study with evidence of a fixed anterior defect  This is most likely related to breast attenuation artifact  There was no evidence of reversible myocardial ischemia  Gated wall motion analysis was normal with well preserved systolic function  The left ventricle was normal in size with calculated EF of 73%  No prior similar study is available for comparison      • Hypertension    • Immunization deficiency 2020    Hep a/b/mumps nonimmune   • Immunization deficiency 2020    Hep a/b/mmr nonimmune 2020, 2020, 2021=hep a/b   • Iron deficiency 2020   • Kidney stone 2020   • Need for pneumococcal vaccination 2021   • Need for shingles vaccine 2021   • Obesity (BMI 30-39 9) 2020    39 4, start wt 219 lb=goal 150 lb   • Onychomycosis of toenail 2020   • Prediabetes 2020    5 8   • Pyridoxine deficiency 2020    5   • Reactive airway disease with acute exacerbation 2020   • Subclinical hyperthyroidism 2020   • Subclinical hyperthyroidism 2020   • Thyroid nodule 2020   • Vitamin D deficiency 2020    33       Past Surgical History:   Procedure Laterality Date   • COLONOSCOPY  2017    3 polyps=next  w Dr Christie Saldivar   • EGD  2017    egd biopsy    • KNEE ARTHROSCOPY W/ ACL RECONSTRUCTION     • KNEE SURGERY      torn mcl, acl, meniscus left knee    • TUBAL LIGATION         Family History   Problem Relation Age of Onset   • Diabetes Father    • Colon cancer Maternal Aunt    • Cancer Maternal Uncle    • Kidney disease Maternal Uncle      I have reviewed and agree with the history as documented  E-Cigarette/Vaping   • E-Cigarette Use Never User      E-Cigarette/Vaping Substances   • Nicotine No    • THC No    • CBD No    • Flavoring No      Social History     Tobacco Use   • Smoking status: Former     Packs/day: 0 25     Years: 24 00     Pack years: 6 00     Types: Cigarettes     Quit date:      Years since quittin 9   • Smokeless tobacco: Never   • Tobacco comments:     Has smoked since age: 16- As per [place]    Vaping Use   • Vaping Use: Never used   Substance Use Topics   • Alcohol use: Not Currently     Alcohol/week: 2 0 standard drinks     Types: 1 Glasses of wine, 1 Shots of liquor per week     Comment: Occasional    • Drug use: Never       Review of Systems   Constitutional: Positive for chills  Negative for fever  HENT: Positive for congestion and rhinorrhea  Negative for hearing loss, nosebleeds, postnasal drip, sore throat and trouble swallowing  Eyes: Negative for visual disturbance  Respiratory: Positive for cough  Negative for shortness of breath  Cardiovascular: Negative for chest pain and leg swelling  Gastrointestinal: Negative for abdominal pain and vomiting  Genitourinary: Negative for dysuria and frequency  Musculoskeletal: Positive for myalgias  Negative for arthralgias     Skin: Negative for rash  Neurological: Negative for dizziness, weakness and numbness  Psychiatric/Behavioral: Negative for behavioral problems  All other systems reviewed and are negative  Physical Exam  Physical Exam  Vitals and nursing note reviewed  Constitutional:       General: She is in acute distress (mild)  Appearance: She is well-developed  She is obese  HENT:      Head: Normocephalic and atraumatic  Right Ear: Tympanic membrane, ear canal and external ear normal       Left Ear: Tympanic membrane, ear canal and external ear normal       Nose: Congestion and rhinorrhea (clear) present  Mouth/Throat:      Mouth: Mucous membranes are moist       Pharynx: Oropharynx is clear  No oropharyngeal exudate or posterior oropharyngeal erythema  Eyes:      Conjunctiva/sclera: Conjunctivae normal    Cardiovascular:      Rate and Rhythm: Normal rate and regular rhythm  Pulmonary:      Effort: Pulmonary effort is normal  No respiratory distress  Breath sounds: Normal breath sounds  Abdominal:      General: Bowel sounds are normal       Palpations: Abdomen is soft  Tenderness: There is no abdominal tenderness  Musculoskeletal:         General: Swelling and tenderness present  Normal range of motion  Cervical back: Normal range of motion  Comments: LLE: mild diffuse left calf swelling compared to right, mild pain with palpable, no palpable cord, distal NV intact, no skin changes   Skin:     General: Skin is warm and dry  Capillary Refill: Capillary refill takes less than 2 seconds  Findings: No bruising, erythema or rash  Neurological:      Mental Status: She is alert and oriented to person, place, and time  Motor: No weakness        Gait: Gait normal    Psychiatric:         Behavior: Behavior normal          Vital Signs  ED Triage Vitals [12/27/22 1355]   Temperature Pulse Respirations Blood Pressure SpO2   99 1 °F (37 3 °C) 96 18 162/79 97 %      Temp Source Heart Rate Source Patient Position - Orthostatic VS BP Location FiO2 (%)   Oral Monitor Sitting Left arm --      Pain Score       6           Vitals:    12/27/22 1355   BP: 162/79   Pulse: 96   Patient Position - Orthostatic VS: Sitting         Visual Acuity      ED Medications  Medications - No data to display    Diagnostic Studies  Results Reviewed     Procedure Component Value Units Date/Time    FLU/RSV/COVID - if FLU/RSV clinically relevant [[id number]]  (Abnormal) Collected: 12/27/22 1431    Lab Status: Final result Specimen: Nares from Nose Updated: 12/27/22 1517     SARS-CoV-2 Negative     INFLUENZA A PCR Positive     INFLUENZA B PCR Negative     RSV PCR Negative    Narrative:      FOR PEDIATRIC PATIENTS - copy/paste COVID Guidelines URL to browser: [email or web address]  [email or web address]    SARS-CoV-2 assay is a Nucleic Acid Amplification assay intended for the  qualitative detection of nucleic acid from SARS-CoV-2 in nasopharyngeal  swabs  Results are for the presumptive identification of SARS-CoV-2 RNA  Positive results are indicative of infection with SARS-CoV-2, the virus  causing COVID-19, but do not rule out bacterial infection or co-infection  with other viruses  Laboratories within the [place] and its  territories are required to report all positive results to the appropriate  public health authorities  Negative results do not preclude SARS-CoV-2  infection and should not be used as the sole basis for treatment or other  patient management decisions  Negative results must be combined with  clinical observations, patient history, and epidemiological information  This test has not been FDA cleared or approved  This test has been authorized by FDA under an Emergency Use Authorization  (EUA)   This test is only authorized for the duration of time the  declaration that circumstances exist justifying the authorization of the  emergency use of an in vitro diagnostic tests for detection of SARS-CoV-2  virus and/or diagnosis of COVID-19 infection under section 564(b)(1) of  the Act, 21 U  S C  215CKM-4(R)(6), unless the authorization is terminated  or revoked sooner  The test has been validated but independent review by FDA  and CLIA is pending  Test performed using [employer] GeneXpert: This RT-PCR assay targets N2,  a region unique to SARS-CoV-2  A conserved region in the E-gene was chosen  for pan-Sarbecovirus detection which includes SARS-CoV-2  According to CMS-2020-01-R, this platform meets the definition of high-throughput technology      Basic metabolic panel [[id number]] Collected: 12/27/22 1431    Lab Status: Final result Specimen: Blood from Hand, Right Updated: 12/27/22 1455     Sodium 136 mmol/L      Potassium 3 6 mmol/L      Chloride 105 mmol/L      CO2 24 mmol/L      ANION GAP 7 mmol/L      BUN 10 mg/dL      Creatinine 0 74 mg/dL      Glucose 102 mg/dL      Calcium 8 8 mg/dL      eGFR 90 ml/min/1 73sq m     Narrative:      [place] guidelines for Chronic Kidney Disease (CKD):   •  Stage 1 with normal or high GFR (GFR > 90 mL/min/1 73 square meters)  •  Stage 2 Mild CKD (GFR = 60-89 mL/min/1 73 square meters)  •  Stage 3A Moderate CKD (GFR = 45-59 mL/min/1 73 square meters)  •  Stage 3B Moderate CKD (GFR = 30-44 mL/min/1 73 square meters)  •  Stage 4 Severe CKD (GFR = 15-29 mL/min/1 73 square meters)  •  Stage 5 End Stage CKD (GFR <15 mL/min/1 73 square meters)  Note: GFR calculation is accurate only with a steady state creatinine    CBC and differential [[id number]]  (Abnormal) Collected: 12/27/22 1431    Lab Status: Final result Specimen: Blood from Hand, Right Updated: 12/27/22 1438     WBC 5 93 Thousand/uL      RBC 4 27 Million/uL      Hemoglobin 11 4 g/dL      Hematocrit 34 6 %      MCV 81 fL      MCH 26 7 pg      MCHC 32 9 g/dL      RDW 16 7 %      MPV 9 8 fL      Platelets 824 Thousands/uL      nRBC 0 /100 WBCs Neutrophils Relative 55 %      Immat GRANS % 0 %      Lymphocytes Relative 23 %      Monocytes Relative 21 %      Eosinophils Relative 1 %      Basophils Relative 0 %      Neutrophils Absolute 3 23 Thousands/µL      Immature Grans Absolute 0 01 Thousand/uL      Lymphocytes Absolute 1 39 Thousands/µL      Monocytes Absolute 1 22 Thousand/µL      Eosinophils Absolute 0 07 Thousand/µL      Basophils Absolute 0 01 Thousands/µL                  VAS lower limb venous duplex study, unilateral/limited   Final Result by Andres Baires MD (12/27 1650)      XR chest 2 views   ED Interpretation by Antonette Engle PA-C (12/27 1502)   nad      Final Result by Beata Stephenson MD (12/27 1630)      No acute cardiopulmonary disease  Workstation performed: [id number]                    Procedures  Procedures         ED Course  ED Course as of 12/27/22 1708   Tue Dec 27, 2022   1502 No DVT                                             MDM    Disposition  Final diagnoses:   Influenza A     Time reflects when diagnosis was documented in both MDM as applicable and the Disposition within this note     Time User Action Codes Description Comment    12/27/2022  3:27 PM Jane Hicks Add [J10 1] Influenza A       ED Disposition     ED Disposition   Discharge    Condition   Stable    Date/Time   Tue Dec 27, 2022  3:27 PM    8 [facility] discharge to home/self care                 Follow-up Information     Follow up With Specialties Details Why Contact Info    Cristin Clements MD Family Medicine  As needed [address] [place] [address]   [phone]            Discharge Medication List as of 12/27/2022  3:28 PM      CONTINUE these medications which have NOT CHANGED    Details   albuterol (Proventil HFA) 90 mcg/act inhaler Inhale 2 puffs every 4 (four) hours as needed for wheezing or shortness of breath, Starting Mon 3/7/2022, Normal      amLODIPine (NORVASC) 10 mg tablet Take 1 tablet (10 mg total) by mouth daily, Starting Fri 6/10/2022, Until Thu 9/8/2022, Normal      escitalopram (LEXAPRO) 20 mg tablet TAKE 1 TABLET (20 MG TOTAL) BY MOUTH DAILY AT BEDTIME FOR MOOD/SLEEP, Starting Tue 11/22/2022, Normal      ipratropium (ATROVENT) 0 06 % nasal spray 2 sprays into each nostril 4 (four) times a day, Starting Fri 3/25/2022, Normal      liraglutide (SAXENDA) injection Inject 0 5 mL (3 mg total) under the skin daily, Starting Fri 3/25/2022, Normal      metFORMIN (GLUCOPHAGE-XR) 500 mg 24 hr tablet TAKE 2 TABLETS BY MOUTH DAILY WITH DINNER, Normal      metoprolol succinate (TOPROL-XL) 50 mg 24 hr tablet TAKE 1 AND 1/2 TABLETS BY MOUTH DAILY, Normal      norethindrone (MICRONOR) 0 35 MG tablet TAKE 1 TABLET BY MOUTH EVERY DAY, Normal      ondansetron (Zofran ODT) 4 mg disintegrating tablet Take 1 tablet (4 mg total) by mouth every 6 (six) hours as needed for nausea or vomiting, Starting Fri 4/29/2022, Normal      phentermine (ADIPEX-P) 37 5 MG tablet 0 5 tab daily in morning, Normal      topiramate (TOPAMAX) 100 mg tablet TAKE 1 TABLET BY MOUTH DAILY AT BEDTIME, Normal             No discharge procedures on file      PDMP Review     None          ED Provider  Electronically Signed by           Katelin Villarreal PA-C  12/27/22 2341

## 2022-12-27 NOTE — Clinical Note
Yamilet Kasper was seen and treated in our emergency department on 12/27/2022  Other - See Comments        Diagnosis:     Mervin Munoz    She may return on this date:     Pt FLU A +  Able to return to work when fever free for 24 hours and feeling better  If you have any questions or concerns, please don't hesitate to call        Aryan Arrieta PA-C    ______________________________           _______________          _______________  Hospital Representative                              Date                                Time

## 2022-12-29 ENCOUNTER — TELEPHONE (OUTPATIENT)
Dept: FAMILY MEDICINE CLINIC | Facility: CLINIC | Age: 57
End: 2022-12-29

## 2022-12-29 DIAGNOSIS — R05.1 ACUTE COUGH: ICD-10-CM

## 2022-12-29 NOTE — TELEPHONE ENCOUNTER
Pt called in stating she went to [facility] and was diagnosed with the flu on Tuesday  Pt wants to know if something can be called in for her as her cough is really bad that it is hurting her ribs and she has shortness of breathe

## 2022-12-30 ENCOUNTER — TELEPHONE (OUTPATIENT)
Dept: FAMILY MEDICINE CLINIC | Facility: CLINIC | Age: 57
End: 2022-12-30

## 2022-12-30 ENCOUNTER — OFFICE VISIT (OUTPATIENT)
Dept: CARDIOLOGY CLINIC | Facility: CLINIC | Age: 57
End: 2022-12-30

## 2022-12-30 VITALS
HEART RATE: 80 BPM | DIASTOLIC BLOOD PRESSURE: 78 MMHG | BODY MASS INDEX: 39.9 KG/M2 | HEIGHT: 62 IN | TEMPERATURE: 98.8 F | SYSTOLIC BLOOD PRESSURE: 138 MMHG | OXYGEN SATURATION: 98 % | WEIGHT: 216.8 LBS

## 2022-12-30 DIAGNOSIS — R06.02 SHORTNESS OF BREATH: ICD-10-CM

## 2022-12-30 DIAGNOSIS — R60.0 BILATERAL LOWER EXTREMITY EDEMA: Primary | ICD-10-CM

## 2022-12-30 DIAGNOSIS — I10 ESSENTIAL HYPERTENSION: ICD-10-CM

## 2022-12-30 DIAGNOSIS — E66.09 CLASS 2 OBESITY DUE TO EXCESS CALORIES WITHOUT SERIOUS COMORBIDITY WITH BODY MASS INDEX (BMI) OF 39.0 TO 39.9 IN ADULT: ICD-10-CM

## 2022-12-30 DIAGNOSIS — R05.1 ACUTE COUGH: Primary | ICD-10-CM

## 2022-12-30 RX ORDER — CHLORTHALIDONE 25 MG/1
25 TABLET ORAL DAILY
Qty: 30 TABLET | Refills: 2 | Status: SHIPPED | OUTPATIENT
Start: 2022-12-30

## 2022-12-30 RX ORDER — BENZONATATE 100 MG/1
100 CAPSULE ORAL 3 TIMES DAILY PRN
Qty: 20 CAPSULE | Refills: 0 | Status: SHIPPED | OUTPATIENT
Start: 2022-12-30 | End: 2022-12-30 | Stop reason: SDUPTHER

## 2022-12-30 RX ORDER — BENZONATATE 100 MG/1
100 CAPSULE ORAL 3 TIMES DAILY PRN
Qty: 20 CAPSULE | Refills: 0 | Status: SHIPPED | OUTPATIENT
Start: 2022-12-30

## 2022-12-30 RX ORDER — AMLODIPINE BESYLATE 10 MG/1
5 TABLET ORAL DAILY
Qty: 90 TABLET | Refills: 1 | Status: SHIPPED | OUTPATIENT
Start: 2022-12-30

## 2022-12-30 NOTE — PROGRESS NOTES
[facility] Chris Sevilla [address] [address]  Phone#  [phone]  Fax#  [phone]                                               Cardiology Office Consult  Lico Greene, 62 y o  female  YOB: 1965  MRN: [id number] Encounter: [id number]      PCP - Hanh-Dung Pollie Opitz, MD  Referring Provider - Yamilet Alvarado MD    Chief Complaint   Patient presents with   • Edema   • Hypertension     Edema in both legs left being most painful       Assessment  Bilateral lower extremity edema   Exercise ECG stress test in March 2020 was negative for ischemia and she did 7:30 min on it  Hypertension  Obesity, Body mass index is 39 65 kg/m²  Hyperthyroidism  CAMERON    Plan  Bilateral lower extremity edema, Shortness of breath  Lungs clear, no significant JVP elevation  Appears to be predominantly related to venous insufficiency  She does report recent worsening/progressive shortness of breath with exertion  She has gained 28 pounds over the last year, and this may be largely contributing as well  She does have risk factors for CAD, and will need to rule out same  Check exercise stress echo  Can evaluate IVC, PASP at that time  Gradual increase exercise levels as tolerated, weight loss suggested    Hypertension  Blood pressure today is reasonably controlled at 138/78, but patient reports that at home it has been very uncontrolled recently and has pressures going up to 200+  Currently on metoprolol succinate 75 mg daily, amlodipine 10 mg daily  Counseled regarding low-salt diet  Amlodipine being decreased due to recent increase in lower extremity edema  Add chlorthalidone 25 mg daily --> Check BMP in 1 week  Keep blood pressure log over the next month    Obesity, Body mass index is 39 65 kg/m²     She has gained 28 pounds since January 2022 (188 --> 216 lbs), mainly from dietary indiscretions and inactivity  counseled regarding need for dietary modifications and weight loss  Low-carb, low-fat, high-fiber diet suggested  Gradually increase walking to include at least 30 minutes 5 days a week    Hyperthyroidism, Thyroid nodules, Elevated PTH  TSH severely reduced, but normal free T4 and T3  She follows with Dr Dorian Mckeon for this  Follow up with endocrinologist    Results for orders placed or performed in visit on 12/30/22   POCT ECG    Impression    Normal sinus rhythm  Possible left atrial enlargement  No acute ST-T changes       Orders Placed This Encounter   Procedures   • Basic metabolic panel   • POCT ECG     Return in about 6 weeks (around 2/10/2023), or if symptoms worsen or fail to improve  History of Present Illness   62 y o  female, who works as an  doing [employer] work for [employer], comes in as a new patient for consultation regarding uncontrolled hypertension and increased lower extremity edema  She previously used to follow with cardiologist Dr Yolis Holt, and is now establishing care with us  Today she reports having increased lower extremity edema, predominantly in the left leg  This has been ongoing and worsening over the last several months  There is no associated symptoms of shortness of breath, orthopnea or PND  She does report significant discomfort in the left anterior leg, as well as numbness in the left hand - occurring intermittently  She underwent lower extremity duplex on 12/27/2022, which was negative for DVT  She has no known prior history of coronary artery disease or heart failure  No complaints of chest pain  She does have a longstanding history of intermittent palpitations, and has been on metoprolol succinate for several years  She has been doing well with this and has not had any recent issues with palpitations          Historical Information   Past Medical History:   Diagnosis Date   • 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   • Asthma due to seasonal allergies 9/25/2020   • Depression    • Essential hypertension 4/20/2020   • JANE (generalized anxiety disorder) 4/24/0174   • Helicobacter pylori gastrointestinal tract infection 4/20/2020    10/02/2017=egd 9/2017 Dr Bryson Flood   • History of Holter monitoring 12/22/2017    The baseline rhythm was of sinus origin with an average heart rate of 90 bpm (range: 72 - 129 bpm)  There were rare single APCs adn VPCs (less than 0 1 % total beats)  There were no sustained cardiac dysrhythmais  There were no significant symptomatic pauses  • History of stress test 01/05/2018    Mildly abnormal exercise Myoview SPECT study with evidence of a fixed anterior defect  This is most likely related to breast attenuation artifact  There was no evidence of reversible myocardial ischemia  Gated wall motion analysis was normal with well preserved systolic function  The left ventricle was normal in size with calculated EF of 73%  No prior similar study is available for comparison      • Hypertension    • Immunization deficiency 4/20/2020    Hep a/b/mumps nonimmune   • Immunization deficiency 9/25/2020    Hep a/b/mmr nonimmune 6/19/2020, 9/25/2020, 2/2021=hep a/b   • Iron deficiency 4/20/2020   • Kidney stone 4/20/2020   • Need for pneumococcal vaccination 6/4/2021   • Need for shingles vaccine 6/4/2021   • Obesity (BMI 30-39 9) 4/20/2020    39 4, start wt 219 lb=goal 150 lb   • Onychomycosis of toenail 6/19/2020   • Prediabetes 4/20/2020    5 8   • 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    5   • Reactive airway disease with acute exacerbation 5/20/2020   • Subclinical hyperthyroidism 4/20/2020   • Subclinical hyperthyroidism 9/25/2020   • Thyroid nodule 9/25/2020   • Vitamin D deficiency 4/20/2020    33     Past Surgical History:   Procedure Laterality Date   • COLONOSCOPY  09/08/2017    3 polyps=next 2020 w Dr Bryson Flood   • EGD  09/01/2017    egd biopsy    • KNEE ARTHROSCOPY W/ ACL RECONSTRUCTION     • KNEE SURGERY      torn mcl, acl, meniscus left knee 2016   • TUBAL LIGATION       Family History   Problem Relation Age of Onset   • Diabetes Father    • Colon cancer Maternal Aunt    • Cancer Maternal Uncle    • Kidney disease Maternal Uncle      Current Outpatient Medications on File Prior to Visit   Medication Sig Dispense Refill   • albuterol (Proventil HFA) 90 mcg/act inhaler Inhale 2 puffs every 4 (four) hours as needed for wheezing or shortness of breath 18 g 1   • benzonatate (TESSALON PERLES) 100 mg capsule Take 1 capsule (100 mg total) by mouth 3 (three) times a day as needed for cough 20 capsule 0   • escitalopram (LEXAPRO) 20 mg tablet TAKE 1 TABLET (20 MG TOTAL) BY MOUTH DAILY AT BEDTIME FOR MOOD/SLEEP 30 tablet 1   • metFORMIN (GLUCOPHAGE-XR) 500 mg 24 hr tablet TAKE 2 TABLETS BY MOUTH DAILY WITH DINNER 60 tablet 3   • metoprolol succinate (TOPROL-XL) 50 mg 24 hr tablet TAKE 1 AND 1/2 TABLETS BY MOUTH DAILY 135 tablet 1   • norethindrone (MICRONOR) 0 35 MG tablet TAKE 1 TABLET BY MOUTH EVERY DAY 28 tablet 5   • topiramate (TOPAMAX) 100 mg tablet TAKE 1 TABLET BY MOUTH DAILY AT BEDTIME 30 tablet 1   • [DISCONTINUED] amLODIPine (NORVASC) 10 mg tablet Take 1 tablet (10 mg total) by mouth daily 90 tablet 1   • [DISCONTINUED] phentermine (ADIPEX-P) 37 5 MG tablet 0 5 tab daily in morning 30 tablet 0   • ipratropium (ATROVENT) 0 06 % nasal spray 2 sprays into each nostril 4 (four) times a day (Patient not taking: Reported on 12/30/2022) 15 mL 1   • ondansetron (Zofran ODT) 4 mg disintegrating tablet Take 1 tablet (4 mg total) by mouth every 6 (six) hours as needed for nausea or vomiting (Patient not taking: Reported on 12/30/2022) 20 tablet 1   • [DISCONTINUED] benzonatate (TESSALON PERLES) 100 mg capsule Take 1 capsule (100 mg total) by mouth 3 (three) times a day as needed for cough 20 capsule 0   • [DISCONTINUED] clotrimazole 1 % external solution Apply 1 application topically 2 (two) times a day for 28 days Apply to affected nail (Patient not taking: Reported on 9/25/2020) 30 mL 0   • [DISCONTINUED] liraglutide (SAXENDA) injection Inject 0 5 mL (3 mg total) under the skin daily (Patient not taking: Reported on 2022) 15 mL 3   • [DISCONTINUED] lisinopril (ZESTRIL) 20 mg tablet Take 1 tablet (20 mg total) by mouth daily 30 tablet 0     No current facility-administered medications on file prior to visit  No Known Allergies  Social History     Socioeconomic History   • Marital status:      Spouse name: None   • Number of children: 5   • Years of education: 12   • Highest education level: None   Occupational History   • Occupation: Sourcery     Tobacco Use   • Smoking status: Former     Packs/day: 0 25     Years: 24 00     Pack years: 6 00     Types: Cigarettes     Quit date:      Years since quittin 0   • Smokeless tobacco: Never   • Tobacco comments:     Has smoked since age: 16- As per Claudetta Pean    Vaping Use   • Vaping Use: Never used   Substance and Sexual Activity   • Alcohol use: Not Currently     Alcohol/week: 2 0 standard drinks     Types: 1 Glasses of wine, 1 Shots of liquor per week     Comment: Occasional    • Drug use: Never   • Sexual activity: Yes     Partners: Male   Other Topics Concern   • None   Social History Narrative    ** Merged History Encounter **         · Most recent tobacco use screenin2020      · Do you currently or have you served in the [employer]:   No      · Were you activated, into active duty, as a member of the [employer] or as a Reservist:   No      · Has patient visited an area known to be high risk for 2019 n-CoV:   No      · In the 14 days before symptom onset, did the patient spend time in a high risk country:   No      · If patient spent time in a high risk country - Does the patient live in the high risk country:   No      · Advance directive:   No      · Live alone or with others:   with others      · Exercise level:   Occasional      · Overweight:   Yes      · Obese:    Yes      · General stress level:   High      · Diet: Regular      · Caffeine intake: Moderate      · Guns present in home:   No      · Seat belts used routinely:   No      · Sunscreen used routinely:   Yes     · Smoke alarm in home: Yes      · Legally blind in one or both eyes:   No      · Hard of hearing or deaf in one or both ears:   No lives w roomates     As per [place] [address] Determinants of Health     Financial Resource Strain: Not on file   Food Insecurity: Not on file   Transportation Needs: Not on file   Physical Activity: Not on file   Stress: Not on file   Social Connections: Not on file   Intimate Partner Violence: Not on file   Housing Stability: Not on file        Review of Systems   All other systems reviewed and are negative  Vitals:  Vitals:    12/30/22 1518   BP: 138/78   BP Location: Right arm   Patient Position: Sitting   Cuff Size: Large   Pulse: 80   Temp: 98 8 °F (37 1 °C)   SpO2: 98%   Weight: 98 3 kg (216 lb 12 8 oz)   Height: 5' 2" (1 575 m)     BMI - Body mass index is 39 65 kg/m²  Wt Readings from Last 7 Encounters:   12/30/22 98 3 kg (216 lb 12 8 oz)   12/27/22 92 5 kg (204 lb)   11/15/22 92 5 kg (204 lb)   08/05/22 92 8 kg (204 lb 9 6 oz)   08/01/22 93 4 kg (206 lb)   06/23/22 88 5 kg (195 lb)   06/10/22 88 5 kg (195 lb)       Physical Exam  Vitals and nursing note reviewed  Constitutional:       General: She is not in acute distress  Appearance: Normal appearance  She is well-developed  She is obese  She is not ill-appearing  HENT:      Head: Normocephalic and atraumatic  Nose: No congestion  Eyes:      General: No scleral icterus  Conjunctiva/sclera: Conjunctivae normal    Neck:      Vascular: No carotid bruit or JVD  Cardiovascular:      Rate and Rhythm: Normal rate and regular rhythm  Pulses: Normal pulses  Heart sounds: Normal heart sounds  No murmur heard  No friction rub  No gallop  Pulmonary:      Effort: Pulmonary effort is normal  No respiratory distress        Breath sounds: Normal breath sounds  No rales  Abdominal:      General: There is no distension  Palpations: Abdomen is soft  Tenderness: There is no abdominal tenderness  Musculoskeletal:         General: No swelling or tenderness  Cervical back: Neck supple  Right lower leg: Edema present  Left lower leg: Edema present  Comments: 1+ bilateral lower extremity edema (l>R)   Skin:     General: Skin is warm  Neurological:      General: No focal deficit present  Mental Status: She is alert and oriented to person, place, and time  Mental status is at baseline  Psychiatric:         Mood and Affect: Mood normal          Behavior: Behavior normal          Thought Content: Thought content normal          Labs:  CBC:   Lab Results   Component Value Date    WBC 5 93 12/27/2022    RBC 4 27 12/27/2022    HGB 11 4 (L) 12/27/2022    HCT 34 6 (L) 12/27/2022    MCV 81 (L) 12/27/2022     12/27/2022    RDW 16 7 (H) 12/27/2022       CMP:   Lab Results   Component Value Date    K 3 6 12/27/2022     12/27/2022    CO2 24 12/27/2022    BUN 10 12/27/2022    CREATININE 0 74 12/27/2022    EGFR 90 12/27/2022    CALCIUM 8 8 12/27/2022    AST 22 11/15/2022    ALT 22 11/15/2022    ALKPHOS 106 11/15/2022       Magnesium:  Lab Results   Component Value Date    MG 1 9 02/11/2020       Lipid Profile:   Lab Results   Component Value Date    HDL 53 02/22/2022    TRIG 105 02/22/2022    LDLCALC 83 02/22/2022       Thyroid Studies:   Lab Results   Component Value Date    QWP7DMEJIDHP 0 019 (L) 11/15/2022    T3FREE 2 68 02/22/2022    FREET4 1 02 11/15/2022    A6QIREG 0 90 11/15/2022       A1c:  No components found for: HGA1C    INR:  Lab Results   Component Value Date    INR 0 98 02/10/2020   5    Imaging: XR chest 2 views    Result Date: 12/27/2022  Narrative: CHEST INDICATION:   cough, fever  COMPARISON:  CXR 2/10/2020   EXAM PERFORMED/VIEWS:  XR CHEST PA & LATERAL FINDINGS: Cardiomediastinal silhouette appears unremarkable  The lungs are clear  No pneumothorax or pleural effusion  Osseous structures appear within normal limits for patient age  Impression: No acute cardiopulmonary disease  Workstation performed: [id number]     VAS lower limb venous duplex study, unilateral/limited    Result Date: 12/27/2022  Narrative:  [facility] REPORT CLINICAL: Indications: Patient c/o swelling of the left leg for several months with a new onset of pain in the last 4 days as well as worsening SOB and a cough  No history of DVT/PE/CA recent surgery or trauma  Operative History: denies cardiovascular surgeries   CONCLUSION: Impression: RIGHT LOWER LIMB LIMITED: Evaluation shows no evidence of thrombus in the common femoral vein  Doppler evaluation shows a normal response to augmentation maneuvers  LEFT LOWER LIMB: No evidence of acute or chronic deep vein thrombosis No evidence of superficial thrombophlebitis noted  Technically difficult/limited study   Technical findings were given to Graham Hopper PA-C immediately following exam   SIGNATURE: Electronically Signed by: Aly Nichols MD, RPVI on 2022-12-27 04:50:56 PM      Cardiac testing:   No results found for this or any previous visit  No results found for this or any previous visit  No results found for this or any previous visit  No results found for this or any previous visit  VAS lower limb venous duplex study, unilateral/limited     [facility] REPORT  CLINICAL:  Indications: Patient c/o swelling of the left leg for several months with a new  onset of pain in the last 4 days as well as worsening SOB and a cough  No history of DVT/PE/CA recent surgery or trauma  Operative History:  denies cardiovascular surgeries        CONCLUSION:  Impression:  RIGHT LOWER LIMB LIMITED:  Evaluation shows no evidence of thrombus in the common femoral vein  Doppler evaluation shows a normal response to augmentation maneuvers       LEFT LOWER LIMB:  No evidence of acute or chronic deep vein thrombosis  No evidence of superficial thrombophlebitis noted  Technically difficult/limited study        Technical findings were given to Loy Alvarenga PA-C immediately following exam      SIGNATURE:  Electronically Signed by: Charles Morel MD, RPVI on 2022-12-27 04:50:56 PM  XR chest 2 views  Narrative: CHEST     INDICATION:   cough, fever  COMPARISON:  CXR 2/10/2020  EXAM PERFORMED/VIEWS:  XR CHEST PA & LATERAL    FINDINGS:    Cardiomediastinal silhouette appears unremarkable  The lungs are clear  No pneumothorax or pleural effusion  Osseous structures appear within normal limits for patient age  Impression: No acute cardiopulmonary disease      Workstation performed: [id number]

## 2022-12-30 NOTE — TELEPHONE ENCOUNTER
Called patient and verified new medication was sent to the pharmacy patient stated to be sent at the [facility] in [facility]

## 2022-12-30 NOTE — TELEPHONE ENCOUNTER
Brandi Briceno earlier and left authorization number on her answering machine. Thank you. Copy was already also in chart. I contacted pt to let her know the medication was called in  Pt then made me aware that it was to be called in to the [facility] in [facility] on [id number]  Anyway to reroute that medication?

## 2022-12-30 NOTE — TELEPHONE ENCOUNTER
Pt called in requesting a work note  Pt was seen in the ED Tuesday 12/26 and was diagnosed with the flu  Doc called in a prescription for pt  Pt has had on and off fevers with a horrible cough  Pt would like a note for work as the ED explained to her she is to be out of work from 5 -10 days

## 2023-01-04 ENCOUNTER — VBI (OUTPATIENT)
Dept: ADMINISTRATIVE | Facility: OTHER | Age: 58
End: 2023-01-04

## 2023-01-11 ENCOUNTER — HOSPITAL ENCOUNTER (OUTPATIENT)
Dept: NON INVASIVE DIAGNOSTICS | Facility: HOSPITAL | Age: 58
Discharge: HOME | End: 2023-01-11
Attending: INTERNAL MEDICINE

## 2023-01-11 VITALS
WEIGHT: 216 LBS | OXYGEN SATURATION: 98 % | DIASTOLIC BLOOD PRESSURE: 74 MMHG | HEIGHT: 62 IN | HEART RATE: 86 BPM | BODY MASS INDEX: 39.75 KG/M2 | SYSTOLIC BLOOD PRESSURE: 126 MMHG | RESPIRATION RATE: 16 BRPM

## 2023-01-11 DIAGNOSIS — I10 ESSENTIAL HYPERTENSION: ICD-10-CM

## 2023-01-11 DIAGNOSIS — R06.02 SHORTNESS OF BREATH: ICD-10-CM

## 2023-01-11 DIAGNOSIS — R20.0 HAND NUMBNESS: Primary | ICD-10-CM

## 2023-01-11 LAB
E WAVE DECELERATION TIME: 231 MS
MAX HR PERCENT: 100 %
MAX HR: 164 BPM
MV E'TISSUE VEL-SEP: 8 CM/S
MV PEAK A VEL: 0.69 M/S
MV PEAK E VEL: 56 CM/S
MV STENOSIS PRESSURE HALF TIME: 67 MS
MV VALVE AREA P 1/2 METHOD: 3.28 CM2
RATE PRESSURE PRODUCT: NORMAL
SL CV LV EF: 65
SL CV STRESS RECOVERY BP: NORMAL MMHG
SL CV STRESS RECOVERY HR: 98 BPM
SL CV STRESS RECOVERY O2 SAT: 99 %
SL CV STRESS STAGE REACHED: 3
STRESS ANGINA INDEX: 0
STRESS BASELINE BP: NORMAL MMHG
STRESS BASELINE HR: 86 BPM
STRESS DUKE TREADMILL SCORE: 8
STRESS O2 SAT REST: 98 %
STRESS PEAK HR: 164 BPM
STRESS POST ESTIMATED WORKLOAD: 9.3 METS
STRESS POST EXERCISE DUR MIN: 7 MIN
STRESS POST EXERCISE DUR SEC: 31 SEC
STRESS POST O2 SAT PEAK: 99 %
STRESS POST PEAK BP: 190 MMHG
STRESS ST DEPRESSION: 0 MM
TR MAX PG: 23 MMHG
TR PEAK VELOCITY: 2.4 M/S
TRICUSPID VALVE PEAK REGURGITATION VELOCITY: 2.42 M/S

## 2023-01-12 LAB
CHEST PAIN STATEMENT: NORMAL
MAX DIASTOLIC BP: 70 MMHG
MAX HEART RATE: 164 BPM
MAX PREDICTED HEART RATE: 163 BPM
MAX. SYSTOLIC BP: 190 MMHG
PROTOCOL NAME: NORMAL
TARGET HR FORMULA: NORMAL
TEST INDICATION: NORMAL
TIME IN EXERCISE PHASE: NORMAL

## 2023-01-18 ENCOUNTER — OFFICE VISIT (OUTPATIENT)
Dept: OBGYN CLINIC | Facility: CLINIC | Age: 58
End: 2023-01-18

## 2023-01-18 VITALS
HEIGHT: 62 IN | HEART RATE: 73 BPM | SYSTOLIC BLOOD PRESSURE: 127 MMHG | BODY MASS INDEX: 39.75 KG/M2 | WEIGHT: 216 LBS | DIASTOLIC BLOOD PRESSURE: 81 MMHG

## 2023-01-18 DIAGNOSIS — R20.0 HAND NUMBNESS: ICD-10-CM

## 2023-01-18 NOTE — PROGRESS NOTES
ASSESSMENT/PLAN:    Assessment:   Suspected carpal tunnel syndrome, LUE    Plan:   US MSK    Follow Up: With Dr Nichol Reddy or Dr Melissa Nails I  Cervical neuritis is thought to be less likely, but remains in the differential   If the US is negative, then will change f/u to [facility]    To Do Next Visit:       General Discussions:           _____________________________________________________  CHIEF COMPLAINT:  Chief Complaint   Patient presents with   • Left Hand - Numbness, Tingling   • Right Hand - Numbness, Tingling         SUBJECTIVE:  Jaydon Mireles is a 62 y o  female who presents with Numbness of the left index finger and thumb  This started  2 month(s) ago: Insidious  It is worse at night   + neck pain  No arm symptoms  Radiation: Yes to the  index finger and thumb  Previous Treatments: None   Associated symptoms: None  Handedness: left  Work status: computer work    PAST MEDICAL HISTORY:  Past Medical History:   Diagnosis Date   • 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   • Asthma due to seasonal allergies 9/25/2020   • Depression    • Essential hypertension 4/20/2020   • JANE (generalized anxiety disorder) 5/82/5109   • Helicobacter pylori gastrointestinal tract infection 4/20/2020    10/02/2017=egd 9/2017 Dr Candido Almaguer   • History of Holter monitoring 12/22/2017    The baseline rhythm was of sinus origin with an average heart rate of 90 bpm (range: 72 - 129 bpm)  There were rare single APCs adn VPCs (less than 0 1 % total beats)  There were no sustained cardiac dysrhythmais  There were no significant symptomatic pauses  • History of stress test 01/05/2018    Mildly abnormal exercise Myoview SPECT study with evidence of a fixed anterior defect  This is most likely related to breast attenuation artifact  There was no evidence of reversible myocardial ischemia  Gated wall motion analysis was normal with well preserved systolic function  The left ventricle was normal in size with calculated EF of 73%   No prior similar study is available for comparison      • Hypertension    • Immunization deficiency 2020    Hep a/b/mumps nonimmune   • Immunization deficiency 2020    Hep a/b/mmr nonimmune 2020, 2020, 2021=hep a/b   • Iron deficiency 2020   • Kidney stone 2020   • Need for pneumococcal vaccination 2021   • Need for shingles vaccine 2021   • Obesity (BMI 30-39 9) 2020    39 4, start wt 219 lb=goal 150 lb   • Onychomycosis of toenail 2020   • Prediabetes 2020    5 8   • Pyridoxine deficiency 2020    5   • Reactive airway disease with acute exacerbation 2020   • Subclinical hyperthyroidism 2020   • Subclinical hyperthyroidism 2020   • Thyroid nodule 2020   • Vitamin D deficiency 2020    33       PAST SURGICAL HISTORY:  Past Surgical History:   Procedure Laterality Date   • COLONOSCOPY  2017    3 polyps=next  w Dr Cherylene Bales   • EGD  2017    egd biopsy    • KNEE ARTHROSCOPY W/ ACL RECONSTRUCTION     • KNEE SURGERY      torn mcl, acl, meniscus left knee 2016   • TUBAL LIGATION         FAMILY HISTORY:  Family History   Problem Relation Age of Onset   • Diabetes Father    • Colon cancer Maternal Aunt    • Cancer Maternal Uncle    • Kidney disease Maternal Uncle        SOCIAL HISTORY:  Social History     Tobacco Use   • Smoking status: Former     Packs/day: 0 25     Years: 24 00     Pack years: 6 00     Types: Cigarettes     Quit date:      Years since quittin 0   • Smokeless tobacco: Never   • Tobacco comments:     Has smoked since age: 16- As per [place]    Vaping Use   • Vaping Use: Never used   Substance Use Topics   • Alcohol use: Not Currently     Alcohol/week: 2 0 standard drinks     Types: 1 Glasses of wine, 1 Shots of liquor per week     Comment: Occasional    • Drug use: Never       MEDICATIONS:    Current Outpatient Medications:   •  albuterol (Proventil HFA) 90 mcg/act inhaler, Inhale 2 puffs every 4 (four) hours as needed for wheezing or shortness of breath, Disp: 18 g, Rfl: 1  •  amLODIPine (NORVASC) 10 mg tablet, Take 0 5 tablets (5 mg total) by mouth daily, Disp: 90 tablet, Rfl: 1  •  benzonatate (TESSALON PERLES) 100 mg capsule, Take 1 capsule (100 mg total) by mouth 3 (three) times a day as needed for cough, Disp: 20 capsule, Rfl: 0  •  chlorthalidone 25 mg tablet, Take 1 tablet (25 mg total) by mouth daily, Disp: 30 tablet, Rfl: 2  •  escitalopram (LEXAPRO) 20 mg tablet, TAKE 1 TABLET (20 MG TOTAL) BY MOUTH DAILY AT BEDTIME FOR MOOD/SLEEP, Disp: 30 tablet, Rfl: 1  •  ipratropium (ATROVENT) 0 06 % nasal spray, 2 sprays into each nostril 4 (four) times a day (Patient not taking: Reported on 12/30/2022), Disp: 15 mL, Rfl: 1  •  metFORMIN (GLUCOPHAGE-XR) 500 mg 24 hr tablet, TAKE 2 TABLETS BY MOUTH DAILY WITH DINNER, Disp: 60 tablet, Rfl: 3  •  metoprolol succinate (TOPROL-XL) 50 mg 24 hr tablet, TAKE 1 AND 1/2 TABLETS BY MOUTH DAILY, Disp: 135 tablet, Rfl: 1  •  norethindrone (MICRONOR) 0 35 MG tablet, TAKE 1 TABLET BY MOUTH EVERY DAY, Disp: 28 tablet, Rfl: 5  •  ondansetron (Zofran ODT) 4 mg disintegrating tablet, Take 1 tablet (4 mg total) by mouth every 6 (six) hours as needed for nausea or vomiting (Patient not taking: Reported on 12/30/2022), Disp: 20 tablet, Rfl: 1  •  topiramate (TOPAMAX) 100 mg tablet, TAKE 1 TABLET BY MOUTH DAILY AT BEDTIME, Disp: 30 tablet, Rfl: 1    ALLERGIES:  No Known Allergies    REVIEW OF SYSTEMS:  Pertinent items are noted in HPI  A comprehensive review of systems was negative      LABS:  HgA1c:   Lab Results   Component Value Date    HGBA1C 5 5 02/22/2022     BMP:   Lab Results   Component Value Date    CALCIUM 8 8 12/27/2022    K 3 6 12/27/2022    CO2 24 12/27/2022     12/27/2022    BUN 10 12/27/2022    CREATININE 0 74 12/27/2022         _____________________________________________________  PHYSICAL EXAMINATION:  Vital signs: /81   Pulse 73   Ht 5' 2" (1 575 m)   Wt 98 kg (216 lb)   LMP  (LMP Unknown)   BMI 39 51 kg/m²   General: well developed and well nourished, alert, oriented times 3 and appears comfortable  Psychiatric: Normal  HEENT: Trachea Midline, No torticollis  Cardiovascular: Regular rate and rhythm  Pulmonary: No audible wheezing   Abdomen: No guarding  Extremities: No lymphedema  Skin: No masses, erythema, lacerations, fluctation, ulcerations  Neurovascular: Sensation Intact to the Median, Ulnar, Radial Nerve, Motor Intact to the Median, Ulnar, Radial Nerve and Pulses Intact    MUSCULOSKELETAL EXAMINATION:  LEFT SIDE:  Carpal tunnel:  No weakness APB, No weakness AIN, No thenar atrophy and Negative tinels  Mildly + phalens  Cervical Spine:  Full ROM with pain in all planes  No arm symptoms    No spurling         _____________________________________________________  STUDIES REVIEWED:  No Studies to review      PROCEDURES PERFORMED:  Procedures  No Procedures performed today

## 2023-01-26 ENCOUNTER — OFFICE VISIT (OUTPATIENT)
Dept: FAMILY MEDICINE CLINIC | Facility: CLINIC | Age: 58
End: 2023-01-26

## 2023-01-26 VITALS
BODY MASS INDEX: 39.86 KG/M2 | SYSTOLIC BLOOD PRESSURE: 124 MMHG | WEIGHT: 216.6 LBS | HEIGHT: 62 IN | TEMPERATURE: 97.3 F | HEART RATE: 75 BPM | OXYGEN SATURATION: 95 % | DIASTOLIC BLOOD PRESSURE: 80 MMHG

## 2023-01-26 DIAGNOSIS — B37.2 CANDIDAL INTERTRIGO: ICD-10-CM

## 2023-01-26 DIAGNOSIS — D50.9 IRON DEFICIENCY ANEMIA, UNSPECIFIED IRON DEFICIENCY ANEMIA TYPE: ICD-10-CM

## 2023-01-26 DIAGNOSIS — G47.33 OSA (OBSTRUCTIVE SLEEP APNEA): ICD-10-CM

## 2023-01-26 DIAGNOSIS — E05.90 SUBCLINICAL HYPERTHYROIDISM: Primary | ICD-10-CM

## 2023-01-26 DIAGNOSIS — J20.9 BRONCHITIS WITH BRONCHOSPASM: ICD-10-CM

## 2023-01-26 DIAGNOSIS — R60.0 LOWER EXTREMITY EDEMA: ICD-10-CM

## 2023-01-26 DIAGNOSIS — I10 ESSENTIAL HYPERTENSION: ICD-10-CM

## 2023-01-26 DIAGNOSIS — F41.1 GAD (GENERALIZED ANXIETY DISORDER): ICD-10-CM

## 2023-01-26 DIAGNOSIS — E21.3 HYPERPARATHYROIDISM (HCC): ICD-10-CM

## 2023-01-26 DIAGNOSIS — R79.89 HIGH SERUM PARATHYROID HORMONE (PTH): ICD-10-CM

## 2023-01-26 DIAGNOSIS — R73.03 PREDIABETES: ICD-10-CM

## 2023-01-26 DIAGNOSIS — M54.12 CERVICAL RADICULOPATHY: ICD-10-CM

## 2023-01-26 RX ORDER — CLOTRIMAZOLE AND BETAMETHASONE DIPROPIONATE 10; .64 MG/G; MG/G
CREAM TOPICAL 2 TIMES DAILY
Qty: 45 G | Refills: 3 | Status: SHIPPED | OUTPATIENT
Start: 2023-01-26

## 2023-01-26 RX ORDER — AZITHROMYCIN 250 MG/1
TABLET, FILM COATED ORAL
Qty: 6 TABLET | Refills: 0 | Status: SHIPPED | OUTPATIENT
Start: 2023-01-26 | End: 2023-01-30

## 2023-01-26 RX ORDER — SPIRONOLACTONE 25 MG/1
25 TABLET ORAL DAILY
Qty: 30 TABLET | Refills: 2 | Status: SHIPPED | OUTPATIENT
Start: 2023-01-26

## 2023-01-26 RX ORDER — PREDNISONE 20 MG/1
40 TABLET ORAL DAILY
Qty: 14 TABLET | Refills: 0 | Status: SHIPPED | OUTPATIENT
Start: 2023-01-26 | End: 2023-02-02

## 2023-01-26 RX ORDER — NYSTATIN 100000 [USP'U]/G
POWDER TOPICAL 2 TIMES DAILY
Qty: 60 G | Refills: 3 | Status: SHIPPED | OUTPATIENT
Start: 2023-01-26

## 2023-01-26 NOTE — PROGRESS NOTES
Name: Mattie Veras      : 1965      MRN: [id number]  Encounter Provider: Isaac Shelley MD  Encounter Date: 2023   Encounter department: [address] [place] [address]    Assessment & Plan     Concern pt may be having side effects from medication= micronor is progesterone based can cause fluid retention and worsening w amlodipine  Chlorthalidone was added on to get rid of fluid buid up but can also trigger gout  Stop ocp since bleeding stop  Start spironolactone to help get rid of fluid and potassium sparring  Start prednisone to help w lungs=suspect prolong reactive airway, start abx  That will also help w leg pain if gout  Call in 72 h if not better will need nebulizer/budesonide  Start cream for candida intertrigo   Pt will need medical wt loss  Mri neck=suspect nerve impingement    I have spent 40 minutes with Patient  today in which greater than 50% of this time was spent in counseling/coordination of care regarding Prognosis, Risks and benefits of tx options and Intructions for management  1  Subclinical hyperthyroidism    2  CAMERON (obstructive sleep apnea)    3  Essential hypertension  -     spironolactone (ALDACTONE) 25 mg tablet; Take 1 tablet (25 mg total) by mouth daily    4  JANE (generalized anxiety disorder)    5  Prediabetes    6  High serum parathyroid hormone (PTH)    7  Iron deficiency anemia, unspecified iron deficiency anemia type    8  Lower extremity edema  -     spironolactone (ALDACTONE) 25 mg tablet; Take 1 tablet (25 mg total) by mouth daily    9  Bronchitis with bronchospasm  -     predniSONE 20 mg tablet; Take 2 tablets (40 mg total) by mouth daily for 7 days  -     azithromycin (ZITHROMAX) 250 mg tablet; 2 tablets day 1, 1 tablet day 2 to day 5    10  Cervical radiculopathy  -     MRI cervical spine wo contrast; Future; Expected date: 2023    11  Hyperparathyroidism ([address] )    12   Candidal intertrigo  -     clotrimazole-betamethasone (LOTRISONE) 1-0 05 % cream; Apply topically 2 (two) times a day  -     nystatin (MYCOSTATIN) powder; Apply topically 2 (two) times a day To moist area         Subjective      61 yo F c/o cough/wheezing since dx w influenza 12/26  Using albuterol inhaler, taking tessalon perle w no improvement  Also c/o L leg pain and both legs swelling since 11/2022  Pain is worse now at ankle and left knee, feels like it's on fire  Interestingly=pt was placed on micronor ocp for menorrhagia in 11/2022  Then saw cardio in 12/2022 and was placed on chlorthalidone to help decreased swelling on legs  dvt work up was negative  Chest xr was negative for infection  Pt c/o neck pain severe radiate down to her left shoulder/hand causing numbness and weakness in her wrist/thumb  Saw pain management and had [facility] us schedule, thought it could be carpal tunnel  Pt has htn/subclinical hyperthyroid=fu w endo  Was on medical wt loss w saxenda and phentermine but insurance denies coverage for saxenda due to inability to los 5% in 3 m=pt could not tolerate higher dose of saxenda due to GI upset  Now gaining back weight, taking metformin and topiramate  Review of Systems   Constitutional: Positive for fatigue  Negative for chills and fever  HENT: Negative for ear pain and sore throat  Eyes: Negative for pain and visual disturbance  Respiratory: Positive for cough and wheezing  Negative for shortness of breath  Cardiovascular: Positive for leg swelling  Negative for chest pain and palpitations  Gastrointestinal: Negative for abdominal pain and vomiting  Genitourinary: Negative for dysuria and hematuria  Musculoskeletal: Positive for neck pain  Negative for arthralgias and back pain  Skin: Positive for rash  Negative for color change  Wet macerated skin BL groin region   Neurological: Positive for weakness and numbness  Negative for seizures and syncope  All other systems reviewed and are negative        Current Outpatient Medications on File Prior to Visit   Medication Sig   • albuterol (Proventil HFA) 90 mcg/act inhaler Inhale 2 puffs every 4 (four) hours as needed for wheezing or shortness of breath   • amLODIPine (NORVASC) 10 mg tablet Take 0 5 tablets (5 mg total) by mouth daily   • chlorthalidone 25 mg tablet Take 1 tablet (25 mg total) by mouth daily   • metFORMIN (GLUCOPHAGE-XR) 500 mg 24 hr tablet TAKE 2 TABLETS BY MOUTH DAILY WITH DINNER   • metoprolol succinate (TOPROL-XL) 50 mg 24 hr tablet TAKE 1 AND 1/2 TABLETS BY MOUTH DAILY   • norethindrone (MICRONOR) 0 35 MG tablet TAKE 1 TABLET BY MOUTH EVERY DAY   • topiramate (TOPAMAX) 100 mg tablet TAKE 1 TABLET BY MOUTH DAILY AT BEDTIME   • benzonatate (TESSALON PERLES) 100 mg capsule Take 1 capsule (100 mg total) by mouth 3 (three) times a day as needed for cough (Patient not taking: Reported on 1/26/2023)   • ipratropium (ATROVENT) 0 06 % nasal spray 2 sprays into each nostril 4 (four) times a day (Patient not taking: Reported on 12/30/2022)   • ondansetron (Zofran ODT) 4 mg disintegrating tablet Take 1 tablet (4 mg total) by mouth every 6 (six) hours as needed for nausea or vomiting (Patient not taking: Reported on 12/30/2022)   • [DISCONTINUED] clotrimazole 1 % external solution Apply 1 application topically 2 (two) times a day for 28 days Apply to affected nail (Patient not taking: Reported on 9/25/2020)   • [DISCONTINUED] escitalopram (LEXAPRO) 20 mg tablet TAKE 1 TABLET (20 MG TOTAL) BY MOUTH DAILY AT BEDTIME FOR MOOD/SLEEP (Patient not taking: Reported on 1/26/2023)   • [DISCONTINUED] lisinopril (ZESTRIL) 20 mg tablet Take 1 tablet (20 mg total) by mouth daily       Objective     /80 (BP Location: Left arm, Patient Position: Sitting, Cuff Size: Adult)   Pulse 75   Temp (!) 97 3 °F (36 3 °C) (Temporal)   Ht 5' 2" (1 575 m)   Wt 98 2 kg (216 lb 9 6 oz)   LMP  (LMP Unknown)   SpO2 95%   BMI 39 62 kg/m²     Physical Exam  Vitals and nursing note reviewed     Constitutional: Appearance: Normal appearance  She is well-developed  She is obese  HENT:      Head: Normocephalic and atraumatic  Right Ear: Tympanic membrane, ear canal and external ear normal       Left Ear: Tympanic membrane, ear canal and external ear normal       Nose: Nose normal       Mouth/Throat:      Mouth: Mucous membranes are moist       Pharynx: Oropharynx is clear  No oropharyngeal exudate  Eyes:      Extraocular Movements: Extraocular movements intact  Pupils: Pupils are equal, round, and reactive to light  Neck:      Thyroid: No thyromegaly  Cardiovascular:      Rate and Rhythm: Normal rate and regular rhythm  Pulses: Normal pulses  Heart sounds: Normal heart sounds  No murmur heard  Pulmonary:      Effort: Pulmonary effort is normal  No respiratory distress  Breath sounds: Normal breath sounds  No wheezing or rales  Abdominal:      General: Abdomen is flat  Bowel sounds are normal  There is no distension  Palpations: Abdomen is soft  Tenderness: There is no abdominal tenderness  There is no guarding  Genitourinary:     Vagina: Normal    Musculoskeletal:         General: Tenderness present  Normal range of motion  Cervical back: Normal range of motion and neck supple  Tenderness present  Comments: Tender on palpation cervical spine region C4-C5  Weakness L arm   Decrease sensation to fine touch and pin prick   Skin:     General: Skin is warm and dry  Capillary Refill: Capillary refill takes less than 2 seconds  Findings: Rash present  Neurological:      General: No focal deficit present  Mental Status: She is alert and oriented to person, place, and time  Mental status is at baseline  Sensory: Sensory deficit present  Motor: Weakness present  Gait: Gait abnormal    Psychiatric:         Mood and Affect: Mood normal          Behavior: Behavior normal          Thought Content:  Thought content normal          Judgment: Judgment normal        Cristin Coronado MD

## 2023-01-27 ENCOUNTER — OFFICE VISIT (OUTPATIENT)
Dept: CARDIOLOGY CLINIC | Facility: CLINIC | Age: 58
End: 2023-01-27

## 2023-01-27 VITALS
WEIGHT: 221 LBS | TEMPERATURE: 98.4 F | HEART RATE: 78 BPM | SYSTOLIC BLOOD PRESSURE: 124 MMHG | BODY MASS INDEX: 40.67 KG/M2 | HEIGHT: 62 IN | OXYGEN SATURATION: 93 % | DIASTOLIC BLOOD PRESSURE: 70 MMHG

## 2023-01-27 DIAGNOSIS — I87.2 VENOUS INSUFFICIENCY: Primary | ICD-10-CM

## 2023-01-27 DIAGNOSIS — R06.02 SHORTNESS OF BREATH: ICD-10-CM

## 2023-01-27 DIAGNOSIS — I10 ESSENTIAL HYPERTENSION: ICD-10-CM

## 2023-01-27 DIAGNOSIS — E66.09 CLASS 2 OBESITY DUE TO EXCESS CALORIES WITHOUT SERIOUS COMORBIDITY WITH BODY MASS INDEX (BMI) OF 39.0 TO 39.9 IN ADULT: ICD-10-CM

## 2023-01-27 RX ORDER — CHLORTHALIDONE 50 MG/1
50 TABLET ORAL DAILY
Qty: 30 TABLET | Refills: 2 | Status: SHIPPED | OUTPATIENT
Start: 2023-01-27

## 2023-01-27 NOTE — PROGRESS NOTES
[facility] [address] [place] [address]  Phone#  [phone]  Fax#  [phone]                                               Cardiology Office Follow up  Dima Sarah, 62 y o  female  YOB: 1965  MRN: [id number] Encounter: [id number]      PCP - Cristin Snyder MD  Referring Provider - No ref  provider found    Chief Complaint   Patient presents with   • Follow-up   • Shortness of Breath       Assessment  Bilateral lower extremity edema   Exercise ECG stress test in March 2020 was negative for ischemia and she did 7:30 min on it  Exercise stress echo - 1/11/23 - 7:31 min, 9 3 METS, 100% MPHR, stress ECg & echo negative for ischemia  EF 65%, normal PASP, IVC normal  Hypertension  Obesity, Body mass index is 40 42 kg/m²     Hyperthyroidism  CAMERON    Plan  Bilateral lower extremity edema, Shortness of breath  Overview  Initially presented with B/L lower extremity edema and progressive shortness of breath, associated with weight gain  She did not have evaluation or any other signs of volume overload, it was felt to be mainly venous insufficiency related to weight gain  Stress echo subsequently was negative for ischemia, and PASP was normal  Weight has continued to trend up (195 lbs in 6/2022 --> 216 lbs in 12/2022 --> 221 lbs in 1/2023)  Impression  Appears to be predominantly related to venous insufficiency  Weight gain related to inactivity (from inability to exercise due to knee pain)  Plan  Use Compression stockings  Stop amlodipine  Increase Chlorthalidone to 50 mg daily, continue spironolactone 25 mg daily  Check follow up BMP in 1 week  Increase walking / dynamic exercises - weight loss needed    Hypertension  Well-controlled, much improved, 124/70 today  Reviewed blood pressure logs brought in by patient   Currently on amlodipine 5 mg daily, chlorthalidone 25 mg daily, spironolactone 25 mg daily  Stop amlodipine  Increase chlorthalidone to 50 mg daily, continue spironolactone 25 mg daily     Obesity, Body mass index is 40 42 kg/m²  Weight has continued to trend up (195 lbs in 6/2022 --> 216 lbs in 12/2022 --> 221 lbs in 1/2023)  mainly from dietary indiscretions and inactivity  Her knee pain is limiting for exercise  Low-carb, low-fat, high-fiber diet suggested  Gradually increase walking to include at least 30 minutes 5 days a week    Hyperthyroidism, Thyroid nodules, Elevated PTH  TSH severely reduced, but normal free T4 and T3  She follows with Dr Dorian Mckeon for this  Follow up with endocrinologist    No results found for this visit on 01/27/23  Orders Placed This Encounter   Procedures   • Compression Stocking     Return in about 2 months (around 3/27/2023), or if symptoms worsen or fail to improve  History of Present Illness   62 y o  female, who works as an  doing [employer] work for [employer], comes in as a new patient for consultation regarding uncontrolled hypertension and increased lower extremity edema  She previously used to follow with cardiologist Dr Yolis Holt, and is now establishing care with us  Today she reports having increased lower extremity edema, predominantly in the left leg  This has been ongoing and worsening over the last several months  There is no associated symptoms of shortness of breath, orthopnea or PND  She does report significant discomfort in the left anterior leg, as well as numbness in the left hand - occurring intermittently  She underwent lower extremity duplex on 12/27/2022, which was negative for DVT  She has no known prior history of coronary artery disease or heart failure  No complaints of chest pain  She does have a longstanding history of intermittent palpitations, and has been on metoprolol succinate for several years  She has been doing well with this and has not had any recent issues with palpitations      Interval history - 1/27/2023  She comes back for follow-up after about 1 month  In the interim, she has made the adjustments to her antihypertensive therapy and has been monitoring her blood pressure at home, and is doing well  She completed the stress echocardiogram, which did not show any significant ischemia or volume overload  Historical Information   Past Medical History:   Diagnosis Date   • 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   • Asthma due to seasonal allergies 9/25/2020   • Depression    • Essential hypertension 4/20/2020   • JANE (generalized anxiety disorder) 4/38/6737   • Helicobacter pylori gastrointestinal tract infection 4/20/2020    10/02/2017=egd 9/2017 Dr Chapman [place]   • History of Holter monitoring 12/22/2017    The baseline rhythm was of sinus origin with an average heart rate of 90 bpm (range: 72 - 129 bpm)  There were rare single APCs adn VPCs (less than 0 1 % total beats)  There were no sustained cardiac dysrhythmais  There were no significant symptomatic pauses  • History of stress test 01/05/2018    Mildly abnormal exercise Myoview SPECT study with evidence of a fixed anterior defect  This is most likely related to breast attenuation artifact  There was no evidence of reversible myocardial ischemia  Gated wall motion analysis was normal with well preserved systolic function  The left ventricle was normal in size with calculated EF of 73%  No prior similar study is available for comparison      • Hypertension    • Immunization deficiency 4/20/2020    Hep a/b/mumps nonimmune   • Immunization deficiency 9/25/2020    Hep a/b/mmr nonimmune 6/19/2020, 9/25/2020, 2/2021=hep a/b   • Iron deficiency 4/20/2020   • Kidney stone 4/20/2020   • Need for pneumococcal vaccination 6/4/2021   • Need for shingles vaccine 6/4/2021   • Obesity (BMI 30-39 9) 4/20/2020    39 4, start wt 219 lb=goal 150 lb   • Onychomycosis of toenail 6/19/2020   • Prediabetes 4/20/2020    5 8   • 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    5   • Reactive airway disease with acute exacerbation 5/20/2020   • Subclinical hyperthyroidism 4/20/2020   • Subclinical hyperthyroidism 9/25/2020   • Thyroid nodule 9/25/2020   • Vitamin D deficiency 4/20/2020    33     Past Surgical History:   Procedure Laterality Date   • COLONOSCOPY  09/08/2017    3 polyps=next 2020 w Dr Gricelda Holland   • EGD  09/01/2017    egd biopsy    • KNEE ARTHROSCOPY W/ ACL RECONSTRUCTION     • KNEE SURGERY      torn mcl, acl, meniscus left knee 2016   • TUBAL LIGATION       Family History   Problem Relation Age of Onset   • Diabetes Father    • Colon cancer Maternal Aunt    • Cancer Maternal Uncle    • Kidney disease Maternal Uncle      Current Outpatient Medications on File Prior to Visit   Medication Sig Dispense Refill   • albuterol (Proventil HFA) 90 mcg/act inhaler Inhale 2 puffs every 4 (four) hours as needed for wheezing or shortness of breath 18 g 1   • azithromycin (ZITHROMAX) 250 mg tablet 2 tablets day 1, 1 tablet day 2 to day 5 6 tablet 0   • clotrimazole-betamethasone (LOTRISONE) 1-0 05 % cream Apply topically 2 (two) times a day 45 g 3   • ipratropium (ATROVENT) 0 06 % nasal spray 2 sprays into each nostril 4 (four) times a day 15 mL 1   • metFORMIN (GLUCOPHAGE-XR) 500 mg 24 hr tablet TAKE 2 TABLETS BY MOUTH DAILY WITH DINNER 60 tablet 3   • metoprolol succinate (TOPROL-XL) 50 mg 24 hr tablet TAKE 1 AND 1/2 TABLETS BY MOUTH DAILY 135 tablet 1   • norethindrone (MICRONOR) 0 35 MG tablet TAKE 1 TABLET BY MOUTH EVERY DAY 28 tablet 5   • nystatin (MYCOSTATIN) powder Apply topically 2 (two) times a day To moist area 60 g 3   • ondansetron (Zofran ODT) 4 mg disintegrating tablet Take 1 tablet (4 mg total) by mouth every 6 (six) hours as needed for nausea or vomiting 20 tablet 1   • predniSONE 20 mg tablet Take 2 tablets (40 mg total) by mouth daily for 7 days 14 tablet 0   • spironolactone (ALDACTONE) 25 mg tablet Take 1 tablet (25 mg total) by mouth daily 30 tablet 2   • topiramate (TOPAMAX) 100 mg tablet TAKE 1 TABLET BY MOUTH DAILY AT BEDTIME 30 tablet 1   • benzonatate (TESSALON PERLES) 100 mg capsule Take 1 capsule (100 mg total) by mouth 3 (three) times a day as needed for cough (Patient not taking: Reported on 2023) 20 capsule 0   • [DISCONTINUED] clotrimazole 1 % external solution Apply 1 application topically 2 (two) times a day for 28 days Apply to affected nail (Patient not taking: Reported on 2020) 30 mL 0   • [DISCONTINUED] lisinopril (ZESTRIL) 20 mg tablet Take 1 tablet (20 mg total) by mouth daily 30 tablet 0     No current facility-administered medications on file prior to visit       No Known Allergies  Social History     Socioeconomic History   • Marital status:      Spouse name: None   • Number of children: 5   • Years of education: 12   • Highest education level: None   Occupational History   • Occupation: [employer]     Tobacco Use   • Smoking status: Former     Packs/day: 0 25     Years: 24 00     Pack years: 6 00     Types: Cigarettes     Quit date:      Years since quittin 0   • Smokeless tobacco: Never   • Tobacco comments:     Has smoked since age: 16- As per [place]    Vaping Use   • Vaping Use: Never used   Substance and Sexual Activity   • Alcohol use: Not Currently     Alcohol/week: 2 0 standard drinks     Types: 1 Glasses of wine, 1 Shots of liquor per week     Comment: Occasional    • Drug use: Never   • Sexual activity: Yes     Partners: Male   Other Topics Concern   • None   Social History Narrative    ** Merged History Encounter **         · Most recent tobacco use screenin2020      · Do you currently or have you served in the [employer]:   No      · Were you activated, into active duty, as a member of the [employer] or as a Reservist:   No      · Has patient visited an area known to be high risk for 2019 n-CoV:   No      · In the 14 days before symptom onset, did the patient spend time in a high risk country:   No      · If patient spent time in a high risk country - Does the patient live in the high risk country:   No      · Advance directive:   No      · Live alone or with others:   with others      · Exercise level:   Occasional      · Overweight:   Yes      · Obese: Yes      · General stress level:   High      · Diet:   Regular      · Caffeine intake: Moderate      · Guns present in home:   No      · Seat belts used routinely:   No      · Sunscreen used routinely:   Yes     · Smoke alarm in home: Yes      · Legally blind in one or both eyes:   No      · Hard of hearing or deaf in one or both ears:   No lives w roomates     As per [place] [address] Determinants of Health     Financial Resource Strain: Not on file   Food Insecurity: Not on file   Transportation Needs: Not on file   Physical Activity: Not on file   Stress: Not on file   Social Connections: Not on file   Intimate Partner Violence: Not on file   Housing Stability: Not on file        Review of Systems   All other systems reviewed and are negative  Vitals:  Vitals:    01/27/23 1545   BP: 124/70   BP Location: Right arm   Patient Position: Sitting   Cuff Size: Large   Pulse: 78   Temp: 98 4 °F (36 9 °C)   SpO2: 93%   Weight: 100 kg (221 lb)   Height: 5' 2" (1 575 m)     BMI - Body mass index is 40 42 kg/m²  Wt Readings from Last 7 Encounters:   01/27/23 100 kg (221 lb)   01/26/23 98 2 kg (216 lb 9 6 oz)   01/18/23 98 kg (216 lb)   01/11/23 98 kg (216 lb)   12/30/22 98 3 kg (216 lb 12 8 oz)   12/27/22 92 5 kg (204 lb)   11/15/22 92 5 kg (204 lb)       Physical Exam  Vitals and nursing note reviewed  Constitutional:       General: She is not in acute distress  Appearance: Normal appearance  She is well-developed  She is obese  She is not ill-appearing  HENT:      Head: Normocephalic and atraumatic  Nose: No congestion  Eyes:      General: No scleral icterus  Conjunctiva/sclera: Conjunctivae normal    Neck:      Vascular: No carotid bruit or JVD     Cardiovascular: Rate and Rhythm: Normal rate and regular rhythm  Pulses: Normal pulses  Heart sounds: Normal heart sounds  No murmur heard  No friction rub  No gallop  Pulmonary:      Effort: Pulmonary effort is normal  No respiratory distress  Breath sounds: Normal breath sounds  No rales  Abdominal:      General: There is no distension  Palpations: Abdomen is soft  Tenderness: There is no abdominal tenderness  Musculoskeletal:         General: No swelling or tenderness  Cervical back: Neck supple  Right lower leg: Edema present  Left lower leg: Edema present  Comments: 1+ bilateral lower extremity edema (l>R)   Skin:     General: Skin is warm  Neurological:      General: No focal deficit present  Mental Status: She is alert and oriented to person, place, and time  Mental status is at baseline  Psychiatric:         Mood and Affect: Mood normal          Behavior: Behavior normal          Thought Content:  Thought content normal          Labs:  CBC:   Lab Results   Component Value Date    WBC 5 93 12/27/2022    RBC 4 27 12/27/2022    HGB 11 4 (L) 12/27/2022    HCT 34 6 (L) 12/27/2022    MCV 81 (L) 12/27/2022     12/27/2022    RDW 16 7 (H) 12/27/2022       CMP:   Lab Results   Component Value Date    K 3 6 12/27/2022     12/27/2022    CO2 24 12/27/2022    BUN 10 12/27/2022    CREATININE 0 74 12/27/2022    EGFR 90 12/27/2022    CALCIUM 8 8 12/27/2022    AST 22 11/15/2022    ALT 22 11/15/2022    ALKPHOS 106 11/15/2022       Magnesium:  Lab Results   Component Value Date    MG 1 9 02/11/2020       Lipid Profile:   Lab Results   Component Value Date    HDL 53 02/22/2022    TRIG 105 02/22/2022    LDLCALC 83 02/22/2022       Thyroid Studies:   Lab Results   Component Value Date    IFF4RTPRAAVV 0 019 (L) 11/15/2022    T3FREE 2 68 02/22/2022    FREET4 1 02 11/15/2022    T7RZDZC 0 90 11/15/2022       A1c:  No components found for: HGA1C    INR:  Lab Results   Component Value Date    INR 0 98 02/10/2020   5    Imaging: XR chest 2 views    Result Date: 12/27/2022  Narrative: CHEST INDICATION:   cough, fever  COMPARISON:  CXR 2/10/2020  EXAM PERFORMED/VIEWS:  XR CHEST PA & LATERAL FINDINGS: Cardiomediastinal silhouette appears unremarkable  The lungs are clear  No pneumothorax or pleural effusion  Osseous structures appear within normal limits for patient age  Impression: No acute cardiopulmonary disease  Workstation performed: [id number]     VAS lower limb venous duplex study, unilateral/limited    Result Date: 12/27/2022  Narrative:  [facility] REPORT CLINICAL: Indications: Patient c/o swelling of the left leg for several months with a new onset of pain in the last 4 days as well as worsening SOB and a cough  No history of DVT/PE/CA recent surgery or trauma  Operative History: denies cardiovascular surgeries   CONCLUSION: Impression: RIGHT LOWER LIMB LIMITED: Evaluation shows no evidence of thrombus in the common femoral vein  Doppler evaluation shows a normal response to augmentation maneuvers  LEFT LOWER LIMB: No evidence of acute or chronic deep vein thrombosis No evidence of superficial thrombophlebitis noted  Technically difficult/limited study   Technical findings were given to Jacob Merino PA-C immediately following exam   SIGNATURE: Electronically Signed by: Jose Back MD, RPVI on 2022-12-27 04:50:56 PM      Cardiac testing:   No results found for this or any previous visit  No results found for this or any previous visit  No results found for this or any previous visit  No results found for this or any previous visit        Stress strip  Confirmed by Margoth Doran ([id number]),  Lc Genao ([id number]) on 1/12/2023 9:28:14 AM

## 2023-02-05 DIAGNOSIS — E66.9 OBESITY (BMI 30-39.9): ICD-10-CM

## 2023-02-06 RX ORDER — TOPIRAMATE 100 MG/1
TABLET, FILM COATED ORAL
Qty: 30 TABLET | Refills: 1 | Status: SHIPPED | OUTPATIENT
Start: 2023-02-06

## 2023-02-08 ENCOUNTER — TELEPHONE (OUTPATIENT)
Dept: MAMMOGRAPHY | Facility: CLINIC | Age: 58
End: 2023-02-08

## 2023-02-11 ENCOUNTER — HOSPITAL ENCOUNTER (OUTPATIENT)
Dept: MRI IMAGING | Facility: HOSPITAL | Age: 58
Discharge: HOME | End: 2023-02-11

## 2023-02-11 DIAGNOSIS — M54.12 CERVICAL RADICULOPATHY: ICD-10-CM

## 2023-02-13 ENCOUNTER — APPOINTMENT (OUTPATIENT)
Dept: LAB | Facility: HOSPITAL | Age: 58
End: 2023-02-13
Attending: INTERNAL MEDICINE

## 2023-02-13 DIAGNOSIS — E66.01 MORBID OBESITY DUE TO EXCESS CALORIES (HCC): ICD-10-CM

## 2023-02-15 ENCOUNTER — OFFICE VISIT (OUTPATIENT)
Dept: ENDOCRINOLOGY | Facility: CLINIC | Age: 58
End: 2023-02-15

## 2023-02-15 ENCOUNTER — OFFICE VISIT (OUTPATIENT)
Dept: PULMONOLOGY | Facility: CLINIC | Age: 58
End: 2023-02-15

## 2023-02-15 VITALS
DIASTOLIC BLOOD PRESSURE: 74 MMHG | OXYGEN SATURATION: 99 % | BODY MASS INDEX: 40.85 KG/M2 | WEIGHT: 222 LBS | HEIGHT: 62 IN | SYSTOLIC BLOOD PRESSURE: 122 MMHG | TEMPERATURE: 98.1 F | HEART RATE: 81 BPM

## 2023-02-15 VITALS
BODY MASS INDEX: 40.89 KG/M2 | OXYGEN SATURATION: 99 % | DIASTOLIC BLOOD PRESSURE: 74 MMHG | SYSTOLIC BLOOD PRESSURE: 122 MMHG | WEIGHT: 222.2 LBS | HEIGHT: 62 IN | HEART RATE: 81 BPM | TEMPERATURE: 98.1 F

## 2023-02-15 DIAGNOSIS — E55.9 VITAMIN D DEFICIENCY: ICD-10-CM

## 2023-02-15 DIAGNOSIS — E66.01 MORBID OBESITY (HCC): Primary | ICD-10-CM

## 2023-02-15 DIAGNOSIS — E66.09 CLASS 2 OBESITY DUE TO EXCESS CALORIES WITHOUT SERIOUS COMORBIDITY WITH BODY MASS INDEX (BMI) OF 37.0 TO 37.9 IN ADULT: ICD-10-CM

## 2023-02-15 DIAGNOSIS — E04.1 THYROID NODULE: ICD-10-CM

## 2023-02-15 DIAGNOSIS — E66.01 MORBID OBESITY DUE TO EXCESS CALORIES (HCC): ICD-10-CM

## 2023-02-15 DIAGNOSIS — E21.3 HYPERPARATHYROIDISM (HCC): ICD-10-CM

## 2023-02-15 DIAGNOSIS — G47.33 OSA (OBSTRUCTIVE SLEEP APNEA): ICD-10-CM

## 2023-02-15 DIAGNOSIS — E66.01 MORBID OBESITY (HCC): ICD-10-CM

## 2023-02-15 DIAGNOSIS — E05.90 SUBCLINICAL HYPERTHYROIDISM: ICD-10-CM

## 2023-02-15 DIAGNOSIS — G47.33 OSA (OBSTRUCTIVE SLEEP APNEA): Primary | ICD-10-CM

## 2023-02-15 PROBLEM — E66.9 OBESITY (BMI 30-39.9): Status: RESOLVED | Noted: 2020-04-20 | Resolved: 2023-02-15

## 2023-02-15 PROBLEM — E66.812 CLASS 2 OBESITY DUE TO EXCESS CALORIES WITHOUT SERIOUS COMORBIDITY WITH BODY MASS INDEX (BMI) OF 37.0 TO 37.9 IN ADULT: Status: RESOLVED | Noted: 2020-02-10 | Resolved: 2023-02-15

## 2023-02-15 RX ORDER — PEN NEEDLE, DIABETIC 32GX 5/32"
NEEDLE, DISPOSABLE MISCELLANEOUS
Qty: 100 EACH | Refills: 2 | Status: SHIPPED | OUTPATIENT
Start: 2023-02-15

## 2023-02-15 RX ORDER — METFORMIN HYDROCHLORIDE 500 MG/1
1000 TABLET, EXTENDED RELEASE ORAL
Qty: 120 TABLET | Refills: 1 | Status: SHIPPED | OUTPATIENT
Start: 2023-02-15

## 2023-02-15 NOTE — PROGRESS NOTES
Assessment/Plan:    CAMERON (obstructive sleep apnea)  Sai Trejo has had history of snoring and daytime sleepiness and tiredness  Her sleep is interrupted and she wakes up frequently  She has increased frequency of micturition  She has occasional headache  She has had history of hypertension and hypothyroidism as comorbidities    On clinical examination, she was obese and had oropharyngeal crowding  She had an overnight home sleep study which showed mild obstructive sleep apnea with oxygen desaturation  She was subsequently started on auto CPAP with 5 to 15 cm of water  She has started using the auto CPAP but her compliance has not been satisfactory  Her residual AHI is low  She is feeling clinical benefit from CPAP therapy  I have advised her to use the CPAP regularly and adequately  I had a long discussion with her and have answered all her questions        Mild persistent asthma without complication  He has mild persistent asthma and she has some shortness of breath on exertion  She has history of allergies  Her chest was clear to auscultation  She is on albuterol as needed  Essential hypertension  She has a history of hypertension and currently this is controlled with metoprolol and Aldactone  Class 2 obesity due to excess calories without serious comorbidity with body mass index (BMI) of 37 0 to 37 9 in adult  She is obese and understands the need for weight reduction  Diagnoses and all orders for this visit:    CAMERON (obstructive sleep apnea)    Class 2 obesity due to excess calories without serious comorbidity with body mass index (BMI) of 37 0 to 37 9 in adult          Subjective:      Patient ID: Martine Caballero is a 62 y o  female  Mrs Martine Caballero came for follow-up for her obstructive sleep apnea on CPAP therapy  She was diagnosed with mild obstructive sleep apnea with oxygen desaturation and was subsequently started on CPAP therapy    She had excessive daytime sleepiness interrupted sleep and occasional morning headache  She states that her sleepiness has improved remarkably after being on CPAP therapy  I reviewed her CPAP compliance records and they are not satisfactory at this point  She is on auto CPAP with a minimum pressure of 5 and maximum 15 cm of water  Her 95th percentile pressure was 6 9 cm of water  She stated that she had some cough and upper respiratory infection before and was not using CPAP  Of late she is using it more than 4 hours and is getting comfortable with the mask and pressure  Her residual AHI was 1 3  She understands the need for using the CPAP regularly and adequately  She is obese and understands the need for weight reduction  She also has hypertension and has been on treatment with spironolactone and metoprolol  She has allergies and occasional postnasal drip  She has some shortness of breath on exertion and occasional nonproductive cough  The following portions of the patient's history were reviewed and updated as appropriate: allergies, current medications, past family history, past medical history, past social history, past surgical history and problem list     Review of Systems   Constitutional: Positive for fatigue  Negative for appetite change, chills and fever  HENT: Positive for postnasal drip, rhinorrhea and trouble swallowing  Negative for hearing loss, sneezing and sore throat  Eyes: Negative for visual disturbance  Respiratory: Positive for cough and shortness of breath  Negative for chest tightness and wheezing  Cardiovascular: Positive for palpitations  Negative for chest pain and leg swelling  Gastrointestinal: Negative for abdominal pain, constipation, diarrhea, nausea and vomiting  Genitourinary: Negative for dysuria, frequency and urgency  Musculoskeletal: Negative for arthralgias and back pain  Skin: Negative for rash  Allergic/Immunologic: Positive for environmental allergies  Neurological: Positive for headaches  Negative for dizziness, syncope and light-headedness  Psychiatric/Behavioral: Positive for sleep disturbance  Negative for agitation and confusion  The patient is not nervous/anxious  Objective:      /74   Pulse 81   Temp 98 1 °F (36 7 °C)   Ht 5' 2" (1 575 m)   Wt 101 kg (222 lb 3 2 oz)   LMP  (LMP Unknown)   SpO2 99%   BMI 40 64 kg/m²          Physical Exam  Vitals reviewed  Constitutional:       General: She is not in acute distress  Appearance: She is obese  She is not ill-appearing, toxic-appearing or diaphoretic  HENT:      Head: Normocephalic  Mouth/Throat:      Mouth: Mucous membranes are moist       Pharynx: Oropharynx is clear  Comments: Oropharyngeal crowding  Eyes:      General: No scleral icterus  Conjunctiva/sclera: Conjunctivae normal    Cardiovascular:      Rate and Rhythm: Normal rate  Heart sounds: Normal heart sounds  No murmur heard  Pulmonary:      Effort: Pulmonary effort is normal  No respiratory distress  Breath sounds: Normal breath sounds  No stridor  No wheezing, rhonchi or rales  Chest:      Chest wall: No tenderness  Abdominal:      General: Bowel sounds are normal       Tenderness: There is no abdominal tenderness  There is no guarding  Musculoskeletal:      Cervical back: No rigidity  Right lower leg: No edema  Left lower leg: No edema  Lymphadenopathy:      Cervical: No cervical adenopathy  Skin:     Coloration: Skin is not jaundiced or pale  Findings: No rash  Neurological:      Mental Status: She is alert and oriented to person, place, and time  Gait: Gait normal    Psychiatric:         Mood and Affect: Mood normal          Behavior: Behavior normal          Thought Content:  Thought content normal          Judgment: Judgment normal

## 2023-02-15 NOTE — ASSESSMENT & PLAN NOTE
He has mild persistent asthma and she has some shortness of breath on exertion  She has history of allergies  Her chest was clear to auscultation  She is on albuterol as needed

## 2023-02-15 NOTE — ASSESSMENT & PLAN NOTE
Sai Trejo has had history of snoring and daytime sleepiness and tiredness  Her sleep is interrupted and she wakes up frequently  She has increased frequency of micturition  She has occasional headache  She has had history of hypertension and hypothyroidism as comorbidities    On clinical examination, she was obese and had oropharyngeal crowding  She had an overnight home sleep study which showed mild obstructive sleep apnea with oxygen desaturation  She was subsequently started on auto CPAP with 5 to 15 cm of water  She has started using the auto CPAP but her compliance has not been satisfactory  Her residual AHI is low  She is feeling clinical benefit from CPAP therapy  I have advised her to use the CPAP regularly and adequately    I had a long discussion with her and have answered all her questions

## 2023-02-15 NOTE — PATIENT INSTRUCTIONS
Please take vitamin D3 5000 IU daily over-the-counter  Will need to repeat ultrasound thyroid next year to monitor the nodules  Please start [facility] medical fitness training program referral was sent  Instructions  Diet:   Take 1500 waldo/day of balanced diet with 1/3rd carbs, 1/3rd protein and rest fiber and small amount fat  Try to include fasting for 12-16hrs -early dinner and late breakfast   Drink at least 64 oz fluids daily  Lifestyle:   30 min brisk activity most days  Join [facility] medical fitness training to build muscles and burn fat  Medical fitness: follow these exercise links  Full Version - [email or web address]     Warmup - [email or web address]     Lower Body - [email or web address]     Upper Body - [email or web address]     Core -  [email or web address]  [email or web address]    Medications: look up Wegovy, Tirzepatide, Victoza - weight loss meds  Consider switching from medications that cause weight gain to weight neutral medications  Other:   Make sure you are treated appropriately if you have sleep apnea  We may consider bariatric surgery if we dont see benefit over 6-12 months  Semaglutide (By injection)   Semaglutide ([id number]-tide)  Treats type 2 diabetes  Lowers the risk of heart attack, stroke, or death in patients with type 2 diabetes and heart or blood vessel disease  Also used to help lose weight and keep the weight off in patients with obesity caused by certain conditions  Brand Name(s): Ozempic 0 25 MG or 0 5 MG Doses, Ozempic 1 MG Doses, Wegovy   There may be other brand names for this medicine  When This Medicine Should Not Be Used: This medicine is not right for everyone  Do not use it if you had an allergic reaction to semaglutide, or if you have multiple endocrine neoplasia syndrome type 2 (MEN 2) or if you or anyone in your family has had medullary thyroid cancer    How to Use This Medicine:   Injectable  Your doctor will prescribe your exact dose and tell you how often it should be given  This medicine is given as a shot under your skin  It is given into your stomach, thigh, or upper arm  You may be taught how to give your medicine at home  Make sure you understand all instructions before giving yourself an injection  Do not use more medicine or use it more often than your doctor tells you to  If you use insulin in addition to this medicine, do not mix them into the same syringe  You may give the shots in the same area (including your stomach), but do not give the shots right next to each other  Check the liquid in the pen  It should be clear and colorless  Do not use it if it is cloudy, discolored, or has particles in it  You will be shown the body areas where this shot can be given  Use a different body area each time you give yourself a shot  Keep track of where you give each shot to make sure you rotate body areas  Use a new needle and syringe each time you inject your medicine  Never share medicine pens with others under any circumstances  Sharing needles or pens can result in transmission of infection  This medicine should come with a Medication Guide  Ask your pharmacist for a copy if you do not have one  Missed dose:   Ozempic®: If you miss a dose of this medicine, use it as soon as possible within 5 days after the missed dose  If you miss a dose for more than 5 days, skip the missed dose and go back to your regular dosing schedule  Wegovy™: If you miss a dose, and the next scheduled dose is more than 2 days away, use it as soon as possible  If you miss a dos, and the next scheduled dose is less than 2 days away, skip the missed dose and go back to your regular dosing schedule  If you miss a dose of this medicine for more than 2 weeks, use it on the next scheduled dose  Ask your doctor about how to restart your treatment    Store your new, unused medicine pen in its original carton in the refrigerator  Do not freeze  You may store the opened Ozempic® pen in the refrigerator or at room temperature for 56 days or the opened [facility] pen in the refrigerator or at room temperature for 28 days  Throw away the pen after you use it for 56 days for Ozempic® or 28 days for [facility], even if it still has medicine in it  Drugs and Foods to Avoid:   Ask your doctor or pharmacist before using any other medicine, including over-the-counter medicines, vitamins, and herbal products  Some medicines may affect how semaglutide works  Tell your doctor if you are using other diabetes medicine (including glimepiride, glipizide, glyburide, or insulin) or any oral medicine  Warnings While Using This Medicine:   Tell your doctor if you are pregnant or planning to become pregnant  Do not use this medicine for at least 2 months before you plan to become pregnant  Tell your doctor if you are breastfeeding, or if you have kidney disease, pancreas problems, diabetes, digestion problems, or a history of diabetic retinopathy or depression  This medicine may cause the following problems:  Increased risk of thyroid tumor  Pancreatitis (swelling of the pancreas)  Gallbladder problems  Low blood sugar (when used with other diabetes medicine)  Kidney problems  Eye or vision problems, including diabetic retinopathy  Increased heart rate  Increased risk for depression or thoughts of suicide  Your doctor will do lab tests at regular visits to check on the effects of this medicine  Keep all appointments  Keep all medicine out of the reach of children  Never share your medicine with anyone  Possible Side Effects While Using This Medicine:   Call your doctor right away if you notice any of these side effects:   Allergic reaction: Itching or hives, swelling in your face or hands, swelling or tingling in your mouth or throat, chest tightness, trouble breathing  Blurred vision or any other change in vision  Change in how much or how often you urinate, lower back or side pain, blood in your urine  Shaking, trembling, sweating, fast or pounding heartbeat, lightheadedness, hunger, confusion  Sudden and severe stomach pain, nausea, vomiting, fever, yellow eyes or skin  Unusual moods or behaviors, depression, thoughts of hurting yourself or others  If you notice these less serious side effects, talk with your doctor:   Constipation, diarrhea, passing gas, stomach upset or bloating  Headache, dizziness  Redness, itching, bump, swelling, or any changes in your skin where the shot was given  Tiredness  If you notice other side effects that you think are caused by this medicine, tell your doctor  Call your doctor for medical advice about side effects  You may report side effects to FDA at 4-924-FDA-8095    © Copyright [employer] 2022 Information is for End User's use only and may not be sold, redistributed or otherwise used for commercial purposes  The above information is an  only  It is not intended as medical advice for individual conditions or treatments  Talk to your doctor, nurse or pharmacist before following any medical regimen to see if it is safe and effective for you

## 2023-02-15 NOTE — PROGRESS NOTES
Follow-up Patient Progress Note      CC: hypothyroidism     History of Present Illness:   64 yr female with HTN, HLD, perimenopausal symptoms, morbid obesity BMI 40, 1 episode of nephrolithiasis s/p laparoscopic extraction 2020, bilateral thyroid nodules associated with subclinical hyperthyroidism and high normal PTH  Last visit was 8/1/22      Since last visit, she gained 16 lbs      No History of external radiation, recent Iodine loading or radiological diagnostic studies  No difficulty with swallowing or breathing or change in voice      Thyroid nodule Hx: ENAMORADO uptake - 2018 and 2/3/20 nml uptake at 6 hrs and 24 hrs but potential hot nodules noted   US thyroid 2013 showed multiple sub-centimeter thyroid nodules bilaterally but only 1 nodule 1cm rt side    Prior TSI was negative        Patient Active Problem List   Diagnosis   • Mild persistent asthma without complication   • Thyroid nodule   • Subclinical hyperthyroidism   • Vitamin D deficiency   • Essential hypertension   • Kidney stone   • Ascorbic acid deficiency   • Iron deficiency   • Helicobacter pylori gastrointestinal tract infection   • JANE (generalized anxiety disorder)   • Prediabetes   • Pyridoxine deficiency   • Reactive airway disease with acute exacerbation   • Onychomycosis of toenail   • Acute stress disorder   • Depression, recurrent (HCC)   • Primary osteoarthritis of right knee   • High serum parathyroid hormone (PTH)   • Menorrhagia with regular cycle   • Iron deficiency anemia   • Vertigo   • COVID   • COVID-19   • CAMERON (obstructive sleep apnea)   • Hyperparathyroidism (HCC)   • Candidal intertrigo   • Cervical radiculopathy   • Bronchitis with bronchospasm   • Lower extremity edema     Past Medical History:   Diagnosis Date   • 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   • Asthma due to seasonal allergies 9/25/2020   • Depression    • Essential hypertension 4/20/2020   • JANE (generalized anxiety disorder) 8/31/0909   • Helicobacter pylori gastrointestinal tract infection 4/20/2020    10/02/2017=egd 9/2017 Dr Vel Cheema   • History of Holter monitoring 12/22/2017    The baseline rhythm was of sinus origin with an average heart rate of 90 bpm (range: 72 - 129 bpm)  There were rare single APCs adn VPCs (less than 0 1 % total beats)  There were no sustained cardiac dysrhythmais  There were no significant symptomatic pauses  • History of stress test 01/05/2018    Mildly abnormal exercise Myoview SPECT study with evidence of a fixed anterior defect  This is most likely related to breast attenuation artifact  There was no evidence of reversible myocardial ischemia  Gated wall motion analysis was normal with well preserved systolic function  The left ventricle was normal in size with calculated EF of 73%  No prior similar study is available for comparison      • Hypertension    • Immunization deficiency 4/20/2020    Hep a/b/mumps nonimmune   • Immunization deficiency 9/25/2020    Hep a/b/mmr nonimmune 6/19/2020, 9/25/2020, 2/2021=hep a/b   • Iron deficiency 4/20/2020   • Kidney stone 4/20/2020   • Need for pneumococcal vaccination 6/4/2021   • Need for shingles vaccine 6/4/2021   • Obesity (BMI 30-39 9) 4/20/2020    39 4, start wt 219 lb=goal 150 lb   • Onychomycosis of toenail 6/19/2020   • Prediabetes 4/20/2020    5 8   • 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    5   • Reactive airway disease with acute exacerbation 5/20/2020   • Subclinical hyperthyroidism 4/20/2020   • Subclinical hyperthyroidism 9/25/2020   • Thyroid nodule 9/25/2020   • Vitamin D deficiency 4/20/2020    33      Past Surgical History:   Procedure Laterality Date   • COLONOSCOPY  09/08/2017    3 polyps=next 2020 w Dr Vel Cheema   • EGD  09/01/2017    egd biopsy    • KNEE ARTHROSCOPY W/ ACL RECONSTRUCTION     • KNEE SURGERY      torn mcl, acl, meniscus left knee 2016   • TUBAL LIGATION        Family History   Problem Relation Age of Onset   • Diabetes Father    • Colon cancer Maternal Aunt    • Cancer Maternal Uncle    • Kidney disease Maternal Uncle      Social History     Tobacco Use   • Smoking status: Former     Packs/day: 0 25     Years: 24 00     Pack years: 6 00     Types: Cigarettes     Quit date:      Years since quittin 1   • Smokeless tobacco: Never   • Tobacco comments:     Has smoked since age: 16- As per [place]    Substance Use Topics   • Alcohol use: Not Currently     Alcohol/week: 2 0 standard drinks     Types: 1 Glasses of wine, 1 Shots of liquor per week     Comment: Occasional      No Known Allergies      Current Outpatient Medications:   •  albuterol (Proventil HFA) 90 mcg/act inhaler, Inhale 2 puffs every 4 (four) hours as needed for wheezing or shortness of breath, Disp: 18 g, Rfl: 1  •  benzonatate (TESSALON PERLES) 100 mg capsule, Take 1 capsule (100 mg total) by mouth 3 (three) times a day as needed for cough (Patient not taking: Reported on 2023), Disp: 20 capsule, Rfl: 0  •  chlorthalidone (HYGROTEN) 50 MG tablet, Take 1 tablet (50 mg total) by mouth daily, Disp: 30 tablet, Rfl: 2  •  clotrimazole-betamethasone (LOTRISONE) 1-0 05 % cream, Apply topically 2 (two) times a day, Disp: 45 g, Rfl: 3  •  ipratropium (ATROVENT) 0 06 % nasal spray, 2 sprays into each nostril 4 (four) times a day, Disp: 15 mL, Rfl: 1  •  metFORMIN (GLUCOPHAGE-XR) 500 mg 24 hr tablet, TAKE 2 TABLETS BY MOUTH DAILY WITH DINNER, Disp: 60 tablet, Rfl: 3  •  metoprolol succinate (TOPROL-XL) 50 mg 24 hr tablet, TAKE 1 AND 1/2 TABLETS BY MOUTH DAILY, Disp: 135 tablet, Rfl: 1  •  norethindrone (MICRONOR) 0 35 MG tablet, TAKE 1 TABLET BY MOUTH EVERY DAY, Disp: 28 tablet, Rfl: 5  •  nystatin (MYCOSTATIN) powder, Apply topically 2 (two) times a day To moist area, Disp: 60 g, Rfl: 3  •  ondansetron (Zofran ODT) 4 mg disintegrating tablet, Take 1 tablet (4 mg total) by mouth every 6 (six) hours as needed for nausea or vomiting, Disp: 20 tablet, Rfl: 1  •  spironolactone (ALDACTONE) 25 mg tablet, Take 1 tablet (25 mg total) by mouth daily, Disp: 30 tablet, Rfl: 2  •  topiramate (TOPAMAX) 100 mg tablet, TAKE 1 TABLET BY MOUTH EVERYDAY AT BEDTIME, Disp: 30 tablet, Rfl: 1    Review of Systems   HENT: Negative  Eyes: Negative  Respiratory: Negative  Cardiovascular: Negative  Gastrointestinal: Negative  Endocrine: Negative  Musculoskeletal: Negative  Skin: Negative  Allergic/Immunologic: Negative  Neurological: Negative  Hematological: Negative  Psychiatric/Behavioral: Negative  Physical Exam:  Body mass index is 40 6 kg/m²  /74   Pulse 81   Temp 98 1 °F (36 7 °C) (Tympanic)   Ht 5' 2" (1 575 m)   Wt 101 kg (222 lb)   LMP  (LMP Unknown)   SpO2 99%   BMI 40 60 kg/m²    Vitals:    02/15/23 1027   Weight: 101 kg (222 lb)        Physical Exam  Constitutional:       General: She is not in acute distress  Appearance: She is well-developed  She is not ill-appearing  HENT:      Head: Normocephalic and atraumatic  Nose: Nose normal       Mouth/Throat:      Pharynx: Oropharynx is clear  Eyes:      Extraocular Movements: Extraocular movements intact  Conjunctiva/sclera: Conjunctivae normal    Neck:      Thyroid: No thyromegaly  Cardiovascular:      Rate and Rhythm: Normal rate  Pulmonary:      Effort: Pulmonary effort is normal    Musculoskeletal:         General: No deformity  Cervical back: Normal range of motion  Skin:     Capillary Refill: Capillary refill takes less than 2 seconds  Coloration: Skin is not pale  Findings: No rash  Neurological:      Mental Status: She is alert and oriented to person, place, and time     Psychiatric:         Behavior: Behavior normal          Labs:   Lab Results   Component Value Date    HGBA1C 5 5 02/22/2022       Lab Results   Component Value Date    XKJ7YUBTAMXM 0 019 (L) 11/15/2022    F8ULBEB 0 90 11/15/2022       Lab Results   Component Value Date    CREATININE 0 74 12/27/2022    CREATININE 0 92 11/15/2022 CREATININE 0 97 07/16/2022    BUN 10 12/27/2022    K 3 6 12/27/2022     12/27/2022    CO2 24 12/27/2022     eGFR   Date Value Ref Range Status   12/27/2022 90 ml/min/1 73sq m Final       Lab Results   Component Value Date    ALT 22 11/15/2022    AST 22 11/15/2022    ALKPHOS 106 11/15/2022       Lab Results   Component Value Date    CHOLESTEROL 157 02/22/2022    CHOLESTEROL 151 03/24/2020    CHOLESTEROL 141 02/11/2020     Lab Results   Component Value Date    HDL 53 02/22/2022    HDL 49 (L) 03/24/2020    HDL 50 02/11/2020     Lab Results   Component Value Date    TRIG 105 02/22/2022    TRIG 107 03/24/2020    TRIG 140 02/11/2020     Lab Results   Component Value Date    [place] 91 02/11/2020         Impression:  1  Morbid obesity ([facility] [address] )    2  Thyroid nodule    3  Hyperparathyroidism ([facility] )    4  Subclinical hyperthyroidism    5  CAMERON (obstructive sleep apnea)    6  Vitamin D deficiency         Plan:    Diagnoses and all orders for this visit:    [facility] )  Today we discussed all aspects of obesity and metabolism including pathophysiology, risk factors, complications, goal of sustaining weight loss long term, usual propensity to regain weight with short term approaches, follow up needs and medications - efficacy and limitations  Briefly discussed bariatric surgery  Diet: carb controlled diet <1500cal/day/ VLCD, 64oz fluids/day   lifestyle modifications: intermittent fasting and 10,000 steps/day  medical fitness training: muscle building  education: nutrition input    Today she was counseled again as above  We agreed to start a GLP-1 agonist   Over the next few weeks, we will continue to escalate dose  Advised to join [facility] medical fitness training program   Referral sent    She is on metformin for prediabetes  Advised to continue this  Follow-up in 3 months with goal weight 200 lbs  -     tirzepatide (Mounjaro) 2 5 MG/0 5ML;  Inject 0 5 mL (2 5 mg total) under the skin once a week  - Hemoglobin A1C; Future  -     Insulin Pen Needle (BD Pen Needle Jaymie U/F) 32G X 4 MM MISC; Use daily as needed with insulin pen  -     T4, free; Future  -     TSH, 3rd generation; Future  -     metFORMIN (GLUCOPHAGE-XR) 500 mg 24 hr tablet; Take 2 tablets (1,000 mg total) by mouth daily with dinner    Thyroid nodule  She has 1 cm right sided thyroid nodule associated with subclinical hyperthyroidism  She is asymptomatic  Today we discussed options including a nuclear uptake scan but we agreed to first repeat thyroid functions and review at next visit  Hyperparathyroidism ([address] )  PTH is high normal in association with improved vitamin D intake  Advised to take 2-3 portions of calcium via diet and vitamin D3 5000 IU OTC  We will retest calcium and PTH next year  Subclinical hyperthyroidism    CAMERON (obstructive sleep apnea)  Written information was provided  She might benefit from weight loss  She is already following with Dr Vandana Hernandez pulmonologist     Vitamin D deficiency        I have spent 42 minutes with patient today in which greater than 50% of this time was spent in counseling/coordination of care of multiple complex medical issues and their interplay from a metabolic standpoint  Discussed with the patient and all questioned fully answered  She will call me if any problems arise  Educated/ Counseled patient on diagnostic test results, prognosis, risk vs benefit of treatment options, importance of treatment compliance, healthy life and lifestyle choices        [address]

## 2023-02-15 NOTE — LETTER
Medical Fitness Referral    Patient: Dalia Salinas  : 1965  MRN: [id number]  Address: [address] [place] [address]  Phone Number: [phone]  E-mail: [email or web address]    [] Medical Disorders (Ex  Diabetes)   [] Cardiovascular Disorders (Ex  Hypertension)   [] Pulmonary Disorders (Ex  Asthma)   [] Cancer Disorders (Ex  Breast, Prostate)   [] Immunological & Hematological Disorders (Rheumatoid Arthritis)   [] Neurological Disorders (Ex  Parkinson's Disease)   [] Cognitive Disorders (Ex  Dementia)   [] Children & Adolescents (Ex  BMI)   [] Older Adults (Ex  Balance & Ambulation)   [] Female Specific Disorders (Ex  Osteoporosis)       Diagnoses:   1  Morbid obesity (HCC)  tirzepatide (Mounjaro) 2 5 MG/0 5ML    Hemoglobin A1C    Insulin Pen Needle (BD Pen Needle Jaymie U/F) 32G X 4 MM MISC      2  Thyroid nodule        3  Hyperparathyroidism ([address] )        4  Subclinical hyperthyroidism        5  CAMERON (obstructive sleep apnea)        6  Vitamin D deficiency        7   Morbid obesity due to excess calories (HCC)  metFORMIN (GLUCOPHAGE-XR) 500 mg 24 hr tablet        Additional Comments:    Service Requested:  [x] Medical Fitness Consult- Screening For Appropriateness of Medical Fitness Program   [x] Medical Fitness Program- Assessment of Body Composition, Aerobic, Anaerobic, Muscle Strength & Endurance, Flexibility, Neuromuscular & Functional Fitness   [x] Medical Fitness Assessment- Individualized Evidence-Based Exercise Program       Requesting Provider: Fatoumata Rangel MD  Date: 02/15/23

## 2023-02-22 ENCOUNTER — TELEPHONE (OUTPATIENT)
Dept: MAMMOGRAPHY | Facility: CLINIC | Age: 58
End: 2023-02-22

## 2023-02-25 LAB
CORTIS BS SAL-MCNC: 0.06 UG/DL
CORTIS SP1 P CHAL SAL-MCNC: 0.03 UG/DL
CORTIS SP2 P CHAL SAL-MCNC: 0.11 UG/DL

## 2023-03-30 PROBLEM — B35.1 ONYCHOMYCOSIS OF TOENAIL: Status: RESOLVED | Noted: 2020-06-19 | Resolved: 2023-03-30

## 2023-03-30 NOTE — PROGRESS NOTES
"Assessment/Plan:    No problem-specific Assessment & Plan notes found for this encounter  Problem List Items Addressed This Visit    None  Visit Diagnoses     Well woman exam    -  Primary    DUB (dysfunctional uterine bleeding)        Relevant Medications    norethindrone (MICRONOR) 0 35 MG tablet          Pap deferred due to guidelines  Mammo scheduled  Will continue pills for now  We reviewed AVEL and will plan changes I advised  If no improvement, will consider pelvic floor PT consult  Subjective:      Patient ID: Dima Nolen is a 62 y o  female  Pt was seen last year due to menorrhagia  She was started on Progesterone only pills and has done well with it  If pt misses she will have light bleeding  No bleeding otherwise  We reviewed options to stop her pills and see when occurs with her menses or continue on them for another year as she is doing well and had recent BTB approx 1 month ago  Pt desires to continue on them for now  Will reassess next year at [facility]  Pt denies any problematic VMS  Pt is not currently sexually active  She declines STD work up  She does note that she has had some AVEL more recently  Advised bladder irritants to avoid, including but not limited to alcohol, caffeine, tobacco and acidic foods/beverasges, advised timed bladder emptying, weight loss and kegels  If no improvement will consider pelvic floor PT consult  The following portions of the patient's history were reviewed and updated as appropriate: allergies, current medications, past family history, past medical history, past social history, past surgical history and problem list     Review of Systems   Constitutional: Negative  Respiratory: Negative  Endocrine: Negative  Genitourinary: Positive for enuresis  Psychiatric/Behavioral: Negative            Objective:      /74 (BP Location: Left arm, Patient Position: Sitting, Cuff Size: Large)   Ht 5' 2\" (1 575 m)   Wt 99 3 kg (219 " lb)   LMP  (LMP Unknown)   BMI 40 06 kg/m²          Physical Exam  Vitals reviewed  Constitutional:       Appearance: She is normal weight  HENT:      Head: Normocephalic and atraumatic  Cardiovascular:      Rate and Rhythm: Normal rate and regular rhythm  Pulmonary:      Effort: Pulmonary effort is normal       Breath sounds: Normal breath sounds  Chest:   Breasts:     Breasts are symmetrical       Right: No swelling, bleeding, inverted nipple, mass, nipple discharge, skin change or tenderness  Left: No swelling, bleeding, inverted nipple, mass, nipple discharge, skin change or tenderness  Abdominal:      General: Abdomen is flat  Bowel sounds are normal       Palpations: Abdomen is soft  Tenderness: There is no abdominal tenderness  There is no right CVA tenderness, left CVA tenderness or guarding  Genitourinary:     General: Normal vulva  Pubic Area: No rash  Labia:         Right: No rash, tenderness, lesion or injury  Left: No rash, tenderness, lesion or injury  Urethra: No prolapse, urethral pain, urethral swelling or urethral lesion  Vagina: Normal  No signs of injury and foreign body  No vaginal discharge or erythema  Cervix: Normal       Uterus: Normal        Adnexa: Right adnexa normal and left adnexa normal    Musculoskeletal:      Cervical back: Neck supple  Lymphadenopathy:      Upper Body:      Right upper body: No axillary adenopathy  Left upper body: No axillary adenopathy  Skin:     General: Skin is warm and dry  Neurological:      Mental Status: She is alert and oriented to person, place, and time  Psychiatric:         Mood and Affect: Mood normal          Behavior: Behavior normal          Thought Content:  Thought content normal          Judgment: Judgment normal

## 2023-03-31 ENCOUNTER — OFFICE VISIT (OUTPATIENT)
Dept: CARDIOLOGY CLINIC | Facility: CLINIC | Age: 58
End: 2023-03-31

## 2023-03-31 VITALS
DIASTOLIC BLOOD PRESSURE: 70 MMHG | BODY MASS INDEX: 39.38 KG/M2 | SYSTOLIC BLOOD PRESSURE: 110 MMHG | HEIGHT: 62 IN | OXYGEN SATURATION: 97 % | WEIGHT: 214 LBS | HEART RATE: 84 BPM

## 2023-03-31 DIAGNOSIS — R60.0 BILATERAL LOWER EXTREMITY EDEMA: ICD-10-CM

## 2023-03-31 DIAGNOSIS — I10 ESSENTIAL HYPERTENSION: Primary | ICD-10-CM

## 2023-03-31 DIAGNOSIS — I87.2 VENOUS INSUFFICIENCY: ICD-10-CM

## 2023-03-31 DIAGNOSIS — E66.09 CLASS 2 OBESITY DUE TO EXCESS CALORIES WITHOUT SERIOUS COMORBIDITY WITH BODY MASS INDEX (BMI) OF 39.0 TO 39.9 IN ADULT: ICD-10-CM

## 2023-03-31 NOTE — PROGRESS NOTES
[facility] [address] [address]  Phone#  [phone]  Fax#  [phone]                                               Cardiology Office Follow up  Ace Flaherty, 62 y o  female  YOB: 1965  MRN: [id number] Encounter: [id number]      PCP - No primary care provider on file  Referring Provider - No ref  provider found    Chief Complaint   Patient presents with   • Follow-up       Assessment  Bilateral lower extremity edema   Exercise ECG stress - 3/2020 - did 7:30 min, negative for ischemia  Exercise stress echo - 1/11/23 - 7:31 min, 9 3 METS, 100% MPHR, stress ECg & echo negative for ischemia  EF 65%, normal PASP, IVC normal  Hypertension  Obesity, Body mass index is 39 14 kg/m²     Hyperthyroidism  CAMERON    Plan  Bilateral lower extremity edema, Shortness of breath  Overview  Initially presented with B/L lower extremity edema and progressive shortness of breath, associated with weight gain  She did not have evaluation or any other signs of volume overload, it was felt to be mainly venous insufficiency related to weight gain  Stress echo subsequently was negative for ischemia, and PASP was normal  Weight had trended up at that time (195 lbs in 6/2022 --> 216 lbs in 12/2022 --> 221 lbs in 1/2023) --> now down to 214 lbs in 3/2023  Impression  Appears to be predominantly related to venous insufficiency  Weight gain related to inactivity (from inability to exercise due to knee pain)  Plan  Use Compression stockings  She appears to have had some improvement in weight since making the adjustments to the medications with stopping amlodipine and taking higher doses of chlorthalidone with spironolactone   Edema is overall improved as well  She did not get the follow-up BMP that was requested after the medication change  Emphasized need to complete BMP follow up with ongoing diuretic use  Continue chlorthalidone 50 mg daily, spironolactone 25 mg daily for now    Hypertension  Well controlled, 110/70  Continue chlorthalidone 50 mg daily, spironolactone 25 mg daily    Obesity, Body mass index is 39 14 kg/m²  Weight has continued to trend up (195 lbs in 6/2022 --> 216 lbs in 12/2022 --> 221 lbs in 1/2023)  mainly from dietary indiscretions and inactivity  Her knee pain is limiting for exercise  She is now more active at work  Low-carb, low-fat, high-fiber diet suggested  Started on ozempic by Dr Joy, and doing well with same  Again emphasized need to try and do more walking - 30 min x 5 days/week    Hyperthyroidism, Thyroid nodules, Elevated PTH  TSH severely reduced, but normal free T4 and T3  Follow up TSH , free T4 pending  Follow up with Dr Joy    No results found for this visit on 03/31/23  Orders Placed This Encounter   Procedures   • Compression Stocking   • Comprehensive metabolic panel     Return in about 6 months (around 9/30/2023), or if symptoms worsen or fail to improve  History of Present Illness   62 y o  female, who works as an  doing desk work for [employer], comes in as a new patient for consultation regarding uncontrolled hypertension and increased lower extremity edema  She previously used to follow with cardiologist Dr Juan Diego Akers, and is now establishing care with us  Today she reports having increased lower extremity edema, predominantly in the left leg  This has been ongoing and worsening over the last several months  There is no associated symptoms of shortness of breath, orthopnea or PND  She does report significant discomfort in the left anterior leg, as well as numbness in the left hand - occurring intermittently  She underwent lower extremity duplex on 12/27/2022, which was negative for DVT  She has no known prior history of coronary artery disease or heart failure  No complaints of chest pain       She does have a longstanding history of intermittent palpitations, and has been on metoprolol succinate for several years  She has been doing well with this and has not had any recent issues with palpitations  Interval history - 1/27/2023  She comes back for follow-up after about 1 month  In the interim, she has made the adjustments to her antihypertensive therapy and has been monitoring her blood pressure at home, and is doing well  She completed the stress echocardiogram, which did not show any significant ischemia or volume overload  Interval history - 3/31/2023  He comes in for follow-up after about 2 months  In the interim she has made adjustments to her diuretics and has been doing well  She was started on Ozempic by Dr Prabhjot Savage, and appears to have lost 7 pounds over the last 6 weeks  Her edema is improved as well  Historical Information   Past Medical History:   Diagnosis Date   • 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   • Asthma due to seasonal allergies 9/25/2020   • Depression    • Essential hypertension 4/20/2020   • JANE (generalized anxiety disorder) 1/85/1286   • Helicobacter pylori gastrointestinal tract infection 4/20/2020    10/02/2017=egd 9/2017 Dr Ariana Sandoval   • History of Holter monitoring 12/22/2017    The baseline rhythm was of sinus origin with an average heart rate of 90 bpm (range: 72 - 129 bpm)  There were rare single APCs adn VPCs (less than 0 1 % total beats)  There were no sustained cardiac dysrhythmais  There were no significant symptomatic pauses  • History of stress test 01/05/2018    Mildly abnormal exercise Myoview SPECT study with evidence of a fixed anterior defect  This is most likely related to breast attenuation artifact  There was no evidence of reversible myocardial ischemia  Gated wall motion analysis was normal with well preserved systolic function  The left ventricle was normal in size with calculated EF of 73%  No prior similar study is available for comparison      • Hypertension    • Immunization deficiency 4/20/2020    Hep a/b/mumps nonimmune   • Immunization deficiency 9/25/2020    Hep a/b/mmr nonimmune 6/19/2020, 9/25/2020, 2/2021=hep a/b   • Iron deficiency 4/20/2020   • Kidney stone 4/20/2020   • Need for pneumococcal vaccination 6/4/2021   • Need for shingles vaccine 6/4/2021   • Obesity (BMI 30-39 9) 4/20/2020    39 4, start wt 219 lb=goal 150 lb   • Onychomycosis of toenail 6/19/2020   • Prediabetes 4/20/2020    5 8   • 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    5   • Reactive airway disease with acute exacerbation 5/20/2020   • Subclinical hyperthyroidism 4/20/2020   • Subclinical hyperthyroidism 9/25/2020   • Thyroid nodule 9/25/2020   • Vitamin D deficiency 4/20/2020    33     Past Surgical History:   Procedure Laterality Date   • COLONOSCOPY  09/08/2017    3 polyps=next 2020 w Dr Morales Master   • EGD  09/01/2017    egd biopsy    • KNEE ARTHROSCOPY W/ ACL RECONSTRUCTION     • KNEE SURGERY      torn mcl, acl, meniscus left knee 2016   • TUBAL LIGATION       Family History   Problem Relation Age of Onset   • Diabetes Father    • Colon cancer Maternal Aunt    • Cancer Maternal Uncle    • Kidney disease Maternal Uncle      Current Outpatient Medications on File Prior to Visit   Medication Sig Dispense Refill   • albuterol (Proventil HFA) 90 mcg/act inhaler Inhale 2 puffs every 4 (four) hours as needed for wheezing or shortness of breath 18 g 1   • chlorthalidone (HYGROTEN) 50 MG tablet Take 1 tablet (50 mg total) by mouth daily 30 tablet 2   • clotrimazole-betamethasone (LOTRISONE) 1-0 05 % cream Apply topically 2 (two) times a day 45 g 3   • Insulin Pen Needle (BD Pen Needle Jaymie U/F) 32G X 4 MM MISC Use daily as needed with insulin pen 100 each 2   • ipratropium (ATROVENT) 0 06 % nasal spray 2 sprays into each nostril 4 (four) times a day 15 mL 1   • metFORMIN (GLUCOPHAGE-XR) 500 mg 24 hr tablet Take 2 tablets (1,000 mg total) by mouth daily with dinner 120 tablet 1   • metoprolol succinate (TOPROL-XL) 50 mg 24 hr tablet TAKE 1 AND 1/2 TABLETS BY MOUTH DAILY 135 tablet 1   • norethindrone (MICRONOR) 0 35 MG tablet TAKE 1 TABLET BY MOUTH EVERY DAY 28 tablet 5   • nystatin (MYCOSTATIN) powder Apply topically 2 (two) times a day To moist area 60 g 3   • ondansetron (Zofran ODT) 4 mg disintegrating tablet Take 1 tablet (4 mg total) by mouth every 6 (six) hours as needed for nausea or vomiting 20 tablet 1   • semaglutide, 0 25 or 0 5 mg/dose, (Ozempic, 0 25 or 0 5 MG/DOSE,) 2 mg/1 5 mL injection pen Use 0 25 mg weekly for 4 weeks then 0 5 mg/week 3 mL 0   • spironolactone (ALDACTONE) 25 mg tablet Take 1 tablet (25 mg total) by mouth daily 30 tablet 2   • topiramate (TOPAMAX) 100 mg tablet TAKE 1 TABLET BY MOUTH EVERYDAY AT BEDTIME 30 tablet 1   • [DISCONTINUED] clotrimazole 1 % external solution Apply 1 application topically 2 (two) times a day for 28 days Apply to affected nail (Patient not taking: Reported on 2020) 30 mL 0   • [DISCONTINUED] lisinopril (ZESTRIL) 20 mg tablet Take 1 tablet (20 mg total) by mouth daily 30 tablet 0     No current facility-administered medications on file prior to visit       No Known Allergies  Social History     Socioeconomic History   • Marital status:      Spouse name: None   • Number of children: 5   • Years of education: 12   • Highest education level: None   Occupational History   • Occupation: [employer]     Tobacco Use   • Smoking status: Former     Packs/day: 0 25     Years: 24 00     Pack years: 6 00     Types: Cigarettes     Quit date:      Years since quittin 2   • Smokeless tobacco: Never   • Tobacco comments:     Has smoked since age: 16- As per Basilio Lares    Vaping Use   • Vaping Use: Never used   Substance and Sexual Activity   • Alcohol use: Not Currently     Alcohol/week: 2 0 standard drinks     Types: 1 Glasses of wine, 1 Shots of liquor per week     Comment: Occasional    • Drug use: Never   • Sexual activity: Yes     Partners: Male   Other Topics Concern   • None   Social History "Narrative    ** Merged History Encounter **         · Most recent tobacco use screenin2020      · Do you currently or have you served in the [employer]:   No      · Were you activated, into active duty, as a member of the [employer] or as a Reservist:   No      · Has patient visited an area known to be high risk for 2019 n-CoV:   No      · In the 14 days before symptom onset, did the patient spend time in a high risk country:   No      · If patient spent time in a high risk country - Does the patient live in the high risk country:   No      · Advance directive:   No      · Live alone or with others:   with others      · Exercise level:   Occasional      · Overweight:   Yes      · Obese: Yes      · General stress level:   High      · Diet:   Regular      · Caffeine intake: Moderate      · Guns present in home:   No      · Seat belts used routinely:   No      · Sunscreen used routinely:   Yes     · Smoke alarm in home: Yes      · Legally blind in one or both eyes:   No      · Hard of hearing or deaf in one or both ears:   No lives w roomates     As per [facility] Determinants of Health     Financial Resource Strain: Not on file   Food Insecurity: Not on file   Transportation Needs: Not on file   Physical Activity: Not on file   Stress: Not on file   Social Connections: Not on file   Intimate Partner Violence: Not on file   Housing Stability: Not on file        Review of Systems   All other systems reviewed and are negative  Vitals:  Vitals:    23 1556   BP: 110/70   BP Location: Right arm   Patient Position: Sitting   Cuff Size: Standard   Pulse: 84   SpO2: 97%   Weight: 97 1 kg (214 lb)   Height: 5' 2\" (1 575 m)     BMI - Body mass index is 39 14 kg/m²    Wt Readings from Last 7 Encounters:   23 97 1 kg (214 lb)   02/15/23 101 kg (222 lb)   02/15/23 101 kg (222 lb 3 2 oz)   23 100 kg (221 lb)   23 98 2 kg (216 lb 9 6 oz)   23 98 kg (216 lb) " 01/11/23 98 kg (216 lb)       Physical Exam  Vitals and nursing note reviewed  Constitutional:       General: She is not in acute distress  Appearance: Normal appearance  She is well-developed  She is obese  She is not ill-appearing  HENT:      Head: Normocephalic and atraumatic  Nose: No congestion  Eyes:      General: No scleral icterus  Conjunctiva/sclera: Conjunctivae normal    Neck:      Vascular: No carotid bruit or JVD  Cardiovascular:      Rate and Rhythm: Normal rate and regular rhythm  Pulses: Normal pulses  Heart sounds: Normal heart sounds  No murmur heard  No friction rub  No gallop  Pulmonary:      Effort: Pulmonary effort is normal  No respiratory distress  Breath sounds: Normal breath sounds  No rales  Abdominal:      General: There is no distension  Palpations: Abdomen is soft  Tenderness: There is no abdominal tenderness  Musculoskeletal:         General: No swelling or tenderness  Cervical back: Neck supple  Right lower leg: Edema present  Left lower leg: Edema present  Comments: 1+ bilateral lower extremity edema (l>R)   Skin:     General: Skin is warm  Neurological:      General: No focal deficit present  Mental Status: She is alert and oriented to person, place, and time  Mental status is at baseline  Psychiatric:         Mood and Affect: Mood normal          Behavior: Behavior normal          Thought Content:  Thought content normal          Labs:  CBC:   Lab Results   Component Value Date    WBC 5 93 12/27/2022    RBC 4 27 12/27/2022    HGB 11 4 (L) 12/27/2022    HCT 34 6 (L) 12/27/2022    MCV 81 (L) 12/27/2022     12/27/2022    RDW 16 7 (H) 12/27/2022       CMP:   Lab Results   Component Value Date    K 3 6 12/27/2022     12/27/2022    CO2 24 12/27/2022    BUN 10 12/27/2022    CREATININE 0 74 12/27/2022    EGFR 90 12/27/2022    CALCIUM 8 8 12/27/2022    AST 22 11/15/2022    ALT 22 11/15/2022 ALKPHOS 106 11/15/2022       Magnesium:  Lab Results   Component Value Date    MG 1 9 02/11/2020       Lipid Profile:   Lab Results   Component Value Date    HDL 53 02/22/2022    TRIG 105 02/22/2022    LDLCALC 83 02/22/2022       Thyroid Studies:   Lab Results   Component Value Date    MMG6LCIVDSXV 0 019 (L) 11/15/2022    T3FREE 2 68 02/22/2022    FREET4 1 02 11/15/2022    X8LGIXN 0 90 11/15/2022       A1c:  No components found for: HGA1C    INR:  Lab Results   Component Value Date    INR 0 98 02/10/2020   5    Imaging: XR chest 2 views    Result Date: 12/27/2022  Narrative: CHEST INDICATION:   cough, fever  COMPARISON:  CXR 2/10/2020  EXAM PERFORMED/VIEWS:  XR CHEST PA & LATERAL FINDINGS: Cardiomediastinal silhouette appears unremarkable  The lungs are clear  No pneumothorax or pleural effusion  Osseous structures appear within normal limits for patient age  Impression: No acute cardiopulmonary disease  Workstation performed: [id number]     VAS lower limb venous duplex study, unilateral/limited    Result Date: 12/27/2022  Narrative:  [facility] REPORT CLINICAL: Indications: Patient c/o swelling of the left leg for several months with a new onset of pain in the last 4 days as well as worsening SOB and a cough  No history of DVT/PE/CA recent surgery or trauma  Operative History: denies cardiovascular surgeries   CONCLUSION: Impression: RIGHT LOWER LIMB LIMITED: Evaluation shows no evidence of thrombus in the common femoral vein  Doppler evaluation shows a normal response to augmentation maneuvers  LEFT LOWER LIMB: No evidence of acute or chronic deep vein thrombosis No evidence of superficial thrombophlebitis noted  Technically difficult/limited study   Technical findings were given to Erika Segovia PA-C immediately following exam   SIGNATURE: Electronically Signed by: Kaitlin Benitez MD, RPVI on 2022-12-27 04:50:56 PM      Cardiac testing:   No results found for this or any previous visit      No results found for this or any previous visit  No results found for this or any previous visit  No results found for this or any previous visit  MRI cervical spine wo contrast  Narrative: MRI CERVICAL SPINE WITHOUT CONTRAST    INDICATION: M54 12: Radiculopathy, cervical region  COMPARISON:  None  TECHNIQUE:  Multiplanar, multisequence imaging of the cervical spine was performed         IMAGE QUALITY:  Diagnostic    FINDINGS:    ALIGNMENT:  Straightening of the normal cervical lordosis with mild smooth reversal at the C4-5 level  Slight anterior subluxation of C6 in relation to C5  No compression fracture  No scoliosis  MARROW SIGNAL:  Normal marrow signal is identified within the visualized bony structures  No discrete marrow lesion  CERVICAL AND VISUALIZED THORACIC CORD:  Normal signal within the visualized cord  PREVERTEBRAL AND PARASPINAL SOFT TISSUES:  Normal     VISUALIZED POSTERIOR FOSSA:  The visualized posterior fossa demonstrates no abnormal signal     CERVICAL DISC SPACES:    C2-C3:  Left facet hypertrophic degenerative change  No disc herniation or canal stenosis  Mild to moderate left foraminal narrowing  C3-C4:  Normal disc height and signal   Right facet hypertrophic degenerative change  No focal disc herniation  Mild right lateral canal stenosis and moderate right foraminal narrowing  C4-C5:  Tiny central disc protrusion  No canal stenosis  No foraminal narrowing  C5-C6:  Disc desiccation and loss of disc height  Mild annular bulging with endplate and uncinate joint hypertrophic degenerative change  No canal stenosis  Moderate left and mild right foraminal narrowing  C6-C7:  Mild annular bulging and slight left uncinate joint hypertrophic degenerative change  No canal stenosis or right foraminal narrowing  Mild left foraminal narrowing  C7-T1:  Normal     UPPER THORACIC DISC SPACES:  Normal     OTHER FINDINGS:  None    Impression: Cervical degenerative change with uncinate and facet hypertrophic changes resulting in mild to moderate foraminal narrowing  Tiny central disc herniation at the C4-5 level      No cord compression or abnormal cord signal     Workstation performed: [id number]

## 2023-04-03 ENCOUNTER — OFFICE VISIT (OUTPATIENT)
Dept: OBGYN CLINIC | Facility: CLINIC | Age: 58
End: 2023-04-03

## 2023-04-03 VITALS
DIASTOLIC BLOOD PRESSURE: 74 MMHG | BODY MASS INDEX: 40.3 KG/M2 | SYSTOLIC BLOOD PRESSURE: 126 MMHG | WEIGHT: 219 LBS | HEIGHT: 62 IN

## 2023-04-03 DIAGNOSIS — N93.8 DUB (DYSFUNCTIONAL UTERINE BLEEDING): ICD-10-CM

## 2023-04-03 DIAGNOSIS — Z01.419 WELL WOMAN EXAM: Primary | ICD-10-CM

## 2023-04-03 RX ORDER — ACETAMINOPHEN AND CODEINE PHOSPHATE 120; 12 MG/5ML; MG/5ML
1 SOLUTION ORAL DAILY
Qty: 28 TABLET | Refills: 11 | Status: SHIPPED | OUTPATIENT
Start: 2023-04-03

## 2023-04-19 ENCOUNTER — HOSPITAL ENCOUNTER (OUTPATIENT)
Dept: ULTRASOUND IMAGING | Facility: CLINIC | Age: 58
Discharge: HOME | End: 2023-04-19

## 2023-04-19 ENCOUNTER — HOSPITAL ENCOUNTER (OUTPATIENT)
Dept: MAMMOGRAPHY | Facility: CLINIC | Age: 58
Discharge: HOME | End: 2023-04-19

## 2023-04-19 VITALS — HEIGHT: 62 IN | WEIGHT: 219 LBS | BODY MASS INDEX: 40.3 KG/M2

## 2023-04-19 DIAGNOSIS — R92.8 ABNORMAL MAMMOGRAM: ICD-10-CM

## 2023-06-10 DIAGNOSIS — E66.01 MORBID OBESITY (HCC): ICD-10-CM

## 2023-06-13 DIAGNOSIS — E66.01 MORBID OBESITY DUE TO EXCESS CALORIES (HCC): ICD-10-CM

## 2023-06-13 RX ORDER — METFORMIN HYDROCHLORIDE 500 MG/1
TABLET, EXTENDED RELEASE ORAL
Qty: 120 TABLET | Refills: 1 | Status: SHIPPED | OUTPATIENT
Start: 2023-06-13

## 2023-06-14 DIAGNOSIS — I10 ESSENTIAL HYPERTENSION: ICD-10-CM

## 2023-06-14 RX ORDER — CHLORTHALIDONE 50 MG/1
TABLET ORAL
Qty: 30 TABLET | Refills: 2 | Status: SHIPPED | OUTPATIENT
Start: 2023-06-14

## 2023-07-06 ENCOUNTER — PROCEDURE VISIT (OUTPATIENT)
Dept: SURGERY | Facility: CLINIC | Age: 58
End: 2023-07-06

## 2023-07-06 VITALS
DIASTOLIC BLOOD PRESSURE: 76 MMHG | BODY MASS INDEX: 40.67 KG/M2 | OXYGEN SATURATION: 98 % | WEIGHT: 221 LBS | HEART RATE: 83 BPM | HEIGHT: 62 IN | SYSTOLIC BLOOD PRESSURE: 128 MMHG | TEMPERATURE: 97.7 F | RESPIRATION RATE: 18 BRPM

## 2023-07-06 DIAGNOSIS — L91.8 CUTANEOUS SKIN TAGS: Primary | ICD-10-CM

## 2023-07-06 DIAGNOSIS — L57.0 KERATOTIC LESION: ICD-10-CM

## 2023-07-06 PROCEDURE — 88305 TISSUE EXAM BY PATHOLOGIST: CPT | Performed by: PATHOLOGY

## 2023-07-06 NOTE — PROGRESS NOTES
The patient 26-year-old female who complains of multiple skin. On both sides of her neck, the left cheek as well as the right cheek. She also asked about a small approximately 4 mm keratotic lesion on the left cheek which I also removed today. Past medical history is most pertinent for asthma, CAMERON, hypertension and prediabetes  This is an obese female in no distress whatsoever. She has multiple skin tags on the left and right side of her neck as stated above. She also inquired about a 5 mm keratotic lesion on the left nares. I told her this best be done in a hospital but under local anesthesia. Shave lesion    Date/Time: 7/6/2023 4:30 PM    Performed by: Terral Skiff, MD  Authorized by: Terral Skiff, MD  Universal Protocol:  Consent: Written consent obtained. Patient identity confirmed: verbally with patient      Number of Lesions: 5  Lesion 1:     Body area: head/neck    Head/neck location: neck    Initial size (mm): 1    Malignancy: benign lesion      Destruction method: shave removal    Lesion 2:     Body area: head/neck    Head/neck location: L cheek    Initial size (mm): 2    Malignancy: benign lesion    Lesion 3:     Body area: head/neck    Head/neck location: L cheek    Initial size (mm): 1    Destruction method: shave removal    Lesion 4:     Body area: head/neck    Head/neck location: neck    Initial size (mm): 1    Destruction method: shave removal    Lesion 5:     Body area: head/neck    Head/neck location: neck    Inital size (mm): 1    Destruction method: shave removal      Comments: The patient was identified by me and placed in supine position upon the operating room table. The area was prepped and then infiltrated with Marcaine with epi. The area was shaved off and the base was fulgurated. Dressing was applied. The rest of the lesions were now prepped, infiltrated with local and the small masses were cut off.   Bases were all fulgurated and some Neosporin was applied

## 2023-09-08 DIAGNOSIS — I10 ESSENTIAL HYPERTENSION: ICD-10-CM

## 2023-09-08 RX ORDER — CHLORTHALIDONE 50 MG/1
50 TABLET ORAL DAILY
Qty: 30 TABLET | Refills: 2 | Status: SHIPPED | OUTPATIENT
Start: 2023-09-08

## 2023-10-04 ENCOUNTER — OFFICE VISIT (OUTPATIENT)
Dept: CARDIOLOGY CLINIC | Facility: CLINIC | Age: 58
End: 2023-10-04
Payer: COMMERCIAL

## 2023-10-04 VITALS
OXYGEN SATURATION: 98 % | DIASTOLIC BLOOD PRESSURE: 74 MMHG | HEART RATE: 78 BPM | TEMPERATURE: 98.9 F | HEIGHT: 62 IN | BODY MASS INDEX: 40.85 KG/M2 | WEIGHT: 222 LBS | SYSTOLIC BLOOD PRESSURE: 132 MMHG

## 2023-10-04 DIAGNOSIS — I87.2 VENOUS INSUFFICIENCY: ICD-10-CM

## 2023-10-04 DIAGNOSIS — I10 ESSENTIAL HYPERTENSION: Primary | ICD-10-CM

## 2023-10-04 DIAGNOSIS — E66.01 MORBID OBESITY (HCC): ICD-10-CM

## 2023-10-04 PROCEDURE — 99213 OFFICE O/P EST LOW 20 MIN: CPT | Performed by: INTERNAL MEDICINE

## 2023-10-04 RX ORDER — SEMAGLUTIDE 0.68 MG/ML
INJECTION, SOLUTION SUBCUTANEOUS
COMMUNITY
Start: 2023-08-12

## 2023-10-05 NOTE — PROGRESS NOTES
[facility] [address]  [place] [address] [place] [address] Road [address]  Phone#  [phone]  Fax#  [phone]                                               Cardiology Office Follow up  Nery Yeager, 62 y.o. female  YOB: 1965  MRN: [id number] Encounter: [id number]      PCP - No primary care provider on file. Referring Provider - No ref. provider found    No chief complaint on file. Assessment  Bilateral lower extremity edema   Exercise ECG stress - 3/2020 - did 7:30 min, negative for ischemia  Exercise stress echo - 1/11/23 - 7:31 min, 9.3 METS, 100% MPHR, stress ECg & echo negative for ischemia. EF 65%, normal PASP, IVC normal  Hypertension  Obesity, Body mass index is 40.6 kg/m².    Hyperthyroidism  CAMERON    Plan  Bilateral lower extremity edema, Shortness of breath  Overview  Initially presented with B/L lower extremity edema and progressive shortness of breath, associated with weight gain  She did not have evaluation or any other signs of volume overload, it was felt to be mainly venous insufficiency related to weight gain  Stress echo subsequently was negative for ischemia, and PASP was normal  Weight had trended up at that time (195 lbs in 6/2022 --> 216 lbs in 12/2022 --> 221 lbs in 1/2023) --> down to 214 lbs in 3/2023 --> back up to 222 lbs (10/2023)  Impression  Appears to be predominantly related to venous insufficiency  Weight gain related to inactivity (from inability to exercise due to knee pain)  Plan  She has been on chlorthalidone (reportedly 50 daily, but she is not sure), but has not been taking the spironolactone apparently   I remain concerned about her compliance with the medications even, and am not sure that she is actually taking all of these, as she doesn't seem to remember her medications  She has not completed the lab work despite emphasis on needing to get this completed on several occasions  I have yet again emphasized to her that with her diuretics she needs to get the lab work done as there is increased risk of electrolyte abnormalities, kidney problems and other issues without monitoring  Compression stockings  She is now on Ozempic and trying to lose weight, and as a result I am concerned that with weight loss her blood pressure may go down even more and the diuretics may be too much for her --> I am going to hold off on renewing her spironolactone  Check BMP - even urged her to go directly from here (does not need to be fasting) --> but she states she will go over the weekend    Hypertension  Reasonably controlled, 132/74  Continue metoprolol XL 50 mg daily, chlorthalidone 50 mg daily      No results found for this visit on 10/04/23. No orders of the defined types were placed in this encounter. Return in about 6 months (around 4/4/2024), or if symptoms worsen or fail to improve. History of Present Illness   62 y.o. female, who works as an  doing desk work for [employer], comes in as a new patient for consultation regarding uncontrolled hypertension and increased lower extremity edema. She previously used to follow with cardiologist Dr. Mahamed Restrepo, and is now establishing care with us. Today she reports having increased lower extremity edema, predominantly in the left leg. This has been ongoing and worsening over the last several months. There is no associated symptoms of shortness of breath, orthopnea or PND. She does report significant discomfort in the left anterior leg, as well as numbness in the left hand - occurring intermittently. She underwent lower extremity duplex on 12/27/2022, which was negative for DVT. She has no known prior history of coronary artery disease or heart failure. No complaints of chest pain. She does have a longstanding history of intermittent palpitations, and has been on metoprolol succinate for several years.   She has been doing well with this and has not had any recent issues with palpitations. Interval history - 1/27/2023  She comes back for follow-up after about 1 month. In the interim, she has made the adjustments to her antihypertensive therapy and has been monitoring her blood pressure at home, and is doing well. She completed the stress echocardiogram, which did not show any significant ischemia or volume overload. Interval history - 3/31/2023  He comes in for follow-up after about 2 months. In the interim she has made adjustments to her diuretics and has been doing well. She was started on Ozempic by Dr. Katalina Vance, and appears to have lost 7 pounds over the last 6 weeks. Her edema is improved as well. Interval history - 10/4/2023  She comes back for follow-up after about 6 months. In the interim, she remains on Ozempic but has not lost any weight and her weight is in fact up by 10 pounds! No current complaints of chest pain or shortness of breath. No complaints of palpitations or dizziness. She has poor recollection of her actual medication list but does seem to be taking metoprolol and chlorthalidone (unclear doses)        Historical Information   Past Medical History:   Diagnosis Date   • 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   • Asthma due to seasonal allergies 9/25/2020   • Depression    • Essential hypertension 4/20/2020   • JANE (generalized anxiety disorder) 0/03/6201   • Helicobacter pylori gastrointestinal tract infection 4/20/2020    10/02/2017=egd 9/2017 Dr Alan Sims   • History of Holter monitoring 12/22/2017    The baseline rhythm was of sinus origin with an average heart rate of 90 bpm (range: 72 - 129 bpm). There were rare single APCs adn VPCs (less than 0.1 % total beats). There were no sustained cardiac dysrhythmais. There were no significant symptomatic pauses. • History of stress test 01/05/2018    Mildly abnormal exercise Myoview SPECT study with evidence of a fixed anterior defect. This is most likely related to breast attenuation artifact.  There was no evidence of reversible myocardial ischemia. Gated wall motion analysis was normal with well preserved systolic function. The left ventricle was normal in size with calculated EF of 73%. No prior similar study is available for comparison.     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    5   • Reactive airway disease with acute exacerbation 5/20/2020   • Subclinical hyperthyroidism 4/20/2020   • Subclinical hyperthyroidism 9/25/2020   • Thyroid nodule 9/25/2020   • Vitamin D deficiency 4/20/2020    33     Past Surgical History:   Procedure Laterality Date   • COLONOSCOPY  09/08/2017    3 polyps=next 2020 w Dr Monty Muir   • EGD  09/01/2017    egd biopsy    • KNEE ARTHROSCOPY W/ ACL RECONSTRUCTION     • KNEE SURGERY      torn mcl, acl, meniscus left knee 2016   • TUBAL LIGATION       Family History   Problem Relation Age of Onset   • Diabetes Father    • Colon cancer Maternal Aunt    • Cancer Maternal Uncle    • Kidney disease Maternal Uncle      Current Outpatient Medications on File Prior to Visit   Medication Sig Dispense Refill   • albuterol (Proventil HFA) 90 mcg/act inhaler Inhale 2 puffs every 4 (four) hours as needed for wheezing or shortness of breath 18 g 1   • chlorthalidone (HYGROTEN) 50 MG tablet Take 1 tablet (50 mg total) by mouth daily 30 tablet 2   • clotrimazole-betamethasone (LOTRISONE) 1-0.05 % cream Apply topically 2 (two) times a day 45 g 3   • Insulin Pen Needle (BD Pen Needle Jaymie U/F) 32G X 4 MM MISC Use daily as needed with insulin pen 100 each 2   • ipratropium (ATROVENT) 0.06 % nasal spray 2 sprays into each nostril 4 (four) times a day 15 mL 1   • metFORMIN (GLUCOPHAGE-XR) 500 mg 24 hr tablet TAKE 2 TABLETS BY MOUTH DAILY WITH DINNER 120 tablet 1   • metoprolol succinate (TOPROL-XL) 50 mg 24 hr tablet TAKE 1 AND 1/2 TABLETS BY MOUTH DAILY 135 tablet 1   • norethindrone (MICRONOR) 0.35 MG tablet Take 1 tablet (0.35 mg total) by mouth daily 28 tablet 11   • nystatin (MYCOSTATIN) powder Apply topically 2 (two) times a day To moist area 60 g 3   • Ozempic, 0.25 or 0.5 MG/DOSE, 2 MG/3ML injection pen INJECT 0.38 ML (0.5 MG TOTAL) UNDER THE SKIN EVERY 7 DAYS     • topiramate (TOPAMAX) 100 mg tablet TAKE 1 TABLET BY MOUTH EVERYDAY AT BEDTIME 30 tablet 1   • ondansetron (Zofran ODT) 4 mg disintegrating tablet Take 1 tablet (4 mg total) by mouth every 6 (six) hours as needed for nausea or vomiting (Patient not taking: Reported on 10/4/2023) 20 tablet 1   • [DISCONTINUED] clotrimazole 1 % external solution Apply 1 application topically 2 (two) times a day for 28 days Apply to affected nail (Patient not taking: Reported on 9/25/2020) 30 mL 0   • [DISCONTINUED] lisinopril (ZESTRIL) 20 mg tablet Take 1 tablet (20 mg total) by mouth daily 30 tablet 0   • [DISCONTINUED] semaglutide, 0.25 or 0.5 mg/dose, (Ozempic, 0.25 or 0.5 MG/DOSE,) 2 mg/1.5 mL injection pen Inject 0.38 mL (0.5 mg total) under the skin every 7 days 3 mL 1   • [DISCONTINUED] spironolactone (ALDACTONE) 25 mg tablet Take 1 tablet (25 mg total) by mouth daily 30 tablet 2     No current facility-administered medications on file prior to visit.      No Known Allergies  Social History     Socioeconomic History   • Marital status:      Spouse name: None   • Number of children: 5   • Years of education: 12   • Highest education level: None   Occupational History   • Occupation: [employer]     Tobacco Use   • Smoking status: Former     Packs/day: 0.25     Years: 24.00     Total pack years: 6.00     Types: Cigarettes     Quit date:      Years since quittin.7   • Smokeless tobacco: Never   • Tobacco comments:     Has smoked since age: 16- As per Gustavo    Vaping Use   • Vaping Use: Never used   Substance and Sexual Activity   • Alcohol use: Not Currently     Alcohol/week: 2.0 standard drinks of alcohol     Types: 1 Glasses of wine, 1 Shots of liquor per week     Comment: Occasional    • Drug use: Never   • Sexual activity: Not Currently     Partners: Male   Other Topics Concern   • None   Social History Narrative    ** Merged History Encounter **         · Most recent tobacco use screenin2020      · Do you currently or have you served in the [address] [place] [address]:   No      · Were you activated, into active duty, as a member of the [employer] or as a Reservist:   No      · Has patient visited an area known to be high risk for 2019 n-CoV:   No      · In the 14 days before symptom onset, did the patient spend time in a high risk country:   No      · If patient spent time in a high risk country - Does the patient live in the high risk country:   No      · Advance directive:   No      · Live alone or with others:   with others      · Exercise level:   Occasional      · Overweight:   Yes      · Obese: Yes      · General stress level:   High      · Diet:   Regular      · Caffeine intake: Moderate      · Guns present in home:   No      · Seat belts used routinely:   No      · Sunscreen used routinely:   Yes     · Smoke alarm in home:    Yes      · Legally blind in one or both eyes:   No      · Hard of hearing or deaf in one or both ears:   No lives w roomates     As per Gustavo      Social Determinants of Health     Financial Resource Strain: Not on file   Food Insecurity: Not on file   Transportation Needs: Not on file   Physical Activity: Not on file   Stress: Not on file   Social Connections: Not on file   Intimate Partner Violence: Not on file   Housing Stability: Not on file Review of Systems   All other systems reviewed and are negative. Vitals:  Vitals:    10/04/23 1514   BP: 132/74   BP Location: Left arm   Patient Position: Sitting   Cuff Size: Large   Pulse: 78   Temp: 98.9 °F (37.2 °C)   TempSrc: Tympanic   SpO2: 98%   Weight: 101 kg (222 lb)   Height: 5' 2" (1.575 m)     BMI - Body mass index is 40.6 kg/m². Wt Readings from Last 7 Encounters:   10/04/23 101 kg (222 lb)   07/06/23 100 kg (221 lb)   04/19/23 99.3 kg (219 lb)   04/03/23 99.3 kg (219 lb)   03/31/23 97.1 kg (214 lb)   02/15/23 101 kg (222 lb)   02/15/23 101 kg (222 lb 3.2 oz)       Physical Exam  Vitals and nursing note reviewed. Constitutional:       General: She is not in acute distress. Appearance: Normal appearance. She is well-developed. She is obese. She is not ill-appearing. HENT:      Head: Normocephalic and atraumatic. Nose: No congestion. Eyes:      General: No scleral icterus. Conjunctiva/sclera: Conjunctivae normal.   Neck:      Vascular: No carotid bruit or JVD. Cardiovascular:      Rate and Rhythm: Normal rate and regular rhythm. Pulses: Normal pulses. Heart sounds: Normal heart sounds. No murmur heard. No friction rub. No gallop. Pulmonary:      Effort: Pulmonary effort is normal. No respiratory distress. Breath sounds: Normal breath sounds. No rales. Abdominal:      General: There is no distension. Palpations: Abdomen is soft. Tenderness: There is no abdominal tenderness. Musculoskeletal:         General: No swelling or tenderness. Cervical back: Neck supple. Right lower leg: Edema present. Left lower leg: Edema present. Comments: 1+ bilateral lower extremity edema (l>R)   Skin:     General: Skin is warm. Neurological:      General: No focal deficit present. Mental Status: She is alert and oriented to person, place, and time. Mental status is at baseline.    Psychiatric:         Mood and Affect: Mood normal. Behavior: Behavior normal.         Thought Content: Thought content normal.         Labs:  CBC:   Lab Results   Component Value Date    WBC 5.93 12/27/2022    RBC 4.27 12/27/2022    HGB 11.4 (L) 12/27/2022    HCT 34.6 (L) 12/27/2022    MCV 81 (L) 12/27/2022     12/27/2022    RDW 16.7 (H) 12/27/2022       CMP:   Lab Results   Component Value Date    K 3.6 12/27/2022     12/27/2022    CO2 24 12/27/2022    BUN 10 12/27/2022    CREATININE 0.74 12/27/2022    EGFR 90 12/27/2022    CALCIUM 8.8 12/27/2022    AST 22 11/15/2022    ALT 22 11/15/2022    ALKPHOS 106 11/15/2022       Magnesium:  Lab Results   Component Value Date    MG 1.9 02/11/2020       Lipid Profile:   Lab Results   Component Value Date    HDL 53 02/22/2022    TRIG 105 02/22/2022    LDLCALC 83 02/22/2022       Thyroid Studies:   Lab Results   Component Value Date    TUI1NRUHUASM 0.019 (L) 11/15/2022    T3FREE 2.68 02/22/2022    FREET4 1.02 11/15/2022    Z4OOQAH 0.90 11/15/2022       A1c:  No components found for: "HGA1C"    INR:  Lab Results   Component Value Date    INR 0.98 02/10/2020   5    Imaging: XR chest 2 views    Result Date: 12/27/2022  Narrative: CHEST INDICATION:   cough, fever. COMPARISON:  CXR 2/10/2020. EXAM PERFORMED/VIEWS:  XR CHEST PA & LATERAL FINDINGS: Cardiomediastinal silhouette appears unremarkable. The lungs are clear. No pneumothorax or pleural effusion. Osseous structures appear within normal limits for patient age. Impression: No acute cardiopulmonary disease. Workstation performed: [id number]     VAS lower limb venous duplex study, unilateral/limited    Result Date: 12/27/2022  Narrative:  [facility] REPORT CLINICAL: Indications: Patient c/o swelling of the left leg for several months with a new onset of pain in the last 4 days as well as worsening SOB and a cough. No history of DVT/PE/CA recent surgery or trauma.  Operative History: denies cardiovascular surgeries   CONCLUSION: Impression: RIGHT LOWER LIMB LIMITED: Evaluation shows no evidence of thrombus in the common femoral vein. Doppler evaluation shows a normal response to augmentation maneuvers. LEFT LOWER LIMB: No evidence of acute or chronic deep vein thrombosis No evidence of superficial thrombophlebitis noted. Technically difficult/limited study   Technical findings were given to Dunia Batres PA-C immediately following exam.  SIGNATURE: Electronically Signed by: Natalie Carson MD, RPVI on 2022-12-27 04:50:56 PM      Cardiac testing:   No results found for this or any previous visit. No results found for this or any previous visit. No results found for this or any previous visit. No results found for this or any previous visit. Mammo diagnostic bilateral w 3d & cad, US breast right limited (diagnostic)  Narrative: DIAGNOSIS: Abnormal mammogram     TECHNIQUE:   Digital diagnostic mammography was performed. Computer Aided Detection   (CAD) analyzed all applicable images. Ultrasound of the bilateral breast(s) was performed. COMPARISONS: Prior breast imaging dated: 04/30/2019 and 04/20/2019    RELEVANT HISTORY:   Family Breast Cancer History: No known family history of breast cancer. Family Medical History: Family medical history includes colon cancer in   maternal aunt. Personal History: Hormone history includes birth control. No known   relevant surgical history. No known relevant medical history. RISK ASSESSMENT:   5 Year Tyrer-Cuzick: 0.49 %  10 Year Tyrer-Cuzick: 1.1 %  Lifetime Tyrer-Cuzick: 3.28 %    TISSUE DENSITY:   The breasts are almost entirely fatty. INDICATION: Julissa  is a 62 y.o. female presenting for abnormal   mammogram.    FINDINGS:   RIGHT  A) FOCAL ASYMMETRY  Mammo diagnostic bilateral w 3d & cad: There is a focal asymmetry seen in   the right breast at 3 o'clock. This appears to be a new finding compared   to the prior outside 2D mammograms.   No suspicious sonographic findings   are noted, small normal fibroglandular tissue focus throughout this area   of probable correlate. Recommend 6-month follow-up mammogram with   ultrasound if needed. Left  Mammo diagnostic bilateral w 3d & cad  There are no suspicious masses, grouped microcalcifications or areas of   unexplained architectural distortion. The skin and nipple areolar complex   are unremarkable. In regard to a previously described asymmetry in the right outer breast on   prior outside mammography, there are no definitive suspicious findings   otherwise identified. Overall exam is somewhat limited in comparison as   the tomographic views are not available for review due to differences in   vendor. Impression: No suspicious findings in the right outer breast as suggested on prior   outside imaging within the limitations as noted. New small focal non-mass asymmetric tissue in the right medial breast 3   o'clock position with probable correlating normal sonographic findings   through this area. Recommend 6-month follow-up mammogram with ultrasound   if needed.     ASSESSMENT/BI-RADS CATEGORY:  Right: 3 - Probably Benign  Overall: 3 - Probably Benign    RECOMMENDATION:       - Diagnostic mammogram in 6 months for the right breast.       - Ultrasound in 6 months for the right breast.    Workstation ID: [id number]

## 2023-10-20 ENCOUNTER — HOSPITAL ENCOUNTER (OUTPATIENT)
Dept: ULTRASOUND IMAGING | Facility: CLINIC | Age: 58
Discharge: HOME | End: 2023-10-20

## 2023-10-20 ENCOUNTER — HOSPITAL ENCOUNTER (OUTPATIENT)
Dept: MAMMOGRAPHY | Facility: CLINIC | Age: 58
End: 2023-10-20
Payer: COMMERCIAL

## 2023-10-20 VITALS — WEIGHT: 222 LBS | HEIGHT: 62 IN | BODY MASS INDEX: 40.85 KG/M2

## 2023-10-20 DIAGNOSIS — R92.8 ABNORMAL MAMMOGRAM: ICD-10-CM

## 2023-10-20 PROCEDURE — 77065 DX MAMMO INCL CAD UNI: CPT

## 2023-10-20 PROCEDURE — G0279 TOMOSYNTHESIS, MAMMO: HCPCS

## 2023-10-29 DIAGNOSIS — I10 ESSENTIAL HYPERTENSION: ICD-10-CM

## 2023-10-29 RX ORDER — CHLORTHALIDONE 50 MG/1
50 TABLET ORAL DAILY
Qty: 30 TABLET | Refills: 2 | Status: SHIPPED | OUTPATIENT
Start: 2023-10-29

## 2023-11-04 ENCOUNTER — APPOINTMENT (OUTPATIENT)
Dept: LAB | Age: 58
End: 2023-11-04
Payer: COMMERCIAL

## 2023-11-04 DIAGNOSIS — R06.02 SHORTNESS OF BREATH: ICD-10-CM

## 2023-11-04 DIAGNOSIS — R60.0 BILATERAL LOWER EXTREMITY EDEMA: ICD-10-CM

## 2023-11-04 DIAGNOSIS — I10 ESSENTIAL HYPERTENSION: ICD-10-CM

## 2023-11-04 DIAGNOSIS — E66.01 MORBID OBESITY (HCC): ICD-10-CM

## 2023-11-04 LAB
ALBUMIN SERPL BCP-MCNC: 4 G/DL (ref 3.5–5)
ALP SERPL-CCNC: 70 U/L (ref 34–104)
ALT SERPL W P-5'-P-CCNC: 15 U/L (ref 7–52)
ANION GAP SERPL CALCULATED.3IONS-SCNC: 9 MMOL/L
AST SERPL W P-5'-P-CCNC: 19 U/L (ref 13–39)
BILIRUB SERPL-MCNC: 0.74 MG/DL (ref 0.2–1)
BUN SERPL-MCNC: 18 MG/DL (ref 5–25)
CALCIUM SERPL-MCNC: 9.7 MG/DL (ref 8.4–10.2)
CHLORIDE SERPL-SCNC: 101 MMOL/L (ref 96–108)
CO2 SERPL-SCNC: 30 MMOL/L (ref 21–32)
CREAT SERPL-MCNC: 0.89 MG/DL (ref 0.6–1.3)
GFR SERPL CREATININE-BSD FRML MDRD: 71 ML/MIN/1.73SQ M
GLUCOSE P FAST SERPL-MCNC: 89 MG/DL (ref 65–99)
POTASSIUM SERPL-SCNC: 3.7 MMOL/L (ref 3.5–5.3)
PROT SERPL-MCNC: 7.8 G/DL (ref 6.4–8.4)
SODIUM SERPL-SCNC: 140 MMOL/L (ref 135–147)
T4 FREE SERPL-MCNC: 1.11 NG/DL (ref 0.61–1.12)
TSH SERPL DL<=0.05 MIU/L-ACNC: <0.01 UIU/ML (ref 0.45–4.5)

## 2023-11-04 PROCEDURE — 83036 HEMOGLOBIN GLYCOSYLATED A1C: CPT

## 2023-11-04 PROCEDURE — 80053 COMPREHEN METABOLIC PANEL: CPT

## 2023-11-04 PROCEDURE — 36415 COLL VENOUS BLD VENIPUNCTURE: CPT

## 2023-11-04 PROCEDURE — 84443 ASSAY THYROID STIM HORMONE: CPT

## 2023-11-04 PROCEDURE — 84439 ASSAY OF FREE THYROXINE: CPT

## 2023-11-05 LAB
EST. AVERAGE GLUCOSE BLD GHB EST-MCNC: 126 MG/DL
HBA1C MFR BLD: 6 %

## 2023-12-16 ENCOUNTER — HOSPITAL ENCOUNTER (EMERGENCY)
Facility: HOSPITAL | Age: 58
Discharge: HOME | End: 2023-12-16
Attending: EMERGENCY MEDICINE
Payer: COMMERCIAL

## 2023-12-16 ENCOUNTER — APPOINTMENT (EMERGENCY)
Dept: CT IMAGING | Facility: HOSPITAL | Age: 58
End: 2023-12-16
Payer: COMMERCIAL

## 2023-12-16 VITALS
RESPIRATION RATE: 24 BRPM | SYSTOLIC BLOOD PRESSURE: 109 MMHG | OXYGEN SATURATION: 97 % | TEMPERATURE: 99.7 F | BODY MASS INDEX: 39.19 KG/M2 | DIASTOLIC BLOOD PRESSURE: 51 MMHG | HEART RATE: 99 BPM | WEIGHT: 214.29 LBS

## 2023-12-16 DIAGNOSIS — R68.89 FLU-LIKE SYMPTOMS: Primary | ICD-10-CM

## 2023-12-16 DIAGNOSIS — R00.0 TACHYCARDIA: ICD-10-CM

## 2023-12-16 LAB
2HR DELTA HS TROPONIN: -2 NG/L
ALBUMIN SERPL BCP-MCNC: 3.8 G/DL (ref 3.5–5)
ALP SERPL-CCNC: 76 U/L (ref 34–104)
ALT SERPL W P-5'-P-CCNC: 21 U/L (ref 7–52)
ANION GAP SERPL CALCULATED.3IONS-SCNC: 7 MMOL/L
AST SERPL W P-5'-P-CCNC: 20 U/L (ref 13–39)
ATRIAL RATE: 115 BPM
ATRIAL RATE: 122 BPM
BASOPHILS # BLD AUTO: 0.02 THOUSANDS/ÂΜL (ref 0–0.1)
BASOPHILS NFR BLD AUTO: 0 % (ref 0–1)
BILIRUB SERPL-MCNC: 0.56 MG/DL (ref 0.2–1)
BUN SERPL-MCNC: 17 MG/DL (ref 5–25)
CALCIUM SERPL-MCNC: 10 MG/DL (ref 8.4–10.2)
CARDIAC TROPONIN I PNL SERPL HS: 11 NG/L
CARDIAC TROPONIN I PNL SERPL HS: 9 NG/L
CHLORIDE SERPL-SCNC: 101 MMOL/L (ref 96–108)
CO2 SERPL-SCNC: 27 MMOL/L (ref 21–32)
CREAT SERPL-MCNC: 0.95 MG/DL (ref 0.6–1.3)
D DIMER PPP FEU-MCNC: 0.78 UG/ML FEU
EOSINOPHIL # BLD AUTO: 0.16 THOUSAND/ÂΜL (ref 0–0.61)
EOSINOPHIL NFR BLD AUTO: 2 % (ref 0–6)
ERYTHROCYTE [DISTWIDTH] IN BLOOD BY AUTOMATED COUNT: 12.9 % (ref 11.6–15.1)
FLUAV RNA RESP QL NAA+PROBE: NEGATIVE
FLUBV RNA RESP QL NAA+PROBE: NEGATIVE
GFR SERPL CREATININE-BSD FRML MDRD: 66 ML/MIN/1.73SQ M
GLUCOSE SERPL-MCNC: 96 MG/DL (ref 65–140)
HCT VFR BLD AUTO: 40.8 % (ref 34.8–46.1)
HGB BLD-MCNC: 14 G/DL (ref 11.5–15.4)
IMM GRANULOCYTES # BLD AUTO: 0.01 THOUSAND/UL (ref 0–0.2)
IMM GRANULOCYTES NFR BLD AUTO: 0 % (ref 0–2)
LYMPHOCYTES # BLD AUTO: 1.12 THOUSANDS/ÂΜL (ref 0.6–4.47)
LYMPHOCYTES NFR BLD AUTO: 12 % (ref 14–44)
MCH RBC QN AUTO: 29.7 PG (ref 26.8–34.3)
MCHC RBC AUTO-ENTMCNC: 34.3 G/DL (ref 31.4–37.4)
MCV RBC AUTO: 87 FL (ref 82–98)
MONOCYTES # BLD AUTO: 1.15 THOUSAND/ÂΜL (ref 0.17–1.22)
MONOCYTES NFR BLD AUTO: 13 % (ref 4–12)
NEUTROPHILS # BLD AUTO: 6.62 THOUSANDS/ÂΜL (ref 1.85–7.62)
NEUTS SEG NFR BLD AUTO: 73 % (ref 43–75)
NRBC BLD AUTO-RTO: 0 /100 WBCS
P AXIS: 59 DEGREES
P AXIS: 65 DEGREES
PLATELET # BLD AUTO: 210 THOUSANDS/UL (ref 149–390)
PMV BLD AUTO: 9.5 FL (ref 8.9–12.7)
POTASSIUM SERPL-SCNC: 3.3 MMOL/L (ref 3.5–5.3)
PR INTERVAL: 124 MS
PR INTERVAL: 132 MS
PROT SERPL-MCNC: 7.6 G/DL (ref 6.4–8.4)
QRS AXIS: -43 DEGREES
QRS AXIS: -70 DEGREES
QRSD INTERVAL: 82 MS
QRSD INTERVAL: 84 MS
QT INTERVAL: 312 MS
QT INTERVAL: 338 MS
QTC INTERVAL: 444 MS
QTC INTERVAL: 467 MS
RBC # BLD AUTO: 4.71 MILLION/UL (ref 3.81–5.12)
RSV RNA RESP QL NAA+PROBE: NEGATIVE
SARS-COV-2 RNA RESP QL NAA+PROBE: POSITIVE
SODIUM SERPL-SCNC: 135 MMOL/L (ref 135–147)
T WAVE AXIS: 39 DEGREES
T WAVE AXIS: 40 DEGREES
VENTRICULAR RATE: 115 BPM
VENTRICULAR RATE: 122 BPM
WBC # BLD AUTO: 9.08 THOUSAND/UL (ref 4.31–10.16)

## 2023-12-16 PROCEDURE — 96374 THER/PROPH/DIAG INJ IV PUSH: CPT

## 2023-12-16 PROCEDURE — 94640 AIRWAY INHALATION TREATMENT: CPT

## 2023-12-16 PROCEDURE — 93005 ELECTROCARDIOGRAM TRACING: CPT

## 2023-12-16 PROCEDURE — 96361 HYDRATE IV INFUSION ADD-ON: CPT

## 2023-12-16 PROCEDURE — 84484 ASSAY OF TROPONIN QUANT: CPT | Performed by: EMERGENCY MEDICINE

## 2023-12-16 PROCEDURE — 85025 COMPLETE CBC W/AUTO DIFF WBC: CPT | Performed by: EMERGENCY MEDICINE

## 2023-12-16 PROCEDURE — 36415 COLL VENOUS BLD VENIPUNCTURE: CPT | Performed by: EMERGENCY MEDICINE

## 2023-12-16 PROCEDURE — 0241U HB NFCT DS VIR RESP RNA 4 TRGT: CPT | Performed by: EMERGENCY MEDICINE

## 2023-12-16 PROCEDURE — 99284 EMERGENCY DEPT VISIT MOD MDM: CPT

## 2023-12-16 PROCEDURE — 80053 COMPREHEN METABOLIC PANEL: CPT | Performed by: EMERGENCY MEDICINE

## 2023-12-16 PROCEDURE — 71275 CT ANGIOGRAPHY CHEST: CPT

## 2023-12-16 PROCEDURE — 99284 EMERGENCY DEPT VISIT MOD MDM: CPT | Performed by: EMERGENCY MEDICINE

## 2023-12-16 PROCEDURE — 85379 FIBRIN DEGRADATION QUANT: CPT | Performed by: EMERGENCY MEDICINE

## 2023-12-16 RX ORDER — KETOROLAC TROMETHAMINE 30 MG/ML
15 INJECTION, SOLUTION INTRAMUSCULAR; INTRAVENOUS ONCE
Status: COMPLETED | OUTPATIENT
Start: 2023-12-16 | End: 2023-12-16

## 2023-12-16 RX ORDER — ALBUTEROL SULFATE 2.5 MG/3ML
2.5 SOLUTION RESPIRATORY (INHALATION) ONCE
Status: COMPLETED | OUTPATIENT
Start: 2023-12-16 | End: 2023-12-16

## 2023-12-16 RX ORDER — ACETAMINOPHEN 325 MG/1
975 TABLET ORAL ONCE
Status: COMPLETED | OUTPATIENT
Start: 2023-12-16 | End: 2023-12-16

## 2023-12-16 RX ORDER — POTASSIUM CHLORIDE 20 MEQ/1
20 TABLET, EXTENDED RELEASE ORAL ONCE
Status: COMPLETED | OUTPATIENT
Start: 2023-12-16 | End: 2023-12-16

## 2023-12-16 RX ORDER — BENZONATATE 100 MG/1
100 CAPSULE ORAL EVERY 8 HOURS
Qty: 21 CAPSULE | Refills: 0 | Status: SHIPPED | OUTPATIENT
Start: 2023-12-16

## 2023-12-16 RX ADMIN — IOHEXOL 85 ML: 350 INJECTION, SOLUTION INTRAVENOUS at 18:09

## 2023-12-16 RX ADMIN — KETOROLAC TROMETHAMINE 15 MG: 30 INJECTION, SOLUTION INTRAMUSCULAR; INTRAVENOUS at 16:51

## 2023-12-16 RX ADMIN — POTASSIUM CHLORIDE 20 MEQ: 1500 TABLET, EXTENDED RELEASE ORAL at 17:18

## 2023-12-16 RX ADMIN — ACETAMINOPHEN 325MG 975 MG: 325 TABLET ORAL at 18:34

## 2023-12-16 RX ADMIN — SODIUM CHLORIDE 1000 ML: 0.9 INJECTION, SOLUTION INTRAVENOUS at 16:44

## 2023-12-16 RX ADMIN — ALBUTEROL SULFATE 2.5 MG: 2.5 SOLUTION RESPIRATORY (INHALATION) at 16:51

## 2023-12-16 NOTE — ED PROVIDER NOTES
History  Chief Complaint   Patient presents with    Flu Symptoms     Flu symptoms ongoing for a couple of days. Unknown exposure       History provided by:  Patient   used: No    Flu Symptoms  Presenting symptoms: cough and fever    Presenting symptoms: no diarrhea, no headaches, no nausea, no shortness of breath, no sore throat and no vomiting    Cough:     Cough characteristics:  Non-productive    Sputum characteristics:  Nondescript    Severity:  Moderate    Onset quality:  Gradual    Duration:  4 days    Timing:  Intermittent    Progression:  Waxing and waning    Chronicity:  New  Severity:  Moderate  Onset quality:  Gradual  Duration:  4 days  Progression:  Waxing and waning  Chronicity:  New  Relieved by:  Nothing  Worsened by:  Nothing  Ineffective treatments:  None tried  Associated symptoms: no chills, no neck stiffness and no syncope    Risk factors comment:  HTN, Pre-diabetes      Prior to Admission Medications   Prescriptions Last Dose Informant Patient Reported? Taking?   Insulin Pen Needle (BD Pen Needle Jaymie U/F) 32G X 4 MM MISC Not Taking  No No   Sig: Use daily as needed with insulin pen   Patient not taking: Reported on 2023   Ozempic, 0.25 or 0.5 MG/DOSE, 2 MG/3ML injection pen Past Week  Yes Yes   Sig: INJECT 0.38 ML (0.5 MG TOTAL) UNDER THE SKIN EVERY 7 DAYS   albuterol (Proventil HFA) 90 mcg/act inhaler 2023 Self No Yes   Sig: Inhale 2 puffs every 4 (four) hours as needed for wheezing or shortness of breath   chlorthalidone (HYGROTEN) 50 MG tablet 2023  No Yes   Sig: Take 1 tablet (50 mg total) by mouth daily   clotrimazole-betamethasone (LOTRISONE) 1-0.05 % cream Unknown Self No No   Sig: Apply topically 2 (two) times a day   Patient not taking: Reported on 2023   ipratropium (ATROVENT) 0.06 % nasal spray Not Taking Self No No   Si sprays into each nostril 4 (four) times a day   Patient not taking: Reported on 2023   metFORMIN  (GLUCOPHAGE-XR) 500 mg 24 hr tablet Not Taking  No No   Sig: TAKE 2 TABLETS BY MOUTH DAILY WITH DINNER   Patient not taking: Reported on 12/16/2023   metoprolol succinate (TOPROL-XL) 50 mg 24 hr tablet Not Taking Self No No   Sig: TAKE 1 AND 1/2 TABLETS BY MOUTH DAILY   Patient not taking: Reported on 12/16/2023   norethindrone (MICRONOR) 0.35 MG tablet 12/16/2023  No Yes   Sig: Take 1 tablet (0.35 mg total) by mouth daily   nystatin (MYCOSTATIN) powder Past Month Self No Yes   Sig: Apply topically 2 (two) times a day To moist area   ondansetron (Zofran ODT) 4 mg disintegrating tablet Not Taking Self No No   Sig: Take 1 tablet (4 mg total) by mouth every 6 (six) hours as needed for nausea or vomiting   Patient not taking: Reported on 10/4/2023   semaglutide, 1 mg/dose, (Ozempic, 1 MG/DOSE,) 4 mg/3 mL injection pen Past Week  No Yes   Sig: Inject 0.75 mL (1 mg total) under the skin every 7 days   topiramate (TOPAMAX) 100 mg tablet Not Taking  No No   Sig: TAKE 1 TABLET BY MOUTH EVERYDAY AT BEDTIME   Patient not taking: Reported on 12/16/2023      Facility-Administered Medications: None       Past Medical History:   Diagnosis Date    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    Asthma due to seasonal allergies 9/25/2020    Depression     Essential hypertension 4/20/2020    JANE (generalized anxiety disorder) 4/20/2020    Helicobacter pylori gastrointestinal tract infection 4/20/2020    10/02/2017=egd 9/2017 Dr Cervantes    History of Holter monitoring 12/22/2017    The baseline rhythm was of sinus origin with an average heart rate of 90 bpm (range: 72 - 129 bpm). There were rare single APCs adn VPCs (less than 0.1 % total beats). There were no sustained cardiac dysrhythmais. There were no significant symptomatic pauses.     History of stress test 01/05/2018    Mildly abnormal exercise Myoview SPECT study with evidence of a fixed anterior defect. This is most likely related to breast attenuation artifact. There was no evidence of  reversible myocardial ischemia. Gated wall motion analysis was normal with well preserved systolic function. The left ventricle was normal in size with calculated EF of 73%. No prior similar study is available for comparison.     Hypertension     Immunization deficiency 2020    Hep a/b/mumps nonimmune    Immunization deficiency 2020    Hep a/b/mmr nonimmune 2020, 2020, 2021=hep a/b    Iron deficiency 2020    Kidney stone 2020    Need for pneumococcal vaccination 2021    Need for shingles vaccine 2021    Obesity (BMI 30-39.9) 2020    39.4, start wt 219 lb=goal 150 lb    Onychomycosis of toenail 2020    Prediabetes 2020    5.8    Pyridoxine deficiency 2020    5    Reactive airway disease with acute exacerbation 2020    Subclinical hyperthyroidism 2020    Subclinical hyperthyroidism 2020    Thyroid nodule 2020    Vitamin D deficiency 2020    33       Past Surgical History:   Procedure Laterality Date    COLONOSCOPY  2017    3 polyps=next  w Dr Cervantes    EGD  2017    egd biopsy     KNEE ARTHROSCOPY W/ ACL RECONSTRUCTION      KNEE SURGERY      torn mcl, acl, meniscus left knee 2016    TUBAL LIGATION         Family History   Problem Relation Age of Onset    Diabetes Father     Colon cancer Maternal Aunt     Cancer Maternal Uncle     Kidney disease Maternal Uncle      I have reviewed and agree with the history as documented.    E-Cigarette/Vaping    E-Cigarette Use Never User      E-Cigarette/Vaping Substances    Nicotine No     THC No     CBD No     Flavoring No      Social History     Tobacco Use    Smoking status: Former     Current packs/day: 0.00     Average packs/day: 0.3 packs/day for 24.0 years (6.0 ttl pk-yrs)     Types: Cigarettes     Start date:      Quit date: 2014     Years since quittin.9    Smokeless tobacco: Never    Tobacco comments:     Has smoked since age: 17- As per [facility]    Vaping Use     Vaping status: Never Used   Substance Use Topics    Alcohol use: Not Currently     Alcohol/week: 2.0 standard drinks of alcohol     Types: 1 Glasses of wine, 1 Shots of liquor per week     Comment: Occasional     Drug use: Never       Review of Systems   Constitutional:  Positive for fever. Negative for chills.   HENT:  Negative for facial swelling, sore throat and trouble swallowing.    Eyes:  Negative for pain and visual disturbance.   Respiratory:  Positive for cough. Negative for chest tightness and shortness of breath.    Cardiovascular:  Negative for chest pain and leg swelling.   Gastrointestinal:  Negative for abdominal pain, diarrhea, nausea and vomiting.   Genitourinary:  Negative for dysuria and flank pain.   Musculoskeletal:  Negative for back pain, neck pain and neck stiffness.   Skin:  Negative for pallor and rash.   Allergic/Immunologic: Negative for environmental allergies and immunocompromised state.   Neurological:  Negative for dizziness and headaches.   Hematological:  Negative for adenopathy. Does not bruise/bleed easily.   Psychiatric/Behavioral:  Negative for agitation and behavioral problems.    All other systems reviewed and are negative.      Physical Exam  Physical Exam  Vitals and nursing note reviewed.   Constitutional:       General: She is not in acute distress.     Appearance: She is well-developed.   HENT:      Head: Normocephalic and atraumatic.   Eyes:      Extraocular Movements: Extraocular movements intact.   Cardiovascular:      Rate and Rhythm: Regular rhythm. Tachycardia present.      Heart sounds: Normal heart sounds.   Pulmonary:      Effort: Pulmonary effort is normal. No respiratory distress.      Breath sounds: Normal breath sounds.   Abdominal:      Palpations: Abdomen is soft.      Tenderness: There is no abdominal tenderness. There is no guarding or rebound.   Musculoskeletal:         General: Normal range of motion.      Cervical back: Normal range of motion and neck  supple.   Skin:     General: Skin is warm and dry.   Neurological:      General: No focal deficit present.      Mental Status: She is alert and oriented to person, place, and time.   Psychiatric:         Mood and Affect: Mood normal.         Behavior: Behavior normal.         Vital Signs  ED Triage Vitals   Temperature Pulse Respirations Blood Pressure SpO2   12/16/23 1554 12/16/23 1554 12/16/23 1554 12/16/23 1554 12/16/23 1554   99.7 °F (37.6 °C) (!) 128 22 157/79 92 %      Temp Source Heart Rate Source Patient Position - Orthostatic VS BP Location FiO2 (%)   12/16/23 1554 12/16/23 1554 12/16/23 1554 12/16/23 1554 --   Oral Monitor Sitting Right arm       Pain Score       12/16/23 1651       8           Vitals:    12/16/23 1554   BP: 157/79   Pulse: (!) 128   Patient Position - Orthostatic VS: Sitting         Visual Acuity      ED Medications  Medications   sodium chloride 0.9 % bolus 1,000 mL (1,000 mL Intravenous New Bag 12/16/23 1644)   potassium chloride (K-DUR,KLOR-CON) CR tablet 20 mEq (has no administration in time range)   albuterol inhalation solution 2.5 mg (2.5 mg Nebulization Given 12/16/23 1651)   ketorolac (TORADOL) injection 15 mg (15 mg Intravenous Given 12/16/23 1651)       Diagnostic Studies  Results Reviewed       Procedure Component Value Units Date/Time    Comprehensive metabolic panel [[id number]]  (Abnormal) Collected: 12/16/23 1643    Lab Status: Final result Specimen: Blood from Arm, Left Updated: 12/16/23 1705     Sodium 135 mmol/L      Potassium 3.3 mmol/L      Chloride 101 mmol/L      CO2 27 mmol/L      ANION GAP 7 mmol/L      BUN 17 mg/dL      Creatinine 0.95 mg/dL      Glucose 96 mg/dL      Calcium 10.0 mg/dL      AST 20 U/L      ALT 21 U/L      Alkaline Phosphatase 76 U/L      Total Protein 7.6 g/dL      Albumin 3.8 g/dL      Total Bilirubin 0.56 mg/dL      eGFR 66 ml/min/1.73sq m     Narrative:      National Kidney Disease Foundation guidelines for Chronic Kidney Disease (CKD):      Stage 1 with normal or high GFR (GFR > 90 mL/min/1.73 square meters)    Stage 2 Mild CKD (GFR = 60-89 mL/min/1.73 square meters)    Stage 3A Moderate CKD (GFR = 45-59 mL/min/1.73 square meters)    Stage 3B Moderate CKD (GFR = 30-44 mL/min/1.73 square meters)    Stage 4 Severe CKD (GFR = 15-29 mL/min/1.73 square meters)    Stage 5 End Stage CKD (GFR <15 mL/min/1.73 square meters)  Note: GFR calculation is accurate only with a steady state creatinine    CBC and differential [[id number]]  (Abnormal) Collected: 12/16/23 1643    Lab Status: Final result Specimen: Blood from Arm, Left Updated: 12/16/23 1649     WBC 9.08 Thousand/uL      RBC 4.71 Million/uL      Hemoglobin 14.0 g/dL      Hematocrit 40.8 %      MCV 87 fL      MCH 29.7 pg      MCHC 34.3 g/dL      RDW 12.9 %      MPV 9.5 fL      Platelets 210 Thousands/uL      nRBC 0 /100 WBCs      Neutrophils Relative 73 %      Immat GRANS % 0 %      Lymphocytes Relative 12 %      Monocytes Relative 13 %      Eosinophils Relative 2 %      Basophils Relative 0 %      Neutrophils Absolute 6.62 Thousands/µL      Immature Grans Absolute 0.01 Thousand/uL      Lymphocytes Absolute 1.12 Thousands/µL      Monocytes Absolute 1.15 Thousand/µL      Eosinophils Absolute 0.16 Thousand/µL      Basophils Absolute 0.02 Thousands/µL     D-Dimer [[id number]] Collected: 12/16/23 1643    Lab Status: In process Specimen: Blood from Arm, Left Updated: 12/16/23 1647    HS Troponin 0hr (reflex protocol) [[id number]] Collected: 12/16/23 1643    Lab Status: In process Specimen: Blood from Arm, Left Updated: 12/16/23 1647    FLU/RSV/COVID - if FLU/RSV clinically relevant [[id number]] Collected: 12/16/23 1643    Lab Status: In process Specimen: Nares from Nose Updated: 12/16/23 1646                   No orders to display              Procedures  Procedures         ED Course  ED Course as of 12/16/23 2132   Sat Dec 16, 2023   1709 WBC: 9.08   1709 Hemoglobin: 14.0   1709 Platelet Count: 210   1709 Sodium:  135   1709 Potassium(!): 3.3   1709 BUN: 17   1709 Creatinine: 0.95   1709 Glucose, Random: 96  CBC, CMP reviewed, K 3.3, will give oral potassium.   1731 D-Dimer, Quant(!): 0.78  Dimer noted, we will get CT PE scan.   1737 SARS-COV-2(!): Positive  COVID + noted.     1745 Patient signed out to Dr. Freddy Brown for follow up of CT PE, re-evaluation after IVF, pain meds.          HEART Score: 2.                              Medical Decision Making  Patient is a 58-year-old female, history of hypertension, prediabetes, comes in with complaints of cough, body aches, chest pain, going on for past 4 days, patient denies vomiting, diarrhea, abdominal pain, back pain.  On exam, patient is conscious, alert, vital signs reviewed, tachycardia noted, O2 sats 92% on room air, low-grade temp. of exam is clear, heart sounds are regular with tachycardia.  Differential diagnosis: Viral illness, COVID flu, pneumonia, PE, nonspecific chest pain, doubt ACS, however due to patient's age and risk factors, will check cardiac workup and labs, EKG, chest x-ray, will get dimer, give IV fluids, Toradol for body aches.    Problems Addressed:  Flu-like symptoms: acute illness or injury  Tachycardia: acute illness or injury    Amount and/or Complexity of Data Reviewed  Labs: ordered. Decision-making details documented in ED Course.  Radiology: ordered.    Risk  OTC drugs.  Prescription drug management.             Disposition  Final diagnoses:   Flu-like symptoms   Tachycardia     Time reflects when diagnosis was documented in both MDM as applicable and the Disposition within this note       Time User Action Codes Description Comment    12/16/2023  4:36 PM Shade Rios Add [R68.89] Flu-like symptoms     12/16/2023  4:36 PM Shade Rios Add [R00.0] Tachycardia           ED Disposition       None          Follow-up Information    None         Patient's Medications   Discharge Prescriptions    No medications on file       No discharge procedures  on file.    PDMP Review       None            ED Provider  Electronically Signed by             Shade Rios MD  12/16/23 8137

## 2023-12-17 NOTE — DISCHARGE INSTRUCTIONS
You can try the Tessalon for cough.     Return to the ER if you develop worsening shortness of breath or have any concerns.

## 2023-12-18 ENCOUNTER — OFFICE VISIT (OUTPATIENT)
Dept: PULMONOLOGY | Facility: CLINIC | Age: 58
End: 2023-12-18
Payer: COMMERCIAL

## 2023-12-18 VITALS
WEIGHT: 212 LBS | SYSTOLIC BLOOD PRESSURE: 134 MMHG | TEMPERATURE: 100 F | HEIGHT: 62 IN | BODY MASS INDEX: 39.01 KG/M2 | RESPIRATION RATE: 22 BRPM | DIASTOLIC BLOOD PRESSURE: 86 MMHG | HEART RATE: 98 BPM | OXYGEN SATURATION: 98 %

## 2023-12-18 DIAGNOSIS — J20.9 ACUTE TRACHEOBRONCHITIS: ICD-10-CM

## 2023-12-18 DIAGNOSIS — G47.33 OSA (OBSTRUCTIVE SLEEP APNEA): Primary | ICD-10-CM

## 2023-12-18 DIAGNOSIS — U07.1 COVID: ICD-10-CM

## 2023-12-18 PROBLEM — Z86.16 HISTORY OF COVID-19: Status: ACTIVE | Noted: 2022-06-23

## 2023-12-18 PROCEDURE — 99214 OFFICE O/P EST MOD 30 MIN: CPT | Performed by: INTERNAL MEDICINE

## 2023-12-18 RX ORDER — AZITHROMYCIN 250 MG/1
TABLET, FILM COATED ORAL
Qty: 6 TABLET | Refills: 0 | Status: SHIPPED | OUTPATIENT
Start: 2023-12-18 | End: 2023-12-22

## 2023-12-18 RX ORDER — PREDNISONE 10 MG/1
TABLET ORAL
Qty: 21 TABLET | Refills: 0 | Status: SHIPPED | OUTPATIENT
Start: 2023-12-18

## 2023-12-18 NOTE — PROGRESS NOTES
Assessment/Plan:    Acute tracheobronchitis  She has acute tracheobronchitis in the setting of COVID.  Been symptomatic for more than a week now.  She did not receive Paxlovid.  She is COVID vaccinated.  On clinical examination her chest was clear to auscultation.  She is hemodynamically stable and saturating well on room air at rest.  Her CT angiogram during her ER visit did not show any PE and showed few groundglass opacities.  We will give her a course of prednisone as well as azithromycin.  She will need repeat imaging if she does not feel better.  On discussion with her and answered all her questions.    CAMERON (obstructive sleep apnea)  Mrs.Marilyn Anderson has had history of snoring and daytime sleepiness and tiredness.  Her sleep is interrupted and she wakes up frequently.  She has increased frequency of micturition.  She has occasional headache.    She has had history of hypertension and hypothyroidism as comorbidities. . On clinical examination, she was obese and had oropharyngeal crowding.  She had an overnight home sleep study which showed mild obstructive sleep apnea with oxygen desaturation.  She was subsequently started on auto CPAP with 5 to 15 cm of water.  She has started using the auto CPAP.  Her CPAP compliance previously was not satisfactory.  Her residual AHI is low.  She is feeling clinical benefit from CPAP therapy.  Her latest CPAP compliance is not available.  I have advised her to use the CPAP regularly and adequately.  I had a long discussion with her and have answered all her questions.        Diagnoses and all orders for this visit:    CAMERON (obstructive sleep apnea)    COVID  -     predniSONE 10 mg tablet; 4 tablets a day for 2 days followed by 3 tablets a day for 2 days followed by 2 tablets a day for 2 days a followed by 1 tablet a day for 2 days followed by half a  tablet a day for 2 days and stop  -     azithromycin (ZITHROMAX) 250 mg tablet; Take 2 tablets today then 1 tablet daily x 4  days    Acute tracheobronchitis  -     predniSONE 10 mg tablet; 4 tablets a day for 2 days followed by 3 tablets a day for 2 days followed by 2 tablets a day for 2 days a followed by 1 tablet a day for 2 days followed by half a  tablet a day for 2 days and stop  -     azithromycin (ZITHROMAX) 250 mg tablet; Take 2 tablets today then 1 tablet daily x 4 days          Subjective:      Patient ID: Peace Anderson is a 58 y.o. female.    Mrs. Marisela Anderson has obstructive sleep apnea and has been on CPAP therapy.  For 1 week she has been having cough shortness of breath Wies runny nose and sneezing.  She went to the emergency room on 12/16/2023 and she reported tested positive for COVID.  Her CT angiogram was negative however she had some groundglass opacities on her CT scan.  Currently she has cough with yellow phlegm and she still feels short of breath.  She is COVID vaccinated.  She did not receive Paxlovid.  Her blood work was unremarkable.  She has diarrhea.  She denied any sore throat.  No chest pain or palpitations.  She stated that she is using the CPAP regularly.  She is comfortable with the mask and pressure.  Her latest CPAP compliance records were not available.        The following portions of the patient's history were reviewed and updated as appropriate: allergies, current medications, past family history, past medical history, past social history, past surgical history, and problem list.    Review of Systems   Constitutional:  Positive for chills and fever.   HENT:  Positive for rhinorrhea, sneezing, sore throat and trouble swallowing. Negative for hearing loss.    Eyes:  Negative for visual disturbance.   Respiratory:  Positive for cough and shortness of breath. Negative for chest tightness.    Cardiovascular:  Negative for chest pain, palpitations and leg swelling.   Gastrointestinal:  Positive for diarrhea. Negative for abdominal pain, constipation, nausea and vomiting.   Genitourinary:  Negative  "for dysuria, frequency and urgency.   Musculoskeletal:  Positive for back pain and myalgias. Negative for arthralgias.   Skin:  Negative for rash.   Allergic/Immunologic: Negative for environmental allergies.   Neurological:  Negative for dizziness, syncope, light-headedness and headaches.   Psychiatric/Behavioral:  Positive for sleep disturbance. Negative for agitation and confusion. The patient is not nervous/anxious.          Objective:      /86 (BP Location: Left arm, Patient Position: Sitting, Cuff Size: Standard)   Pulse 98   Temp 100 °F (37.8 °C) (Tympanic)   Resp 22   Ht 5' 2\" (1.575 m)   Wt 96.2 kg (212 lb)   SpO2 98%   BMI 38.78 kg/m²          Physical Exam  Vitals reviewed.   Constitutional:       General: She is not in acute distress.     Appearance: She is obese. She is not ill-appearing, toxic-appearing or diaphoretic.   HENT:      Head: Normocephalic.   Eyes:      General: No scleral icterus.     Conjunctiva/sclera: Conjunctivae normal.   Cardiovascular:      Rate and Rhythm: Normal rate and regular rhythm.      Heart sounds: Normal heart sounds. No murmur heard.     No gallop.   Pulmonary:      Effort: No respiratory distress.      Breath sounds: Normal breath sounds. No wheezing, rhonchi or rales.   Abdominal:      General: Bowel sounds are normal.      Palpations: Abdomen is soft.      Tenderness: There is no abdominal tenderness. There is no guarding.   Musculoskeletal:      Cervical back: Neck supple. No rigidity.      Right lower leg: No edema.      Left lower leg: No edema.   Lymphadenopathy:      Cervical: No cervical adenopathy.   Skin:     Coloration: Skin is not jaundiced or pale.   Neurological:      Mental Status: She is alert and oriented to person, place, and time.      Gait: Gait normal.   Psychiatric:         Mood and Affect: Mood normal.         Behavior: Behavior normal.         Thought Content: Thought content normal.         Judgment: Judgment normal.           "

## 2023-12-18 NOTE — ASSESSMENT & PLAN NOTE
She has acute tracheobronchitis in the setting of COVID.  Been symptomatic for more than a week now.  She did not receive Paxlovid.  She is COVID vaccinated.  On clinical examination her chest was clear to auscultation.  She is hemodynamically stable and saturating well on room air at rest.  Her CT angiogram during her ER visit did not show any PE and showed few groundglass opacities.  We will give her a course of prednisone as well as azithromycin.  She will need repeat imaging if she does not feel better.  On discussion with her and answered all her questions.

## 2023-12-18 NOTE — ASSESSMENT & PLAN NOTE
Mrs.Marilyn Anderson has had history of snoring and daytime sleepiness and tiredness.  Her sleep is interrupted and she wakes up frequently.  She has increased frequency of micturition.  She has occasional headache.    She has had history of hypertension and hypothyroidism as comorbidities. . On clinical examination, she was obese and had oropharyngeal crowding.  She had an overnight home sleep study which showed mild obstructive sleep apnea with oxygen desaturation.  She was subsequently started on auto CPAP with 5 to 15 cm of water.  She has started using the auto CPAP.  Her CPAP compliance previously was not satisfactory.  Her residual AHI is low.  She is feeling clinical benefit from CPAP therapy.  Her latest CPAP compliance is not available.  I have advised her to use the CPAP regularly and adequately.  I had a long discussion with her and have answered all her questions.

## 2024-01-02 DIAGNOSIS — E66.01 MORBID OBESITY (HCC): ICD-10-CM

## 2024-02-05 NOTE — ADDENDUM NOTE
HISTORY OF PRESENT ILLNESS:    Sharon Hunt is a 29 year old female   who presents today for prenatal care.    She reports a certain LMP of 2023 and regular 28 day cycles.  She had a positive home pregnancy test on 12/15/2023.  She has since had positive serum testing through Dr. Brady.  By her LMP, EGA is 13 weeks 2 days.      She has breast tenderness which is starting to improve.    She has a history of syncope with fainting occurring about 4 times per year.  The fainting episodes can look like seizures.  It was originally thought that she had epilepsy, but she does not.  She has been evaluated and does see a neurologist.  She last saw Dr. Diaz in .  At the 2023 visit with him, it was noted that he recommended that she see Cardiac Electrophysiology to rule out the possibility of an underlying cardiac arrhythmia and also to get a tilt-table test.  It does not appear that she did see Cardiology.  She did have a negative EKG and a negative echocardiogram in 2023.  She did have 1 fainting episode this pregnancy, this past .  She was standing watching a bicycle race.  She suddenly started to feel faint but did get herself down before she fell.  Initially, she was trying to stay upright so that she could get her 's attention.    She was taking a gummy prenatal vitamin without iron.  She plans to switch over to a regular prenatal vitamin containing iron.      She does have asthma.  She was hospitalized as a child but not since.  Triggers for an acute asthmatic episode include infection, exercise, and allergies.  She typically takes Benadryl for allergy symptoms.  She has not received immunizations against influenza, COVID, or RSV.  She has absolutely not interested in any of those at this time.    She used to work in a dental office.  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  They travel, and he gives courses regarding cycling, dirt biking, etc.   Addended by: Radha Gamez on: 7/6/2023 05:23 PM     Modules accepted: Orders She stopped having alcohol with her positive pregnancy test and has no tobacco or drug history.    OB History  OB History    Para Term  AB Living   1 0 0 0 0 0   SAB IAB Ectopic Molar Multiple Live Births   0 0 0 0 0 0        Past Medical History  Past Medical History:   Diagnosis Date    Asthma     with allergies, running, infections.  hospitalized during childhood.    Gastritis, bile acid reflux     Head ache     temporal     Hx of varicella     had chickenpox    Non-seasonal allergic rhinitis 2023    Syncope     look like seizures but are NOT.  saw neurologist, most recently mes per year.        Surgical History  Past Surgical History:   Procedure Laterality Date    Echo heart resting w doppler & color flow  2023    Esophagogastroduodenoscopy      6/15    [place] tooth extraction          Family History  Family History   Problem Relation Age of Onset    Asthma Mother     Other Mother         skin condition age 16. never figured out.    Hodgkin's Lymphoma Mother     Hypertension Father     Asthma Sister     Patient is unaware of any medical problems Brother     Patient is unaware of any medical problems Maternal Grandmother     Cancer, Prostate Maternal Grandfather     Heart Paternal Grandmother     Multiple myeloma Paternal Grandmother     Heart Paternal Grandfather     Stroke Paternal Grandfather     Stroke/TIA Paternal Uncle         Social History  Social History     Tobacco Use    Smoking status: Never    Smokeless tobacco: Never   Vaping Use    Vaping Use: never used   Substance Use Topics    Alcohol use: Not Currently     Alcohol/week: 6.0 standard drinks of alcohol     Types: 6 Cans of beer per week     Comment: holidays, not with pregnancy    Drug use: Never        Current Medication  Current Outpatient Medications   Medication Sig Dispense Refill    Prenatal Vit-Fe Fumarate-FA (multivitamin & mineral w/folic acid- PRENATAL) 27-1 MG Tab Take 1 tablet by mouth daily.       albuterol 108 (90 Base) MCG/ACT inhaler Inhale 2 puffs into the lungs every 4 hours as needed for Shortness of Breath or Wheezing (or prn). 3 Inhaler 3    diphenhydrAMINE (BENADRYL) 25 MG tablet Take 25 mg by mouth every 6 hours as needed for Itching.       No current facility-administered medications for this visit.        Allergies  ALLERGIES:   Allergen Reactions    Amoxicillin RASH    Ceclor [Cefaclor] RASH    Penicillins RASH        PHYSICAL EXAM:    Vital Signs: Visit Vitals  /66 (BP Location: LUE - Left upper extremity, Patient Position: Sitting, Cuff Size: Regular)   Ht 5' 9\" (1.753 m)   Wt 66.7 kg (147 lb 1.6 oz)   LMP 11/07/2023 (Exact Date)   BMI 21.72 kg/m²    Patient's last menstrual period was 11/07/2023 (exact date).    For exam, see prenatal physical.    ASSESSMENT:    1. G1 at 13 weeks 2 days by LMP, has not yet had ultrasound confirmation of ORI.  Fetal heart tones are present.  2. History of syncope.  3. History of asthma.      PLAN:    Declines flu shot, COVID vaccination, RSV despite pregnancy and hx asthma.  GC, chlamydia, trich.  Routine prenatal labs, TSH, urine culture.  Ultrasound to confirm ORI.  Plan on [facility] anatomy scan, we will schedule after confirmation of ORI.  See neurologist for hx syncope and pregnancy.  We will see what he has to say and go from there about whether or not she needs to go through a cardiac evaluation during the pregnancy.  The importance of her getting down to the ground level before she actually faint was discussed.  The importance of treating asthma and its triggers were discussed.  She may use Benadryl and her albuterol inhaler.  Carrier status and aneuploidy screening were discussed.  She declines.  Return in 4 weeks.  10. Routine materials and instructions given.

## 2024-02-15 DIAGNOSIS — I10 ESSENTIAL HYPERTENSION: ICD-10-CM

## 2024-02-15 RX ORDER — CHLORTHALIDONE 50 MG/1
50 TABLET ORAL DAILY
Qty: 30 TABLET | Refills: 1 | Status: SHIPPED | OUTPATIENT
Start: 2024-02-15

## 2024-03-12 DIAGNOSIS — I10 ESSENTIAL HYPERTENSION: ICD-10-CM

## 2024-03-12 RX ORDER — CHLORTHALIDONE 50 MG/1
50 TABLET ORAL DAILY
Qty: 90 TABLET | Refills: 1 | Status: SHIPPED | OUTPATIENT
Start: 2024-03-12

## 2024-03-17 PROBLEM — R73.01 IFG (IMPAIRED FASTING GLUCOSE): Status: ACTIVE | Noted: 2020-04-20

## 2024-03-17 PROBLEM — Z86.16 HISTORY OF COVID-19: Status: RESOLVED | Noted: 2022-06-23 | Resolved: 2024-03-17

## 2024-03-17 PROBLEM — J20.9 ACUTE TRACHEOBRONCHITIS: Status: RESOLVED | Noted: 2023-01-26 | Resolved: 2024-03-17

## 2024-03-17 PROBLEM — I87.2 VENOUS INSUFFICIENCY: Status: ACTIVE | Noted: 2023-01-26

## 2024-03-17 PROBLEM — U07.1 COVID: Status: RESOLVED | Noted: 2022-06-23 | Resolved: 2024-03-17

## 2024-03-18 ENCOUNTER — OFFICE VISIT (OUTPATIENT)
Dept: FAMILY MEDICINE CLINIC | Facility: CLINIC | Age: 59
End: 2024-03-18
Payer: COMMERCIAL

## 2024-03-18 VITALS
HEART RATE: 90 BPM | SYSTOLIC BLOOD PRESSURE: 124 MMHG | DIASTOLIC BLOOD PRESSURE: 88 MMHG | HEIGHT: 62 IN | RESPIRATION RATE: 16 BRPM | OXYGEN SATURATION: 96 % | WEIGHT: 214.8 LBS | BODY MASS INDEX: 39.53 KG/M2

## 2024-03-18 DIAGNOSIS — I87.2 VENOUS INSUFFICIENCY: ICD-10-CM

## 2024-03-18 DIAGNOSIS — F33.1 MDD (MAJOR DEPRESSIVE DISORDER), RECURRENT EPISODE, MODERATE (HCC): ICD-10-CM

## 2024-03-18 DIAGNOSIS — Z82.49 FAMILY HISTORY OF HEART DISEASE: ICD-10-CM

## 2024-03-18 DIAGNOSIS — F33.9 DEPRESSION, RECURRENT (HCC): ICD-10-CM

## 2024-03-18 DIAGNOSIS — E21.3 HYPERPARATHYROIDISM (HCC): ICD-10-CM

## 2024-03-18 DIAGNOSIS — Z12.11 SCREENING FOR COLON CANCER: ICD-10-CM

## 2024-03-18 DIAGNOSIS — Z23 ENCOUNTER FOR IMMUNIZATION: ICD-10-CM

## 2024-03-18 DIAGNOSIS — Z78.0 POST-MENOPAUSAL: ICD-10-CM

## 2024-03-18 DIAGNOSIS — E05.90 SUBCLINICAL HYPERTHYROIDISM: ICD-10-CM

## 2024-03-18 DIAGNOSIS — J45.30 MILD PERSISTENT ASTHMA WITHOUT COMPLICATION: ICD-10-CM

## 2024-03-18 DIAGNOSIS — Z00.00 WELL ADULT EXAM: Primary | ICD-10-CM

## 2024-03-18 DIAGNOSIS — N20.0 KIDNEY STONE: ICD-10-CM

## 2024-03-18 DIAGNOSIS — E55.9 VITAMIN D DEFICIENCY: ICD-10-CM

## 2024-03-18 DIAGNOSIS — E04.1 THYROID NODULE: ICD-10-CM

## 2024-03-18 DIAGNOSIS — F41.1 GAD (GENERALIZED ANXIETY DISORDER): ICD-10-CM

## 2024-03-18 DIAGNOSIS — L40.9 PSORIASIS OF SCALP: ICD-10-CM

## 2024-03-18 DIAGNOSIS — M25.511 CHRONIC RIGHT SHOULDER PAIN: ICD-10-CM

## 2024-03-18 DIAGNOSIS — D50.9 IRON DEFICIENCY ANEMIA, UNSPECIFIED IRON DEFICIENCY ANEMIA TYPE: ICD-10-CM

## 2024-03-18 DIAGNOSIS — G47.33 OSA ON CPAP: ICD-10-CM

## 2024-03-18 DIAGNOSIS — N39.46 MIXED INCONTINENCE URGE AND STRESS: ICD-10-CM

## 2024-03-18 DIAGNOSIS — I10 ESSENTIAL HYPERTENSION: ICD-10-CM

## 2024-03-18 DIAGNOSIS — K63.5 POLYP OF COLON, UNSPECIFIED PART OF COLON, UNSPECIFIED TYPE: ICD-10-CM

## 2024-03-18 DIAGNOSIS — G89.29 CHRONIC RIGHT SHOULDER PAIN: ICD-10-CM

## 2024-03-18 PROBLEM — B37.2 CANDIDAL INTERTRIGO: Status: RESOLVED | Noted: 2023-01-26 | Resolved: 2024-03-18

## 2024-03-18 PROCEDURE — 90471 IMMUNIZATION ADMIN: CPT

## 2024-03-18 PROCEDURE — 99215 OFFICE O/P EST HI 40 MIN: CPT | Performed by: FAMILY MEDICINE

## 2024-03-18 PROCEDURE — 99396 PREV VISIT EST AGE 40-64: CPT | Performed by: FAMILY MEDICINE

## 2024-03-18 PROCEDURE — 90677 PCV20 VACCINE IM: CPT

## 2024-03-18 RX ORDER — FLUOCINOLONE ACETONIDE 0.1 MG/ML
SOLUTION TOPICAL 2 TIMES DAILY
Qty: 60 ML | Refills: 2 | Status: SHIPPED | OUTPATIENT
Start: 2024-03-18

## 2024-03-18 RX ORDER — DULOXETIN HYDROCHLORIDE 30 MG/1
30 CAPSULE, DELAYED RELEASE ORAL DAILY
Qty: 90 CAPSULE | Refills: 2 | Status: SHIPPED | OUTPATIENT
Start: 2024-03-18

## 2024-03-18 NOTE — PATIENT INSTRUCTIONS
Consider [facility] or call [phone].    Fasting blood work one week before seeing me.     Schedule PT for your shoulder.

## 2024-03-18 NOTE — ASSESSMENT & PLAN NOTE
dBP little elevated. Has not been taking metoprolol due to not having prescription. She is takign chlorthaladone 50 mg. Update labs.

## 2024-03-18 NOTE — PROGRESS NOTES
1. Well adult exam    2. Essential hypertension  Assessment & Plan:  dBP little elevated. Has not been taking metoprolol due to not having prescription. She is takign chlorthaladone 50 mg. Update labs.     Orders:  -     Comprehensive metabolic panel; Future  -     Vitamin B12; Future  -     CBC and differential; Future; Expected date: 03/19/2024  -     TSH, 3rd generation; Future  -     Hemoglobin A1C; Future  -     Iron Panel (Includes Ferritin, Iron Sat%, Iron, and TIBC); Future  -     Vitamin D 25 hydroxy; Future  -     Albumin / creatinine urine ratio; Future  -     PTH, intact; Future  -     US thyroid; Future; Expected date: 03/18/2024  -     Ambulatory referral to Physical Therapy; Future    3. Iron deficiency anemia, unspecified iron deficiency anemia type  Assessment & Plan:  Update labs     Orders:  -     Comprehensive metabolic panel; Future  -     Vitamin B12; Future  -     CBC and differential; Future; Expected date: 03/19/2024  -     TSH, 3rd generation; Future  -     Hemoglobin A1C; Future  -     Iron Panel (Includes Ferritin, Iron Sat%, Iron, and TIBC); Future  -     Vitamin D 25 hydroxy; Future  -     Albumin / creatinine urine ratio; Future  -     PTH, intact; Future  -     US thyroid; Future; Expected date: 03/18/2024  -     Ambulatory referral to Physical Therapy; Future    4. Venous insufficiency  Assessment & Plan:  Pt does not have compression stockings. Consider these and/or lymphedema therapy at follow up.     Orders:  -     Comprehensive metabolic panel; Future  -     Vitamin B12; Future  -     CBC and differential; Future; Expected date: 03/19/2024  -     TSH, 3rd generation; Future  -     Hemoglobin A1C; Future  -     Iron Panel (Includes Ferritin, Iron Sat%, Iron, and TIBC); Future  -     Vitamin D 25 hydroxy; Future  -     Albumin / creatinine urine ratio; Future  -     PTH, intact; Future  -     US thyroid; Future; Expected date: 03/18/2024  -     Ambulatory referral to Physical  Therapy; Future    5. Depression, recurrent (HCC)  Assessment & Plan:  Not well controlled. Treatment options reviewed. Failed lexapro and possibly zoloft. Will start Cymbalta. Consider Gene Sight testing. Agrees to see therapist. Follow up in 4 -6 weeks.     Orders:  -     DULoxetine (CYMBALTA) 30 mg delayed release capsule; Take 1 capsule (30 mg total) by mouth daily    6. JANE (generalized anxiety disorder)  Assessment & Plan:  Not well controlled. Treatment options reviewed. Failed lexapro and possibly zoloft. Will start Cymbalta. Consider Gene Sight testing. Agrees to see therapist. Follow up in 4 -6 weeks.     Orders:  -     DULoxetine (CYMBALTA) 30 mg delayed release capsule; Take 1 capsule (30 mg total) by mouth daily    7. Hyperparathyroidism (HCC)  Assessment & Plan:  Update labs. Due to see endo     Orders:  -     Comprehensive metabolic panel; Future  -     Vitamin B12; Future  -     CBC and differential; Future; Expected date: 03/19/2024  -     TSH, 3rd generation; Future  -     Hemoglobin A1C; Future  -     Iron Panel (Includes Ferritin, Iron Sat%, Iron, and TIBC); Future  -     Vitamin D 25 hydroxy; Future  -     Albumin / creatinine urine ratio; Future  -     PTH, intact; Future  -     US thyroid; Future; Expected date: 03/18/2024  -     Ambulatory referral to Physical Therapy; Future    8. Encounter for immunization  -     Pneumococcal Conjugate Vaccine 20-valent (Pcv20)    9. Mixed incontinence urge and stress  Assessment & Plan:  Recommend pelvic PT. Referred.     Orders:  -     Ambulatory referral to Physical Therapy; Future    10. Thyroid nodule  Assessment & Plan:  Update thyroid us. Due for visit with endo     Orders:  -     US thyroid; Future; Expected date: 03/18/2024    11. Post-menopausal  -     DXA bone density spine hip and pelvis; Future; Expected date: 03/18/2024    12. Screening for colon cancer    13. Polyp of colon, unspecified part of colon, unspecified type  Assessment &  Plan:  Scheduled tomorrow with general surgery for consult and then update colonoscopy     Orders:  -     Ambulatory Referral to Gastroenterology; Future    14. Chronic right shoulder pain  Comments:  refer to PT. shoulder or radiating from neck?  Orders:  -     Ambulatory Referral to Physical Therapy; Future    15. Psoriasis of scalp  Assessment & Plan:  New dx. Start with steroid solution. Can progress to foam if needed. 2 weeks on/2 weeks off     Orders:  -     fluocinolone (SYNALAR) 0.01 % external solution; Apply topically 2 (two) times a day 2 weeks on, 2 weeks off to scalp rash    16. Family history of heart disease  Assessment & Plan:  Mom with recent testing showing coronary calcium score > 1000. Check labs, consider further testing.       17. MDD (major depressive disorder), recurrent episode, moderate (HCC)  Assessment & Plan:  Not well controlled. Treatment options reviewed. Failed lexapro and possibly zoloft. Will start Cymbalta. Consider Gene Sight testing. Agrees to see therapist. Follow up in 4 -6 weeks.       18. Kidney stone  Assessment & Plan:  Stop topamax       19. Mild persistent asthma without complication  Assessment & Plan:  Follows with pulm, mainly for sleep apnea. Not using CPAP but willing to restart       20. Subclinical hyperthyroidism  Assessment & Plan:  Update labs       21. Vitamin D deficiency  Assessment & Plan:  Update labs       22. CAMERON on CPAP  Assessment & Plan:  Restart CPAP. See pulm.            ·         Continue to work on healthy diet   ·         Encourage exercise 4 times a week or more for minimum 30 minutes.   ·         see dentist, wear seatbelt.  ·         Health maintenance reviewed - PCV20 today, updated mammo scheduled, appointment scheduled with general surgery for colonoscopy. Schedule dexa at a later time.   Health Maintenance   Topic Date Due   • Osteoporosis Screening  Never done   • Colorectal Cancer Screening  09/08/2020   • COVID-19 Vaccine (4 - 2023-24  season) 09/01/2023   • Depression Screening  01/26/2024   • Breast Cancer Screening: Mammogram  04/19/2024   • Influenza Vaccine (1) 06/30/2024 (Originally 9/1/2023)   • Annual Physical  03/18/2025   • Cervical Cancer Screening  02/22/2027   • DTaP,Tdap,and Td Vaccines (2 - Td or Tdap) 06/14/2027   • HIV Screening  Completed   • Hepatitis C Screening  Completed   • Zoster Vaccine  Completed   • Pneumococcal Vaccine: Pediatrics (0 to 5 Years) and At-Risk Patients (6 to 64 Years)  Completed   • HIB Vaccine  Aged Out   • IPV Vaccine  Aged Out   • Hepatitis A Vaccine  Aged Out   • Meningococcal ACWY Vaccine  Aged Out   • HPV Vaccine  Aged Out       5 minutes spent on chart prep, 60 minutes spent with patient counseling/educating on their diagnoses, tests completed and any new tests ordered, any referrals placed, treatment options, and documentation of above today.   In prescribing new medications, or changing doses, we reviewed the risks and benefits and side effects of these medications along with other treatment options if appropriate.     Return in about 1 month (around 4/18/2024) for mood, labs (long visit) .  Patient Instructions   Consider [facility] or call [phone].    Fasting blood work one week before seeing me.     Schedule PT for your shoulder.         Return in about 1 month (around 4/18/2024) for mood, labs (long visit) .    Subjective:   Peace is a 58 y.o. female here today for a follow-up on her current medical conditions:    Patient Active Problem List   Diagnosis   • Mild persistent asthma without complication   • Thyroid nodule   • Subclinical hyperthyroidism   • Vitamin D deficiency   • Essential hypertension   • Kidney stone   • Ascorbic acid deficiency   • Iron deficiency   • Helicobacter pylori gastrointestinal tract infection   • JANE (generalized anxiety disorder)   • IFG (impaired fasting glucose)   • Pyridoxine deficiency   • Reactive airway disease with acute exacerbation   • Acute  stress disorder   • MDD (major depressive disorder), recurrent episode, moderate (HCC)   • Primary osteoarthritis of right knee   • High serum parathyroid hormone (PTH)   • Menorrhagia with regular cycle   • Iron deficiency anemia   • Vertigo   • CAMERON on CPAP   • Hyperparathyroidism (HCC)   • Cervical radiculopathy   • Venous insufficiency   • Family history of heart disease   • Psoriasis of scalp   • Polyp of colon   • Mixed incontinence urge and stress       Patient Care Team:  Arti Luna DO as PCP - General (Family Medicine)  Cristin Hayes MD (Family Medicine)  Lemuel Joy MD (Endocrinology)    Current Medications:  Current Outpatient Medications   Medication Sig Dispense Refill   • chlorthalidone 50 MG tablet TAKE 1 TABLET BY MOUTH EVERY DAY 90 tablet 1   • clotrimazole-betamethasone (LOTRISONE) 1-0.05 % cream Apply topically 2 (two) times a day (Patient taking differently: Apply topically 2 (two) times a day as needed) 45 g 3   • DULoxetine (CYMBALTA) 30 mg delayed release capsule Take 1 capsule (30 mg total) by mouth daily 90 capsule 2   • fluocinolone (SYNALAR) 0.01 % external solution Apply topically 2 (two) times a day 2 weeks on, 2 weeks off to scalp rash 60 mL 2   • norethindrone (MICRONOR) 0.35 MG tablet Take 1 tablet (0.35 mg total) by mouth daily 28 tablet 11   • nystatin (MYCOSTATIN) powder Apply topically 2 (two) times a day To moist area (Patient taking differently: Apply topically 2 (two) times a day as needed To moist area) 60 g 3   • semaglutide, 1 mg/dose, (Ozempic, 1 MG/DOSE,) 4 mg/3 mL injection pen Inject 0.75 mL (1 mg total) under the skin every 7 days 9 mL 0   • Insulin Pen Needle (BD Pen Needle Jaymie U/F) 32G X 4 MM MISC Use daily as needed with insulin pen (Patient not taking: Reported on 12/16/2023) 100 each 2     No current facility-administered medications for this visit.       HPI:  Chief Complaint   Patient presents with   • Establish Care   • Arm Pain     R side,  shoulder and upper arm. Started approx 3 weeks ago.     -- Above per clinical staff and reviewed. --    PHQ-2/9 Depression Screening    Little interest or pleasure in doing things: 2 - more than half the days  Feeling down, depressed, or hopeless: 1 - several days  Trouble falling or staying asleep, or sleeping too much: 2 - more than half the days  Feeling tired or having little energy: 2 - more than half the days  Poor appetite or overeatin - several days  Feeling bad about yourself - or that you are a failure or have let yourself or your family down: 1 - several days  Trouble concentrating on things, such as reading the newspaper or watching television: 2 - more than half the days  Moving or speaking so slowly that other people could have noticed. Or the opposite - being so fidgety or restless that you have been moving around a lot more than usual: 0 - not at all  Thoughts that you would be better off dead, or of hurting yourself in some way: 0 - not at all  PHQ-9 Score: 11  PHQ-9 Interpretation: Moderate depression       JANE-7 Flowsheet Screening    Flowsheet Row Most Recent Value   Over the last 2 weeks, how often have you been bothered by any of the following problems?    Feeling nervous, anxious, or on edge 1   Not being able to stop or control worrying 1   Worrying too much about different things 1   Trouble relaxing 2   Being so restless that it is hard to sit still 0   Becoming easily annoyed or irritable 0   Feeling afraid as if something awful might happen 0   JANE-7 Total Score 5           New pt transferring - due for cmp b12 CBC TSH A1C iron ferritin vit D urine microalbumin:creatinine ratio PTH   24 due for diagnostic bilateral mammo   Cervical MRI forminal narrowing (2023)   Overdue for thryoid US f/u nodules 22  LV hypertr  ophy echo 2023   History of kidney stones - STOP TOPAMAX   Pelvic US last 2022. EMB non-diagnostic   Last visit with PCP 2023 was advised to stop OCP   She  "is not taking albuterol atrovent, metformin 1000 mg daily. Topril 75 mg daily Topamax 100 mg   daily   On CPAP - not compliant in past. Sees pulm   Venous insuffiency. Non-compliant with blood work ordered by cardio. Has compression stockings.   Knee pain   Today:  New patient here today with daughter Nicolle   Follows with cardio, [facility], endo,   Sees ortho in [facility] for left knee  Does not use cpap   Says she does not take her meds because she did not have refills   Does not use compression stockings - does not have them   Dog walks on her leg and very painful     [place] ACL, MCL and meniscus left knee - surgery Sept - out of work 6 months. Was told nerves were not working well. Movement was not going the way it should      for [employer] in [employer] [place]    Lives with son Rufino Trin[place] - has 4 sons   Daughter Nicolle  11 grandchildren  - one on the way    about 15 years ago     Was on saxenda in the past -   Then was on ozempic   Stopped by Dr. Hayes     Doing okay on Ozempic - slow weight loss   Diet okay carnatation instead breakfast, rice and meat, sheppards   Not a lot of fruits and veggies   Not exercising much - walking dog   Dentist due   Sleep - not using cpap, 4-5 hours, restless  Upper back pain     Heavy bleeding when she saw Ob Gyn     Gets depressed, does not want to do thing, secludes herself   Tried to see a therapist - was not helpful - for a month or so once a week  Lexapro did not help in the past (got up to 20 mg)      The following portions of the patient's history were reviewed and updated as appropriate: allergies, current medications, past family history, past medical history, past social history, past surgical history and problem list.    Objective:  Vitals:  /88   Pulse 90   Resp 16   Ht 5' 2\" (1.575 m)   Wt 97.4 kg (214 lb 12.8 oz)   SpO2 96%   BMI 39.29 kg/m²    Wt Readings from " Last 3 Encounters:   03/18/24 97.4 kg (214 lb 12.8 oz)   12/18/23 96.2 kg (212 lb)   12/16/23 97.2 kg (214 lb 4.6 oz)      BP Readings from Last 3 Encounters:   03/18/24 124/88   12/18/23 134/86   12/16/23 109/51        Review of Systems   She has no other concerns. No unexpected weight changes. No chest pain, SOB, + palpitations. No GERD. No changes in bowels or bladder. Not sleeping well. + mood changes. Chronic left knee pain, upper back pain, + right shoulder pain for months.     Physical Exam   Constitutional:  she appears well-developed and well-nourished.  HENT: Head: Normocephalic.   Right Ear: External ear normal. Tympanic membrane normal.   Left Ear: External ear normal. Tympanic membrane normal.   Nose: Nose normal. No mucosal edema, No rhinorrhea.   Right sinus exhibits no maxillary sinus tenderness.   Left sinus exhibits no maxillary sinus tenderness.   Mouth/Throat: Oropharynx is clear and moist.  Eyes: Normal conjunctiva.  No erythema. No discharge.  Neck: Neck supple.   Cardiovascular: Normal rate, regular rhythm and normal heart sounds. + bilateral lower extremity edema.   Pulmonary/Chest: Effort normal and breath sounds normal. No wheezes, rales, or rhonchi.   Abdominal: Soft. Bowel sounds are normal. There is no tenderness. No hepatosplenomegaly.   Musculoskeletal: she exhibits no edema. Right shoulder pain with limited range of motion.   Lymphadenopathy: she has no cervical adenopathy.   Neurological: she is alert and oriented to person, place, and time.   Skin: Skin is warm and dry.   Psychiatric: she has a depressed mood and affect. her behavior is normal. Thought content normal. Tearful when filling out mood screening and talking about her mood. Forgetful at times - defers to daughter.       Depression Screening and Follow-up Plan: Patient's depression screening was positive with a PHQ-9 score of 11. Patient with underlying depression and was advised to continue current medications as  prescribed. New medication started today. See above.

## 2024-03-18 NOTE — ASSESSMENT & PLAN NOTE
Mom with recent testing showing coronary calcium score > 1000. Check labs, consider further testing.

## 2024-03-18 NOTE — ASSESSMENT & PLAN NOTE
Not well controlled. Treatment options reviewed. Failed lexapro and possibly zoloft. Will start Cymbalta. Consider Gene Sight testing. Agrees to see therapist. Follow up in 4 -6 weeks.

## 2024-03-21 ENCOUNTER — HOSPITAL ENCOUNTER (OUTPATIENT)
Dept: ULTRASOUND IMAGING | Facility: HOSPITAL | Age: 59
End: 2024-03-21
Payer: COMMERCIAL

## 2024-03-21 ENCOUNTER — OFFICE VISIT (OUTPATIENT)
Dept: SURGERY | Facility: CLINIC | Age: 59
End: 2024-03-21
Payer: COMMERCIAL

## 2024-03-21 VITALS
HEIGHT: 62 IN | WEIGHT: 215.4 LBS | HEART RATE: 94 BPM | TEMPERATURE: 98.1 F | BODY MASS INDEX: 39.64 KG/M2 | DIASTOLIC BLOOD PRESSURE: 82 MMHG | SYSTOLIC BLOOD PRESSURE: 140 MMHG | OXYGEN SATURATION: 98 %

## 2024-03-21 DIAGNOSIS — E21.3 HYPERPARATHYROIDISM (HCC): ICD-10-CM

## 2024-03-21 DIAGNOSIS — I87.2 VENOUS INSUFFICIENCY: ICD-10-CM

## 2024-03-21 DIAGNOSIS — I10 ESSENTIAL HYPERTENSION: ICD-10-CM

## 2024-03-21 DIAGNOSIS — Z86.010 HISTORY OF COLON POLYPS: Primary | ICD-10-CM

## 2024-03-21 DIAGNOSIS — E04.1 THYROID NODULE: ICD-10-CM

## 2024-03-21 DIAGNOSIS — D50.9 IRON DEFICIENCY ANEMIA, UNSPECIFIED IRON DEFICIENCY ANEMIA TYPE: ICD-10-CM

## 2024-03-21 PROCEDURE — 76536 US EXAM OF HEAD AND NECK: CPT

## 2024-03-21 PROCEDURE — 99203 OFFICE O/P NEW LOW 30 MIN: CPT | Performed by: SURGERY

## 2024-03-21 RX ORDER — POLYETHYLENE GLYCOL-3350 AND ELECTROLYTES 236; 6.74; 5.86; 2.97; 22.74 G/274.31G; G/274.31G; G/274.31G; G/274.31G; G/274.31G
4 POWDER, FOR SOLUTION ORAL ONCE
Qty: 4000 ML | Refills: 0 | Status: SHIPPED | OUTPATIENT
Start: 2024-03-21 | End: 2024-03-21

## 2024-03-21 NOTE — PROGRESS NOTES
"Assessment/Plan:   Diagnoses and all orders for this visit:    History of colon polyps     Patient is being scheduled for colonoscopy at [facility].    Procedure discussed with patient and informed consent obtained.    Risks include bleeding and perforation.    Bowel prep instructions given.    Subjective:      Patient ID: Peace Anderson is a 58 y.o. female.   for a [employer]    HPI  Patient presents to the office requesting a colonoscopy.  Her last exam was 7 years ago.  She was found to have colon polyps and was asked to have a repeat colonoscopy in 3 years.    She denies any change in her bowel habits.  No history of bleeding per rectum.    Recent labs have been unremarkable.    The following portions of the patient's history were reviewed and updated as appropriate: allergies, current medications, past family history, past medical history, past social history, past surgical history, and problem list.    Review of Systems    Essential hypertension    Left knee surgery    No cardiopulmonary disease  No history of diabetes  No cerebrovascular disease  No abdominal surgeries    Objective:    /82 (BP Location: Left arm, Patient Position: Sitting, Cuff Size: Standard)   Pulse 94   Temp 98.1 °F (36.7 °C) (Tympanic)   Ht 5' 2\" (1.575 m)   Wt 97.7 kg (215 lb 6.4 oz)   SpO2 98%   BMI 39.40 kg/m²      Physical Exam    No pallor or icterus  No cervical lymphadenopathy    Heart sounds are normal  Chest is clear to auscultation  Abdominal exam reveals no masses  Extremity exam is normal  "

## 2024-03-25 ENCOUNTER — EVALUATION (OUTPATIENT)
Dept: PHYSICAL THERAPY | Facility: CLINIC | Age: 59
End: 2024-03-25
Payer: COMMERCIAL

## 2024-03-25 DIAGNOSIS — I87.2 VENOUS INSUFFICIENCY: ICD-10-CM

## 2024-03-25 DIAGNOSIS — D50.9 IRON DEFICIENCY ANEMIA, UNSPECIFIED IRON DEFICIENCY ANEMIA TYPE: ICD-10-CM

## 2024-03-25 DIAGNOSIS — I10 ESSENTIAL HYPERTENSION: ICD-10-CM

## 2024-03-25 DIAGNOSIS — E21.3 HYPERPARATHYROIDISM (HCC): ICD-10-CM

## 2024-03-25 DIAGNOSIS — N39.46 MIXED INCONTINENCE URGE AND STRESS: Primary | ICD-10-CM

## 2024-03-25 PROCEDURE — 97162 PT EVAL MOD COMPLEX 30 MIN: CPT | Performed by: PHYSICAL THERAPIST

## 2024-03-25 NOTE — PROGRESS NOTES
PT Evaluation     Today's date: 3/25/2024  Patient name: Peace Anderson  : 1965  MRN: [id number]  Referring provider: Arti Luna DO  Dx:   Encounter Diagnosis     ICD-10-CM    1. Mixed incontinence urge and stress  N39.46 Ambulatory referral to Physical Therapy      2. Essential hypertension  I10 Ambulatory referral to Physical Therapy      3. Iron deficiency anemia, unspecified iron deficiency anemia type  D50.9 Ambulatory referral to Physical Therapy      4. Venous insufficiency  I87.2 Ambulatory referral to Physical Therapy      5. Hyperparathyroidism (HCC)  E21.3 Ambulatory referral to Physical Therapy          Start Time: 1600  Stop Time: 1651  Total time in clinic (min): 51 minutes    Assessment  Assessment details: Peace Anderson is a 58 y.o. female who presents to physical therapy with Mixed incontinence urge and stress  (primary encounter diagnosis)  Essential hypertension  Iron deficiency anemia, unspecified iron deficiency anemia type  Venous insufficiency  Hyperparathyroidism (HCC). Functional impairments include leakage with coughing, sneezing, and laughing, occasional straining for BM, and urinary frequency. Physical impairments include deficits in R hip strength and pain with MMT, poor endurance of pelvic floor, and fair pelvic floor strength. Peace Anderson would benefit from formal physical therapy to address impairments as detailed, decrease pain, and restore maximal level of function for all home, work, and mobility tasks.    Impairments: abnormal coordination, abnormal muscle firing, impaired physical strength and lacks appropriate home exercise program  Understanding of Dx/Px/POC: good   Prognosis: good    Goals  Short term goals (to be met in 4 weeks):  1. Pt will report leakage <3 days/week.  2. Pt will improve strength in hips and pelvic floor by at least 1/2 grade.  3. Pt will improve endurance hold of pelvic floor to at least 5 seconds to help with getting to the  bathroom.    Long term goals (to be met by discharge):  1. Pt will report resolution of leakage.  2. Pt will demonstrate good bowel mechanics without cuing to avoid straining.  3. Pt will display improved PERF score in all parameters for improved strength and endurance of pelvic floor.      Plan  Patient would benefit from: women's health eval and skilled physical therapy  Planned modality interventions: biofeedback  Planned therapy interventions: abdominal trunk stabilization, IASTM, joint mobilization, massage, manual therapy, behavior modification, body mechanics training, breathing training, coordination, functional ROM exercises, neuromuscular re-education, postural training, patient education, self care, stretching, strengthening, therapeutic exercise, therapeutic activities and home exercise program  Frequency: 1x week  Duration in visits: 12  Duration in weeks: 12  Treatment plan discussed with: patient        PT Pelvic Floor Subjective:   History of Present Illness:   Peace notes leaking with sneezing, coughing, and laughing. She has to strain sometimes to have a BM. She notes that this has been going on for a while. She is hoping to stop leakage.  Social Support:     Lives in:  Multiple-level home (has bathroom upstairs and downstairs)    Lives with:  Adult children    Work status: employed full time ( for [employer]  in [employer])    Life stress level: 5    Life stress severity: moderate  Diet and Exercise:      Doesn't do much exercise, has been in a slump  Sometimes walks her dog  OB/ gyn History    Gestational History:     Prior Pregnancy: Yes      Number of prior pregnancies: 5    Number of term pregnancies: 5    Delivery Type: vaginal delivery      Number of vaginal deliveries: 5    Delivery Complications:  Tearing, no forceps or suction    Menstrual History:      Menstrual irregularities irregular menses    Birth control: contraception  On continuous birth control to control heavy  "bleeding x2 years  Bladder Function:     Voiding Difficulties positive for: urgency and frequent urination      Voiding Difficulties negative for: hesitancy, straining and incomplete emptying       Voiding Difficulties comments:     Voiding frequency: every 3-4 hours    Urinary leakage: urine leakage    Urinary leakage aggravated by: coughing, sneezing and laughing    Urinary leakage not aggravated by: post-void dribble    Nocturia (episodes per night): 2 and 1    Painful urination: No      Fluid Intake Type:  Water    Intake (ounces):     Intake (ounces) comment: On medication for HTN, contributes to frequency  Worse with sneezing 3-4x consecutively  Also drinks strawberry lemonade  Incontinence Management:     Pads/Diaper Use:  Day    Patient has attempted at least 4 weeks of independent pelvic floor strengthening exercises without a resolution of symptoms  Bowel Function:     Voiding DIfficulties: urgency, painful defecating and unfinished feeling after defecating      Bowel frequency: daily    Stool softener use: no stool softeners    Uses \"squatty potty\": Squatty Potty  Sexual Function:     Sexually Active:  Not sexually active  Pain:     At best pain ratin    At worst pain ratin  Diagnostic Tests:     None    Treatments:   Upcoming doctor's appointment: yes  Date of next appointment: 2024  Patient Goals:     Patient goals for therapy:  Fully empty bladder or bowels, improved bladder or bowel function and improved quality of life    Other patient goals:  No leakage or peeing myself      Objective     Postural Observations  Standing posture: good      Active Range of Motion     Additional Active Range of Motion Details  Limited flexion secondary to pulling in L knee, full extension, full SB B with slight pain in middle of low back, full B rotation with only slight low back discomfort    Strength/Myotome Testing     Left Hip   Planes of Motion   Flexion: 5  External rotation: 4  Internal rotation: " 5    Right Hip   Planes of Motion   Flexion: 5  External rotation: 3+ (pain in anterior hip)  Internal rotation: 5    Tests     Left Hip   Negative KYLAH, FADIR and Gillet's.   SLR: Negative.     Right Hip   Positive KYLAH and FADIR.   Negative Gillet's.   SLR: Negative.       Abdominal Assessment:      Position: supine exam  Abdominal Assessment: No tenderness to palpation  Good mobility with skin rolling  Some hardness over L middle rectus without pain  Mild diaphragm mobility restriction  Good diaphragmatic breath    Diastatis   Diastasis recti present? no  Connective tissue integrity at linea alba: firm  no tenderness at linea alba  able to engage transverse abdominis (shallow breath)       General Perineum Exam:   perineum intact.     General perineum exam comments: Dry skin upper medial R thigh    Visual Inspection of Perineum:   Excursion of perineal body in cephalad direction with contraction of pelvic floor muscles (PFM): good  Excursion of perineal body in caudal direction with relaxation of pelvic floor muscles (PFM): good   Involuntary contraction with coughing: yes  Involuntary relaxation with bearing down: yes  Cotton swab test: non-tender  Cough reflex: cough reflex  Sphincter Tone Resting: normal  Sphincter Tone Squeeze: normal  Sensation: intact    Pelvic Organ Prolapse   no pelvic organ prolapse  Perineal body inspection: within normal limits        Pelvic Floor Muscle Exam:     Muscle Contraction: well isolated and stronger posteriorly than anteriorly   Breathing pattern with contraction: apical and holding breath   Pelvic floor muscle relaxation is complete.         PERFECT Score   Power right: 3+/5   Power left: 3+/5   Endurance (seconds to max): 4   Repetitions (before fatigue): 3   Fast flicks (in 10 seconds): 10   Perfect Score: Poor endurance over several reps      pelvic floor exam consent given by patient    Pelvic exam completed: vaginally              Precautions: HTN, venous  insufficiency      Manuals 3/25/24            Consider TLF skin rolling prn                                                   Neuro Re-Ed             TA + breath nv            TA + PF with breath nv            PF endurance nv            PF QF nv            TA + PF + march nv            TA + PF + BKFO nv                         Ther Ex                                                                                                                     Ther Activity             Bowel mechanics nv            Urge delay: freeze breathe squeeze reviewed            Gait Training                                       Modalities             Biofeedback has extra charge for pt

## 2024-04-04 ENCOUNTER — EVALUATION (OUTPATIENT)
Dept: PHYSICAL THERAPY | Facility: CLINIC | Age: 59
End: 2024-04-04
Payer: COMMERCIAL

## 2024-04-04 DIAGNOSIS — G89.29 CHRONIC RIGHT SHOULDER PAIN: Primary | ICD-10-CM

## 2024-04-04 DIAGNOSIS — M54.12 CERVICAL RADICULOPATHY: ICD-10-CM

## 2024-04-04 DIAGNOSIS — M25.511 CHRONIC RIGHT SHOULDER PAIN: Primary | ICD-10-CM

## 2024-04-04 PROCEDURE — 97110 THERAPEUTIC EXERCISES: CPT | Performed by: PHYSICAL THERAPIST

## 2024-04-04 PROCEDURE — 97162 PT EVAL MOD COMPLEX 30 MIN: CPT | Performed by: PHYSICAL THERAPIST

## 2024-04-04 PROCEDURE — 97112 NEUROMUSCULAR REEDUCATION: CPT | Performed by: PHYSICAL THERAPIST

## 2024-04-04 NOTE — PROGRESS NOTES
PT Evaluation     Today's date: 2024  Patient name: Peace Anderson  : 1965  MRN: [id number]  Referring provider: Arti Luna DO  Dx:   Encounter Diagnosis     ICD-10-CM    1. Chronic right shoulder pain  M25.511     G89.29       2. Cervical radiculopathy  M54.12                      Assessment  Assessment details: Pt presents with signs and symptoms synonymous of admitting diagnosis as well as cervical diagnosis of derangement with DP of CS Range + Ext, R shoulder derangement with DP of repeated shoulder ext.  Pt presents with pain, decreased strength, decreased range, flexibility, as well as tolerance to activity and postural awareness.  Pt would benefit skilled PT intervention in order to address these impairments in order to be able to perform all desired activities with minimal to nil symptom exacerbation.  If she fails to find improvement over the next 3-4 weeks appropriate referral will be performed.  Thank you very much for this kind and motivated referral.     Impairments: abnormal or restricted ROM, activity intolerance, difficulty understanding, impaired balance, impaired physical strength, lacks appropriate home exercise program, pain with function, poor posture  and poor body mechanics  Understanding of Dx/Px/POC: good   Prognosis: good    Goals  STG 4 Weeks:  Decrease pain at worst to 6  Improve range to improve Shoulder range pain free 50% of time, improve CS range to nil.  Shoulder Ext 70  Improve strength to 5/5 50% pain free  Independent with HEP  LTG 8 Weeks:  Decrease pain at worst to 3  Improve range to shoulder range pain free 90% of time, sustain CS range to nil. Shoulder Ext 80  Improve strength to 5/5 90% pain free.    Able to perform all desired activities with minimal to nil symptom exacerbation      Plan  Patient would benefit from: skilled physical therapy  Planned modality interventions: cryotherapy and thermotherapy: hydrocollator packs  Planned therapy interventions:  joint mobilization, abdominal trunk stabilization, activity modification, behavior modification, manual therapy, neuromuscular re-education, patient education, postural training, prosthetic fitting/training, strengthening, stretching, therapeutic activities, therapeutic exercise, therapeutic training, transfer training, IADL retraining, home exercise program, graded motor, graded exercise, graded activity, functional ROM exercises, body mechanics training and flexibility  Frequency: 2x week  Duration in weeks: 10  Treatment plan discussed with: patient        Subjective Evaluation    History of Present Illness  Date of onset: 4/4/2024  Mechanism of injury: Pt is a 58 yofemale who is Lhd and presents today stating that she has been having some R shoulder and arm pain that been present for approximately 3-6 months.  Primarily in the R shoulder and bicep, denies symptoms into her forearm.  Pt reports there is minor symptoms in the L shoulder.  Pt reports that she also has some pain in the middle of her neck, she will get this at work this has been present for approximately 1-2 years.  Pt reports that there are periods of time with out pain in her shoulder and neck.  Pt reports if she sits, there isn't shoulder pain, but the neck will bother her.  Pt reports that there is pain with lifting items and waist and chest height, if its heavy this increases her pain.  Pt reports these symptoms do dissipate relatively quickly.  Pt reports that there is irritation with OH reaching, but no significant pain.  Pt reports that she has not tried reaching behind her back due to fear.  Pt reports denies any headaches.  Pt reports that when she does have neck pain, she bends forward to stretch, hasn't noticed any significant change, she tries to rub it, again no assistance, but time and getting up from seated position can assist.  Pt reports that sleeping on her R side is the preferred side, but she has found positions to sleep  comfortably, she does wake up due to pain but can readjust.  Pt reports there is no behavioral time line of the shoulder cannot tell if period of day is better than others.  Pt reports goals are to reduce pain, improve ability to lift, reach, sit and sleep better than her current status.  Pt is referred by her family physician Dr. Luna, no formal imagery.    Quality of life: good    Patient Goals  Patient goals for therapy: increased strength, return to sport/leisure activities, increased motion and decreased pain    Pain  Current pain ratin  At best pain ratin  At worst pain ratin (shoulder 7, neck 10/10.)  Quality: dull ache and sharp  Relieving factors: rest, change in position and medications  Aggravating factors: sitting and lifting  Progression: no change (Better R arm pain, slightly worse shoulder.)      Diagnostic Tests  No diagnostic tests performed        Objective     Active Range of Motion   Cervical/Thoracic Spine       Cervical  Subcranial protraction:  WFL   Flexion:  WFL and with pain  Extension:  with pain Restriction level: minimal  Left lateral flexion:  WFL  Right lateral flexion:  with pain Restriction level minimal  Left rotation:  WFL  Right rotation:  WFL    Additional Active Range of Motion Details  Forward head, rounded shoulders  B/L Sensation intact to light touch C3,4,5,6,7,8,T1,T2  DTR Bi Tri Brac  Hines  (NT)   Postural correction  nil  UE Screen  R Screen WFL Range, pain with all.  Ext 45*  L Screen WFL IR pain. Ext 80  Strength 5/5 all pain with Abd.   Repeated motion   Flex  Retraction  NB.  Ret + Ext NB.  Ret + Ext + OP NB.    Ext  SB R  SB L  Joint Mobility  Palpation  STs  (-) B/L.     Repeated Shoulder Ext  Better Range tolerance in flexion and better IR.         Flowsheet Rows      Flowsheet Row Most Recent Value   PT/OT G-Codes    Current Score 58   Projected Score 68               Precautions: HTN, Hx of Asthma      Manuals 4/4            Shoulder repeated  motion may have to go IR ?                                                   Neuro Re-Ed             Education and progression 10 min            Bank Account/Obstruction education performed                                                                             Ther Ex             Shoulder ext D NB 4 x 10            Shoulder Ext + Pole OP B 4 x 10            Perhaps IR?             Force progression?              CS Ret + Ext B 5 x 5            Progression?                                       Ther Activity             Shoulder Ext ROM B            Shoulder Flexion quality B            CS Flex/Ext Pain  B            Other concerns?                           Modalities             CP/HP prn.

## 2024-04-10 ENCOUNTER — APPOINTMENT (OUTPATIENT)
Dept: PHYSICAL THERAPY | Facility: CLINIC | Age: 59
End: 2024-04-10
Payer: COMMERCIAL

## 2024-04-12 DIAGNOSIS — N93.8 DUB (DYSFUNCTIONAL UTERINE BLEEDING): ICD-10-CM

## 2024-04-12 RX ORDER — ACETAMINOPHEN AND CODEINE PHOSPHATE 120; 12 MG/5ML; MG/5ML
1 SOLUTION ORAL DAILY
Qty: 28 TABLET | Refills: 0 | Status: SHIPPED | OUTPATIENT
Start: 2024-04-12

## 2024-04-18 ENCOUNTER — TELEPHONE (OUTPATIENT)
Dept: FAMILY MEDICINE CLINIC | Facility: CLINIC | Age: 59
End: 2024-04-18

## 2024-04-18 NOTE — TELEPHONE ENCOUNTER
Dr. Luna asked if someone could please call the patient and schedule a new patient appointment with Satinder Vargas

## 2024-04-22 ENCOUNTER — ANESTHESIA EVENT (OUTPATIENT)
Dept: ANESTHESIOLOGY | Facility: HOSPITAL | Age: 59
End: 2024-04-22

## 2024-04-22 ENCOUNTER — ANESTHESIA (OUTPATIENT)
Dept: ANESTHESIOLOGY | Facility: HOSPITAL | Age: 59
End: 2024-04-22

## 2024-04-22 NOTE — ANESTHESIA PREPROCEDURE EVALUATION
Procedure:  PRE-OP ONLY  Colonoscopy hx of polyps    Stress 1/23 - Negative for ischemia LVEF 65%  ECG: NSR with LAD and Poor R wave progression    HTN - Chlorthalidone  Ozempic injection   CAMERON on CPAP  Relevant Problems   CARDIO   (+) Essential hypertension      ENDO   (+) Hyperparathyroidism (HCC)   (+) Subclinical hyperthyroidism      /RENAL   (+) Kidney stone      HEMATOLOGY   (+) Iron deficiency anemia      MUSCULOSKELETAL   (+) Primary osteoarthritis of right knee      NEURO/PSYCH   (+) Acute stress disorder   (+) JANE (generalized anxiety disorder)   (+) MDD (major depressive disorder), recurrent episode, moderate (HCC)      PULMONARY   (+) Mild persistent asthma without complication   (+) CAMERON on CPAP             Anesthesia Plan  ASA Score- 3     Anesthesia Type- IV sedation with anesthesia with ASA Monitors.         Additional Monitors:     Airway Plan:            Plan Factors-    Chart reviewed. EKG reviewed.  Existing labs reviewed. Patient summary reviewed.                  Induction-     Postoperative Plan-     Informed Consent-

## 2024-04-24 ENCOUNTER — APPOINTMENT (OUTPATIENT)
Dept: PHYSICAL THERAPY | Facility: CLINIC | Age: 59
End: 2024-04-24
Payer: COMMERCIAL

## 2024-04-24 ENCOUNTER — HOSPITAL ENCOUNTER (OUTPATIENT)
Dept: GASTROENTEROLOGY | Facility: HOSPITAL | Age: 59
Setting detail: OUTPATIENT SURGERY
Discharge: HOME | End: 2024-04-24
Attending: SURGERY
Payer: COMMERCIAL

## 2024-04-24 ENCOUNTER — ANESTHESIA (OUTPATIENT)
Dept: GASTROENTEROLOGY | Facility: HOSPITAL | Age: 59
End: 2024-04-24

## 2024-04-24 ENCOUNTER — ANESTHESIA EVENT (OUTPATIENT)
Dept: GASTROENTEROLOGY | Facility: HOSPITAL | Age: 59
End: 2024-04-24

## 2024-04-24 VITALS
HEART RATE: 72 BPM | WEIGHT: 207.5 LBS | DIASTOLIC BLOOD PRESSURE: 94 MMHG | TEMPERATURE: 97.9 F | OXYGEN SATURATION: 98 % | HEIGHT: 62 IN | RESPIRATION RATE: 16 BRPM | SYSTOLIC BLOOD PRESSURE: 159 MMHG | BODY MASS INDEX: 38.18 KG/M2

## 2024-04-24 DIAGNOSIS — M54.12 CERVICAL RADICULOPATHY: ICD-10-CM

## 2024-04-24 DIAGNOSIS — Z86.010 HISTORY OF COLON POLYPS: ICD-10-CM

## 2024-04-24 DIAGNOSIS — G89.29 CHRONIC RIGHT SHOULDER PAIN: Primary | ICD-10-CM

## 2024-04-24 DIAGNOSIS — M25.511 CHRONIC RIGHT SHOULDER PAIN: Primary | ICD-10-CM

## 2024-04-24 PROCEDURE — 45380 COLONOSCOPY AND BIOPSY: CPT | Performed by: SURGERY

## 2024-04-24 PROCEDURE — 88305 TISSUE EXAM BY PATHOLOGIST: CPT | Performed by: STUDENT IN AN ORGANIZED HEALTH CARE EDUCATION/TRAINING PROGRAM

## 2024-04-24 RX ORDER — SODIUM CHLORIDE, SODIUM LACTATE, POTASSIUM CHLORIDE, CALCIUM CHLORIDE 600; 310; 30; 20 MG/100ML; MG/100ML; MG/100ML; MG/100ML
INJECTION, SOLUTION INTRAVENOUS CONTINUOUS PRN
Status: DISCONTINUED | OUTPATIENT
Start: 2024-04-24 | End: 2024-04-24

## 2024-04-24 RX ORDER — FENTANYL CITRATE/PF 50 MCG/ML
25 SYRINGE (ML) INJECTION
Status: CANCELLED | OUTPATIENT
Start: 2024-04-24

## 2024-04-24 RX ORDER — LIDOCAINE HYDROCHLORIDE 10 MG/ML
INJECTION, SOLUTION EPIDURAL; INFILTRATION; INTRACAUDAL; PERINEURAL AS NEEDED
Status: DISCONTINUED | OUTPATIENT
Start: 2024-04-24 | End: 2024-04-24

## 2024-04-24 RX ORDER — ALBUTEROL SULFATE 2.5 MG/3ML
2.5 SOLUTION RESPIRATORY (INHALATION) ONCE AS NEEDED
Status: CANCELLED | OUTPATIENT
Start: 2024-04-24

## 2024-04-24 RX ORDER — ONDANSETRON 2 MG/ML
4 INJECTION INTRAMUSCULAR; INTRAVENOUS ONCE AS NEEDED
Status: CANCELLED | OUTPATIENT
Start: 2024-04-24

## 2024-04-24 RX ORDER — PROPOFOL 10 MG/ML
INJECTION, EMULSION INTRAVENOUS AS NEEDED
Status: DISCONTINUED | OUTPATIENT
Start: 2024-04-24 | End: 2024-04-24

## 2024-04-24 RX ADMIN — SODIUM CHLORIDE, SODIUM LACTATE, POTASSIUM CHLORIDE, AND CALCIUM CHLORIDE: .6; .31; .03; .02 INJECTION, SOLUTION INTRAVENOUS at 07:18

## 2024-04-24 RX ADMIN — PROPOFOL 30 MG: 10 INJECTION, EMULSION INTRAVENOUS at 07:44

## 2024-04-24 RX ADMIN — PROPOFOL 30 MG: 10 INJECTION, EMULSION INTRAVENOUS at 07:34

## 2024-04-24 RX ADMIN — PROPOFOL 30 MG: 10 INJECTION, EMULSION INTRAVENOUS at 07:36

## 2024-04-24 RX ADMIN — LIDOCAINE HYDROCHLORIDE 50 MG: 10 INJECTION, SOLUTION EPIDURAL; INFILTRATION; INTRACAUDAL; PERINEURAL at 07:29

## 2024-04-24 RX ADMIN — PROPOFOL 40 MG: 10 INJECTION, EMULSION INTRAVENOUS at 07:31

## 2024-04-24 RX ADMIN — PROPOFOL 30 MG: 10 INJECTION, EMULSION INTRAVENOUS at 07:38

## 2024-04-24 RX ADMIN — PROPOFOL 100 MG: 10 INJECTION, EMULSION INTRAVENOUS at 07:29

## 2024-04-24 RX ADMIN — PROPOFOL 30 MG: 10 INJECTION, EMULSION INTRAVENOUS at 07:50

## 2024-04-24 NOTE — ANESTHESIA PREPROCEDURE EVALUATION
Procedure:  COLONOSCOPY  Stress 1/23 - Negative for ischemia LVEF 65%  ECG: NSR with LAD and Poor R wave progression     HTN - Chlorthalidone  Ozempic injection - last took two weeks ago    CAMERON on CPAP    Denies the following: CP/SOB with exertion, asthma, COPD, stroke/TIA, seizure    Relevant Problems   CARDIO   (+) Essential hypertension      ENDO   (+) Hyperparathyroidism (HCC)   (+) Subclinical hyperthyroidism      /RENAL   (+) Kidney stone      HEMATOLOGY   (+) Iron deficiency anemia      MUSCULOSKELETAL   (+) Primary osteoarthritis of right knee      NEURO/PSYCH   (+) Acute stress disorder   (+) JANE (generalized anxiety disorder)   (+) MDD (major depressive disorder), recurrent episode, moderate (HCC)      PULMONARY   (+) Mild persistent asthma without complication   (+) CAMERON on CPAP        Physical Exam    Airway       Dental       Cardiovascular      Pulmonary      Other Findings  post-pubertal.      Anesthesia Plan  ASA Score- 3     Anesthesia Type- IV sedation with anesthesia with ASA Monitors.         Additional Monitors:     Airway Plan:            Plan Factors-Exercise tolerance (METS): >4 METS.    Chart reviewed. EKG reviewed.  Existing labs reviewed. Patient summary reviewed.    Patient is not a current smoker.      Obstructive sleep apnea risk education given perioperatively.        Induction-     Postoperative Plan-     Informed Consent- Anesthetic plan and risks discussed with patient.  I personally reviewed this patient with the CRNA. Discussed and agreed on the Anesthesia Plan with the CRNA..

## 2024-04-24 NOTE — PROGRESS NOTES
Daily Note     Today's date: 2024  Patient name: Peace Anderson  : 1965  MRN: [id number]  Referring provider: Arti Luna DO  Dx:   Encounter Diagnosis     ICD-10-CM    1. Chronic right shoulder pain  M25.511     G89.29       2. Cervical radiculopathy  M54.12                      Subjective: ***      Objective: See treatment diary below      Assessment: Tolerated treatment {Tolerated treatment :[id number]}. Patient {assessment:5688235075}      Plan: {PLAN:8451997590}     Precautions: HTN, Hx of Asthma      Manuals 4/4            Shoulder repeated motion may have to go IR ?                                                   Neuro Re-Ed             Education and progression 10 min            Bank Account/Obstruction education performed                                                                             Ther Ex             Shoulder ext D NB 4 x 10            Shoulder Ext + Pole OP B 4 x 10            Perhaps IR?             Force progression?              CS Ret + Ext B 5 x 5            Progression?                                       Ther Activity             Shoulder Ext ROM B            Shoulder Flexion quality B            CS Flex/Ext Pain  B            Other concerns?                           Modalities             CP/HP prn.

## 2024-04-24 NOTE — ANESTHESIA POSTPROCEDURE EVALUATION
Post-Op Assessment Note    CV Status:  Stable  Pain Score: 0    Pain management: adequate       Mental Status:  Sleepy and arousable   Hydration Status:  Euvolemic   PONV Controlled:  Controlled   Airway Patency:  Patent     Post Op Vitals Reviewed: Yes    No anethesia notable event occurred.    Staff: ZAIDA               /64 (04/24/24 0800)    Temp 99.3 °F (37.4 °C) (04/24/24 0800)    Pulse 67 (04/24/24 0800)   Resp 15 (04/24/24 0800)    SpO2 95 % (04/24/24 0800)

## 2024-04-25 ENCOUNTER — APPOINTMENT (OUTPATIENT)
Dept: PHYSICAL THERAPY | Facility: CLINIC | Age: 59
End: 2024-04-25
Payer: COMMERCIAL

## 2024-04-25 PROCEDURE — 88305 TISSUE EXAM BY PATHOLOGIST: CPT | Performed by: STUDENT IN AN ORGANIZED HEALTH CARE EDUCATION/TRAINING PROGRAM

## 2024-04-29 ENCOUNTER — APPOINTMENT (OUTPATIENT)
Dept: PHYSICAL THERAPY | Facility: CLINIC | Age: 59
End: 2024-04-29
Payer: COMMERCIAL

## 2024-05-03 ENCOUNTER — HOSPITAL ENCOUNTER (OUTPATIENT)
Dept: MAMMOGRAPHY | Facility: CLINIC | Age: 59
Discharge: HOME | End: 2024-05-03
Payer: COMMERCIAL

## 2024-05-03 VITALS — BODY MASS INDEX: 38.09 KG/M2 | HEIGHT: 62 IN | WEIGHT: 207 LBS

## 2024-05-03 DIAGNOSIS — R92.8 ABNORMAL MAMMOGRAM: ICD-10-CM

## 2024-05-03 PROCEDURE — 77066 DX MAMMO INCL CAD BI: CPT

## 2024-05-03 PROCEDURE — G0279 TOMOSYNTHESIS, MAMMO: HCPCS

## 2024-05-13 DIAGNOSIS — N93.8 DUB (DYSFUNCTIONAL UTERINE BLEEDING): ICD-10-CM

## 2024-05-14 RX ORDER — ACETAMINOPHEN AND CODEINE PHOSPHATE 120; 12 MG/5ML; MG/5ML
1 SOLUTION ORAL DAILY
Qty: 84 TABLET | Refills: 1 | Status: SHIPPED | OUTPATIENT
Start: 2024-05-14

## 2024-05-17 DIAGNOSIS — F41.1 GAD (GENERALIZED ANXIETY DISORDER): Primary | ICD-10-CM

## 2024-05-17 DIAGNOSIS — F33.1 MDD (MAJOR DEPRESSIVE DISORDER), RECURRENT EPISODE, MODERATE (HCC): ICD-10-CM

## 2024-05-19 DIAGNOSIS — E66.01 MORBID OBESITY (HCC): ICD-10-CM

## 2024-05-21 ENCOUNTER — OFFICE VISIT (OUTPATIENT)
Dept: ENDOCRINOLOGY | Facility: CLINIC | Age: 59
End: 2024-05-21
Payer: COMMERCIAL

## 2024-05-21 VITALS
TEMPERATURE: 97.6 F | HEART RATE: 95 BPM | OXYGEN SATURATION: 98 % | RESPIRATION RATE: 16 BRPM | SYSTOLIC BLOOD PRESSURE: 132 MMHG | WEIGHT: 209 LBS | HEIGHT: 62 IN | DIASTOLIC BLOOD PRESSURE: 86 MMHG | BODY MASS INDEX: 38.46 KG/M2

## 2024-05-21 DIAGNOSIS — E21.3 HYPERPARATHYROIDISM (HCC): ICD-10-CM

## 2024-05-21 DIAGNOSIS — E04.2 MULTINODULAR THYROID: Primary | ICD-10-CM

## 2024-05-21 DIAGNOSIS — D50.9 IRON DEFICIENCY ANEMIA, UNSPECIFIED IRON DEFICIENCY ANEMIA TYPE: ICD-10-CM

## 2024-05-21 DIAGNOSIS — E05.90 SUBCLINICAL HYPERTHYROIDISM: ICD-10-CM

## 2024-05-21 DIAGNOSIS — E55.9 VITAMIN D DEFICIENCY: ICD-10-CM

## 2024-05-21 DIAGNOSIS — E66.01 MORBID OBESITY (HCC): ICD-10-CM

## 2024-05-21 PROBLEM — E61.1 IRON DEFICIENCY: Status: RESOLVED | Noted: 2020-04-20 | Resolved: 2024-05-21

## 2024-05-21 PROBLEM — R79.89 HIGH SERUM PARATHYROID HORMONE (PTH): Status: RESOLVED | Noted: 2022-02-25 | Resolved: 2024-05-21

## 2024-05-21 PROCEDURE — 99214 OFFICE O/P EST MOD 30 MIN: CPT | Performed by: INTERNAL MEDICINE

## 2024-05-21 NOTE — ASSESSMENT & PLAN NOTE
She lost some weight since last year. Reiterated diet and lifestyle changes. May consider medications if that fails.

## 2024-05-21 NOTE — PROGRESS NOTES
"    Follow-up Patient Progress Note      CC: hypothyroidism     History of Present Illness:   56 yr female with HTN, HLD, perimenopausal symptoms, morbid obesity BMI 40, 1 episode of nephrolithiasis s/p laparoscopic extraction 2020, bilateral thyroid nodules associated with subclinical hyperthyroidism and high normal PTH. Last visit was 2/15/23.     Since last visit, she lost 13 lbs.     No History of external radiation, recent Iodine loading or radiological diagnostic studies.   No difficulty with swallowing or breathing or change in voice.     Thyroid nodule Hx: ENAMORADO uptake - 2018 and 2/3/20 nml uptake at 6 hrs and 24 hrs but potential hot nodules noted. US thyroid 2013 showed multiple sub-centimeter thyroid nodules bilaterally but only 1 nodule 1cm rt side.   Prior TSI was negative.    3/21/24 US thyroid:    Right lobe: 7.3 x 2.0 x 2.8 cm. Volume 19.5 mL  Left lobe: 6.9 x 1.2 x 2.8 cm. Volume 10.8 mL  Isthmus: 0.2 cm.     Nodule #1. Image 20.  RIGHT upper pole nodule measuring 1.2cmTR 3, FNA if >2.5 cm.  Follow if >1.5 cm.  Nodule #2. RIGHT lower pole nodule measuring 1.1 cm.TR 2 (2 points), Not suspicious. No FNA.  Nodule #3. Right isthmic exophytic nodule versus accessory ectopic thyroid parenchyma 1.7 cm. TR 3. FNA if >2.5 cm.    Physical Exam:  Body mass index is 38.23 kg/m².  /86 (BP Location: Left arm, Patient Position: Sitting)   Pulse 95   Temp 97.6 °F (36.4 °C) (Tympanic)   Resp 16   Ht 5' 2\" (1.575 m)   Wt 94.8 kg (209 lb)   LMP  (LMP Unknown) Comment: Taking birth control for abnormal bleeding. Has not had a period in 2 years  SpO2 98%   BMI 38.23 kg/m²    Vitals:    05/21/24 1515   Weight: 94.8 kg (209 lb)        Physical Exam  Constitutional:       General: She is not in acute distress.     Appearance: She is well-developed.   HENT:      Head: Normocephalic and atraumatic.      Nose: Nose normal.   Eyes:      Conjunctiva/sclera: Conjunctivae normal.   Pulmonary:      Effort: Pulmonary " effort is normal.   Abdominal:      General: There is no distension.   Musculoskeletal:      Cervical back: Normal range of motion and neck supple.   Skin:     Findings: No rash.      Comments: No icterus   Neurological:      Mental Status: She is alert and oriented to person, place, and time.         Labs:   Lab Results   Component Value Date    HGBA1C 6.0 (H) 11/04/2023       Lab Results   Component Value Date    IDB5TAACFYQG <0.010 (L) 11/04/2023    Y6JFMPR 0.90 11/15/2022       Lab Results   Component Value Date    CREATININE 0.95 12/16/2023    CREATININE 0.89 11/04/2023    CREATININE 0.74 12/27/2022    BUN 17 12/16/2023    K 3.3 (L) 12/16/2023     12/16/2023    CO2 27 12/16/2023     eGFR   Date Value Ref Range Status   12/16/2023 66 ml/min/1.73sq m Final       Lab Results   Component Value Date    ALT 21 12/16/2023    AST 20 12/16/2023    ALKPHOS 76 12/16/2023       Lab Results   Component Value Date    CHOLESTEROL 157 02/22/2022    CHOLESTEROL 151 03/24/2020    CHOLESTEROL 141 02/11/2020     Lab Results   Component Value Date    HDL 53 02/22/2022    HDL 49 (L) 03/24/2020    HDL 50 02/11/2020     Lab Results   Component Value Date    TRIG 105 02/22/2022    TRIG 107 03/24/2020    TRIG 140 02/11/2020     Lab Results   Component Value Date    NONHDLC 91 02/11/2020         Assessment/Plan:    1. Multinodular thyroid  Assessment & Plan:  She has new thyroid nodules and recent US thyroid.    Today we discussed all aspects of thyroid nodules including prevalence, usual risk factors, pathophysiology, complications including cancer risk, role of US, biopsy, genetic testing and treatment options including hemithyroidectomy, recurrent imaging and labs.     We agreed to continue surveillance with repeat US thyroid in 1 year.  Orders:  -     US thyroid; Future; Expected date: 05/21/2024  2. Hyperparathyroidism (HCC)  Assessment & Plan:  Will repeat calcium and PTH panel to establish potential etiology and  severity.    Orders:  -     Comprehensive metabolic panel; Future  -     Phosphorus; Future  -     Vitamin D 25 hydroxy; Future  -     PTH, intact; Future  3. Subclinical hyperthyroidism  Assessment & Plan:  She lost about 13 lbs.  Will repeat tests to assess current functions.  Orders:  -     T4, free; Future  -     TSH, 3rd generation; Future  -     Thyroid Antibodies Panel; Future  4. Iron deficiency anemia, unspecified iron deficiency anemia type  5. Vitamin D deficiency  -     Vitamin D 25 hydroxy; Future  6. Morbid obesity (HCC)  Assessment & Plan:  She lost some weight since last year. Reiterated diet and lifestyle changes. May consider medications if that fails.        I have spent a total time of 31 minutes on 05/21/24 in caring for this patient including greater than 50% of this time was spent in counseling/coordination of care as listed above.       Discussed with the patient and all questioned fully answered. She will contact me with concerns.    Lemuel Joy

## 2024-05-21 NOTE — PATIENT INSTRUCTIONS
Instructions    Diet:   Take 1500 waldo/day of balanced diet with 1/3rd carbs, 1/3rd protein and rest fiber and small amount fat.   Drink at least 64 oz fluids daily.    Lifestyle:   30 min brisk activity most days. 150min-220min/week of cardiac and muscle building exercise that causes sweating.  Consider [facility] medical fitness training program.  Medical fitness: follow these exercise links  Full Version - [email or web address]     Warmup - [email or web address]     Lower Body - [email or web address]     Upper Body - [email or web address]     Core -  [email or web address]    Fasting:  Can consider after becoming regular with exercise - 14 to 24 hrs fasting 1-3 times a week.    Medications:   look up Wegovy, Zepbound, saxenda - weight loss meds.  Consider switching from medications that cause weight gain to weight neutral medications.    Other:   Make sure you are treated appropriately if you have sleep apnea.  May consider bariatric surgery if we dont see benefit over 6-12 months of all above.

## 2024-05-21 NOTE — ASSESSMENT & PLAN NOTE
She has new thyroid nodules and recent US thyroid.    Today we discussed all aspects of thyroid nodules including prevalence, usual risk factors, pathophysiology, complications including cancer risk, role of US, biopsy, genetic testing and treatment options including hemithyroidectomy, recurrent imaging and labs.     We agreed to continue surveillance with repeat US thyroid in 1 year.

## 2024-05-22 RX ORDER — SEMAGLUTIDE 1.34 MG/ML
INJECTION, SOLUTION SUBCUTANEOUS
Qty: 9 ML | Refills: 1 | Status: SHIPPED | OUTPATIENT
Start: 2024-05-22

## 2024-05-26 ENCOUNTER — TELEPHONE (OUTPATIENT)
Age: 59
End: 2024-05-26

## 2024-05-26 NOTE — TELEPHONE ENCOUNTER
PA for OZEMPIC not required     Reason  Your PA has been resolved, no additional PA is required               Message sent to provider pool yes

## 2024-05-31 ENCOUNTER — HOSPITAL ENCOUNTER (OUTPATIENT)
Dept: ULTRASOUND IMAGING | Facility: MEDICAL CENTER | Age: 59
Discharge: HOME | End: 2024-05-31
Attending: INTERNAL MEDICINE
Payer: COMMERCIAL

## 2024-05-31 ENCOUNTER — APPOINTMENT (OUTPATIENT)
Dept: ULTRASOUND IMAGING | Facility: HOSPITAL | Age: 59
End: 2024-05-31
Attending: INTERNAL MEDICINE
Payer: COMMERCIAL

## 2024-05-31 ENCOUNTER — HOSPITAL ENCOUNTER (OUTPATIENT)
Dept: BONE DENSITY | Facility: MEDICAL CENTER | Age: 59
Discharge: HOME | End: 2024-05-31
Payer: COMMERCIAL

## 2024-05-31 DIAGNOSIS — E04.2 MULTINODULAR THYROID: ICD-10-CM

## 2024-05-31 DIAGNOSIS — Z78.0 POST-MENOPAUSAL: ICD-10-CM

## 2024-05-31 PROCEDURE — 77080 DXA BONE DENSITY AXIAL: CPT

## 2024-06-03 ENCOUNTER — TELEPHONE (OUTPATIENT)
Dept: PSYCHIATRY | Facility: CLINIC | Age: 59
End: 2024-06-03

## 2024-06-03 NOTE — TELEPHONE ENCOUNTER
Contacted patient in regards to Routine Referral in attempts to verify patient's needs of services and add patient to proper wait list. spoke with patient whom stated they are interested in services.  IC verified , insurance, address, and obtained preferences.  IC will send resource guide to email provided.  Pt had concerns about providers, IC explained that pt may request [facility] if not happy with providers.    Preferences  ROF, Talk [facility], [facility], Open to Prov  ROF, [facility], [place], Open to Prov    Email: [email or web address]    Final call attempt, closed, Referral Completed

## 2024-06-17 ENCOUNTER — TELEPHONE (OUTPATIENT)
Dept: BEHAVIORAL/MENTAL HEALTH CLINIC | Facility: CLINIC | Age: 59
End: 2024-06-17

## 2024-06-18 NOTE — TELEPHONE ENCOUNTER
Writer made second attempt to schedule with Stainder at [facility] office.  TYRONEM for return call.

## 2024-06-19 ENCOUNTER — RA CDI HCC (OUTPATIENT)
Dept: OTHER | Facility: HOSPITAL | Age: 59
End: 2024-06-19

## 2024-06-19 PROBLEM — A04.8 HELICOBACTER PYLORI GASTROINTESTINAL TRACT INFECTION: Status: RESOLVED | Noted: 2020-04-20 | Resolved: 2024-06-19

## 2024-06-22 ENCOUNTER — APPOINTMENT (OUTPATIENT)
Dept: LAB | Age: 59
End: 2024-06-22
Payer: COMMERCIAL

## 2024-06-22 DIAGNOSIS — E21.3 HYPERPARATHYROIDISM (HCC): ICD-10-CM

## 2024-06-22 DIAGNOSIS — I10 ESSENTIAL HYPERTENSION: ICD-10-CM

## 2024-06-22 DIAGNOSIS — E55.9 VITAMIN D DEFICIENCY: ICD-10-CM

## 2024-06-22 DIAGNOSIS — I87.2 VENOUS INSUFFICIENCY: ICD-10-CM

## 2024-06-22 DIAGNOSIS — E05.90 SUBCLINICAL HYPERTHYROIDISM: ICD-10-CM

## 2024-06-22 DIAGNOSIS — D50.9 IRON DEFICIENCY ANEMIA, UNSPECIFIED IRON DEFICIENCY ANEMIA TYPE: ICD-10-CM

## 2024-06-22 LAB
25(OH)D3 SERPL-MCNC: 32.2 NG/ML (ref 30–100)
ALBUMIN SERPL BCG-MCNC: 3.6 G/DL (ref 3.5–5)
ALP SERPL-CCNC: 72 U/L (ref 34–104)
ALT SERPL W P-5'-P-CCNC: 42 U/L (ref 7–52)
ANION GAP SERPL CALCULATED.3IONS-SCNC: 10 MMOL/L (ref 4–13)
AST SERPL W P-5'-P-CCNC: 34 U/L (ref 13–39)
BASOPHILS # BLD AUTO: 0.04 THOUSANDS/ÂΜL (ref 0–0.1)
BASOPHILS NFR BLD AUTO: 1 % (ref 0–1)
BILIRUB SERPL-MCNC: 0.74 MG/DL (ref 0.2–1)
BUN SERPL-MCNC: 10 MG/DL (ref 5–25)
CALCIUM SERPL-MCNC: 9.8 MG/DL (ref 8.4–10.2)
CHLORIDE SERPL-SCNC: 104 MMOL/L (ref 96–108)
CO2 SERPL-SCNC: 28 MMOL/L (ref 21–32)
CREAT SERPL-MCNC: 0.73 MG/DL (ref 0.6–1.3)
CREAT UR-MCNC: 171.8 MG/DL
EOSINOPHIL # BLD AUTO: 0.22 THOUSAND/ÂΜL (ref 0–0.61)
EOSINOPHIL NFR BLD AUTO: 3 % (ref 0–6)
ERYTHROCYTE [DISTWIDTH] IN BLOOD BY AUTOMATED COUNT: 13.3 % (ref 11.6–15.1)
EST. AVERAGE GLUCOSE BLD GHB EST-MCNC: 123 MG/DL
FERRITIN SERPL-MCNC: 34 NG/ML (ref 11–307)
GFR SERPL CREATININE-BSD FRML MDRD: 91 ML/MIN/1.73SQ M
GLUCOSE P FAST SERPL-MCNC: 106 MG/DL (ref 65–99)
HBA1C MFR BLD: 5.9 %
HCT VFR BLD AUTO: 41.5 % (ref 34.8–46.1)
HGB BLD-MCNC: 13.8 G/DL (ref 11.5–15.4)
IMM GRANULOCYTES # BLD AUTO: 0.02 THOUSAND/UL (ref 0–0.2)
IMM GRANULOCYTES NFR BLD AUTO: 0 % (ref 0–2)
IRON SATN MFR SERPL: 45 % (ref 15–50)
IRON SERPL-MCNC: 150 UG/DL (ref 50–212)
LYMPHOCYTES # BLD AUTO: 1.74 THOUSANDS/ÂΜL (ref 0.6–4.47)
LYMPHOCYTES NFR BLD AUTO: 27 % (ref 14–44)
MCH RBC QN AUTO: 29.3 PG (ref 26.8–34.3)
MCHC RBC AUTO-ENTMCNC: 33.3 G/DL (ref 31.4–37.4)
MCV RBC AUTO: 88 FL (ref 82–98)
MICROALBUMIN UR-MCNC: <7 MG/L
MONOCYTES # BLD AUTO: 0.71 THOUSAND/ÂΜL (ref 0.17–1.22)
MONOCYTES NFR BLD AUTO: 11 % (ref 4–12)
NEUTROPHILS # BLD AUTO: 3.74 THOUSANDS/ÂΜL (ref 1.85–7.62)
NEUTS SEG NFR BLD AUTO: 58 % (ref 43–75)
NRBC BLD AUTO-RTO: 0 /100 WBCS
PHOSPHATE SERPL-MCNC: 2.7 MG/DL (ref 2.7–4.5)
PLATELET # BLD AUTO: 258 THOUSANDS/UL (ref 149–390)
PMV BLD AUTO: 10.3 FL (ref 8.9–12.7)
POTASSIUM SERPL-SCNC: 3.9 MMOL/L (ref 3.5–5.3)
PROT SERPL-MCNC: 7.1 G/DL (ref 6.4–8.4)
PTH-INTACT SERPL-MCNC: 47 PG/ML (ref 12–88)
RBC # BLD AUTO: 4.71 MILLION/UL (ref 3.81–5.12)
SODIUM SERPL-SCNC: 142 MMOL/L (ref 135–147)
T4 FREE SERPL-MCNC: 1.06 NG/DL (ref 0.61–1.12)
TIBC SERPL-MCNC: 330 UG/DL (ref 250–450)
TSH SERPL DL<=0.05 MIU/L-ACNC: <0.01 UIU/ML (ref 0.45–4.5)
UIBC SERPL-MCNC: 180 UG/DL (ref 155–355)
VIT B12 SERPL-MCNC: 355 PG/ML (ref 180–914)
WBC # BLD AUTO: 6.47 THOUSAND/UL (ref 4.31–10.16)

## 2024-06-22 PROCEDURE — 84439 ASSAY OF FREE THYROXINE: CPT

## 2024-06-22 PROCEDURE — 83540 ASSAY OF IRON: CPT

## 2024-06-22 PROCEDURE — 86376 MICROSOMAL ANTIBODY EACH: CPT

## 2024-06-22 PROCEDURE — 83550 IRON BINDING TEST: CPT

## 2024-06-22 PROCEDURE — 82570 ASSAY OF URINE CREATININE: CPT

## 2024-06-22 PROCEDURE — 84443 ASSAY THYROID STIM HORMONE: CPT

## 2024-06-22 PROCEDURE — 80053 COMPREHEN METABOLIC PANEL: CPT

## 2024-06-22 PROCEDURE — 85025 COMPLETE CBC W/AUTO DIFF WBC: CPT

## 2024-06-22 PROCEDURE — 82607 VITAMIN B-12: CPT

## 2024-06-22 PROCEDURE — 83036 HEMOGLOBIN GLYCOSYLATED A1C: CPT

## 2024-06-22 PROCEDURE — 36415 COLL VENOUS BLD VENIPUNCTURE: CPT

## 2024-06-22 PROCEDURE — 86800 THYROGLOBULIN ANTIBODY: CPT

## 2024-06-22 PROCEDURE — 84100 ASSAY OF PHOSPHORUS: CPT

## 2024-06-22 PROCEDURE — 82728 ASSAY OF FERRITIN: CPT

## 2024-06-22 PROCEDURE — 82043 UR ALBUMIN QUANTITATIVE: CPT

## 2024-06-22 PROCEDURE — 82306 VITAMIN D 25 HYDROXY: CPT

## 2024-06-22 PROCEDURE — 83970 ASSAY OF PARATHORMONE: CPT

## 2024-06-24 LAB — THYROPEROXIDASE AB SERPL-ACNC: 18 IU/ML (ref 0–34)

## 2024-06-25 ENCOUNTER — TELEMEDICINE (OUTPATIENT)
Dept: FAMILY MEDICINE CLINIC | Facility: CLINIC | Age: 59
End: 2024-06-25
Payer: COMMERCIAL

## 2024-06-25 VITALS — BODY MASS INDEX: 38.23 KG/M2 | HEIGHT: 62 IN

## 2024-06-25 DIAGNOSIS — R73.01 IFG (IMPAIRED FASTING GLUCOSE): ICD-10-CM

## 2024-06-25 DIAGNOSIS — E05.90 SUBCLINICAL HYPERTHYROIDISM: ICD-10-CM

## 2024-06-25 DIAGNOSIS — E21.3 HYPERPARATHYROIDISM (HCC): ICD-10-CM

## 2024-06-25 DIAGNOSIS — I10 ESSENTIAL HYPERTENSION: ICD-10-CM

## 2024-06-25 DIAGNOSIS — E66.01 MORBID OBESITY (HCC): ICD-10-CM

## 2024-06-25 DIAGNOSIS — F41.1 GAD (GENERALIZED ANXIETY DISORDER): ICD-10-CM

## 2024-06-25 DIAGNOSIS — F33.1 MDD (MAJOR DEPRESSIVE DISORDER), RECURRENT EPISODE, MODERATE (HCC): Primary | ICD-10-CM

## 2024-06-25 DIAGNOSIS — J30.1 ALLERGIC RHINITIS DUE TO POLLEN, UNSPECIFIED SEASONALITY: ICD-10-CM

## 2024-06-25 DIAGNOSIS — E04.2 MULTINODULAR THYROID: ICD-10-CM

## 2024-06-25 DIAGNOSIS — L60.8 TOENAIL DEFORMITY: ICD-10-CM

## 2024-06-25 LAB — THYROGLOB AB SERPL-ACNC: <1 IU/ML (ref 0–0.9)

## 2024-06-25 PROCEDURE — 99214 OFFICE O/P EST MOD 30 MIN: CPT | Performed by: FAMILY MEDICINE

## 2024-06-25 RX ORDER — LORATADINE 10 MG/1
10 TABLET ORAL DAILY
Qty: 90 TABLET | Refills: 5 | Status: SHIPPED | OUTPATIENT
Start: 2024-06-25

## 2024-06-25 RX ORDER — DULOXETIN HYDROCHLORIDE 60 MG/1
60 CAPSULE, DELAYED RELEASE ORAL DAILY
Qty: 90 CAPSULE | Refills: 3 | Status: SHIPPED | OUTPATIENT
Start: 2024-06-25

## 2024-06-25 RX ORDER — FLUTICASONE PROPIONATE 50 MCG
1 SPRAY, SUSPENSION (ML) NASAL DAILY
Qty: 18 ML | Refills: 11 | Status: SHIPPED | OUTPATIENT
Start: 2024-06-25

## 2024-06-25 RX ORDER — HYDROXYZINE HYDROCHLORIDE 25 MG/1
25 TABLET, FILM COATED ORAL EVERY 6 HOURS PRN
Qty: 90 TABLET | Refills: 1 | Status: SHIPPED | OUTPATIENT
Start: 2024-06-25

## 2024-06-25 NOTE — PROGRESS NOTES
Virtual Regular Visit    Assessment/Plan:   1. MDD (major depressive disorder), recurrent episode, moderate (HCC)  Assessment & Plan:  Not at goal. Increase cymablta. Add atarax as needed for anxiety/sleep. If patient decides to go to  we can send in ativan. Risks and benefits and side effects reviewed. She is looking forward to her visit with Satinder.   2. JANE (generalized anxiety disorder)  Assessment & Plan:  Not at goal. Tolerating cymbalta so far. Increase from 30 to 60 mg daily   Orders:  -     DULoxetine (CYMBALTA) 60 mg delayed release capsule; Take 1 capsule (60 mg total) by mouth daily  -     hydrOXYzine HCL (ATARAX) 25 mg tablet; Take 1 tablet (25 mg total) by mouth every 6 (six) hours as needed for allergies or anxiety  3. Subclinical hyperthyroidism  Assessment & Plan:  Following with endo who ordered further blood work   4. IFG (impaired fasting glucose)  Assessment & Plan:  Stable. Work on diet, lowering carbs     5. Hyperparathyroidism (HCC)  6. Multinodular thyroid  Assessment & Plan:  Stable. Following with endo   7. Toenail deformity  Comments:  send picture. consider Lamisil. may need nail clipping. may be due to rubbing.  8. Allergic rhinitis due to pollen, unspecified seasonality  Comments:  start claritin and flonase daily. add atarax as needed for allergies and anxiety.  Orders:  -     fluticasone (FLONASE) 50 mcg/act nasal spray; 1 spray into each nostril daily  -     loratadine (CLARITIN) 10 mg tablet; Take 1 tablet (10 mg total) by mouth daily  9. Essential hypertension  Assessment & Plan:  Well controlled on last office visit            Return for 4 - 8 weeks virtual mood check .    Reason for visit is   Chief Complaint   Patient presents with   • Follow-up     F/U - patient states that everything is going okay, but patient would like PCP to look at her big toe on her foot, patient is looking to maybe collect a prescription for ointment. Patient believes that it may be a fungal  infection location on both of her big toes. Denies seeing a podiatrist or derm.      Encounter provider Arti Luna DO  Provider located at [facility]  [address]  [place] [address] [phone]    Recent Visits  No visits were found meeting these conditions.  Showing recent visits within past 7 days and meeting all other requirements  Today's Visits  Date Type Provider Dept   06/25/24 Telemedicine Arti Luna DO [facility]   Showing today's visits and meeting all other requirements  Future Appointments  No visits were found meeting these conditions.  Showing future appointments within next 150 days and meeting all other requirements       The patient was identified by name and date of birth. Peace Anderson was informed that this is a telemedicine visit and that the visit is being conducted through the Epic Embedded platform. She agrees to proceed..  My office door was closed. No one else was in the room.  She acknowledged consent and understanding of privacy and security of the video platform. The patient has agreed to participate and understands they can discontinue the visit at any time.    Patient is currently located in the [facility]  Patient is currently located in a state in which I am licensed    Patient is aware this is a billable service.     Subjective  Peace Anderson is a 58 y.o. female is being seen via Video Visit today due to the COVID-19 pandemic.  Chief Complaint   Patient presents with   • Follow-up     F/U - patient states that everything is going okay, but patient would like PCP to look at her big toe on her foot, patient is looking to maybe collect a prescription for ointment. Patient believes that it may be a fungal infection location on both of her big toes. Denies seeing a podiatrist or derm.      Last visit blood pressure not at goal - was not taking medicaitons   Updated fasting blood work   Refer to PT   US thyroid  3/21/24 Multinodular thyroid gland. No nodule meets current ACR criteria for requiring biopsy but follow-up ultrasound is recommended in 1 year.     DEXA normal   colonsocopy 3/26/24 colonoscopy - repeat in 5 years Dr. Nabil Lance scheduled   Compression stockings vs lymphedema therapy   Started cymbalta - follow up   Refer to therapist - scheduled        Latest Reference Range & Units 06/22/24 09:27   Sodium 135 - 147 mmol/L 142   Potassium 3.5 - 5.3 mmol/L 3.9   Chloride 96 - 108 mmol/L 104   Carbon Dioxide 21 - 32 mmol/L 28   ANION GAP 4 - 13 mmol/L 10   BUN 5 - 25 mg/dL 10   Creatinine 0.60 - 1.30 mg/dL 0.73   GLUCOSE, FASTING 65 - 99 mg/dL 106 (H)   Calcium 8.4 - 10.2 mg/dL 9.8   AST 13 - 39 U/L 34   ALT 7 - 52 U/L 42   ALK PHOS 34 - 104 U/L 72   Total Protein 6.4 - 8.4 g/dL 7.1   Albumin 3.5 - 5.0 g/dL 3.6   Total Bilirubin 0.20 - 1.00 mg/dL 0.74   GFR, Calculated ml/min/1.73sq m 91   Phosphorus 2.7 - 4.5 mg/dL 2.7   Iron 50 - 212 ug/dL 150   FERRITIN 11 - 307 ng/mL 34   Iron Saturation 15 - 50 % 45   TIBC 250 - 450 ug/dL 330   UIBC 155 - 355 ug/dL 180   VITAMIN D, 25-HYDROXY 30.0 - 100.0 ng/mL 32.2   Vitamin B-12 180 - 914 pg/mL 355   WBC 4.31 - 10.16 Thousand/uL 6.47   RBC 3.81 - 5.12 Million/uL 4.71   Hemoglobin 11.5 - 15.4 g/dL 13.8   Hematocrit 34.8 - 46.1 % 41.5   MCV 82 - 98 fL 88   MCH 26.8 - 34.3 pg 29.3   MCHC 31.4 - 37.4 g/dL 33.3   RDW 11.6 - 15.1 % 13.3   Platelet Count 149 - 390 Thousands/uL 258   MPV 8.9 - 12.7 fL 10.3   nRBC /100 WBCs 0   Segmented % 43 - 75 % 58   Lymphocytes % 14 - 44 % 27   Monocytes % 4 - 12 % 11   Eosinophils % 0 - 6 % 3   Basophils % 0 - 1 % 1   Immature Grans % 0 - 2 % 0   Absolute Immature Grans 0.00 - 0.20 Thousand/uL 0.02   Absolute Neutrophils 1.85 - 7.62 Thousands/µL 3.74   Absolute Lymphocytes 0.60 - 4.47 Thousands/µL 1.74   Absolute Monocytes 0.17 - 1.22 Thousand/µL 0.71   Absolute Eosinophils 0.00 - 0.61 Thousand/µL 0.22   Absolute Basophils 0.00 - 0.10  "Thousands/µL 0.04   Hemoglobin A1C Normal 4.0-5.6%; PreDiabetic 5.7-6.4%; Diabetic >=6.5%; Glycemic control for adults with diabetes <7.0% % 5.9 (H)   eAG, EST AVG Glucose mg/dl 123   PTH 12.0 - 88.0 pg/mL 47.0   TSH 3RD GENERATON 0.450 - 4.500 uIU/mL <0.010 (L)   FREE T4 0.61 - 1.12 ng/dL 1.06   THYROID MICROSOMAL ANTIBODY 0 - 34 IU/mL 18   (H): Data is abnormally high  (L): Data is abnormally low    Today's concerns are:    Her aunt    So far on the new medication not 100%  Still having some struggles with getting out   Allergies are acting up   Itchy eyes, nose, sneezing a lot and coughing now  Has not taken anything right now. Allegra in the past.    Discolored toenail   Big toes, left > right   Bothersome when wearing shoes   2020 toenail fungal culture negative     Vitals:    24 0935   Height: 5' 2\" (1.575 m)     Wt Readings from Last 3 Encounters:   24 94.8 kg (209 lb)   24 93.9 kg (207 lb)   24 94.1 kg (207 lb 8 oz)     BP Readings from Last 3 Encounters:   24 132/86   24 159/94   24 140/82       PHQ-2/9 Depression Screening            Past Medical History:   Diagnosis Date   • Ascorbic acid deficiency 2020   • Asthma due to seasonal allergies 2020   • COVID    • Depression    • Essential hypertension 2020   • JANE (generalized anxiety disorder) 2020   • Helicobacter pylori gastrointestinal tract infection 2020    10/02/2017=egd 2017 Dr Cervantes   • Helicobacter pylori gastrointestinal tract infection 2020    10/02/2017=egd 2017 Dr Cervantes     • History of Holter monitoring 2017    The baseline rhythm was of sinus origin with an average heart rate of 90 bpm (range: 72 - 129 bpm). There were rare single APCs adn VPCs (less than 0.1 % total beats). There were no sustained cardiac dysrhythmais. There were no significant symptomatic pauses.    • History of stress test 2018    Mildly abnormal exercise Myoview SPECT " study with evidence of a fixed anterior defect. This is most likely related to breast attenuation artifact. There was no evidence of reversible myocardial ischemia. Gated wall motion analysis was normal with well preserved systolic function. The left ventricle was normal in size with calculated EF of 73%. No prior similar study is available for comparison.    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    5   • Reactive airway disease with acute exacerbation 05/20/2020   • Subclinical hyperthyroidism 04/20/2020   • Thyroid nodule 09/25/2020   • Vitamin D deficiency 04/20/2020    33     Past Surgical History:   Procedure Laterality Date   • COLONOSCOPY  09/08/2017    3 polyps=next 2020 w Dr Cervantes   • EGD  09/01/2017    egd biopsy    • KNEE ARTHROSCOPY W/ ACL RECONSTRUCTION     • KNEE SURGERY      torn mcl, acl, meniscus left knee 2016   • TUBAL LIGATION         Current Medications:  Current Outpatient Medications   Medication Sig Dispense Refill   • chlorthalidone 50 MG tablet TAKE 1 TABLET BY MOUTH EVERY DAY 90 tablet 1   • clotrimazole-betamethasone (LOTRISONE) 1-0.05 % cream Apply topically 2 (two) times a day 45 g 3   • DULoxetine (CYMBALTA) 60 mg delayed release capsule Take 1 capsule (60 mg total) by mouth daily 90 capsule 3   • fluticasone (FLONASE) 50 mcg/act nasal spray 1 spray into each nostril daily 18 mL 11   • hydrOXYzine HCL (ATARAX) 25 mg tablet Take 1 tablet (25 mg total) by mouth every 6 (six) hours as needed for allergies or anxiety  90 tablet 1   • Insulin Pen Needle (BD Pen Needle Jaymie U/F) 32G X 4 MM MISC Use daily as needed with insulin pen 100 each 2   • loratadine (CLARITIN) 10 mg tablet Take 1 tablet (10 mg total) by mouth daily 90 tablet 5   • norethindrone (MICRONOR) 0.35 MG tablet TAKE 1 TABLET BY MOUTH EVERY DAY 84 tablet 1   • nystatin (MYCOSTATIN) powder Apply topically 2 (two) times a day To moist area 60 g 3   • semaglutide, 1 mg/dose, (Ozempic, 1 MG/DOSE,) 4 mg/3 mL injection pen INJECT 0.75 ML (1 MG TOTAL) UNDER THE SKIN EVERY 7 DAYS 9 mL 1     No current facility-administered medications for this visit.        Allergies:  No Known Allergies    Review of Systems  all others negative - no chest pain, SOB, normal urine and bowels. no GERD. sleeping well. mood as above. + itchy eyes/nose, runny nose, cough     Physical Exam   Video Exam Pt not examined in person - seen over epic virtual video visit   Constitutional:  she appears well-developed and well-nourished.   HENT: Head: Normocephalic.   Right Ear: External ear normal.   Left Ear: External ear normal.   Nose: Nose normal.   Eyes: Pupils are equal, round, and reactive to light. Right eye exhibits no discharge. Left eye exhibits no discharge. No scleral icterus.   Neck: Normal range of motion.   Pulmonary/Chest: Effort normal. No respiratory distress.   Neurological: she is alert and oriented to person, place, and time.   Skin: Skin is warm and dry on face - no rashes. Not pale. Not diaphoretic.   Psychiatric: she  has a normal mood and affect. she behavior is normal. Thought content normal.       As a result of this visit, I have not referred the patient for further respiratory evaluation.    VIRTUAL VISIT DISCLAIMER    Peace Anderson acknowledges that she has consented to an online visit or consultation. She understands that the online visit is based solely on information provided by her, and that, in the absence of a face-to-face physical evaluation by the physician, the  diagnosis she receives is both limited and provisional in terms of accuracy and completeness. This is not intended to replace a full medical face-to-face evaluation by the physician. Peace Anderson understands and accepts these terms.

## 2024-06-25 NOTE — ASSESSMENT & PLAN NOTE
Not at goal. Increase cymablta. Add atarax as needed for anxiety/sleep. If patient decides to go to  we can send in ativan. Risks and benefits and side effects reviewed. She is looking forward to her visit with Hipolito

## 2024-06-28 ENCOUNTER — PATIENT MESSAGE (OUTPATIENT)
Dept: FAMILY MEDICINE CLINIC | Facility: CLINIC | Age: 59
End: 2024-06-28

## 2024-06-28 DIAGNOSIS — B35.3 TINEA PEDIS OF LEFT FOOT: Primary | ICD-10-CM

## 2024-07-01 ENCOUNTER — SOCIAL WORK (OUTPATIENT)
Dept: BEHAVIORAL/MENTAL HEALTH CLINIC | Facility: CLINIC | Age: 59
End: 2024-07-01
Payer: COMMERCIAL

## 2024-07-01 DIAGNOSIS — F41.1 GAD (GENERALIZED ANXIETY DISORDER): ICD-10-CM

## 2024-07-01 DIAGNOSIS — F33.1 MDD (MAJOR DEPRESSIVE DISORDER), RECURRENT EPISODE, MODERATE (HCC): Primary | ICD-10-CM

## 2024-07-01 PROCEDURE — 90834 PSYTX W PT 45 MINUTES: CPT | Performed by: SOCIAL WORKER
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
## 2024-07-02 RX ORDER — CLOTRIMAZOLE 1 %
CREAM (GRAM) TOPICAL 2 TIMES DAILY
Qty: 60 G | Refills: 5 | Status: SHIPPED | OUTPATIENT
Start: 2024-07-02

## 2024-07-18 ENCOUNTER — SOCIAL WORK (OUTPATIENT)
Dept: BEHAVIORAL/MENTAL HEALTH CLINIC | Facility: CLINIC | Age: 59
End: 2024-07-18
Payer: COMMERCIAL

## 2024-07-18 DIAGNOSIS — F33.1 MDD (MAJOR DEPRESSIVE DISORDER), RECURRENT EPISODE, MODERATE (HCC): Primary | ICD-10-CM

## 2024-07-18 DIAGNOSIS — F41.1 GAD (GENERALIZED ANXIETY DISORDER): ICD-10-CM

## 2024-07-18 PROCEDURE — 90834 PSYTX W PT 45 MINUTES: CPT | Performed by: SOCIAL WORKER

## 2024-07-18 NOTE — PSYCH
1. MDD (major depressive disorder), recurrent episode, moderate (HCC)        2. JANE (generalized anxiety disorder)          Data: Pt and writer continued to explore pt's childhood, anxiety growing up, insecurity, being left with strangers, or by herself. Pt and writer discussed her strategy to isolate herself in order to protect herself. 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 Most of the time she retreats to the place where she feels safe and stays there. Pt is noticing she is less motivated to go out. When asked what she likes to do for fun, pt has a difficult time answering this. Pt does not like herself, and she feels like an outcast. Because of this, pt does not ascribe much worth to herself which is causing her more and more isolation.     Pt identifies her main goal for this week is to try and reverse this process. Pt is encouraged to go out with her daughter and to ask her daughter to hold her accountable for going out more. This writer suspects pt needs to build some confidence in herself by being around people and enjoying herself more. Pt states she has a hard time going to [employer] to do shopping. Pt is encouraged to ask her daughter to go with her next time. Pt is also encouraged to start to work on letting compliments land more often, so if she has a good time with her daughter, she remembers that she has a part to play in the good time and to give herself some accolades accordingly. This will start the process of raising pt's self worth, which is currently very depleted, and always has been.         Plan:Follow up in 2 weeks time       Psychotherapy Provided: Individual Psychotherapy 45 minutes     Length of time in session: 45 minutes, follow up in 2 week    Goals addressed in session: Goal 1 and Goal 2     Pain:      none    0    Current suicide risk : Low     Pt is future oriented and not suicidal.       Behavioral Health  Treatment Plan [facility]: Diagnosis and Treatment Plan explained to Peace Hand relates understanding diagnosis and is agreeable to Treatment Plan. Yes     Visit start and stop times:    07/18/24  Start Time: 1300  Stop Time: 1345  Total Visit Time: 45 minutes

## 2024-07-18 NOTE — BH TREATMENT PLAN
Outpatient Behavioral Health Psychotherapy Treatment Plan    Peace Anderson  1965     Date of Initial Psychotherapy Assessment: 7/1/2024  Date of Current Treatment Plan: 07/18/24  Treatment Plan Target Date: 2/17/2025  Treatment Plan Expiration Date: 2/17/2025    Diagnosis:   1. MDD (major depressive disorder), recurrent episode, moderate (HCC)        2. JANE (generalized anxiety disorder)            Area(s) of Need: Anxiety, depression, low self worth, trauma symptoms, low motivation.     Long Term Goal 1 (in the client's own words): Process trauma     Stage of Change: Action    Target Date for completion: 2/17/2025     Anticipated therapeutic modalities: DBT skills, CBT     People identified to complete this goal: Pt      Objective 1: (identify the means of measuring success in meeting the objective): Pt has an understanding of the role childhood trauma played in producing anxiety in her life.       Objective 2: (identify the means of measuring success in meeting the objective): Pt is working towards self compassion and self love as a result of understanding the trauma was not her fault.       Long Term Goal 2 (in the client's own words): Pt is working to improve self care.     Stage of Change: Action    Target Date for completion: 2/17/2025     Anticipated therapeutic modalities: Solution Focused, Strength Based     People identified to complete this goal: PT      Objective 1: (identify the means of measuring success in meeting the objective): Pt identifies good things in her character and life and can start to accept her strengths without critiquing herself.       Objective 2: (identify the means of measuring success in meeting the objective): Pt is getting out of her house more, trying new things, being kinder to self. Applying growth mindset to her life.      Long Term Goal 3 (in the client's own words): Anxiety management.     Stage of Change: Action    Target Date for completion: 2/17/25     Anticipated  therapeutic modalities: CBT, Solution focused.      People identified to complete this goal: PT      Objective 1: (identify the means of measuring success in meeting the objective): Pt is using tools such as grounding and TIPP skills to reduce anxiety. JANE score is down to less than 10.       Objective 2: (identify the means of measuring success in meeting the objective): Pt is able to exercise parasympathetic nervous system to calm herself down when she feels anxious.      I am currently under the care of a [facility] psychiatric provider: no    My [facility] psychiatric provider is: NA    I am currently taking psychiatric medications: Yes, as prescribed    I feel that I will be ready for discharge from mental health care when I reach the following (measurable goal/objective): Pt is seeing decline in anxiety, depression, improvement in self care.     For children and adults who have a legal guardian:   Has there been any change to custody orders and/or guardianship status? NA. If yes, attach updated documentation.    I have created my Crisis Plan and have been offered a copy of this plan    Behavioral Health Treatment Plan [facility]: Diagnosis and Treatment Plan explained to Peace Anderson acknowledges an understanding of their diagnosis. Peace Anderson agrees to this treatment plan.    I have been offered a copy of this Treatment Plan. Yes via my chart.

## 2024-08-06 ENCOUNTER — PATIENT MESSAGE (OUTPATIENT)
Dept: FAMILY MEDICINE CLINIC | Facility: CLINIC | Age: 59
End: 2024-08-06

## 2024-08-07 ENCOUNTER — OFFICE VISIT (OUTPATIENT)
Dept: FAMILY MEDICINE CLINIC | Facility: CLINIC | Age: 59
End: 2024-08-07
Payer: COMMERCIAL

## 2024-08-07 VITALS
DIASTOLIC BLOOD PRESSURE: 80 MMHG | OXYGEN SATURATION: 98 % | BODY MASS INDEX: 38.53 KG/M2 | HEIGHT: 62 IN | HEART RATE: 86 BPM | RESPIRATION RATE: 36 BRPM | WEIGHT: 209.4 LBS | TEMPERATURE: 97.2 F | SYSTOLIC BLOOD PRESSURE: 108 MMHG

## 2024-08-07 DIAGNOSIS — J32.0 MAXILLARY SINUSITIS, UNSPECIFIED CHRONICITY: Primary | ICD-10-CM

## 2024-08-07 LAB
SARS-COV-2 AG UPPER RESP QL IA: NEGATIVE
VALID CONTROL: NORMAL

## 2024-08-07 PROCEDURE — 99213 OFFICE O/P EST LOW 20 MIN: CPT | Performed by: FAMILY MEDICINE

## 2024-08-07 PROCEDURE — 87811 SARS-COV-2 COVID19 W/OPTIC: CPT | Performed by: FAMILY MEDICINE

## 2024-08-07 RX ORDER — CEFUROXIME AXETIL 500 MG/1
500 TABLET ORAL EVERY 12 HOURS SCHEDULED
Qty: 20 TABLET | Refills: 0 | Status: SHIPPED | OUTPATIENT
Start: 2024-08-07 | End: 2024-08-17

## 2024-08-07 NOTE — PATIENT INSTRUCTIONS
Ceftin twice daily x 10 days in case of sinus infection  Test for covid at home tomorrow or next day and if positive, call  Call if no better in 7-10 days or if symptoms worsen

## 2024-08-07 NOTE — PROGRESS NOTES
"Ambulatory Visit  Name: Peace Anderson      : 1965      MRN: [id number]  Encounter Provider: Bobbi Pearson DO  Encounter Date: 2024   Encounter department: [facility]    Assessment & Plan   1. Maxillary sinusitis, unspecified chronicity  Comments:  current illness s/p chronic allergy sx  will treat for possible sinus infxn with ceftin  can recheck rapid covid tomorrow--if positive, will d/c ceftin and start paxlovid    Orders:  -     POCT Rapid Covid Ag  -     cefuroxime (CEFTIN) 500 mg tablet; Take 1 tablet (500 mg total) by mouth every 12 (twelve) hours for 10 days       History of Present Illness     HPI  Pt c/o headache, congestion, nausea, +ST.  No cough.  Some shortness of breath.  No chest pain.  No diarrhea.  No body aches.  No loss of taste or smell.  No fevers that she knows of.  Started  when she was at work.  +fatigue.  Negative rapid covid today.  Has chronic allergies.      Review of Systems  See hpi      Objective     /80   Pulse 86   Temp (!) 97.2 °F (36.2 °C) (Temporal)   Resp (!) 36   Ht 5' 2\" (1.575 m)   Wt 95 kg (209 lb 6.4 oz)   SpO2 98%   BMI 38.30 kg/m²     Physical Exam  Constitutional:       Appearance: Normal appearance.   HENT:      Head: Normocephalic and atraumatic.      Right Ear: Tympanic membrane, ear canal and external ear normal.      Left Ear: Tympanic membrane, ear canal and external ear normal.      Nose: Congestion present.      Right Sinus: Maxillary sinus tenderness present.      Left Sinus: Maxillary sinus tenderness present.      Mouth/Throat:      Pharynx: Posterior oropharyngeal erythema present. No oropharyngeal exudate.   Eyes:      Extraocular Movements: Extraocular movements intact.      Conjunctiva/sclera: Conjunctivae normal.      Pupils: Pupils are equal, round, and reactive to light.   Cardiovascular:      Rate and Rhythm: Normal rate and regular rhythm.      Heart sounds: No murmur heard.     No friction rub. No " gallop.   Pulmonary:      Effort: Pulmonary effort is normal.      Breath sounds: Normal breath sounds. No wheezing, rhonchi or rales.   Abdominal:      General: Abdomen is flat. Bowel sounds are normal. There is no distension.      Tenderness: There is no abdominal tenderness.   Lymphadenopathy:      Cervical: Cervical adenopathy present.   Neurological:      Mental Status: She is alert.       Administrative Statements

## 2024-08-08 ENCOUNTER — TELEPHONE (OUTPATIENT)
Age: 59
End: 2024-08-08

## 2024-08-08 NOTE — TELEPHONE ENCOUNTER
Patient needs to reschedule her appointment with [facility] . She is sick . Please call patient to reschedule . Thank you.

## 2024-08-14 ENCOUNTER — TELEPHONE (OUTPATIENT)
Age: 59
End: 2024-08-14

## 2024-08-14 NOTE — TELEPHONE ENCOUNTER
Here is patient education that can either be shared with her, or reviewed  Preventing Methicillin-resistant Staphylococcus aureus (MRSA)  MRSA  CDC     She was due for a visit in April - please schedule (long visit for labs, CHRONIC CONDITIONS, mood) .

## 2024-08-14 NOTE — TELEPHONE ENCOUNTER
Patient called in to inquire about assistance to friend who is currently being treated for wound that is positive for MRSA; patient has a wound vac in place. Plans to help with food shopping and stay overnight in  bedroom and bathroom. Patient lives in [place]; friend in [place]. RN advised to use caution with items patient may have come in contact with, wash hands thoroughly. If provider has additional advice, please follow up with patient.

## 2024-08-25 ENCOUNTER — HOSPITAL ENCOUNTER (EMERGENCY)
Facility: HOSPITAL | Age: 59
Discharge: HOME | End: 2024-08-25
Payer: COMMERCIAL

## 2024-08-25 ENCOUNTER — TELEPHONE (OUTPATIENT)
Dept: FAMILY MEDICINE CLINIC | Facility: CLINIC | Age: 59
End: 2024-08-25

## 2024-08-25 VITALS
BODY MASS INDEX: 38.51 KG/M2 | HEART RATE: 89 BPM | DIASTOLIC BLOOD PRESSURE: 64 MMHG | OXYGEN SATURATION: 97 % | RESPIRATION RATE: 18 BRPM | SYSTOLIC BLOOD PRESSURE: 122 MMHG | WEIGHT: 210.54 LBS | TEMPERATURE: 99.7 F

## 2024-08-25 DIAGNOSIS — M54.9 MUSCULOSKELETAL BACK PAIN: Primary | ICD-10-CM

## 2024-08-25 LAB
BACTERIA UR QL AUTO: ABNORMAL /HPF
BILIRUB UR QL STRIP: NEGATIVE
CLARITY UR: ABNORMAL
COLOR UR: YELLOW
GLUCOSE UR STRIP-MCNC: NEGATIVE MG/DL
HGB UR QL STRIP.AUTO: NEGATIVE
KETONES UR STRIP-MCNC: NEGATIVE MG/DL
LEUKOCYTE ESTERASE UR QL STRIP: ABNORMAL
MUCOUS THREADS UR QL AUTO: ABNORMAL
NITRITE UR QL STRIP: NEGATIVE
NON-SQ EPI CELLS URNS QL MICRO: ABNORMAL /HPF
PH UR STRIP.AUTO: 5.5 [PH]
PROT UR STRIP-MCNC: ABNORMAL MG/DL
RBC #/AREA URNS AUTO: ABNORMAL /HPF
SP GR UR STRIP.AUTO: 1.03 (ref 1–1.03)
UROBILINOGEN UR STRIP-ACNC: <2 MG/DL
WBC #/AREA URNS AUTO: ABNORMAL /HPF

## 2024-08-25 PROCEDURE — 81001 URINALYSIS AUTO W/SCOPE: CPT

## 2024-08-25 PROCEDURE — 99284 EMERGENCY DEPT VISIT MOD MDM: CPT

## 2024-08-25 PROCEDURE — 87086 URINE CULTURE/COLONY COUNT: CPT

## 2024-08-25 PROCEDURE — 96372 THER/PROPH/DIAG INJ SC/IM: CPT

## 2024-08-25 RX ORDER — LIDOCAINE 50 MG/G
1 PATCH TOPICAL DAILY
Qty: 7 PATCH | Refills: 0 | Status: SHIPPED | OUTPATIENT
Start: 2024-08-25

## 2024-08-25 RX ORDER — KETOROLAC TROMETHAMINE 30 MG/ML
15 INJECTION, SOLUTION INTRAMUSCULAR; INTRAVENOUS ONCE
Status: COMPLETED | OUTPATIENT
Start: 2024-08-25 | End: 2024-08-25

## 2024-08-25 RX ORDER — DIAZEPAM 5 MG
5 TABLET ORAL ONCE
Status: COMPLETED | OUTPATIENT
Start: 2024-08-25 | End: 2024-08-25

## 2024-08-25 RX ORDER — METHOCARBAMOL 500 MG/1
500 TABLET, FILM COATED ORAL 2 TIMES DAILY
Qty: 14 TABLET | Refills: 0 | Status: SHIPPED | OUTPATIENT
Start: 2024-08-25

## 2024-08-25 RX ORDER — NAPROXEN 500 MG/1
500 TABLET ORAL 2 TIMES DAILY WITH MEALS
Qty: 14 TABLET | Refills: 0 | Status: SHIPPED | OUTPATIENT
Start: 2024-08-25 | End: 2024-09-01

## 2024-08-25 RX ORDER — LIDOCAINE 50 MG/G
1 PATCH TOPICAL ONCE
Status: DISCONTINUED | OUTPATIENT
Start: 2024-08-25 | End: 2024-08-25 | Stop reason: HOSPADM

## 2024-08-25 RX ADMIN — LIDOCAINE 1 PATCH: 700 PATCH TOPICAL at 01:23

## 2024-08-25 RX ADMIN — KETOROLAC TROMETHAMINE 15 MG: 30 INJECTION, SOLUTION INTRAMUSCULAR; INTRAVENOUS at 01:24

## 2024-08-25 RX ADMIN — DIAZEPAM 5 MG: 5 TABLET ORAL at 01:23

## 2024-08-25 NOTE — TELEPHONE ENCOUNTER
Please call pt - seen in ER for back pain. Note says no hematuria, sent home. However UA did have blood and she has a history of kidney stones. Offer appointment today if she is still having flank pain or thinks she may have a stone.

## 2024-08-25 NOTE — DISCHARGE INSTRUCTIONS
You were evaluated in the Emergency Department today for your back pain. Your evaluation did not show signs of medical conditions requiring emergent intervention at this time, and we feel safe discharging you home.    I gave you a prescription for naproxen, robaxin and lidoderm patches. These are at your pharmacy. Please take as prescribed. You can also take tylenol for pain. This medication is over the counter.    Please schedule an appointment for follow-up with your primary care physician this week for further evaluation of your symptoms. I also wrote a referral for the comprehensive spine program. Please follow up with them.    Return to the Emergency Department if you experience worsening back pain, difficulty walking, fevers, numbness, tingling, difficulty urinating, incontinence, or any other concerning symptoms.    Thank you for choosing us for your care.

## 2024-08-25 NOTE — ED PROVIDER NOTES
History  Chief Complaint   Patient presents with    Flank Pain     Pt reports right flank pain since 1630, states she took tylenol @1800 and placed a lidocaine patch on area with no relief. Pain does not radiate. Hx of kidney stones. -n/v     Patient is a 59-year-old female with a significant past medical history of hypertension, presenting for evaluation of right-sided low back pain.  Patient reports that over the last day she has had some pain in her right-sided low back.  It is described as a sharp pain.  This is worse with motion and relieved by rest.  She denies any radiation into her abdomen.  She denies any change in sensation/saddle anesthesia.  She has been urinating normally without retention, incontinence, or dysuria.  She denies fevers.  She tried taking an aspirin as well as a Lidoderm patch without relief.        Prior to Admission Medications   Prescriptions Last Dose Informant Patient Reported? Taking?   DULoxetine (CYMBALTA) 60 mg delayed release capsule  Self No No   Sig: Take 1 capsule (60 mg total) by mouth daily   Insulin Pen Needle (BD Pen Needle Jaymie U/F) 32G X 4 MM MISC  Self No No   Sig: Use daily as needed with insulin pen   chlorthalidone 50 MG tablet  Self No No   Sig: TAKE 1 TABLET BY MOUTH EVERY DAY   clotrimazole (LOTRIMIN) 1 % cream  Self No No   Sig: Apply topically 2 (two) times a day   clotrimazole-betamethasone (LOTRISONE) 1-0.05 % cream  Self No No   Sig: Apply topically 2 (two) times a day   fluticasone (FLONASE) 50 mcg/act nasal spray  Self No No   Si spray into each nostril daily   hydrOXYzine HCL (ATARAX) 25 mg tablet  Self No No   Sig: Take 1 tablet (25 mg total) by mouth every 6 (six) hours as needed for allergies or anxiety   loratadine (CLARITIN) 10 mg tablet  Self No No   Sig: Take 1 tablet (10 mg total) by mouth daily   norethindrone (MICRONOR) 0.35 MG tablet  Self No No   Sig: TAKE 1 TABLET BY MOUTH EVERY DAY   nystatin (MYCOSTATIN) powder  Self No No   Sig: Apply  topically 2 (two) times a day To moist area   semaglutide, 1 mg/dose, (Ozempic, 1 MG/DOSE,) 4 mg/3 mL injection pen  Self No No   Sig: INJECT 0.75 ML (1 MG TOTAL) UNDER THE SKIN EVERY 7 DAYS      Facility-Administered Medications: None       Past Medical History:   Diagnosis Date    Ascorbic acid deficiency 04/20/2020    Asthma due to seasonal allergies 09/25/2020    COVID     Depression     Essential hypertension 04/20/2020    JANE (generalized anxiety disorder) 04/20/2020    Helicobacter pylori gastrointestinal tract infection 04/20/2020    10/02/2017=egd 9/2017 Dr Cervantes    Helicobacter pylori gastrointestinal tract infection 04/20/2020    10/02/2017=egd 9/2017 Dr Cervantes      History of Holter monitoring 12/22/2017    The baseline rhythm was of sinus origin with an average heart rate of 90 bpm (range: 72 - 129 bpm). There were rare single APCs adn VPCs (less than 0.1 % total beats). There were no sustained cardiac dysrhythmais. There were no significant symptomatic pauses.     History of stress test 01/05/2018    Mildly abnormal exercise Myoview SPECT study with evidence of a fixed anterior defect. This is most likely related to breast attenuation artifact. There was no evidence of reversible myocardial ischemia. Gated wall motion analysis was normal with well preserved systolic function. The left ventricle was normal in size with calculated EF of 73%. No prior similar study is available for comparison.     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    5     Reactive airway disease with acute exacerbation 05/20/2020    Subclinical hyperthyroidism 04/20/2020    Thyroid nodule 09/25/2020    Vitamin D deficiency 04/20/2020    33       Past Surgical History:   Procedure Laterality Date    COLONOSCOPY  09/08/2017    3 polyps=next 2020 w Dr Cervantes    EGD  09/01/2017    egd biopsy     KNEE ARTHROSCOPY W/ ACL RECONSTRUCTION      KNEE SURGERY      torn mcl, acl, meniscus left knee 2016    TUBAL LIGATION         Family History   Problem Relation Age of Onset    Lung disease Mother         lung nodules    Coronary artery disease Mother         calcium score > 1000    Diabetes Father     Colon cancer Maternal Aunt 60 - 69    Cancer Maternal Uncle     Kidney disease Maternal Uncle      I have reviewed and agree with the history as documented.    E-Cigarette/Vaping    E-Cigarette Use Never User      E-Cigarette/Vaping Substances    Nicotine No     THC No     CBD No     Flavoring No     Other No     Unknown No      Social History     Tobacco Use    Smoking status: Former     Current packs/day: 0.00     Average packs/day: 0.3 packs/day for 24.0 years (6.0 ttl pk-yrs)     Types: Cigarettes     Start date: 1990     Quit date: 2014     Years since quitting: 10.6    Smokeless tobacco: Never    Tobacco comments:     Has smoked since age: 17- As per [facility]    Vaping Use    Vaping status: Never Used   Substance Use Topics    Alcohol use: Yes     Alcohol/week: 2.0 standard drinks of alcohol     Types: 1 Glasses of wine, 1 Shots of liquor per week     Comment: Occasional     Drug use: Never       Review of Systems   Constitutional:  Negative for fever.   Gastrointestinal:  Negative for nausea and vomiting.   Genitourinary:  Negative for dysuria and hematuria.   Musculoskeletal:  Positive for back pain.   Neurological:  Negative for weakness and numbness.       Physical Exam  Physical Exam  Vitals and nursing note reviewed.   Constitutional:       General: She is not in acute distress.      Appearance: Normal appearance. She is not ill-appearing or toxic-appearing.   HENT:      Head: Normocephalic and atraumatic.      Right Ear: External ear normal.      Left Ear: External ear normal.      Nose: Nose normal.   Eyes:      General: No scleral icterus.        Right eye: No discharge.         Left eye: No discharge.      Extraocular Movements: Extraocular movements intact.      Conjunctiva/sclera: Conjunctivae normal.   Cardiovascular:      Rate and Rhythm: Tachycardia present.      Heart sounds: Normal heart sounds. No murmur heard.     No friction rub. No gallop.   Pulmonary:      Effort: Pulmonary effort is normal. No respiratory distress.      Breath sounds: Normal breath sounds.   Abdominal:      General: Abdomen is flat. There is no distension.      Palpations: Abdomen is soft. There is no mass.      Tenderness: There is no abdominal tenderness.   Genitourinary:     Comments: Deferred  Musculoskeletal:      Comments: Minimal tenderness of the right sided paralumbar and upper gluteal muscles, no bony vertebral tenderness. No stepoffs, deformities or skin changes. ROM limited secondary to pain. Strength of bilateral lower extremities 5/5 in ankle flexion and extension. EHL intact. Sensation intact including S1 distribution. DP/PT pulses 2+/4.    Skin:     General: Skin is warm and dry.   Neurological:      General: No focal deficit present.      Mental Status: She is alert.         Vital Signs  ED Triage Vitals [08/25/24 0044]   Temperature Pulse Respirations Blood Pressure SpO2   99.7 °F (37.6 °C) (S) (!) 106 18 166/75 97 %      Temp Source Heart Rate Source Patient Position - Orthostatic VS BP Location FiO2 (%)   Oral Monitor Sitting Right arm --      Pain Score       10 - Worst Possible Pain           Vitals:    08/25/24 0044 08/25/24 0153   BP: 166/75 122/64   Pulse: (S) (!) 106 89   Patient Position - Orthostatic VS: Sitting Sitting         Visual Acuity      ED Medications  Medications    ketorolac (TORADOL) injection 15 mg (15 mg Intramuscular Given 8/25/24 0124)   diazepam (VALIUM) tablet 5 mg (5 mg Oral Given 8/25/24 0123)       Diagnostic Studies  Results Reviewed       Procedure Component Value Units Date/Time    Urine Microscopic [[id number]]  (Abnormal) Collected: 08/25/24 0112    Lab Status: Final result Specimen: Urine, Clean Catch Updated: 08/25/24 0142     RBC, UA 4-10 /hpf      WBC, UA 10-20 /hpf      Epithelial Cells Innumerable /hpf      Bacteria, UA None Seen /hpf      MUCUS THREADS Occasional    Urine culture [[id number]] Collected: 08/25/24 0112    Lab Status: In process Specimen: Urine, Clean Catch Updated: 08/25/24 0142    UA w Reflex to Microscopic w Reflex to Culture [[id number]]  (Abnormal) Collected: 08/25/24 0112    Lab Status: Final result Specimen: Urine, Clean Catch Updated: 08/25/24 0135     Color, UA Yellow     Clarity, UA Turbid     Specific Gravity, UA 1.029     pH, UA 5.5     Leukocytes, UA Small     Nitrite, UA Negative     Protein, UA Trace mg/dl      Glucose, UA Negative mg/dl      Ketones, UA Negative mg/dl      Urobilinogen, UA <2.0 mg/dl      Bilirubin, UA Negative     Occult Blood, UA Negative                   No orders to display              Procedures  Procedures         ED Course  ED Course as of 08/25/24 0956   Sun Aug 25, 2024   0135 Blood, UA: Negative                                               Medical Decision Making  Patient with history as above presented with back/flank pain. History obtained from patient.    Differential diagnosis includes: Musculoskeletal strain, musculoskeletal spasm, pyelonephritis    Plan: Toradol, Valium, Lidoderm, urine    Urine without signs of infection or hematuria. Patient was treated with above with improvement in symptoms. Reassessed the patient and they continue to be well appearing. Presentation most consistent with musculoskeletal back pain.  Given prescriptions for naproxen, Robaxin, Lidoderm.  Given ambulatory  referral to comprehensive spine.  Stable for outpatient management.    Disposition: Discharged with instructions to obtain outpatient follow up of patient's symptoms and findings, with strict return precautions if patient develops new or worsening symptoms. Patient understands this plan and is agreeable. All questions answered. Patient discharged home with return precautions.    Amount and/or Complexity of Data Reviewed  Labs: ordered. Decision-making details documented in ED Course.    Risk  Prescription drug management.                 Disposition  Final diagnoses:   Musculoskeletal back pain     Time reflects when diagnosis was documented in both MDM as applicable and the Disposition within this note       Time User Action Codes Description Comment    8/25/2024  2:16 AM Zak Cody Add [M54.9] Musculoskeletal back pain           ED Disposition       ED Disposition   Discharge    Condition   Stable    Date/Time   Sun Aug 25, 2024  2:16 AM    Comment   Peace Anderson discharge to home/self care.                   Follow-up Information    None         Discharge Medication List as of 8/25/2024  2:17 AM        START taking these medications    Details   lidocaine (Lidoderm) 5 % Apply 1 patch topically over 12 hours daily Remove & Discard patch within 12 hours or as directed by MD, Starting Sun 8/25/2024, Normal      methocarbamol (ROBAXIN) 500 mg tablet Take 1 tablet (500 mg total) by mouth 2 (two) times a day, Starting Sun 8/25/2024, Normal      naproxen (Naprosyn) 500 mg tablet Take 1 tablet (500 mg total) by mouth 2 (two) times a day with meals for 7 days, Starting Sun 8/25/2024, Until Sun 9/1/2024, Normal           CONTINUE these medications which have NOT CHANGED    Details   chlorthalidone 50 MG tablet TAKE 1 TABLET BY MOUTH EVERY DAY, Starting Tue 3/12/2024, Normal      clotrimazole (LOTRIMIN) 1 % cream Apply topically 2 (two) times a day, Starting Tue 7/2/2024, Normal      clotrimazole-betamethasone  (LOTRISONE) 1-0.05 % cream Apply topically 2 (two) times a day, Starting Thu 1/26/2023, Normal      DULoxetine (CYMBALTA) 60 mg delayed release capsule Take 1 capsule (60 mg total) by mouth daily, Starting Tue 6/25/2024, Normal      fluticasone (FLONASE) 50 mcg/act nasal spray 1 spray into each nostril daily, Starting Tue 6/25/2024, Normal      hydrOXYzine HCL (ATARAX) 25 mg tablet Take 1 tablet (25 mg total) by mouth every 6 (six) hours as needed for allergies or anxiety, Starting Tue 6/25/2024, Normal      Insulin Pen Needle (BD Pen Needle Jaymie U/F) 32G X 4 MM MISC Use daily as needed with insulin pen, Normal      loratadine (CLARITIN) 10 mg tablet Take 1 tablet (10 mg total) by mouth daily, Starting Tue 6/25/2024, Normal      norethindrone (MICRONOR) 0.35 MG tablet TAKE 1 TABLET BY MOUTH EVERY DAY, Starting Tue 5/14/2024, Normal      nystatin (MYCOSTATIN) powder Apply topically 2 (two) times a day To moist area, Starting Thu 1/26/2023, Normal      semaglutide, 1 mg/dose, (Ozempic, 1 MG/DOSE,) 4 mg/3 mL injection pen INJECT 0.75 ML (1 MG TOTAL) UNDER THE SKIN EVERY 7 DAYS, Normal                 PDMP Review       None            ED Provider  Electronically Signed by             Zak Cody DO  08/25/24 0985

## 2024-08-26 ENCOUNTER — OFFICE VISIT (OUTPATIENT)
Dept: FAMILY MEDICINE CLINIC | Facility: CLINIC | Age: 59
End: 2024-08-26
Payer: COMMERCIAL

## 2024-08-26 ENCOUNTER — HOSPITAL ENCOUNTER (OUTPATIENT)
Dept: RADIOLOGY | Facility: HOSPITAL | Age: 59
Discharge: HOME | End: 2024-08-26
Payer: COMMERCIAL

## 2024-08-26 VITALS
BODY MASS INDEX: 38.9 KG/M2 | SYSTOLIC BLOOD PRESSURE: 132 MMHG | HEART RATE: 75 BPM | DIASTOLIC BLOOD PRESSURE: 82 MMHG | TEMPERATURE: 98 F | HEIGHT: 62 IN | WEIGHT: 211.4 LBS | OXYGEN SATURATION: 98 % | RESPIRATION RATE: 18 BRPM

## 2024-08-26 DIAGNOSIS — R31.9 HEMATURIA, UNSPECIFIED TYPE: Primary | ICD-10-CM

## 2024-08-26 DIAGNOSIS — M54.50 ACUTE RIGHT-SIDED LOW BACK PAIN WITHOUT SCIATICA: ICD-10-CM

## 2024-08-26 DIAGNOSIS — R31.9 HEMATURIA, UNSPECIFIED TYPE: ICD-10-CM

## 2024-08-26 LAB — BACTERIA UR CULT: NORMAL

## 2024-08-26 PROCEDURE — 99214 OFFICE O/P EST MOD 30 MIN: CPT | Performed by: FAMILY MEDICINE

## 2024-08-26 PROCEDURE — 74176 CT ABD & PELVIS W/O CONTRAST: CPT

## 2024-08-26 RX ORDER — CHLORHEXIDINE GLUCONATE ORAL RINSE 1.2 MG/ML
SOLUTION DENTAL 2 TIMES DAILY
COMMUNITY
Start: 2024-08-07

## 2024-08-26 NOTE — PROGRESS NOTES
Assessment/Plan:     Assessment & Plan  Hematuria, unspecified type  Patient seen in ER for low back pain.  Initial dipstick UA showed no blood, but formal UA did show blood.  Patient with history of kidney stones.  Will check stat CT stone study today.  If negative recommend she continue with current medications and see physical therapy.  Orders:    CT renal stone study abdomen pelvis wo contrast; Future    Ambulatory Referral to Physical Therapy; Future    Acute right-sided low back pain without sciatica  See above.  Orders:    CT renal stone study abdomen pelvis wo contrast; Future    Ambulatory Referral to Physical Therapy; Future         There are no Patient Instructions on file for this visit.  Return for acute visit to talk about headaches .  Subjective:    BAHMAN Hand is a 59 y.o. female who presents with:  Chief Complaint    Back Pain; Headache       HPI       Back Pain     Additional comments: C/O lower back pain. Pt rates pain being a 10/10 at its worse. Was seen ER for complaint, was given a referral to spine and pain doctor Pt states that she is unable to describe the type of pain, but states that it is localized. Pt states that it is triggered when changing positions, has been ongoing for a while, but has gotten progressively worse. Pt used heating pad ,OTC aspirin, and lidocaine patches denies working. Pt was given Toradol inj and valium, prescription strength lidocaine patch,naproxen.              Headache     Additional comments: C/O severe headache. Pt states onset of headaches is unusual for her.           Last edited by Ely Henry LPN on 8/26/2024  2:54 PM.        ---Above per clinical staff & reviewed. ---        Today:  Severe pain 2 days ago when she went to get up   Tried ASA, lidocaine patch   No help   Was getting worse   Tried to lay down - could not find a comfortable position   ER due to pain   Right side   Difficult to get her leg up   Saturday severe pain 1510   Today 4/10  "  Depends how she moves   In ER pain meds helped some   Toradol, valium and patch help some   Not as severe  Urine more often maybe, no burning, no blood noted   One stone       The following portions of the patient's history were reviewed and updated as appropriate: allergies, current medications, past family history, past medical history, past social history, past surgical history and problem list.  Review of Systems  ROS:  all others negative - no chest pain, SOB, normal bowels. no GERD. sleeping well. mood good.  Positive urine changes.  Objective:    /82 (BP Location: Left arm, Patient Position: Sitting, Cuff Size: Large)   Pulse 75   Temp 98 °F (36.7 °C)   Resp 18   Ht 5' 2\" (1.575 m)   Wt 95.9 kg (211 lb 6.4 oz)   SpO2 98%   BMI 38.67 kg/m²   Wt Readings from Last 3 Encounters:   08/26/24 95.9 kg (211 lb 6.4 oz)   08/25/24 95.5 kg (210 lb 8.6 oz)   08/07/24 95 kg (209 lb 6.4 oz)     BP Readings from Last 3 Encounters:   08/26/24 132/82   08/25/24 122/64   08/07/24 108/80       Current Medications:  Current Outpatient Medications   Medication Sig Dispense Refill    chlorhexidine (PERIDEX) 0.12 % solution Apply to the mouth or throat 2 (two) times a day      chlorthalidone 50 MG tablet TAKE 1 TABLET BY MOUTH EVERY DAY 90 tablet 1    clotrimazole (LOTRIMIN) 1 % cream Apply topically 2 (two) times a day (Patient taking differently: Apply topically as needed) 60 g 5    clotrimazole-betamethasone (LOTRISONE) 1-0.05 % cream Apply topically 2 (two) times a day (Patient taking differently: Apply topically as needed) 45 g 3    DULoxetine (CYMBALTA) 60 mg delayed release capsule Take 1 capsule (60 mg total) by mouth daily 90 capsule 3    fluticasone (FLONASE) 50 mcg/act nasal spray 1 spray into each nostril daily 18 mL 11    hydrOXYzine HCL (ATARAX) 25 mg tablet Take 1 tablet (25 mg total) by mouth every 6 (six) hours as needed for allergies or anxiety 90 tablet 1    Insulin Pen Needle (BD Pen Needle Jaymie " U/F) 32G X 4 MM MISC Use daily as needed with insulin pen 100 each 2    lidocaine (Lidoderm) 5 % Apply 1 patch topically over 12 hours daily Remove & Discard patch within 12 hours or as directed by MD 7 patch 0    loratadine (CLARITIN) 10 mg tablet Take 1 tablet (10 mg total) by mouth daily (Patient taking differently: Take 10 mg by mouth as needed for allergies) 90 tablet 5    methocarbamol (ROBAXIN) 500 mg tablet Take 1 tablet (500 mg total) by mouth 2 (two) times a day 14 tablet 0    naproxen (Naprosyn) 500 mg tablet Take 1 tablet (500 mg total) by mouth 2 (two) times a day with meals for 7 days 14 tablet 0    nystatin (MYCOSTATIN) powder Apply topically 2 (two) times a day To moist area (Patient taking differently: Apply topically as needed To moist area) 60 g 3    semaglutide, 1 mg/dose, (Ozempic, 1 MG/DOSE,) 4 mg/3 mL injection pen INJECT 0.75 ML (1 MG TOTAL) UNDER THE SKIN EVERY 7 DAYS 9 mL 1    norethindrone (MICRONOR) 0.35 MG tablet TAKE 1 TABLET BY MOUTH EVERY DAY (Patient not taking: Reported on 8/26/2024) 84 tablet 1     No current facility-administered medications for this visit.        Physical Exam   Constitutional: she appears well-developed and well-nourished.   HENT: Head: Normocephalic.   Right Ear: External ear normal. Tympanic membrane normal.   Left Ear: External ear normal. Tympanic membrane normal.   Nose: Nose normal. No mucosal edema, No rhinorrhea.   Right sinus exhibits no maxillary sinus tenderness.   Left sinus exhibits no maxillary sinus tenderness.   Mouth/Throat: Oropharynx is clear and moist.   Eyes: Normal conjunctiva.  No erythema. No discharge.  Neck: No pain on exam. Neck supple.   Cardiovascular: Normal rate, regular rhythm and normal heart sounds.    Pulmonary/Chest: Effort normal and breath sounds normal.   Abdominal: Soft. Bowel sounds are normal. There is positive right sided abdominal tenderness. No rebound, rigidity, guarding. No CVA tenderness but + right SI pain.    Lymphadenopathy: she has no cervical adenopathy.   Neurological: she is alert and oriented to person, place, and time.   Skin: Skin is warm and dry.   Psychiatric: she has a normal mood and affect. her behavior is normal.

## 2024-08-26 NOTE — RESULT ENCOUNTER NOTE
Call patient with results - CT scan shows small kidney stones on the right. These should pass on their own, but she should let us know if her pain increases.   It also shows some arthritic changes in her low back, and a compression deformity of her mid back.   If her low back continues to bother her we can certainly refer her to pain mgmt.

## 2024-08-26 NOTE — LETTER
[facility]  [address] [address]      August 30, 2024    MRN: [id number]     Phone: [phone]     Dear Ms. Anderson,    You recently had a(n) Cat Scan performed on 8/26/2024 at  [facility] that was requested by Atri Luna DO. The study was reviewed by a radiologist, which is a physician who specializes in medical imaging. The radiologist issued a report describing his or her findings. In that report there was a finding that the radiologist felt warranted further discussion with your health care provider and that discussion would be beneficial to you.      The results were sent to Arti Luna DO on 08/26/2024  6:35 PM. We recommend that you contact Arti Luna DO at [phone] or set up an appointment to discuss the results of the imaging test. If you have already heard from Arti Luna DO regarding the results of your study, you can disregard this letter.     This letter is not meant to alarm you, but intended to encourage you to follow-up on your results with the provider that sent you for the imaging study. In addition, we have enclosed answers to frequently asked questions by other patients who have also received a letter to review results with their health care provider (see page two).      Thank you for choosing [facility] for your medical imaging needs.                                                                                                                                                        FREQUENTLY ASKED QUESTIONS    Why am I receiving this letter?  [place] State Law requires us to notify patients who have findings on imaging exams that may require more testing or follow-up with a health professional within the next 3 months.        How serious is the finding on the imaging test?  This letter is sent to all patients who may need follow-up or more testing within the next 3  months.  Receiving this letter does not necessarily mean you have a life-threatening imaging finding or disease.  Recommendations in the radiologist’s imaging report are general in nature and it is up to your healthcare provider to say whether those recommendations make sense for your situation.  You are strongly encouraged to talk to your health care provider about the results and ask whether additional steps need to be taken.    Where can I get a copy of the final report for my recent radiology exam?  To get a full copy of the report you can access your records online at [email or web address] or please contact [facility]’s Medical Records Department at [phone] Monday through Friday between 8 am and 6 pm.         What do I need to do now?           Please contact your health care provider who requested the imaging study to discuss what further actions (if any) are needed.  You may have already heard from (your ordering provider) in regard to this test in which case you can disregard this letter.        NOTICE IN ACCORDANCE WITH THE [place] STATE “PATIENT TEST RESULT INFORMATION ACT OF 2018”    You are receiving this notice as a result of a determination by your diagnostic imaging service that further discussions of your test results are warranted and would be beneficial to you.    The complete results of your test or tests have been or will be sent to the health care practitioner that ordered the test or tests. It is recommended that you contact your health care practitioner to discuss your results as soon as possible.

## 2024-08-27 ENCOUNTER — TELEPHONE (OUTPATIENT)
Dept: FAMILY MEDICINE CLINIC | Facility: CLINIC | Age: 59
End: 2024-08-27

## 2024-08-27 DIAGNOSIS — G89.29 CHRONIC RIGHT-SIDED LOW BACK PAIN WITHOUT SCIATICA: Primary | ICD-10-CM

## 2024-08-27 DIAGNOSIS — M54.50 CHRONIC RIGHT-SIDED LOW BACK PAIN WITHOUT SCIATICA: Primary | ICD-10-CM

## 2024-08-27 NOTE — TELEPHONE ENCOUNTER
Spoke to patient to relay information provided by Dr. Luna. Patient verbalized understanding. I provided pt with pain management referral inform with the doctor's name, address, and phone number to schedule with. Informed patient to contact the office if she has any further questions or concerns.

## 2024-08-27 NOTE — TELEPHONE ENCOUNTER
She will not officially know if the stones pass unless she sees them. But if the pain is moving towards the area of her ovaries and away from the back she will know they are passing. I will place the referral to pain mgmt.

## 2024-08-27 NOTE — TELEPHONE ENCOUNTER
Spoke with pt and reviewed result note from . Pt would like to know how she will know if the kidney stones have passed or not. She also says her back is always bothering her and would like the referral placed to pain management. Please advise.

## 2024-08-27 NOTE — TELEPHONE ENCOUNTER
----- Message from Arti Luna DO sent at 8/26/2024  6:48 PM EDT -----  Call patient with results - CT scan shows small kidney stones on the right. These should pass on their own, but she should let us know if her pain increases.   It also shows some arthritic changes in her low back, and a compression deformity of her mid back.   If her low back continues to bother her we can certainly refer her to pain mgmt.

## 2024-08-29 ENCOUNTER — SOCIAL WORK (OUTPATIENT)
Dept: BEHAVIORAL/MENTAL HEALTH CLINIC | Facility: CLINIC | Age: 59
End: 2024-08-29
Payer: COMMERCIAL

## 2024-08-29 DIAGNOSIS — F33.1 MDD (MAJOR DEPRESSIVE DISORDER), RECURRENT EPISODE, MODERATE (HCC): Primary | ICD-10-CM

## 2024-08-29 DIAGNOSIS — F41.1 GAD (GENERALIZED ANXIETY DISORDER): ICD-10-CM

## 2024-08-29 PROCEDURE — 90834 PSYTX W PT 45 MINUTES: CPT | Performed by: SOCIAL WORKER

## 2024-08-29 NOTE — PSYCH
1. MDD (major depressive disorder), recurrent episode, moderate (HCC)        2. JANE (generalized anxiety disorder)          Data: Pt and writer worked on specific goals to address social isolation which is a prime reason for depression. Pt states she feels shame about not feeling worthy and hates the feeling of rejection which causes her to socially isolate even more. Pt is ready to work on adjusting her thoughts to reflect the truth which is that she is liked. Pt states her grandson has a game tonight. Pt texted her grandson in session and said she would go to his game tonight. This has improved the accountability in pt's life which will make it harder to justify not going to the game. Pt has a second goal of talking to at least one person in the game- even if it is just small talk.         Plan: Follow up in 2 weeks.     Psychotherapy Provided: Individual Psychotherapy 45 minutes     Length of time in session: 45 minutes, follow up in 2 weeks    Goals addressed in session: Goal 1 and Goal 2     Pain:      none    0    Current suicide risk : Low     Pt is future oriented and not suicidal.       Behavioral Health Treatment Plan [facility]: Diagnosis and Treatment Plan explained to Leatha Zhang relates understanding diagnosis and is agreeable to Treatment Plan. Yes     Visit start and stop times:       08/29/24  Start Time: 1300  Stop Time: 1345  Total Visit Time: 45 minutes

## 2024-09-06 ENCOUNTER — NURSE TRIAGE (OUTPATIENT)
Age: 59
End: 2024-09-06

## 2024-09-06 NOTE — TELEPHONE ENCOUNTER
Regarding: Back Pain  ----- Message from Paulette PEREZ sent at 9/6/2024  5:05 PM EDT -----  I am having bad back pains. I don't know if I do or don't have stones anymore.

## 2024-09-06 NOTE — TELEPHONE ENCOUNTER
"Triaged patient from My Chart Message.  Patient is having lower back pain, 5/10.  Seen in the ED on 8/25.  Patient denies fever or blood in her urine.  Denies weakness, numbness, loss of bladder or bowel or radiation.  Home care advice given.  Advised patient to go to ED if symptoms worsen.       Reason for Disposition   Preventing back strain, questions about    Answer Assessment - Initial Assessment Questions  1. ONSET: \"When did the pain begin?\"       In the ED on 8/25  2. LOCATION: \"Where does it hurt?\" (upper, mid or lower back)      Right sided lower back pain   3. SEVERITY: \"How bad is the pain?\"  (e.g., Scale 1-10; mild, moderate, or severe)    - MILD (1-3): doesn't interfere with normal activities     - MODERATE (4-7): interferes with normal activities or awakens from sleep     - SEVERE (8-10): excruciating pain, unable to do any normal activities       5/10  4. PATTERN: \"Is the pain constant?\" (e.g., yes, no; constant, intermittent)       Constant  5. RADIATION: \"Does the pain shoot into your legs or elsewhere?\"      Denies  6. CAUSE:  \"What do you think is causing the back pain?\"       Unknown  7. BACK OVERUSE:  \"Any recent lifting of heavy objects, strenuous work or exercise?\"      Denies  8. MEDICATIONS: \"What have you taken so far for the pain?\" (e.g., nothing, acetaminophen, NSAIDS)      Lidocaine patch, alleve  9. NEUROLOGIC SYMPTOMS: \"Do you have any weakness, numbness, or problems with bowel/bladder control?\"      Denies  10. OTHER SYMPTOMS: \"Do you have any other symptoms?\" (e.g., fever, abdominal pain, burning with urination, blood in urine)        Denies  11. PREGNANCY: \"Is there any chance you are pregnant?\" (e.g., yes, no; LMP)        N/A    Protocols used: Back Pain-ADULT-[place]    "

## 2024-09-06 NOTE — TELEPHONE ENCOUNTER
Patient called states is having a lot of pain in her right lower back like when she took herself to ER. Is unsure if she passed the stones or not. States does not always look in toilet.

## 2024-09-08 ENCOUNTER — NURSE TRIAGE (OUTPATIENT)
Dept: FAMILY MEDICINE CLINIC | Facility: CLINIC | Age: 59
End: 2024-09-08

## 2024-09-09 ENCOUNTER — HOSPITAL ENCOUNTER (EMERGENCY)
Facility: HOSPITAL | Age: 59
Discharge: HOME | End: 2024-09-09
Attending: EMERGENCY MEDICINE
Payer: COMMERCIAL

## 2024-09-09 ENCOUNTER — APPOINTMENT (EMERGENCY)
Dept: CT IMAGING | Facility: HOSPITAL | Age: 59
End: 2024-09-09
Payer: COMMERCIAL

## 2024-09-09 VITALS
WEIGHT: 209.66 LBS | SYSTOLIC BLOOD PRESSURE: 168 MMHG | RESPIRATION RATE: 14 BRPM | OXYGEN SATURATION: 99 % | DIASTOLIC BLOOD PRESSURE: 80 MMHG | HEART RATE: 72 BPM | BODY MASS INDEX: 38.35 KG/M2 | TEMPERATURE: 98 F

## 2024-09-09 DIAGNOSIS — M54.9 BACK PAIN: Primary | ICD-10-CM

## 2024-09-09 DIAGNOSIS — R51.9 HEADACHE: ICD-10-CM

## 2024-09-09 LAB
BACTERIA UR QL AUTO: ABNORMAL /HPF
BILIRUB UR QL STRIP: NEGATIVE
CLARITY UR: CLEAR
COLOR UR: YELLOW
GLUCOSE UR STRIP-MCNC: NEGATIVE MG/DL
HGB UR QL STRIP.AUTO: NEGATIVE
KETONES UR STRIP-MCNC: NEGATIVE MG/DL
LEUKOCYTE ESTERASE UR QL STRIP: NEGATIVE
MUCOUS THREADS UR QL AUTO: ABNORMAL
NITRITE UR QL STRIP: NEGATIVE
NON-SQ EPI CELLS URNS QL MICRO: ABNORMAL /HPF
PH UR STRIP.AUTO: 5.5 [PH]
PROT UR STRIP-MCNC: ABNORMAL MG/DL
RBC #/AREA URNS AUTO: ABNORMAL /HPF
SP GR UR STRIP.AUTO: 1.03 (ref 1–1.03)
UROBILINOGEN UR STRIP-ACNC: <2 MG/DL
WBC #/AREA URNS AUTO: ABNORMAL /HPF

## 2024-09-09 PROCEDURE — 74176 CT ABD & PELVIS W/O CONTRAST: CPT

## 2024-09-09 PROCEDURE — 96375 TX/PRO/DX INJ NEW DRUG ADDON: CPT

## 2024-09-09 PROCEDURE — 81001 URINALYSIS AUTO W/SCOPE: CPT

## 2024-09-09 PROCEDURE — 99284 EMERGENCY DEPT VISIT MOD MDM: CPT | Performed by: EMERGENCY MEDICINE

## 2024-09-09 PROCEDURE — 99284 EMERGENCY DEPT VISIT MOD MDM: CPT

## 2024-09-09 PROCEDURE — 96365 THER/PROPH/DIAG IV INF INIT: CPT

## 2024-09-09 PROCEDURE — 96367 TX/PROPH/DG ADDL SEQ IV INF: CPT

## 2024-09-09 RX ORDER — DIPHENHYDRAMINE HYDROCHLORIDE 50 MG/ML
25 INJECTION INTRAMUSCULAR; INTRAVENOUS ONCE
Status: COMPLETED | OUTPATIENT
Start: 2024-09-09 | End: 2024-09-09

## 2024-09-09 RX ORDER — METOCLOPRAMIDE HYDROCHLORIDE 5 MG/ML
10 INJECTION INTRAMUSCULAR; INTRAVENOUS ONCE
Status: COMPLETED | OUTPATIENT
Start: 2024-09-09 | End: 2024-09-09

## 2024-09-09 RX ORDER — KETOROLAC TROMETHAMINE 30 MG/ML
15 INJECTION, SOLUTION INTRAMUSCULAR; INTRAVENOUS ONCE
Status: COMPLETED | OUTPATIENT
Start: 2024-09-09 | End: 2024-09-09

## 2024-09-09 RX ORDER — MAGNESIUM SULFATE HEPTAHYDRATE 40 MG/ML
2 INJECTION, SOLUTION INTRAVENOUS ONCE
Status: COMPLETED | OUTPATIENT
Start: 2024-09-09 | End: 2024-09-09

## 2024-09-09 RX ORDER — ACETAMINOPHEN 325 MG/1
975 TABLET ORAL ONCE
Status: COMPLETED | OUTPATIENT
Start: 2024-09-09 | End: 2024-09-09

## 2024-09-09 RX ORDER — SODIUM CHLORIDE, SODIUM GLUCONATE, SODIUM ACETATE, POTASSIUM CHLORIDE, MAGNESIUM CHLORIDE, SODIUM PHOSPHATE, DIBASIC, AND POTASSIUM PHOSPHATE .53; .5; .37; .037; .03; .012; .00082 G/100ML; G/100ML; G/100ML; G/100ML; G/100ML; G/100ML; G/100ML
1000 INJECTION, SOLUTION INTRAVENOUS ONCE
Status: DISCONTINUED | OUTPATIENT
Start: 2024-09-09 | End: 2024-09-09

## 2024-09-09 RX ADMIN — MAGNESIUM SULFATE HEPTAHYDRATE 2 G: 40 INJECTION, SOLUTION INTRAVENOUS at 07:27

## 2024-09-09 RX ADMIN — SODIUM CHLORIDE, SODIUM GLUCONATE, SODIUM ACETATE, POTASSIUM CHLORIDE, MAGNESIUM CHLORIDE, SODIUM PHOSPHATE, DIBASIC, AND POTASSIUM PHOSPHATE 1000 ML: .53; .5; .37; .037; .03; .012; .00082 INJECTION, SOLUTION INTRAVENOUS at 06:52

## 2024-09-09 RX ADMIN — SODIUM CHLORIDE 1000 ML: 0.9 INJECTION, SOLUTION INTRAVENOUS at 07:38

## 2024-09-09 RX ADMIN — METOCLOPRAMIDE 10 MG: 5 INJECTION, SOLUTION INTRAMUSCULAR; INTRAVENOUS at 07:24

## 2024-09-09 RX ADMIN — KETOROLAC TROMETHAMINE 15 MG: 30 INJECTION, SOLUTION INTRAMUSCULAR; INTRAVENOUS at 07:21

## 2024-09-09 RX ADMIN — ACETAMINOPHEN 975 MG: 325 TABLET ORAL at 07:26

## 2024-09-09 RX ADMIN — DIPHENHYDRAMINE HYDROCHLORIDE 25 MG: 50 INJECTION, SOLUTION INTRAMUSCULAR; INTRAVENOUS at 07:19

## 2024-09-09 NOTE — TELEPHONE ENCOUNTER
"Regarding: painful headaches, constantly  ----- Message from Elicia PEGUERO sent at 9/9/2024 11:01 AM EDT -----  Pt sent in [employer] message stating  \"I am having headaches constantly. Painful ones. \"    "

## 2024-09-09 NOTE — TELEPHONE ENCOUNTER
"Reason for Disposition  • [1] MODERATE headache (e.g., interferes with normal activities) AND [2] present > 24 hours AND [3] unexplained  (Exceptions: analgesics not tried, typical migraine, or headache part of viral illness)  • [1] MILD-MODERATE headache AND [2] present > 72 hours    Additional Information  • Negative: Difficult to awaken or acting confused (e.g., disoriented, slurred speech)  • Negative: [1] Weakness of the face, arm or leg on one side of the body AND [2] new-onset  • Negative: [1] Numbness of the face, arm or leg on one side of the body AND [2] new-onset  • Negative: [1] Loss of speech or garbled speech AND [2] new-onset  • Negative: Passed out (i.e., lost consciousness, collapsed and was not responding)  • Negative: Sounds like a life-threatening emergency to the triager  • Negative: Followed a head injury  • Negative: Pregnant  • Negative: Postpartum (from 0 to 6 weeks after delivery)  • Negative: Traumatic Brain Injury (TBI) is suspected  • Negative: Unable to walk, or can only walk with assistance (e.g., requires support)  • Negative: Stiff neck (can't touch chin to chest)  • Negative: Severe pain in one eye  • Negative: [1] Other family members (or roommates) with headaches AND [2] possibility of carbon monoxide exposure  • Negative: [1] SEVERE headache (e.g., excruciating) AND [2] \"worst headache\" of life  • Negative: [1] SEVERE headache AND [2] sudden-onset (i.e., reaching maximum intensity within seconds to 1 hour)  • Negative: [1] SEVERE headache AND [2] fever  • Negative: Loss of vision or double vision (Exception: same as prior migraines)  • Negative: [1] Fever > 100.0 F (37.8 C) AND [2] diabetes mellitus or weak immune system (e.g., HIV positive, cancer chemo, splenectomy, organ transplant, chronic steroids)  • Negative: Patient sounds very sick or weak to the triager  • Negative: [1] Vomiting AND [2] 2 or more times (Exception: similar to previous migraines)  • Negative: Fever > 104 " F (40 C)  • Negative: [1] SEVERE headache (e.g., excruciating) AND [2] not improved after 2 hours of pain medicine  • Negative: [1] New headache AND [2] weak immune system (e.g., HIV positive, cancer chemo, splenectomy, organ transplant, chronic steroids)  • Negative: [1] New headache AND [2] age > 50  • Negative: [1] Sinus pain of forehead AND [2] yellow or green nasal discharge  • Negative: Fever present > 3 days (72 hours)  • Negative: Headache started during exertion (e.g., sex, strenuous exercise, heavy lifting)  • Negative: Headache is a chronic symptom (recurrent or ongoing AND present > 4 weeks)  • Negative: Similar to previously diagnosed migraine headaches  • Negative: Similar to previously diagnosed muscle-tension headaches  • Negative: [1] Headache AND [2] has not taken pain medications  • Negative: [1] Headache AND [2] part of viral illness AND [3] present < 72 hours  • Negative: Headache    Protocols used: Headache-ADULT-[facility]

## 2024-09-09 NOTE — DISCHARGE INSTRUCTIONS
You were seen in the emergency department today for back pain / headache.    Your testing showed  1.  No acute abdominopelvic pathology.  2.  Nonobstructing 1 mm right renal calculus. No hydroureteronephrosis..    Follow up with your primary care provider and [facility].     Take Tylenol / Ibuprofen as needed following the instructions on the bottle.     Return to the emergency department for any new or concerning symptoms including worsening pain, bowel / bladder incontinence, fevers.     Thank you for choosing [facility] for your care today.

## 2024-09-09 NOTE — ED PROVIDER NOTES
"History  Chief Complaint   Patient presents with    Back Pain     Pt reports right lower back pain. Reports dx with kidney stones 2 weeks ago, unknown if passed stones. Denies urinary symptoms. Pt also reports mid upper back pain. Oxy @ 4am.     Headache     Pt reports constant headaches. +photosensitivity. -n/v     HPI  Patient is a 59 y.o. female with history of renal stones presenting to the emergency department for back pain and headache. Patient states that she was seen in the ED on 8/25 for right sided mid back pain and her pain has been continuing. She saw her PCP the following day and had a CT renal stone study that showed 2 small punctate non-obstructing renal stones. Her pain has been intermittent but is not exacerbated by movement. She has not had any abdominal pain or urinary symptoms. She also complains of having thoracic back pain that is worse with movement. She denies any recent trauma or injury.  She also complains of having a headache for the same period of time. She had migraines in the past but does not typically get them and has not had one in a long time. Her pain is diffuse and not always in the same location. She does wear glasses and states she thinks she has been due for new glasses for \"a while\" but denies any new vision changes. She denies having any N/V, neck stiffness, fever, focal weakness, and her headache does not wake her up from sleep.     Prior to Admission Medications   Prescriptions Last Dose Informant Patient Reported? Taking?   DULoxetine (CYMBALTA) 60 mg delayed release capsule  Self No Yes   Sig: Take 1 capsule (60 mg total) by mouth daily   Insulin Pen Needle (BD Pen Needle Jaymie U/F) 32G X 4 MM MISC  Self No No   Sig: Use daily as needed with insulin pen   chlorhexidine (PERIDEX) 0.12 % solution   Yes No   Sig: Apply to the mouth or throat 2 (two) times a day   chlorthalidone 50 MG tablet  Self No Yes   Sig: TAKE 1 TABLET BY MOUTH EVERY DAY   clotrimazole (LOTRIMIN) 1 % " cream  Self No No   Sig: Apply topically 2 (two) times a day   Patient taking differently: Apply topically as needed   clotrimazole-betamethasone (LOTRISONE) 1-0.05 % cream  Self No No   Sig: Apply topically 2 (two) times a day   Patient taking differently: Apply topically as needed   fluticasone (FLONASE) 50 mcg/act nasal spray  Self No No   Si spray into each nostril daily   hydrOXYzine HCL (ATARAX) 25 mg tablet  Self No No   Sig: Take 1 tablet (25 mg total) by mouth every 6 (six) hours as needed for allergies or anxiety   lidocaine (Lidoderm) 5 %   No No   Sig: Apply 1 patch topically over 12 hours daily Remove & Discard patch within 12 hours or as directed by MD   loratadine (CLARITIN) 10 mg tablet  Self No No   Sig: Take 1 tablet (10 mg total) by mouth daily   Patient taking differently: Take 10 mg by mouth as needed for allergies   methocarbamol (ROBAXIN) 500 mg tablet   No No   Sig: Take 1 tablet (500 mg total) by mouth 2 (two) times a day   naproxen (Naprosyn) 500 mg tablet   No No   Sig: Take 1 tablet (500 mg total) by mouth 2 (two) times a day with meals for 7 days   norethindrone (MICRONOR) 0.35 MG tablet  Self No Yes   Sig: TAKE 1 TABLET BY MOUTH EVERY DAY   nystatin (MYCOSTATIN) powder  Self No No   Sig: Apply topically 2 (two) times a day To moist area   Patient taking differently: Apply topically as needed To moist area   semaglutide, 1 mg/dose, (Ozempic, 1 MG/DOSE,) 4 mg/3 mL injection pen  Self No No   Sig: INJECT 0.75 ML (1 MG TOTAL) UNDER THE SKIN EVERY 7 DAYS      Facility-Administered Medications: None       Past Medical History:   Diagnosis Date    Ascorbic acid deficiency 2020    Asthma due to seasonal allergies 2020    COVID     Depression     Disease of thyroid gland     Essential hypertension 2020    JANE (generalized anxiety disorder) 2020    Helicobacter pylori gastrointestinal tract infection 2020    10/02/2017=egd 2017 Dr Cervantes    Helicobacter pylori  gastrointestinal tract infection 04/20/2020    10/02/2017=egd 9/2017 Dr Cervantes      History of Holter monitoring 12/22/2017    The baseline rhythm was of sinus origin with an average heart rate of 90 bpm (range: 72 - 129 bpm). There were rare single APCs adn VPCs (less than 0.1 % total beats). There were no sustained cardiac dysrhythmais. There were no significant symptomatic pauses.     History of stress test 01/05/2018    Mildly abnormal exercise Myoview SPECT study with evidence of a fixed anterior defect. This is most likely related to breast attenuation artifact. There was no evidence of reversible myocardial ischemia. Gated wall motion analysis was normal with well preserved systolic function. The left ventricle was normal in size with calculated EF of 73%. No prior similar study is available for comparison.     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    5    Reactive airway disease with acute exacerbation 05/20/2020    Subclinical hyperthyroidism 04/20/2020    Thyroid nodule 09/25/2020    Vitamin D deficiency 04/20/2020    33       Past Surgical History:   Procedure Laterality Date    COLONOSCOPY  09/08/2017    3 polyps=next 2020 w Dr Cervantes    EGD  09/01/2017    egd biopsy     KNEE ARTHROSCOPY W/ ACL RECONSTRUCTION      KNEE SURGERY      torn mcl, acl, meniscus left knee 2016    TUBAL LIGATION         Family History   Problem Relation Age of Onset    Lung disease Mother         lung nodules    Coronary artery disease Mother         calcium score > 1000     Hypertension Mother     Diabetes Father     Colon cancer Maternal Aunt 60 - 69    Cancer Maternal Uncle     Kidney disease Maternal Uncle      I have reviewed and agree with the history as documented.    E-Cigarette/Vaping    E-Cigarette Use Never User      E-Cigarette/Vaping Substances    Nicotine No     THC No     CBD No     Flavoring No     Other No     Unknown No      Social History     Tobacco Use    Smoking status: Former     Current packs/day: 0.00     Average packs/day: 0.3 packs/day for 24.0 years (6.0 ttl pk-yrs)     Types: Cigarettes     Start date: 1990     Quit date: 2014     Years since quitting: 10.6    Smokeless tobacco: Never    Tobacco comments:     Has smoked since age: 17- As per Lorena    Vaping Use    Vaping status: Never Used   Substance Use Topics    Alcohol use: Yes     Alcohol/week: 2.0 standard drinks of alcohol     Types: 1 Glasses of wine, 1 Shots of liquor per week     Comment: Social and holidays    Drug use: Never        Review of Systems   Constitutional:  Negative for fever.   HENT:  Negative for congestion.    Eyes:  Negative for photophobia.   Respiratory:  Negative for shortness of breath.    Cardiovascular:  Negative for chest pain.   Gastrointestinal:  Negative for abdominal pain, diarrhea and vomiting.   Endocrine: Negative for polyuria.   Genitourinary:  Positive for flank pain. Negative for dysuria and hematuria.   Musculoskeletal:  Positive for back pain. Negative for gait problem.   Skin:  Negative for rash.   Neurological:  Positive for headaches. Negative for dizziness.   All other systems reviewed and are negative.      Physical Exam  ED Triage Vitals [09/09/24 0623]   Temperature Pulse Respirations Blood Pressure SpO2   98 °F (36.7 °C) 94 18 168/80 96 %      Temp Source Heart Rate Source Patient Position - Orthostatic VS BP Location FiO2 (%)   Oral Monitor Sitting Right arm --      Pain Score       8             Orthostatic Vital Signs  Vitals:    09/09/24 0623  09/09/24 0831   BP: 168/80    Pulse: 94 72   Patient Position - Orthostatic VS: Sitting        Physical Exam  Vitals and nursing note reviewed.   Constitutional:       Appearance: Normal appearance.   HENT:      Head: Normocephalic and atraumatic.      Mouth/Throat:      Mouth: Mucous membranes are moist.   Eyes:      Extraocular Movements: Extraocular movements intact.      Conjunctiva/sclera: Conjunctivae normal.      Pupils: Pupils are equal, round, and reactive to light.   Cardiovascular:      Rate and Rhythm: Normal rate and regular rhythm.      Pulses: Normal pulses.      Heart sounds: Normal heart sounds.   Pulmonary:      Effort: Pulmonary effort is normal.      Breath sounds: Normal breath sounds.   Abdominal:      General: There is no distension.      Palpations: Abdomen is soft.      Tenderness: There is no abdominal tenderness. There is right CVA tenderness. There is no left CVA tenderness, guarding or rebound.   Musculoskeletal:      Cervical back: Neck supple.      Comments: Diffuse thoracic TTP, no stepoffs or deformities   Skin:     General: Skin is warm and dry.      Capillary Refill: Capillary refill takes less than 2 seconds.   Neurological:      General: No focal deficit present.      Mental Status: She is alert and oriented to person, place, and time. Mental status is at baseline.      Cranial Nerves: No cranial nerve deficit.      Sensory: No sensory deficit.      Motor: No weakness.      Coordination: Coordination normal.      Gait: Gait normal.   Psychiatric:         Mood and Affect: Mood normal.         ED Medications  Medications   magnesium sulfate 2 g/50 mL IVPB (premix) 2 g (0 g Intravenous Stopped 9/9/24 0830)   ketorolac (TORADOL) injection 15 mg (15 mg Intravenous Given 9/9/24 0721)   metoclopramide (REGLAN) injection 10 mg (10 mg Intravenous Given 9/9/24 0724)   diphenhydrAMINE (BENADRYL) injection 25 mg (25 mg Intravenous Given 9/9/24 0719)   acetaminophen (TYLENOL) tablet 975 mg  (975 mg Oral Given 9/9/24 0726)   sodium chloride 0.9 % bolus 1,000 mL (0 mL Intravenous Stopped 9/9/24 0830)       Diagnostic Studies  Results Reviewed       Procedure Component Value Units Date/Time    Urine Microscopic [[id number]]  (Abnormal) Collected: 09/09/24 0648    Lab Status: Final result Specimen: Urine, Clean Catch Updated: 09/09/24 0756     RBC, UA 2-4 /hpf      WBC, UA 2-4 /hpf      Epithelial Cells Occasional /hpf      Bacteria, UA Occasional /hpf      MUCUS THREADS Moderate    UA w Reflex to Microscopic w Reflex to Culture [[id number]]  (Abnormal) Collected: 09/09/24 0648    Lab Status: Final result Specimen: Urine, Clean Catch Updated: 09/09/24 0728     Color, UA Yellow     Clarity, UA Clear     Specific Gravity, UA 1.028     pH, UA 5.5     Leukocytes, UA Negative     Nitrite, UA Negative     Protein, UA Trace mg/dl      Glucose, UA Negative mg/dl      Ketones, UA Negative mg/dl      Urobilinogen, UA <2.0 mg/dl      Bilirubin, UA Negative     Occult Blood, UA Negative                   CT renal stone study abdomen pelvis wo contrast   Final Result by Peter Boyd MD (09/09 0723)      1.  No acute abdominopelvic pathology.   2.  Nonobstructing 1 mm right renal calculus. No hydroureteronephrosis.         Workstation performed: [id number]               Procedures  Procedures      ED Course  ED Course as of 09/09/24 0843   Mon Sep 09, 2024   0757 UA w Reflex to Microscopic w Reflex to Culture(!)  No evidence of UTI   0802 Patient feeling better, discussed results - IV fluids and mag still running.                              SBIRT 22yo+      Flowsheet Row Most Recent Value   Initial Alcohol Screen: US AUDIT-C     1. How often do you have a drink containing alcohol? 0 Filed at: 09/09/2024 0629   2. How many drinks containing alcohol do you have on a typical day you are drinking?  0 Filed at: 09/09/2024 0629   3a. Male UNDER 65: How often do you have five or more drinks on one occasion? 0 Filed at:  09/09/2024 0629   3b. FEMALE Any Age, or MALE 65+: How often do you have 4 or more drinks on one occassion? 0 Filed at: 09/09/2024 0629   Audit-C Score 0 Filed at: 09/09/2024 0629   ELI: How many times in the past year have you...    Used an illegal drug or used a prescription medication for non-medical reasons? Never Filed at: 09/09/2024 0629                  Medical Decision Making  Amount and/or Complexity of Data Reviewed  Labs:  Decision-making details documented in ED Course.  Radiology: ordered.    Risk  OTC drugs.  Prescription drug management.    Patient is a 59 y.o. female with PMH of renal stones who presents to the ED with back pain, headache.    Vital signs stable, afebrile. On exam right CVA TTP, no abdominal TTP. No focal neurological deficits.    History and physical exam most consistent with musculoskeletal back pain. However, differential diagnosis included but not limited to renal stone, tension headache, migraine.     Plan: Tylenol, toradol, reglan, benadryl, IV magnesium, IV fluids. Will also check UA to evaluate for infection and CT renal stone.    View ED course above for further discussion on patient workup.     On review of previous records reviewed PCP notes as well as CT renal study from 8/26 which showed 2 right sided punctate non-obstructing renal stones.    All labs reviewed and utilized in the medical decision making process  All radiology studies independently viewed by me and interpreted by the radiologist.  I reviewed all testing with the patient.     Upon re-evaluation patients pain improved.    Disposition: I have reviewed the patient's vital signs, nursing notes, and other relevant tests/information. I had a detailed discussion with the patient regarding the history, exam findings, and any diagnostic results.   Plan to discharge home in stable condition, follow up with comprehensive spine.  Discussed with patient who is agreeable to plan.  I discussed discharge instructions,  "need for follow-up, and oral return precautions for what to return for in addition to the written return precautions and discharge instructions, specifically highlighting areas of special concern.  The patient verbalized understanding of the discharge instructions and warnings that would necessitate return to the Emergency Department including worsening pain, focal weakness, neck stiffness, bowel / bladder dysfunction, fevers.  All questions the patient had were answered prior to discharge to the best of my ability.       Portions of the record may have been created with voice recognition software. Occasional wrong word or \"sound a like\" substitutions may have occurred due to the inherent limitations of voice recognition software. Read the chart carefully and recognize, using context, where substitutions have occurred.        Disposition  Final diagnoses:   Back pain   Headache     Time reflects when diagnosis was documented in both MDM as applicable and the Disposition within this note       Time User Action Codes Description Comment    9/9/2024  8:05 AM Nataliia Mejia [M54.9] Back pain     9/9/2024  8:05 AM Nataliia Mejia [R51.9] Headache           ED Disposition       ED Disposition   Discharge    Condition   Stable    Date/Time   Mon Sep 9, 2024 0832    Comment   Peace Anderson discharge to home/self care.                   Follow-up Information       Follow up With Specialties Details Why Contact Info Additional Information    Arti Luna,  Family Medicine Schedule an appointment as soon as possible for a visit in 1 week  [address]  [address]  [facility] [address] [phone]       [facility] Physical Therapy Schedule an appointment as soon as possible for a visit   [phone]            Discharge Medication List as of 9/9/2024  8:32 AM        CONTINUE these medications which have NOT CHANGED    Details   chlorthalidone 50 MG tablet TAKE 1 " TABLET BY MOUTH EVERY DAY, Starting Tue 3/12/2024, Normal      DULoxetine (CYMBALTA) 60 mg delayed release capsule Take 1 capsule (60 mg total) by mouth daily, Starting Tue 6/25/2024, Normal      norethindrone (MICRONOR) 0.35 MG tablet TAKE 1 TABLET BY MOUTH EVERY DAY, Starting Tue 5/14/2024, Normal      chlorhexidine (PERIDEX) 0.12 % solution Apply to the mouth or throat 2 (two) times a day, Starting Wed 8/7/2024, Historical Med      clotrimazole (LOTRIMIN) 1 % cream Apply topically 2 (two) times a day, Starting Tue 7/2/2024, Normal      clotrimazole-betamethasone (LOTRISONE) 1-0.05 % cream Apply topically 2 (two) times a day, Starting Thu 1/26/2023, Normal      fluticasone (FLONASE) 50 mcg/act nasal spray 1 spray into each nostril daily, Starting Tue 6/25/2024, Normal      hydrOXYzine HCL (ATARAX) 25 mg tablet Take 1 tablet (25 mg total) by mouth every 6 (six) hours as needed for allergies or anxiety, Starting Tue 6/25/2024, Normal      Insulin Pen Needle (BD Pen Needle Jaymie U/F) 32G X 4 MM MISC Use daily as needed with insulin pen, Normal      lidocaine (Lidoderm) 5 % Apply 1 patch topically over 12 hours daily Remove & Discard patch within 12 hours or as directed by MD, Starting Sun 8/25/2024, Normal      loratadine (CLARITIN) 10 mg tablet Take 1 tablet (10 mg total) by mouth daily, Starting Tue 6/25/2024, Normal      methocarbamol (ROBAXIN) 500 mg tablet Take 1 tablet (500 mg total) by mouth 2 (two) times a day, Starting Sun 8/25/2024, Normal      naproxen (Naprosyn) 500 mg tablet Take 1 tablet (500 mg total) by mouth 2 (two) times a day with meals for 7 days, Starting Sun 8/25/2024, Until Sun 9/1/2024, Normal      nystatin (MYCOSTATIN) powder Apply topically 2 (two) times a day To moist area, Starting Thu 1/26/2023, Normal      semaglutide, 1 mg/dose, (Ozempic, 1 MG/DOSE,) 4 mg/3 mL injection pen INJECT 0.75 ML (1 MG TOTAL) UNDER THE SKIN EVERY 7 DAYS, Normal               PDMP Review       None             ED  Provider  Attending physically available and evaluated Peace Anderson. I managed the patient along with the ED Attending.    Electronically Signed by           Nataliia Mejia DO  09/09/24 0822

## 2024-09-09 NOTE — ED ATTENDING ATTESTATION
9/9/2024  IIsabell DO, saw and evaluated the patient. I have discussed the patient with the resident/non-physician practitioner and agree with the resident's/non-physician practitioner's findings, Plan of Care, and MDM as documented in the resident's/non-physician practitioner's note, except where noted. All available labs and Radiology studies were reviewed.  I was present for key portions of any procedure(s) performed by the resident/non-physician practitioner and I was immediately available to provide assistance.       At this point I agree with the current assessment done in the Emergency Department.  I have conducted an independent evaluation of this patient a history and physical is as follows: 59y F here for evaluation of back pain and headache. Seen recently for back pain and found to have two punctate right sided kidney stones - nonobstructive.  Pt reporting no improvement with home meds.   Also c/o persistent headache for approx 2wks, wax/wane, hx of episodic migraine but states this is different.  No f/cs/, no n/v, no vision complaints, no numbness/weakness. No urinary symptoms.  Exam WNWD nad, mmm, neck supple, resp non-labored, cv regular rate, abd right flank tenderness, nd, ext no cce, skin dry, neuro non-focal, normal mood. A/P Back pain, headache. Will get urine to r/o infection. Will get ct stone study to r/o obstructive stone, will treat symptomatically and re-eval.      ED Course     Labs Reviewed   UA W REFLEX TO MICROSCOPIC WITH REFLEX TO CULTURE - Abnormal       Result Value Ref Range Status    Color, UA Yellow   Final    Clarity, UA Clear   Final    Specific Gravity, UA 1.028  1.003 - 1.030 Final    pH, UA 5.5  4.5, 5.0, 5.5, 6.0, 6.5, 7.0, 7.5, 8.0 Final    Leukocytes, UA Negative  Negative Final    Nitrite, UA Negative  Negative Final    Protein, UA Trace (*) Negative mg/dl Final    Glucose, UA Negative  Negative mg/dl Final    Ketones, UA Negative  Negative mg/dl Final     Urobilinogen, UA <2.0  <2.0 mg/dl mg/dl Final    Bilirubin, UA Negative  Negative Final    Occult Blood, UA Negative  Negative Final   URINE MICROSCOPIC - Abnormal    RBC, UA 2-4 (*) None Seen, 1-2 /hpf Final    WBC, UA 2-4 (*) None Seen, 1-2 /hpf Final    Epithelial Cells Occasional  None Seen, Occasional /hpf Final    Bacteria, UA Occasional  None Seen, Occasional /hpf Final    MUCUS THREADS Moderate (*) None Seen Final     CT renal stone study abdomen pelvis wo contrast   Final Result      1.  No acute abdominopelvic pathology.   2.  Nonobstructing 1 mm right renal calculus. No hydroureteronephrosis.         Workstation performed: [id number]               Critical Care Time  Procedures    1. Back pain  Ambulatory Referral to Comprehensive Spine Program      2. Headache          Time reflects when diagnosis was documented in both MDM as applicable and the Disposition within this note       Time User Action Codes Description Comment    9/9/2024  8:05 AM Nataliia Mejia [M54.9] Back pain     9/9/2024  8:05 AM Nataliia Mejia [R51.9] Headache           ED Disposition       ED Disposition   Discharge    Condition   Stable    Date/Time   Mon Sep 9, 2024  8:32 AM    Comment   Peace Anderson discharge to home/self care.                   Follow-up Information       Follow up With Specialties Details Why Contact Info Additional Information    Arti Luna DO Family Medicine Schedule an appointment as soon as possible for a visit in 1 week  [address]  [address]  [facility] [id number] [phone]       [facility] Physical Therapy Schedule an appointment as soon as possible for a visit   [phone]

## 2024-09-09 NOTE — TELEPHONE ENCOUNTER
It is not clear if she called before or after the ER.   ER visit mainly discussed her back pain.   She can schedule a visit for headaches.

## 2024-09-11 ENCOUNTER — SOCIAL WORK (OUTPATIENT)
Dept: BEHAVIORAL/MENTAL HEALTH CLINIC | Facility: CLINIC | Age: 59
End: 2024-09-11
Payer: COMMERCIAL

## 2024-09-11 ENCOUNTER — EVALUATION (OUTPATIENT)
Dept: PHYSICAL THERAPY | Facility: REHABILITATION | Age: 59
End: 2024-09-11
Payer: COMMERCIAL

## 2024-09-11 DIAGNOSIS — M54.9 ACUTE UPPER BACK PAIN: Primary | ICD-10-CM

## 2024-09-11 DIAGNOSIS — G44.86 CERVICOGENIC HEADACHE: ICD-10-CM

## 2024-09-11 DIAGNOSIS — F33.1 MDD (MAJOR DEPRESSIVE DISORDER), RECURRENT EPISODE, MODERATE (HCC): Primary | ICD-10-CM

## 2024-09-11 DIAGNOSIS — R31.9 HEMATURIA, UNSPECIFIED TYPE: ICD-10-CM

## 2024-09-11 DIAGNOSIS — M54.50 ACUTE RIGHT-SIDED LOW BACK PAIN WITHOUT SCIATICA: ICD-10-CM

## 2024-09-11 DIAGNOSIS — F41.1 GAD (GENERALIZED ANXIETY DISORDER): ICD-10-CM

## 2024-09-11 PROCEDURE — 90834 PSYTX W PT 45 MINUTES: CPT | Performed by: SOCIAL WORKER

## 2024-09-11 PROCEDURE — 97112 NEUROMUSCULAR REEDUCATION: CPT

## 2024-09-11 PROCEDURE — 97161 PT EVAL LOW COMPLEX 20 MIN: CPT

## 2024-09-11 PROCEDURE — 97110 THERAPEUTIC EXERCISES: CPT

## 2024-09-12 NOTE — PSYCH
1. MDD (major depressive disorder), recurrent episode, moderate (HCC)        2. JANE (generalized anxiety disorder)          Data: Pt followed up on goal to go to [employer] football game and it was a success. Pt has noticed her son get very frustrated with her for not going over to his house more often. Son feels rejected by pt. Pt admits she has not told her son that she has been going through depression and anxiety and son is misjudging her motives. Pt reports she is not good at communicating and has not told her son what is going on. Pt fears being misunderstood. This session, pt's goal is to work on building a more honest relationship with her son and to start visiting him at least once a week. Pt gets along very well with her son and her daughter in law.     Pt and writer started discussion of thought traps and pt has a lot of very catastrophic thoughts related to previous rejection and trauma. Pt and writer discussed the importance of challenging these thought traps with the truth and evidence which disputes the thought trap. Pt and writer discussed examples from her life where she could do this.         Plan: Follow up in 2 weeks time.       Psychotherapy Provided: Individual Psychotherapy 45 minutes     Length of time in session: 45 minutes, follow up in 2 week    Goals addressed in session: Goal 1 and Goal 2     Pain:      moderate to severe    5    Current suicide risk : Low     Pt is future oriented and not suicidal.       Behavioral Health Treatment Plan [facility]: Diagnosis and Treatment Plan explained to Peace Hand relates understanding diagnosis and is agreeable to Treatment Plan. Yes     Visit start and stop times:    09/11/24  Start Time: 1500  Stop Time: 1545  Total Visit Time: 45 minutes

## 2024-09-13 ENCOUNTER — OFFICE VISIT (OUTPATIENT)
Dept: FAMILY MEDICINE CLINIC | Facility: CLINIC | Age: 59
End: 2024-09-13
Payer: COMMERCIAL

## 2024-09-13 VITALS
HEART RATE: 76 BPM | RESPIRATION RATE: 16 BRPM | WEIGHT: 207 LBS | HEIGHT: 62 IN | BODY MASS INDEX: 38.09 KG/M2 | SYSTOLIC BLOOD PRESSURE: 130 MMHG | TEMPERATURE: 97.7 F | DIASTOLIC BLOOD PRESSURE: 82 MMHG | OXYGEN SATURATION: 97 %

## 2024-09-13 DIAGNOSIS — F41.1 GAD (GENERALIZED ANXIETY DISORDER): ICD-10-CM

## 2024-09-13 DIAGNOSIS — B35.3 TINEA PEDIS OF LEFT FOOT: ICD-10-CM

## 2024-09-13 DIAGNOSIS — R51.9 NONINTRACTABLE HEADACHE, UNSPECIFIED CHRONICITY PATTERN, UNSPECIFIED HEADACHE TYPE: ICD-10-CM

## 2024-09-13 DIAGNOSIS — J30.1 ALLERGIC RHINITIS DUE TO POLLEN, UNSPECIFIED SEASONALITY: ICD-10-CM

## 2024-09-13 DIAGNOSIS — G47.09 OTHER INSOMNIA: ICD-10-CM

## 2024-09-13 DIAGNOSIS — B37.2 CANDIDAL INTERTRIGO: ICD-10-CM

## 2024-09-13 DIAGNOSIS — M54.9 UPPER BACK PAIN: Primary | ICD-10-CM

## 2024-09-13 PROCEDURE — 99214 OFFICE O/P EST MOD 30 MIN: CPT | Performed by: FAMILY MEDICINE

## 2024-09-13 RX ORDER — CLOTRIMAZOLE AND BETAMETHASONE DIPROPIONATE 10; .64 MG/G; MG/G
CREAM TOPICAL 2 TIMES DAILY PRN
Status: SHIPPED | COMMUNITY
Start: 2024-09-13

## 2024-09-13 RX ORDER — NYSTATIN 100000 [USP'U]/G
POWDER TOPICAL 2 TIMES DAILY PRN
Status: SHIPPED
Start: 2024-09-13

## 2024-09-13 RX ORDER — LORATADINE 10 MG/1
10 TABLET ORAL AS NEEDED
Status: SHIPPED | COMMUNITY
Start: 2024-09-13

## 2024-09-13 RX ORDER — CLOTRIMAZOLE 1 %
CREAM (GRAM) TOPICAL DAILY PRN
Status: SHIPPED
Start: 2024-09-13

## 2024-09-13 RX ORDER — HYDROXYZINE HYDROCHLORIDE 25 MG/1
25-50 TABLET, FILM COATED ORAL EVERY 6 HOURS PRN
Start: 2024-09-13

## 2024-09-13 RX ORDER — METHYLPREDNISOLONE 4 MG
TABLET, DOSE PACK ORAL
Qty: 21 EACH | Refills: 0 | Status: SHIPPED | OUTPATIENT
Start: 2024-09-13

## 2024-09-13 NOTE — PATIENT INSTRUCTIONS
Do not use NSAID's.  These include Advil, Motrin, Aleve. You make take Tylenol if you need it while on steroids.     magnesium oxide or magnesium citrate   One per day of lower 150- 300 mg daily   Can increase to twice a day.

## 2024-09-13 NOTE — ASSESSMENT & PLAN NOTE
Has not been using hydroxyzine.  Advise she may take 1 to 2 tablets before bed to help with sleep.  May also use half to 1 tablet during the day if needed for anxiety.  Orders:    hydrOXYzine HCL (ATARAX) 25 mg tablet; Take 1-2 tablets (25-50 mg total) by mouth every 6 (six) hours as needed for anxiety

## 2024-09-13 NOTE — PROGRESS NOTES
Assessment/Plan:     Assessment & Plan  Upper back pain  Patient scheduled today for headaches, but now states her headaches are improving, her lower back pain is improving, but she now has severe 12 out of 10 cervical back pain/upper back pain (T12).  She is already seeing physical therapy and she is already scheduled with pain management.  Advised her to keep this appointment.  She has tried and failed Tylenol, NSAIDs, Lidoderm patch, oxy.  For headache and back pain will give Medrol Dosepak.  Advised her this will not be a recurrent medication to use as this is not safe.  Orders:    methylPREDNISolone 4 MG tablet therapy pack; Use as directed on package    Nonintractable headache, unspecified chronicity pattern, unspecified headache type  Patient has had headache for approximately 2 weeks.  Improved with migraine cocktail in the ER, but came back within 24 hours.  5 out of 10 pain.  Will give Medrol Dosepak.  If this is not helpful we will refer to neurology.  Orders:    methylPREDNISolone 4 MG tablet therapy pack; Use as directed on package    Allergic rhinitis due to pollen, unspecified seasonality  Medication list updated only.  Orders:    loratadine (CLARITIN) 10 mg tablet; Take 1 tablet (10 mg total) by mouth as needed for allergies    JANE (generalized anxiety disorder)  Has not been using hydroxyzine.  Advise she may take 1 to 2 tablets before bed to help with sleep.  May also use half to 1 tablet during the day if needed for anxiety.  Orders:    hydrOXYzine HCL (ATARAX) 25 mg tablet; Take 1-2 tablets (25-50 mg total) by mouth every 6 (six) hours as needed for anxiety    Other insomnia  Has not been using hydroxyzine.  Advise she may take 1 to 2 tablets before bed to help with sleep.  May also use half to 1 tablet during the day if needed for anxiety.  Patient asked about using magnesium for sleep.  Advised her this is safe and may also help her constipation and muscle pain.  Orders:    hydrOXYzine HCL  (ATARAX) 25 mg tablet; Take 1-2 tablets (25-50 mg total) by mouth every 6 (six) hours as needed for anxiety    Candidal intertrigo  Medication list updated only.  Orders:    clotrimazole-betamethasone (LOTRISONE) 1-0.05 % cream; Apply topically 2 (two) times a day as needed (rash)    nystatin (MYCOSTATIN) powder; Apply topically 2 (two) times a day as needed (rash) To moist area    Tinea pedis of left foot  Medication list updated only.  Orders:    clotrimazole (LOTRIMIN) 1 % cream; Apply topically daily as needed (rash)         Patient Instructions   Do not use NSAID's.  These include Advil, Motrin, Aleve. You make take Tylenol if you need it while on steroids.     magnesium oxide or magnesium citrate   One per day of lower 150- 300 mg daily   Can increase to twice a day.     No follow-ups on file.  Subjective:    BAHMAN Hand is a 59 y.o. female who presents with:  Chief Complaint    Headache       HPI       Headache     Additional comments: Headaches x  2 weeks, c/o some light sensitivity and nausea          Last edited by Trina Granados LPN on 9/13/2024  4:06 PM.        ---Above per clinical staff & reviewed. ---        Today:  Pt scheduled for a headache x 2 weeks  Send [employer] message on 9/8/24  with this concern.   She went to the ER on 9/9/24. Given  Tylenol, toradol, reglan, benadryl, IV magnesium, IV fluids.   Did have another CT scan - 1 mm stone remains on right. Non-obstructing.   She has history of headaches - last years ago and was told to drink cofffee when they come.,   This is the most extreme. Drank coffee every day. Cannot take coffee now (stopped coffee after the headache). Migraine cocktail helped but headache came back when she woke up at home   Low back feeling better  Now upper back painful.   Now last 2 days headache is improved. Rates 5/10 now. Lasted 10 - 15 minutes.   No significant light or noise sensitive.   Nausea lately but not with headache. (On Ozempic) - seems different   Seems  "worse 2 weeks ago   Has it all the time  Has heartburn - that is new/unusual. Gets it   Scheduled with pain mgmt 9/28/24  Lower back much better 1 -2 now   Upper back pain 11-12/10 around C7   Has been 2 weeks   Sharp pain, not burning or tingling, no radiation, nothing improves it at all, cannot figure out what makes it worse, no trigger noted. No change with position change. Hard to fall asleep and wakes her from sleep. Going to work daily other than ER day.   Chills, no fevers.   Lidocaine patch, tylenol, aleve, heating, oxy none of this is helping.     The following portions of the patient's history were reviewed and updated as appropriate: allergies, current medications, past family history, past medical history, past social history, past surgical history and problem list.  Review of Systems  ROS:  all others negative - no chest pain, SOB, normal urine, positive constipation.. no GERD.  Not sleeping well. mood good.  Positive for severe pain.  Objective:    /82 (BP Location: Left arm, Patient Position: Sitting, Cuff Size: Large)   Pulse 76   Temp 97.7 °F (36.5 °C) (Temporal)   Resp 16   Ht 5' 2\" (1.575 m)   Wt 93.9 kg (207 lb)   SpO2 97%   BMI 37.86 kg/m²   Wt Readings from Last 3 Encounters:   09/13/24 93.9 kg (207 lb)   09/09/24 95.1 kg (209 lb 10.5 oz)   08/26/24 95.9 kg (211 lb 6.4 oz)     BP Readings from Last 3 Encounters:   09/13/24 130/82   09/09/24 168/80   08/26/24 132/82       Current Medications:  Current Outpatient Medications   Medication Sig Dispense Refill    chlorhexidine (PERIDEX) 0.12 % solution Apply to the mouth or throat 2 (two) times a day      chlorthalidone 50 MG tablet TAKE 1 TABLET BY MOUTH EVERY DAY 90 tablet 1    clotrimazole (LOTRIMIN) 1 % cream Apply topically daily as needed (rash)      clotrimazole-betamethasone (LOTRISONE) 1-0.05 % cream Apply topically 2 (two) times a day as needed (rash)      DULoxetine (CYMBALTA) 60 mg delayed release capsule Take 1 capsule (60 " mg total) by mouth daily 90 capsule 3    fluticasone (FLONASE) 50 mcg/act nasal spray 1 spray into each nostril daily 18 mL 11    hydrOXYzine HCL (ATARAX) 25 mg tablet Take 1-2 tablets (25-50 mg total) by mouth every 6 (six) hours as needed for anxiety      Insulin Pen Needle (BD Pen Needle Jaymie U/F) 32G X 4 MM MISC Use daily as needed with insulin pen 100 each 2    lidocaine (Lidoderm) 5 % Apply 1 patch topically over 12 hours daily Remove & Discard patch within 12 hours or as directed by MD 7 patch 0    loratadine (CLARITIN) 10 mg tablet Take 1 tablet (10 mg total) by mouth as needed for allergies      methocarbamol (ROBAXIN) 500 mg tablet Take 1 tablet (500 mg total) by mouth 2 (two) times a day 14 tablet 0    methylPREDNISolone 4 MG tablet therapy pack Use as directed on package 21 each 0    naproxen (Naprosyn) 500 mg tablet Take 1 tablet (500 mg total) by mouth 2 (two) times a day with meals for 7 days 14 tablet 0    nystatin (MYCOSTATIN) powder Apply topically 2 (two) times a day as needed (rash) To moist area      semaglutide, 1 mg/dose, (Ozempic, 1 MG/DOSE,) 4 mg/3 mL injection pen INJECT 0.75 ML (1 MG TOTAL) UNDER THE SKIN EVERY 7 DAYS 9 mL 1     No current facility-administered medications for this visit.        Physical Exam  Musculoskeletal:        Back:       Comments: Pain over C12.  No paraspinal pain.  Decreased range of motion in all directions with pain.  No thoracic or lumbar spasm.  No CVA tenderness.        Constitutional: she appears well-developed and well-nourished. Does not appear to be in pain while talking during HPI.   HENT: Head: Normocephalic.   Right Ear: External ear normal. Tympanic membrane normal.   Left Ear: External ear normal. Tympanic membrane normal.   Nose: Nose normal. No mucosal edema, No rhinorrhea.   Right sinus exhibits no maxillary sinus tenderness.   Left sinus exhibits no maxillary sinus tenderness.   Mouth/Throat: Oropharynx is clear and moist.   Eyes: Normal  conjunctiva.  No erythema. No discharge.  Neck: No pain on exam. Neck supple.   Cardiovascular: Normal rate, regular rhythm and normal heart sounds.    Pulmonary/Chest: Effort normal and breath sounds normal.   Abdominal: Soft. Bowel sounds are normal. There is no tenderness.   Lymphadenopathy: she has no cervical adenopathy.   Neurological: she is alert and oriented to person, place, and time.   Skin: Skin is warm and dry.   Psychiatric: she has a normal mood and affect. her behavior is normal.

## 2024-09-13 NOTE — ASSESSMENT & PLAN NOTE
Medication list updated only.  Orders:    loratadine (CLARITIN) 10 mg tablet; Take 1 tablet (10 mg total) by mouth as needed for allergies

## 2024-09-16 ENCOUNTER — OFFICE VISIT (OUTPATIENT)
Dept: PHYSICAL THERAPY | Facility: REHABILITATION | Age: 59
End: 2024-09-16
Payer: COMMERCIAL

## 2024-09-16 DIAGNOSIS — M54.9 ACUTE UPPER BACK PAIN: Primary | ICD-10-CM

## 2024-09-16 DIAGNOSIS — M54.50 ACUTE RIGHT-SIDED LOW BACK PAIN WITHOUT SCIATICA: ICD-10-CM

## 2024-09-16 DIAGNOSIS — G44.86 CERVICOGENIC HEADACHE: ICD-10-CM

## 2024-09-16 PROCEDURE — 97112 NEUROMUSCULAR REEDUCATION: CPT

## 2024-09-16 PROCEDURE — 97110 THERAPEUTIC EXERCISES: CPT

## 2024-09-16 PROCEDURE — 97140 MANUAL THERAPY 1/> REGIONS: CPT

## 2024-09-16 NOTE — PROGRESS NOTES
Daily Note     Today's date: 2024  Patient name: Peace Anderson  : 1965  MRN: [id number]  Referring provider: Arti Luna DO  Dx:   Encounter Diagnosis     ICD-10-CM    1. Acute upper back pain  M54.9       2. Acute right-sided low back pain without sciatica  M54.50       3. Cervicogenic headache  G44.86           Start Time: 1445  Stop Time: 1530  Total time in clinic (min): 45 minutes      Subjective: Patient is doing better today than last week. She is compliant with HEP. Pain was a 12 last week and currently a 6/10.      Objective: See treatment diary below      Assessment: Patient tolerated treatment session better today. Patient demonstrated increase tissue restriction and muscle guarding along bilateral upper and middle trap. We reviewed additional stretches for UT and LS to perform at home and work. Patient had WNL cervical PROM with less pain at end-range this session. Patient responded positively to postural strengthening. Patient will continue to be appropriate for skilled outpatient physical therapy in order to address impairments and acute pain. 1:1 with Karen Arana PT, DPT for entirety of treatment session.        Plan: Continue per plan of care.  Progress treatment as tolerated.       Precautions: HTN, Venous Insufficiency, Asthma, Impaired Fasting Glucose, Hyperparathyroidism, Anxiety/Depression, Anemia, Morbid Obesity, SAADIA      POC expires Unit limit Auth Expiration date PT/OT + Visit Limit?   24 4 24 58 Visits         Visit/Unit Tracking  AUTH Status:  Date        58 Visits Used 1 2        Remaining  57 56          Pertinent Findings:      POC End Date: 24                                                                                          Test / Measure  24   FOTO (Predicted 67) 45   Postural Deficits +   Painful Cervical AROM +   DNF Weakness +      Access Code: [email or web address]  URL: [email or web address]  Date: 2024  Prepared  "by: Karen Arana    Exercises  - Seated Assisted Cervical Rotation with Towel  - 1-3 x daily - 7 x weekly - 1 sets - 10 reps - 5-10 second hold  - Cervical Extension AROM with Strap  - 1-3 x daily - 7 x weekly - 1 sets - 10 reps - 10 seconds hold  - Supine Chin Tuck  - 1-2 x daily - 7 x weekly - 1-2 sets - 10 reps - 5 seconds hold  - Seated Cervical Retraction  - 1-2 x daily - 7 x weekly - 1-2 sets - 10 reps - 5 seconds hold    Manuals 9/11 9/16           Cervical PROM  AR           STM or IASTM along cevical paraspinals  AR  both                                     Neuro Re-Ed             Supine Chin Tucks  5\"x20           Prone Prop Cervical Extension  10x           SNAGs             SL ER             SL Open Books             Prone Ys-Ts  10x eac B           Shrugs             Standing Rows             Ther Ex             HEP/Patient Education/PNE AR 38'             UBE/Treadmill Walking  NS 10'           Levator Scap/UT Stretch  10\"x5 each           Thread the Needle Stretch                                                                              Ther Activity                                       Gait Training                                       Modalities                                            "

## 2024-09-18 ENCOUNTER — OFFICE VISIT (OUTPATIENT)
Dept: PHYSICAL THERAPY | Facility: REHABILITATION | Age: 59
End: 2024-09-18
Payer: COMMERCIAL

## 2024-09-18 DIAGNOSIS — M54.50 ACUTE RIGHT-SIDED LOW BACK PAIN WITHOUT SCIATICA: ICD-10-CM

## 2024-09-18 DIAGNOSIS — G44.86 CERVICOGENIC HEADACHE: ICD-10-CM

## 2024-09-18 DIAGNOSIS — M54.9 ACUTE UPPER BACK PAIN: Primary | ICD-10-CM

## 2024-09-18 PROCEDURE — 97112 NEUROMUSCULAR REEDUCATION: CPT

## 2024-09-18 PROCEDURE — 97140 MANUAL THERAPY 1/> REGIONS: CPT

## 2024-09-18 PROCEDURE — 97110 THERAPEUTIC EXERCISES: CPT

## 2024-09-18 NOTE — PROGRESS NOTES
Daily Note     Today's date: 2024  Patient name: Peace Anderson  : 1965  MRN: [id number]  Referring provider: Arti Luna DO  Dx:   Encounter Diagnosis     ICD-10-CM    1. Acute upper back pain  M54.9       2. Acute right-sided low back pain without sciatica  M54.50       3. Cervicogenic headache  G44.86           Start Time: 1700  Stop Time: 1745  Total time in clinic (min): 45 minutes      Subjective: Patient continues to feel better. Did have increase upper back pain after her safety meeting yesterday. Lower back continues to feel ok.       Objective: See treatment diary below      Assessment: Patient tolerated treatment session better today. Patient had headache response with repeated cervical extension. SNAGs were more tolerable this session. Patient will continue to be appropriate for skilled outpatient physical therapy in order to address impairments and acute pain. 1:1 with Karen Arana, PT, DPT for entirety of treatment session.        Plan: Continue per plan of care.  Progress treatment as tolerated.       Precautions: HTN, Venous Insufficiency, Asthma, Impaired Fasting Glucose, Hyperparathyroidism, Anxiety/Depression, Anemia, Morbid Obesity, SAADIA      POC expires Unit limit Auth Expiration date PT/OT + Visit Limit?   24 4 24 58 Visits         Visit/Unit Tracking  AUTH Status:  Date       58 Visits Used 1 2 3       Remaining  57 56 55         Pertinent Findings:      POC End Date: 24                                                                                          Test / Measure  24   FOTO (Predicted 67) 45   Postural Deficits +   Painful Cervical AROM +   DNF Weakness +      Access Code: [email or web address]  URL: [email or web address]  Date: 2024  Prepared by: Karen Arana    Exercises  - Seated Assisted Cervical Rotation with Towel  - 1-3 x daily - 7 x weekly - 1 sets - 10 reps - 5-10 second hold  - Cervical Extension AROM with Strap  -  "1-3 x daily - 7 x weekly - 1 sets - 10 reps - 10 seconds hold  - Supine Chin Tuck  - 1-2 x daily - 7 x weekly - 1-2 sets - 10 reps - 5 seconds hold  - Seated Cervical Retraction  - 1-2 x daily - 7 x weekly - 1-2 sets - 10 reps - 5 seconds hold    Manuals 9/11 9/16 9/18          Cervical PROM  AR AR          STM or IASTM along cevical paraspinals  AR  both AR          Thoracic Grade V Mob   AR                       Neuro Re-Ed             Supine Chin Tucks  5\"x20 5\"x20          Prone Prop Cervical Extension  10x           SNAGs   10\"x10          SL ER             SL Open Books             Prone Ys-Ts  10x eac B 10x  Each B          Shrugs             Standing Rows             Ther Ex             HEP/Patient Education/PNE AR 38'             UBE/NS  Seat: 6  NS 10' NS 10'          Levator Scap/UT Stretch  10\"x5 each           Thread the Needle Stretch             Cervical Extension with Towel Self-Mo   10x                                                              Ther Activity                                       Gait Training                                       Modalities                                            "

## 2024-09-19 DIAGNOSIS — I10 ESSENTIAL HYPERTENSION: ICD-10-CM

## 2024-09-19 RX ORDER — CHLORTHALIDONE 50 MG/1
50 TABLET ORAL DAILY
Qty: 30 TABLET | Refills: 0 | Status: SHIPPED | OUTPATIENT
Start: 2024-09-19

## 2024-09-23 ENCOUNTER — APPOINTMENT (OUTPATIENT)
Dept: PHYSICAL THERAPY | Facility: REHABILITATION | Age: 59
End: 2024-09-23
Payer: COMMERCIAL

## 2024-10-02 ENCOUNTER — SOCIAL WORK (OUTPATIENT)
Dept: BEHAVIORAL/MENTAL HEALTH CLINIC | Facility: CLINIC | Age: 59
End: 2024-10-02
Payer: COMMERCIAL

## 2024-10-02 DIAGNOSIS — F41.1 GAD (GENERALIZED ANXIETY DISORDER): Primary | ICD-10-CM

## 2024-10-02 DIAGNOSIS — F33.1 MDD (MAJOR DEPRESSIVE DISORDER), RECURRENT EPISODE, MODERATE (HCC): ICD-10-CM

## 2024-10-02 PROCEDURE — 90834 PSYTX W PT 45 MINUTES: CPT | Performed by: SOCIAL WORKER

## 2024-10-03 NOTE — PSYCH
1. JANE (generalized anxiety disorder)        2. MDD (major depressive disorder), recurrent episode, moderate (HCC)          Data: Pt feels she has gone backwards since lasts session. Pt missed last appointment. Felt a sense of shame with not doing what she was asked to do. This writer reassures her that therapy is not about increasing shame, but looking to solve problems, challenge unhelpful thoughts and behaviors. Pt admits her childhood program of shame and not feeling good enough has been kicking in. Pt and writer worked to challenge these fundamental negative thought and behavior patterns pt has been using from childhood.     Pt wants to follow up with goals set last session. Speak to her son to help him understand why she has been socially isolating. Pt admits she wants to go to football games, go out to concerns, hang out with her son and son's family, but she find excuses when it matters. Pt and writer discussed strategies to mitigate this, and pt is agreeable to increasing accountability, by already making plans with people, which will make is harder to back out of. Pt hates to let people down, and pt is willing to use this part of her personality to work in her favor with self care and spending more time with people by making plans ahead of time via text.         Plan: Follow up in 2 weeks time. Writer letter to son to explain why she has been isolating.       Psychotherapy Provided: Individual Psychotherapy 45 minutes     Length of time in session: 45 minutes, follow up in 2 week    Goals addressed in session: Goal 1 and Goal 2     Pain:      moderate to severe    4    Current suicide risk : Low     Pt is future oriented and not suicidal.       Behavioral Health Treatment Plan [facility]: Diagnosis and Treatment Plan explained to Peace Hand relates understanding diagnosis and is agreeable to Treatment Plan. Yes     Visit start and stop times:    10/02/24  Start Time: 1515  Stop Time: 1600  Total Visit  Time: 45 minutes

## 2024-10-04 ENCOUNTER — TELEPHONE (OUTPATIENT)
Age: 59
End: 2024-10-04

## 2024-10-09 NOTE — TELEPHONE ENCOUNTER
Contacted patient for Medication Management  to verify needs of services in attempts to offer patient an appointment at Available Office. Writer verified N/A - NO ANSWER. Writer LVM and left callback number [phone]; option 3.    1st call attempt

## 2024-10-25 DIAGNOSIS — I10 ESSENTIAL HYPERTENSION: ICD-10-CM

## 2024-10-25 RX ORDER — CHLORTHALIDONE 50 MG/1
50 TABLET ORAL DAILY
Qty: 90 TABLET | Refills: 0 | Status: SHIPPED | OUTPATIENT
Start: 2024-10-25

## 2024-10-29 ENCOUNTER — OFFICE VISIT (OUTPATIENT)
Dept: CARDIOLOGY CLINIC | Facility: CLINIC | Age: 59
End: 2024-10-29
Payer: COMMERCIAL

## 2024-10-29 VITALS
TEMPERATURE: 98.3 F | SYSTOLIC BLOOD PRESSURE: 117 MMHG | HEART RATE: 77 BPM | WEIGHT: 211 LBS | BODY MASS INDEX: 38.83 KG/M2 | HEIGHT: 62 IN | DIASTOLIC BLOOD PRESSURE: 82 MMHG | OXYGEN SATURATION: 98 %

## 2024-10-29 DIAGNOSIS — R73.03 PREDIABETES: ICD-10-CM

## 2024-10-29 DIAGNOSIS — G47.33 OSA (OBSTRUCTIVE SLEEP APNEA): ICD-10-CM

## 2024-10-29 DIAGNOSIS — I10 ESSENTIAL HYPERTENSION: ICD-10-CM

## 2024-10-29 PROCEDURE — 99214 OFFICE O/P EST MOD 30 MIN: CPT

## 2024-10-29 NOTE — PROGRESS NOTES
Peace Anderson  1965  [id number]  [facility]  [address] [facility] [address] [phone]    1. Essential hypertension  Lipid Panel with Direct LDL reflex      2. CAMERON (obstructive sleep apnea)        3. Prediabetes            Summary/Discussion:  Hypertension:  - stable, 117/82 while only being on chlorthalidone  - lifestyle modification   - close blood pressure monitoring   - exercise stress echo (1/2023): LV wall thickness mildly increased, mild to moderate concentric hypertrophy, LVEF 65%, no RWMA, normal systolic/diastolic function.  No diagnostic evidence of ischemia after maximal exercise    Dyslipidemia:  - Lipid Profile:    Latest Reference Range & Units 02/22/22 07:49   Cholesterol See Comment mg/dL 157   Triglycerides See Comment mg/dL 105   HDL >=50 mg/dL 53   LDL Calculated 0 - 100 mg/dL 83   - due for updated lipids-- order placed today   - ASCVD risk: <4%  - currently being managed with diet alone  - encouraged low cholesterol, mediterranean diet, and annual lipid follow up    Obstructive sleep apnea:  - unable to tolerate CPAP    Prediabetes:  - A1c: 5.9     Interval History: Peace Anderson is a 59 y.o. year old female with history mentioned in problem list who presents to the office today for routine follow up.     Since her last office visit she has no significant cardiac complaints. She denies any chest pain/pressure/discomfort or shortness of breath. She denies lower extremity edema, orthopnea, and PND. She denies lightheadedness, dizziness, and syncope. She denies palpitations.  She does report ongoing fatigue that is unchanged over the last several years. Admits to not wearing her CPAP regularly.     She will RTO on 5/1/2025 with Dr. Acuña or sooner if necessary. She will call with any concerns.       Medical Problems       Problem List       Subclinical hyperthyroidism    Vitamin D deficiency    Overview Signed 4/20/2020  1:54 PM by  Cristin Hayes MD     33         Essential hypertension    Kidney stone    Ascorbic acid deficiency    JANE (generalized anxiety disorder)    IFG (impaired fasting glucose)    Overview Signed 4/20/2020  1:57 PM by Cristin Hayes MD     5.8         Pyridoxine deficiency    Overview Signed 4/20/2020  1:58 PM by Cristin Hayes MD     5         Reactive airway disease with acute exacerbation    Mild persistent asthma without complication    Multinodular thyroid    Overview Signed 6/25/2024  8:17 AM by Arti Luna DO     3/21/24 thyroid US  Multinodular thyroid gland. No nodule meets current ACR criteria for requiring biopsy but follow-up ultrasound is recommended in 1 year.            Acute stress disorder    MDD (major depressive disorder), recurrent episode, moderate (HCC)    Primary osteoarthritis of right knee    Menorrhagia with regular cycle    Iron deficiency anemia    Vertigo    CAMERON on CPAP    Hyperparathyroidism (HCC)    Cervical radiculopathy    Venous insufficiency    Family history of heart disease    Psoriasis of scalp    Hyperplastic polyp of sigmoid colon    Overview Signed 6/25/2024  8:16 AM by Arti Luna DO     3/26/24 colonoscopy - repeat in 5 years Dr. Ferrer   IMPRESSION:  Scattered diverticulosis of moderate severity in the hepatic flexure, proximal sigmoid colon, mid sigmoid colon and distal sigmoid colon  One polyp measuring smaller than 5 mm in the proximal rectum; bleeding occurred after intervention; performed cold forceps biopsy           RECOMMENDATION:  Repeat colonoscopy in 5 years            Daljit Ferrer MD            Mixed incontinence urge and stress    Morbid obesity (HCC)    Allergic rhinitis due to pollen    Overview Signed 6/25/2024 10:16 AM by Arti Luna DO     start claritin and flonase daily. add atarax as needed for allergies and anxiety.         Toenail deformity    Overview Signed 6/25/2024 10:16 AM by Arti Luna DO     send picture. consider  Lamisil. may need nail clipping. may be due to rubbing.             Past Medical History:   Diagnosis Date    Ascorbic acid deficiency 04/20/2020    Asthma due to seasonal allergies 09/25/2020    COVID     Depression     Disease of thyroid gland     Essential hypertension 04/20/2020    JANE (generalized anxiety disorder) 04/20/2020    Helicobacter pylori gastrointestinal tract infection 04/20/2020    10/02/2017=egd 9/2017 Dr Cervantes    Helicobacter pylori gastrointestinal tract infection 04/20/2020    10/02/2017=egd 9/2017 Dr Cervantes      History of Holter monitoring 12/22/2017    The baseline rhythm was of sinus origin with an average heart rate of 90 bpm (range: 72 - 129 bpm). There were rare single APCs adn VPCs (less than 0.1 % total beats). There were no sustained cardiac dysrhythmais. There were no significant symptomatic pauses.     History of stress test 01/05/2018    Mildly abnormal exercise Myoview SPECT study with evidence of a fixed anterior defect. This is most likely related to breast attenuation artifact. There was no evidence of reversible myocardial ischemia. Gated wall motion analysis was normal with well preserved systolic function. The left ventricle was normal in size with calculated EF of 73%. No prior similar study is available for comparison.     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    5    Reactive airway disease with acute exacerbation 05/20/2020    Subclinical hyperthyroidism 04/20/2020    Thyroid nodule 09/25/2020    Vitamin D  deficiency 2020    33     Social History     Socioeconomic History    Marital status:      Spouse name: Not on file    Number of children: 5    Years of education: 12    Highest education level: Not on file   Occupational History    Occupation: [employer]      Tobacco Use    Smoking status: Former     Current packs/day: 0.00     Average packs/day: 0.3 packs/day for 24.0 years (6.0 ttl pk-yrs)     Types: Cigarettes     Start date:      Quit date:      Years since quitting: 10.8     Passive exposure: Past    Smokeless tobacco: Never    Tobacco comments:     Has smoked since age: 17- As per [place]    Vaping Use    Vaping status: Never Used   Substance and Sexual Activity    Alcohol use: Yes     Alcohol/week: 2.0 standard drinks of alcohol     Types: 1 Glasses of wine, 1 Shots of liquor per week     Comment: Social and holidays    Drug use: Never    Sexual activity: Not Currently     Partners: Male     Birth control/protection: None   Other Topics Concern    Not on file   Social History Narrative    ** Merged History Encounter **         · Most recent tobacco use screenin2020      · Do you currently or have you served in the  Armed Forces:   No      · Were you activated, into active duty, as a member of the [employer] or as a Reservist:   No      · Has patient visited an area known to be high risk for 2019 n-CoV:   No      · In the 14 days before symptom onset, did the patient spend time in a high risk country:   No      · If patient spent time in a high risk country - Does the patient live in the high risk country:   No      · Advance directive:   No      · Live alone or with others:   with others      · Exercise level:   Occasional      · Overweight:   Yes      · Obese:   Yes      · General stress level:   High      · Diet:   Regular      · Caffeine intake:   Moderate      · Guns present in home:   No      · Seat belts used routinely:   No      · Sunscreen used routinely:   Yes      · Smoke alarm in home:   Yes      · Legally blind in one or both eyes:   No      · Hard of hearing or deaf in one or both ears:   No lives w roomates     As per [place]      Social Determinants of Health     Financial Resource Strain: Not on file   Food Insecurity: Not on file   Transportation Needs: Not on file   Physical Activity: Not on file   Stress: Not on file   Social Connections: Not on file   Intimate Partner Violence: Unknown (11/20/2020)    [facility]    Intimate Partner Violence     Within the last year, have you been afraid of your partner, ex-partner or family member?: Not on file     Within the last year, have you been humiliated or emotionally abused in other ways by your partner, ex-partner or family member?: Not on file     Within the last year, have you been kicked, hit, slapped, or otherwise physically hurt by your partner, ex-partner or family member?: Not on file     Within the last year, have you been raped or forced to have any kind of sexual activity by your partner, ex-partner or family member?: Not on file   Housing Stability: Not on file      Family History   Problem Relation Age of Onset    Lung disease Mother         lung nodules    Coronary artery disease Mother         calcium score > 1000    Hypertension Mother     Diabetes Father     Colon cancer Maternal Aunt 60 - 69    Cancer Maternal Uncle     Kidney disease Maternal Uncle      Past Surgical History:   Procedure Laterality Date    COLONOSCOPY  09/08/2017    3 polyps=next 2020 w Dr Cervantes    EGD  09/01/2017    egd biopsy     KNEE ARTHROSCOPY W/ ACL RECONSTRUCTION      KNEE SURGERY      torn mcl, acl, meniscus left knee 2016    TUBAL LIGATION         Current Outpatient Medications:     chlorthalidone 50 MG tablet, TAKE 1 TABLET BY MOUTH EVERY DAY, Disp: 90 tablet, Rfl: 0    DULoxetine (CYMBALTA) 60 mg delayed release capsule, Take 1 capsule (60 mg total) by mouth daily, Disp:  90 capsule, Rfl: 3    hydrOXYzine HCL (ATARAX) 25 mg tablet, Take 1-2 tablets (25-50 mg total) by mouth every 6 (six) hours as needed for anxiety, Disp: , Rfl:     Insulin Pen Needle (BD Pen Needle Jaymie U/F) 32G X 4 MM MISC, Use daily as needed with insulin pen, Disp: 100 each, Rfl: 2    nystatin (MYCOSTATIN) powder, Apply topically 2 (two) times a day as needed (rash) To moist area, Disp: , Rfl:     semaglutide, 1 mg/dose, (Ozempic, 1 MG/DOSE,) 4 mg/3 mL injection pen, INJECT 0.75 ML (1 MG TOTAL) UNDER THE SKIN EVERY 7 DAYS, Disp: 9 mL, Rfl: 1    chlorhexidine (PERIDEX) 0.12 % solution, Apply to the mouth or throat 2 (two) times a day (Patient not taking: Reported on 10/29/2024), Disp: , Rfl:     clotrimazole (LOTRIMIN) 1 % cream, Apply topically daily as needed (rash) (Patient not taking: Reported on 10/29/2024), Disp: , Rfl:     clotrimazole-betamethasone (LOTRISONE) 1-0.05 % cream, Apply topically 2 (two) times a day as needed (rash) (Patient not taking: Reported on 10/29/2024), Disp: , Rfl:     fluticasone (FLONASE) 50 mcg/act nasal spray, 1 spray into each nostril daily (Patient not taking: Reported on 10/29/2024), Disp: 18 mL, Rfl: 11    lidocaine (Lidoderm) 5 %, Apply 1 patch topically over 12 hours daily Remove & Discard patch within 12 hours or as directed by MD (Patient not taking: Reported on 10/29/2024), Disp: 7 patch, Rfl: 0    loratadine (CLARITIN) 10 mg tablet, Take 1 tablet (10 mg total) by mouth as needed for allergies (Patient not taking: Reported on 10/29/2024), Disp: , Rfl:     methocarbamol (ROBAXIN) 500 mg tablet, Take 1 tablet (500 mg total) by mouth 2 (two) times a day (Patient not taking: Reported on 10/29/2024), Disp: 14 tablet, Rfl: 0    methylPREDNISolone 4 MG tablet therapy pack, Use as directed on package (Patient not taking: Reported on 10/29/2024), Disp: 21 each, Rfl: 0    naproxen (Naprosyn) 500 mg tablet, Take 1 tablet (500 mg total) by mouth 2 (two) times a day with meals for 7  "days (Patient not taking: Reported on 10/29/2024), Disp: 14 tablet, Rfl: 0  No Known Allergies    Labs:     Chemistry        Component Value Date/Time    K 3.9 06/22/2024 0927    K 3.3 (L) 03/24/2020 0720     06/22/2024 0927    CL 99 03/24/2020 0720    CO2 28 06/22/2024 0927    CO2 31 03/24/2020 0720    BUN 10 06/22/2024 0927    BUN 18 03/24/2020 0720    CREATININE 0.73 06/22/2024 0927        Component Value Date/Time    CALCIUM 9.8 06/22/2024 0927    CALCIUM 9.1 03/24/2020 0720    CALCIUM 9.1 03/24/2020 0720    ALKPHOS 72 06/22/2024 0927    ALKPHOS 79 03/24/2020 0720    AST 34 06/22/2024 0927    AST 17 03/24/2020 0720    ALT 42 06/22/2024 0927    ALT 16 03/24/2020 0720            No results found for: \"CHOL\"  Lab Results   Component Value Date    HDL 53 02/22/2022    HDL 49 (L) 03/24/2020    HDL 50 02/11/2020     Lab Results   Component Value Date    LDLCALC 83 02/22/2022    LDLCALC 82 03/24/2020    LDLCALC 63 02/11/2020     Lab Results   Component Value Date    TRIG 105 02/22/2022    TRIG 107 03/24/2020    TRIG 140 02/11/2020     No results found for: \"CHOLHDL\"    Imaging: No results found.    ECG:  n/a    Review of Systems   Constitutional: Positive for malaise/fatigue (ongoing/unchanged).   HENT: Negative.     Eyes: Negative.    Cardiovascular: Negative.    Respiratory: Negative.     Endocrine: Negative.    Hematologic/Lymphatic: Negative.    Skin: Negative.    Musculoskeletal: Negative.    Gastrointestinal: Negative.    Genitourinary: Negative.    Neurological:  Negative for dizziness and light-headedness.   Psychiatric/Behavioral: Negative.     Allergic/Immunologic: Negative.        Vitals:    10/29/24 1510   BP: 117/82   Pulse: 77   Temp: 98.3 °F (36.8 °C)   SpO2: 98%     Vitals:    10/29/24 1510   Weight: 95.7 kg (211 lb)     Height: 5' 2\" (157.5 cm)   Body mass index is 38.59 kg/m².    Physical Exam  Vitals and nursing note reviewed.   Constitutional:       General: She is not in acute distress.     " Appearance: She is not ill-appearing.   HENT:      Head: Normocephalic.      Nose: Nose normal.      Mouth/Throat:      Mouth: Mucous membranes are moist.      Pharynx: Oropharynx is clear.   Cardiovascular:      Rate and Rhythm: Normal rate and regular rhythm.      Heart sounds: No murmur heard.  Pulmonary:      Effort: Pulmonary effort is normal.   Musculoskeletal:         General: Normal range of motion.      Cervical back: Normal range of motion.      Right lower leg: No edema.      Left lower leg: No edema.   Skin:     General: Skin is warm and dry.   Neurological:      Mental Status: She is alert and oriented to person, place, and time.   Psychiatric:         Mood and Affect: Mood normal.         Behavior: Behavior normal.

## 2024-12-02 ENCOUNTER — HOSPITAL ENCOUNTER (EMERGENCY)
Facility: HOSPITAL | Age: 59
Discharge: HOME | End: 2024-12-02
Attending: EMERGENCY MEDICINE
Payer: COMMERCIAL

## 2024-12-02 VITALS
RESPIRATION RATE: 18 BRPM | SYSTOLIC BLOOD PRESSURE: 157 MMHG | HEART RATE: 80 BPM | OXYGEN SATURATION: 98 % | WEIGHT: 206.79 LBS | TEMPERATURE: 98.4 F | DIASTOLIC BLOOD PRESSURE: 106 MMHG | BODY MASS INDEX: 37.82 KG/M2

## 2024-12-02 DIAGNOSIS — M54.41 ACUTE MIDLINE LOW BACK PAIN WITH RIGHT-SIDED SCIATICA: Primary | ICD-10-CM

## 2024-12-02 PROCEDURE — 96372 THER/PROPH/DIAG INJ SC/IM: CPT

## 2024-12-02 PROCEDURE — 99282 EMERGENCY DEPT VISIT SF MDM: CPT

## 2024-12-02 PROCEDURE — 99284 EMERGENCY DEPT VISIT MOD MDM: CPT

## 2024-12-02 RX ORDER — ACETAMINOPHEN 325 MG/1
975 TABLET ORAL ONCE
Status: COMPLETED | OUTPATIENT
Start: 2024-12-02 | End: 2024-12-02

## 2024-12-02 RX ORDER — KETOROLAC TROMETHAMINE 30 MG/ML
15 INJECTION, SOLUTION INTRAMUSCULAR; INTRAVENOUS ONCE
Status: COMPLETED | OUTPATIENT
Start: 2024-12-02 | End: 2024-12-02

## 2024-12-02 RX ORDER — LIDOCAINE 50 MG/G
1 PATCH TOPICAL ONCE
Status: DISCONTINUED | OUTPATIENT
Start: 2024-12-02 | End: 2024-12-02 | Stop reason: HOSPADM

## 2024-12-02 RX ORDER — METHOCARBAMOL 500 MG/1
500 TABLET, FILM COATED ORAL ONCE
Status: COMPLETED | OUTPATIENT
Start: 2024-12-02 | End: 2024-12-02

## 2024-12-02 RX ORDER — METHOCARBAMOL 500 MG/1
500 TABLET, FILM COATED ORAL 2 TIMES DAILY
Qty: 20 TABLET | Refills: 0 | Status: SHIPPED | OUTPATIENT
Start: 2024-12-02

## 2024-12-02 RX ORDER — DIAZEPAM 5 MG/1
5 TABLET ORAL 2 TIMES DAILY
Qty: 6 TABLET | Refills: 0 | Status: SHIPPED | OUTPATIENT
Start: 2024-12-02

## 2024-12-02 RX ADMIN — KETOROLAC TROMETHAMINE 15 MG: 30 INJECTION, SOLUTION INTRAMUSCULAR; INTRAVENOUS at 20:45

## 2024-12-02 RX ADMIN — LIDOCAINE 1 PATCH: 700 PATCH TOPICAL at 20:47

## 2024-12-02 RX ADMIN — ACETAMINOPHEN 975 MG: 325 TABLET, FILM COATED ORAL at 20:44

## 2024-12-02 RX ADMIN — METHOCARBAMOL 500 MG: 500 TABLET ORAL at 20:44

## 2024-12-02 NOTE — Clinical Note
Peace Anderson was seen and treated in our emergency department on 12/2/2024.            Limit flexion/extension movements of spine (i.e. bending over)    Diagnosis: Back pain, sciatica.    Peace  may return to work on return date.    She may return on this date: 12/04/2024         If you have any questions or concerns, please don't hesitate to call.      Olu Isaacs PA-C    ______________________________           _______________          _______________  Hospital Representative                              Date                                Time

## 2024-12-03 ENCOUNTER — RESULTS FOLLOW-UP (OUTPATIENT)
Dept: FAMILY MEDICINE CLINIC | Facility: CLINIC | Age: 59
End: 2024-12-03

## 2024-12-03 ENCOUNTER — OFFICE VISIT (OUTPATIENT)
Dept: FAMILY MEDICINE CLINIC | Facility: CLINIC | Age: 59
End: 2024-12-03
Payer: COMMERCIAL

## 2024-12-03 ENCOUNTER — PATIENT MESSAGE (OUTPATIENT)
Dept: FAMILY MEDICINE CLINIC | Facility: CLINIC | Age: 59
End: 2024-12-03

## 2024-12-03 ENCOUNTER — HOSPITAL ENCOUNTER (OUTPATIENT)
Dept: CT IMAGING | Facility: HOSPITAL | Age: 59
Discharge: HOME | End: 2024-12-03
Attending: FAMILY MEDICINE
Payer: COMMERCIAL

## 2024-12-03 VITALS
TEMPERATURE: 97.9 F | OXYGEN SATURATION: 97 % | BODY MASS INDEX: 37.06 KG/M2 | DIASTOLIC BLOOD PRESSURE: 84 MMHG | RESPIRATION RATE: 18 BRPM | WEIGHT: 201.4 LBS | HEART RATE: 84 BPM | SYSTOLIC BLOOD PRESSURE: 144 MMHG | HEIGHT: 62 IN

## 2024-12-03 DIAGNOSIS — Z87.442 HX OF RENAL CALCULI: ICD-10-CM

## 2024-12-03 DIAGNOSIS — M54.50 ACUTE RIGHT-SIDED LOW BACK PAIN WITHOUT SCIATICA: ICD-10-CM

## 2024-12-03 DIAGNOSIS — M54.50 ACUTE RIGHT-SIDED LOW BACK PAIN WITHOUT SCIATICA: Primary | ICD-10-CM

## 2024-12-03 LAB
SL AMB  POCT GLUCOSE, UA: NORMAL
SL AMB LEUKOCYTE ESTERASE,UA: NORMAL
SL AMB POCT BILIRUBIN,UA: NORMAL
SL AMB POCT BLOOD,UA: NORMAL
SL AMB POCT CLARITY,UA: CLEAR
SL AMB POCT COLOR,UA: YELLOW
SL AMB POCT KETONES,UA: NORMAL
SL AMB POCT NITRITE,UA: NORMAL
SL AMB POCT PH,UA: 6.5
SL AMB POCT SPECIFIC GRAVITY,UA: 3.5
SL AMB POCT URINE PROTEIN: NORMAL
SL AMB POCT UROBILINOGEN: NORMAL

## 2024-12-03 PROCEDURE — 99214 OFFICE O/P EST MOD 30 MIN: CPT | Performed by: FAMILY MEDICINE

## 2024-12-03 PROCEDURE — 81002 URINALYSIS NONAUTO W/O SCOPE: CPT | Performed by: FAMILY MEDICINE

## 2024-12-03 PROCEDURE — 74176 CT ABD & PELVIS W/O CONTRAST: CPT

## 2024-12-03 NOTE — PROGRESS NOTES
"Name: Peace Anderson      : 1965      MRN: [id number]  Encounter Provider: Sonja Alvarez MD  Encounter Date: 12/3/2024   Encounter department: [facility]  :  Assessment & Plan  Acute right-sided low back pain without sciatica  Had eval in ER, reviewed note.  Presumed to have musculoskel pain- referred to comp spine, started on valium for spasm.    Check poct ua (neg for blood)  Check ct for stones- stat  Ct revealed two nonobstructing 1 mm renal calculi on R.    No inflammatory process     Given the above- treat as low back pain   Recommend appointment w PT  Has pain mgmt appointment     Orders:    POCT urine dip    CT renal stone study abdomen pelvis wo contrast; Future    Hx of renal calculi   As above.    Orders:    POCT urine dip    CT renal stone study abdomen pelvis wo contrast; Future           History of Present Illness     Started with pain Sat night.   still w pain but was getting through the day.  Sun night the pain was worse- more severe into Monday, couldn't go to work.   Toradol, tylenol, lidocaine in ER on Monday.  Was told to f/u with comp spine PT, dx with LBP with radicular symptoms.   The pain is severe today.   Feels pain into her anterior R thigh.     No visible blood in urine.   No freq, no pain.   The pain in her back feels similar to when she had kidney stones.     No nausea or vomiting.   No diarrhea.  Tends to be a bit constipated.     No fever.     Can't find a comfortable position  Tried aspercream, tried heat.     She was moving her room around on Saturday, but son did most of the lifting.   Drinking lots of water.   No fever    Pain is low back to to the R buttocks.  Some lower abd pain        Review of Systems       Objective   /84 (BP Location: Left arm, Patient Position: Sitting, Cuff Size: Large)   Pulse 84   Temp 97.9 °F (36.6 °C)   Resp 18   Ht 5' 2\" (1.575 m)   Wt 91.4 kg (201 lb 6.4 oz)   LMP  (LMP Unknown) Comment: Taking " birth control for abnormal bleeding. Has not had a period in 2 years  SpO2 97%   BMI 36.84 kg/m²      Physical Exam  Vitals and nursing note reviewed.   Constitutional:       General: She is not in acute distress.     Appearance: Normal appearance. She is not ill-appearing.   Cardiovascular:      Rate and Rhythm: Normal rate and regular rhythm.   Pulmonary:      Effort: Pulmonary effort is normal.      Breath sounds: Normal breath sounds. No wheezing or rhonchi.   Abdominal:      Palpations: Abdomen is soft.      Tenderness: There is abdominal tenderness (RLQ without guarding or rebound).   Musculoskeletal:      Right lower leg: No edema.      Left lower leg: No edema.      Comments: Tenderness low lumbar spine and R lumbar paraspinals into buttocks.   Neg SLT   Neurological:      Mental Status: She is alert.

## 2024-12-03 NOTE — DISCHARGE INSTRUCTIONS
Your symptoms are most likely musculoskeletal in origin.  These usually benefit most from physical therapy.  I do encourage you to try this again even if it did not help last time.  You will receive a phone call soon from the comprehensive spine program to discuss further management.  If not improving then they will likely consider other options such as MRI.  In the meantime continue treating supportively.  You can alternate Tylenol with an NSAID such as naproxen or ibuprofen.  You can apply topical agents such as Voltaren which you can get at the pharmacy.  You can take a muscle relaxant such as Robaxin twice daily.  I also encourage you to keep using the heating pad frequently, and encourage frequent light movements like walking.  If still experiencing severe pain you can try 1 dose of the Valium, does be aware that this is a sedating medication and you cannot drive or operate other machinery when using this medication.

## 2024-12-03 NOTE — ED PROVIDER NOTES
Time reflects when diagnosis was documented in both MDM as applicable and the Disposition within this note       Time User Action Codes Description Comment    12/2/2024  9:10 PM Olu Isaacs Add [M54.41] Acute midline low back pain with right-sided sciatica           ED Disposition       ED Disposition   Discharge    Condition   Stable    Date/Time   Mon Dec 2, 2024  9:10 PM    Comment   Peaceroderick Anderson discharge to home/self care.                   Assessment & Plan       Medical Decision Making  59-year-old female presents for evaluation of low back pain that radiates into her right buttocks and leg, with symptoms occurring intermittently for at least several months.  On exam she is tearful, sitting upright in bed.  Nontoxic-appearing.  Pain worsened when moving from sitting to standing but is able to ambulate without much difficulty.    Symptoms likely consistent with MSK pain +/- associated sciatica.  Not concerning for emergent condition such as cauda equina or spinal abscess.  Not concerning for infectious origin i.e. pyelonephritis.  Patient having some improvement in symptoms with medications in the ER.  Will submit referral to comprehensive [facility].  Although she did not benefit last time, I do think she may have success with physical therapy.  Discussed supportive care and expectations.  Patient expresses understanding of the condition, treatment plan, follow-up instructions, and return precautions.      Risk  OTC drugs.  Prescription drug management.             Medications   ketorolac (TORADOL) injection 15 mg (15 mg Intramuscular Given 12/2/24 2045)   acetaminophen (TYLENOL) tablet 975 mg (975 mg Oral Given 12/2/24 2044)   methocarbamol (ROBAXIN) tablet 500 mg (500 mg Oral Given 12/2/24 2044)       ED Risk Strat Scores                                               History of Present Illness       Chief Complaint   Patient presents with    Back Pain     Pt c/o mid lower back pain that radiates to R side  and down R leg. Denies GI/ problems. Pt states this pain has been ongoing for months.        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 There were rare single APCs adn VPCs (less than 0.1 % total beats). There were no sustained cardiac dysrhythmais. There were no significant symptomatic pauses.     History of stress test 01/05/2018    Mildly abnormal exercise Myoview SPECT study with evidence of a fixed anterior defect. This is most likely related to breast attenuation artifact. There was no evidence of reversible myocardial ischemia. Gated wall motion analysis was normal with well preserved systolic function. The left ventricle was normal in size with calculated EF of 73%. No prior similar study is available for comparison.     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    5    Reactive airway disease with acute exacerbation 05/20/2020    Subclinical hyperthyroidism 04/20/2020    Thyroid  nodule 09/25/2020    Vitamin D deficiency 04/20/2020    33      Past Surgical History:   Procedure Laterality Date    COLONOSCOPY  09/08/2017    3 polyps=next 2020 w Dr Cervantes    EGD  09/01/2017    egd biopsy     KNEE ARTHROSCOPY W/ ACL RECONSTRUCTION      KNEE SURGERY      torn mcl, acl, meniscus left knee 2016    TUBAL LIGATION        Family History   Problem Relation Age of Onset    Lung disease Mother         lung nodules    Coronary artery disease Mother         calcium score > 1000    Hypertension Mother     Diabetes Father     Colon cancer Maternal Aunt 60 - 69    Cancer Maternal Uncle     Kidney disease Maternal Uncle       Social History     Tobacco Use    Smoking status: Former     Current packs/day: 0.00     Average packs/day: 0.3 packs/day for 24.0 years (6.0 ttl pk-yrs)     Types: Cigarettes     Start date: 1990     Quit date: 2014     Years since quitting: 10.9     Passive exposure: Past    Smokeless tobacco: Never    Tobacco comments:     Has smoked since age: 17- As per Lorena    Vaping Use    Vaping status: Never Used   Substance Use Topics    Alcohol use: Yes     Alcohol/week: 2.0 standard drinks of alcohol     Types: 1 Glasses of wine, 1 Shots of liquor per week     Comment: Social and holidays    Drug use: Never      E-Cigarette/Vaping    E-Cigarette Use Never User       E-Cigarette/Vaping Substances    Nicotine No     THC No     CBD No     Flavoring No     Other No     Unknown No       I have reviewed and agree with the history as documented.     59-year-old female presents for evaluation of back pain.  Symptoms have been on and off for at least several months.  Feels pain primarily in her midline low back, and sometimes this radiates into her right buttocks and leg.  Denies numbness, tingling, or weakness.  Pain is greatly worsened with flexion/extension of spine, and right leg raise.  Denies fevers, chills, nausea, vomiting, bowel/bladder incontinence, urinary retention, dysuria, difficulty  urinating.  States that she has tried PT in the past without improvement.  Recurrent pain she has been taking naproxen, using an OTC topical agent, and using heating pad.        Review of Systems   Constitutional:  Negative for chills and fever.   HENT:  Negative for ear pain and sore throat.    Eyes:  Negative for pain and visual disturbance.   Respiratory:  Negative for cough and shortness of breath.    Cardiovascular:  Negative for chest pain and palpitations.   Gastrointestinal:  Negative for abdominal pain and vomiting.   Genitourinary:  Negative for dysuria and hematuria.   Musculoskeletal:  Positive for back pain and myalgias. Negative for arthralgias.   Skin:  Negative for color change and rash.   Neurological:  Negative for seizures and syncope.   All other systems reviewed and are negative.          Objective       ED Triage Vitals   Temperature Pulse Blood Pressure Respirations SpO2 Patient Position - Orthostatic VS   12/02/24 1934 12/02/24 1934 12/02/24 1934 12/02/24 1934 12/02/24 1934 12/02/24 2013   98.4 °F (36.9 °C) 101 (!) 218/128 20 98 % Lying      Temp Source Heart Rate Source BP Location FiO2 (%) Pain Score    12/02/24 1934 12/02/24 1934 12/02/24 1934 -- 12/02/24 2044    Oral Monitor Right arm  6      Vitals      Date and Time Temp Pulse SpO2 Resp BP Pain Score FACES Pain Rating User   12/02/24 2044 -- -- -- -- -- 6 --    12/02/24 2013 -- 80 98 % 18 157/106 -- --    12/02/24 1934 98.4 °F (36.9 °C) 101 98 % 20 218/128 -- -- MM            Physical Exam  Vitals and nursing note reviewed.   Constitutional:       General: She is not in acute distress.     Appearance: She is well-developed.   HENT:      Head: Normocephalic and atraumatic.   Eyes:      Conjunctiva/sclera: Conjunctivae normal.   Cardiovascular:      Rate and Rhythm: Normal rate and regular rhythm.      Heart sounds: No murmur heard.  Pulmonary:      Effort: Pulmonary effort is normal. No respiratory distress.      Breath sounds:  Normal breath sounds.   Abdominal:      Palpations: Abdomen is soft.      Tenderness: There is no abdominal tenderness.   Musculoskeletal:         General: No swelling.      Cervical back: Neck supple.   Skin:     General: Skin is warm and dry.      Capillary Refill: Capillary refill takes less than 2 seconds.   Neurological:      Mental Status: She is alert.   Psychiatric:         Mood and Affect: Mood normal. Affect is tearful.         Results Reviewed       None            No orders to display       Procedures    ED Medication and Procedure Management   Prior to Admission Medications   Prescriptions Last Dose Informant Patient Reported? Taking?   DULoxetine (CYMBALTA) 60 mg delayed release capsule  Self No No   Sig: Take 1 capsule (60 mg total) by mouth daily   Insulin Pen Needle (BD Pen Needle Jaymie U/F) 32G X 4 MM MISC  Self No No   Sig: Use daily as needed with insulin pen   chlorhexidine (PERIDEX) 0.12 % solution   Yes No   Sig: Apply to the mouth or throat 2 (two) times a day   Patient not taking: Reported on 10/29/2024   chlorthalidone 50 MG tablet   No No   Sig: TAKE 1 TABLET BY MOUTH EVERY DAY   clotrimazole (LOTRIMIN) 1 % cream   No No   Sig: Apply topically daily as needed (rash)   Patient not taking: Reported on 10/29/2024   clotrimazole-betamethasone (LOTRISONE) 1-0.05 % cream   No No   Sig: Apply topically 2 (two) times a day as needed (rash)   Patient not taking: Reported on 10/29/2024   fluticasone (FLONASE) 50 mcg/act nasal spray  Self No No   Si spray into each nostril daily   Patient not taking: Reported on 10/29/2024   hydrOXYzine HCL (ATARAX) 25 mg tablet   No No   Sig: Take 1-2 tablets (25-50 mg total) by mouth every 6 (six) hours as needed for anxiety   lidocaine (Lidoderm) 5 %   No No   Sig: Apply 1 patch topically over 12 hours daily Remove & Discard patch within 12 hours or as directed by MD   Patient not taking: Reported on 10/29/2024   loratadine (CLARITIN) 10 mg tablet   No No    Sig: Take 1 tablet (10 mg total) by mouth as needed for allergies   Patient not taking: Reported on 10/29/2024   methocarbamol (ROBAXIN) 500 mg tablet   No No   Sig: Take 1 tablet (500 mg total) by mouth 2 (two) times a day   Patient not taking: Reported on 10/29/2024   methylPREDNISolone 4 MG tablet therapy pack   No No   Sig: Use as directed on package   Patient not taking: Reported on 10/29/2024   naproxen (Naprosyn) 500 mg tablet   No No   Sig: Take 1 tablet (500 mg total) by mouth 2 (two) times a day with meals for 7 days   Patient not taking: Reported on 10/29/2024   nystatin (MYCOSTATIN) powder   No No   Sig: Apply topically 2 (two) times a day as needed (rash) To moist area   semaglutide, 1 mg/dose, (Ozempic, 1 MG/DOSE,) 4 mg/3 mL injection pen  Self No No   Sig: INJECT 0.75 ML (1 MG TOTAL) UNDER THE SKIN EVERY 7 DAYS      Facility-Administered Medications: None     Discharge Medication List as of 12/2/2024  9:14 PM        START taking these medications    Details   diazepam (VALIUM) 5 mg tablet Take 1 tablet (5 mg total) by mouth 2 (two) times a day, Starting Mon 12/2/2024, Normal      !! methocarbamol (ROBAXIN) 500 mg tablet Take 1 tablet (500 mg total) by mouth 2 (two) times a day, Starting Mon 12/2/2024, Normal       !! - Potential duplicate medications found. Please discuss with provider.        CONTINUE these medications which have NOT CHANGED    Details   chlorhexidine (PERIDEX) 0.12 % solution Apply to the mouth or throat 2 (two) times a day, Starting Wed 8/7/2024, Historical Med      chlorthalidone 50 MG tablet TAKE 1 TABLET BY MOUTH EVERY DAY, Starting Fri 10/25/2024, Normal      clotrimazole (LOTRIMIN) 1 % cream Apply topically daily as needed (rash), Starting Fri 9/13/2024, No Print      clotrimazole-betamethasone (LOTRISONE) 1-0.05 % cream Apply topically 2 (two) times a day as needed (rash), Starting Fri 9/13/2024, OTC      DULoxetine (CYMBALTA) 60 mg delayed release capsule Take 1 capsule  (60 mg total) by mouth daily, Starting Tue 6/25/2024, Normal      fluticasone (FLONASE) 50 mcg/act nasal spray 1 spray into each nostril daily, Starting Tue 6/25/2024, Normal      hydrOXYzine HCL (ATARAX) 25 mg tablet Take 1-2 tablets (25-50 mg total) by mouth every 6 (six) hours as needed for anxiety, Starting Fri 9/13/2024, No Print      Insulin Pen Needle (BD Pen Needle Jaymie U/F) 32G X 4 MM MISC Use daily as needed with insulin pen, Normal      lidocaine (Lidoderm) 5 % Apply 1 patch topically over 12 hours daily Remove & Discard patch within 12 hours or as directed by MD, Starting Sun 8/25/2024, Normal      loratadine (CLARITIN) 10 mg tablet Take 1 tablet (10 mg total) by mouth as needed for allergies, Starting Fri 9/13/2024, OTC      !! methocarbamol (ROBAXIN) 500 mg tablet Take 1 tablet (500 mg total) by mouth 2 (two) times a day, Starting Sun 8/25/2024, Normal      methylPREDNISolone 4 MG tablet therapy pack Use as directed on package, Normal      naproxen (Naprosyn) 500 mg tablet Take 1 tablet (500 mg total) by mouth 2 (two) times a day with meals for 7 days, Starting Sun 8/25/2024, Until Fri 9/13/2024, Normal      nystatin (MYCOSTATIN) powder Apply topically 2 (two) times a day as needed (rash) To moist area, Starting Fri 9/13/2024, No Print      semaglutide, 1 mg/dose, (Ozempic, 1 MG/DOSE,) 4 mg/3 mL injection pen INJECT 0.75 ML (1 MG TOTAL) UNDER THE SKIN EVERY 7 DAYS, Normal       !! - Potential duplicate medications found. Please discuss with provider.          ED SEPSIS DOCUMENTATION   Time reflects when diagnosis was documented in both MDM as applicable and the Disposition within this note       Time User Action Codes Description Comment    12/2/2024  9:10 PM Olu Isaacs Add [M54.41] Acute midline low back pain with right-sided sciatica                  Olu Isaacs PA-C  12/03/24 0050

## 2024-12-04 ENCOUNTER — NURSE TRIAGE (OUTPATIENT)
Dept: PHYSICAL THERAPY | Facility: OTHER | Age: 59
End: 2024-12-04

## 2024-12-04 NOTE — TELEPHONE ENCOUNTER
Additional Information   Negative: Is this related to a work injury?   Negative: Is this related to an MVA?   Negative: Are you currently recieving homecare services?    Background - Initial Assessment  Clinical complaint: Pain is right low back into right lower side, hip and front of right thigh. No numbness or tingling. NKI. Pain has been present on and off for 3+months. This pain started 11/30/24. Normally pain is intermittent however for the last 5-6 days is constant. Seen in ED 12/2/24. Followed up with PCP on 12/3/24. Has PM appt scheduled for 12/17/24. Per Pt PCP informed her she does have renal stones, but not big enough to be causing her back pain. Had CT. No prior back pain or back SX. At this time pain is constant and feels sharp  Date of onset: 11/30/24  Frequency of pain: constant  Quality of pain: sharp    Protocols used: [facility] Protocol

## 2024-12-06 NOTE — TELEPHONE ENCOUNTER
This nurse called the patient to proceed with the triage initiated for her LBP.   Message left stating reason for the f/u call, [facility] contact information, and hours of operation.  Nurse thanked the patient for her patience and understanding as well.    Encouraged patient to CB if she would like to complete the triage and referral process.    Referral closed per protocol and will await possible call-back from the patient.

## 2024-12-17 ENCOUNTER — CONSULT (OUTPATIENT)
Dept: PAIN MEDICINE | Facility: CLINIC | Age: 59
End: 2024-12-17
Payer: COMMERCIAL

## 2024-12-17 ENCOUNTER — HOSPITAL ENCOUNTER (OUTPATIENT)
Dept: RADIOLOGY | Facility: HOSPITAL | Age: 59
Discharge: HOME | End: 2024-12-17
Payer: COMMERCIAL

## 2024-12-17 VITALS
BODY MASS INDEX: 37.91 KG/M2 | HEIGHT: 62 IN | SYSTOLIC BLOOD PRESSURE: 167 MMHG | HEART RATE: 78 BPM | DIASTOLIC BLOOD PRESSURE: 95 MMHG | WEIGHT: 206 LBS

## 2024-12-17 DIAGNOSIS — M75.51 SUBACROMIAL BURSITIS OF RIGHT SHOULDER JOINT: ICD-10-CM

## 2024-12-17 DIAGNOSIS — M50.120 CERVICAL DISC DISORDER WITH RADICULOPATHY OF MID-CERVICAL REGION: ICD-10-CM

## 2024-12-17 DIAGNOSIS — M48.02 FORAMINAL STENOSIS OF CERVICAL REGION: ICD-10-CM

## 2024-12-17 DIAGNOSIS — M25.511 ACUTE PAIN OF RIGHT SHOULDER: Primary | ICD-10-CM

## 2024-12-17 DIAGNOSIS — G89.29 CHRONIC RIGHT-SIDED LOW BACK PAIN WITHOUT SCIATICA: ICD-10-CM

## 2024-12-17 DIAGNOSIS — M54.50 CHRONIC RIGHT-SIDED LOW BACK PAIN WITHOUT SCIATICA: ICD-10-CM

## 2024-12-17 DIAGNOSIS — M25.511 ACUTE PAIN OF RIGHT SHOULDER: ICD-10-CM

## 2024-12-17 DIAGNOSIS — F32.2 SEVERE MAJOR DEPRESSIVE DISORDER (HCC): ICD-10-CM

## 2024-12-17 PROCEDURE — 73030 X-RAY EXAM OF SHOULDER: CPT

## 2024-12-17 PROCEDURE — 99204 OFFICE O/P NEW MOD 45 MIN: CPT | Performed by: PHYSICAL MEDICINE & REHABILITATION

## 2024-12-17 RX ORDER — GABAPENTIN 100 MG/1
100 CAPSULE ORAL 2 TIMES DAILY
Qty: 60 CAPSULE | Refills: 2 | Status: SHIPPED | OUTPATIENT
Start: 2024-12-17

## 2024-12-17 NOTE — PROGRESS NOTES
Assessment  1. Acute pain of right shoulder    2. Chronic right-sided low back pain without sciatica    3. Subacromial bursitis of right shoulder joint    4. Cervical disc disorder with radiculopathy of mid-cervical region    5. Foraminal stenosis of cervical region    6. Severe major depressive disorder (HCC)        Plan  Ms. Anderson is a pleasant 59-year-old female significant past medical history of hypertension, CAMERON, hyperparathyroidism, severe major depressive disorder presents to [facility] for initial evaluation regarding neck, right shoulder and low back pain with intermittent rating symptoms into the right lower extremity.  During today's evaluation she is demonstrating pain that is likely multifactorial in nature with the majority of her symptoms appear to be generating from the cervical spine, subacromial bursa of the right shoulder as well as lumbar spine.  She is pending physical therapy to start tomorrow dedicated to the low back and at this time our primary focus was to the neck and right shoulder.    At this time would proceed with updated shoulder x-ray and plan for a right subacromial bursa steroid injection under ultrasound guidance.  Will also plan for a cervical epidural steroid injection under fluoroscopy guidance given both clinical and diagnostic evidence of cervical radiculopathy with MRI cervical spine demonstrating mild to moderate foraminal narrowing notable at C5-C6 and C6-C7 and C3-C4.  All questions answered, patient is agreeable with plan.    Complete risks and benefits including bleeding, infection, tissue reaction, nerve injury and allergic reaction were discussed. The approach was demonstrated using models and literature was provided. Verbal and written consent was obtained.    Will also provide the patient with gabapentin 100 mg twice a day and titrate up as tolerated and necessary.    My impressions and treatment recommendations were discussed in detail  with the patient who verbalized understanding and had no further questions.  Discharge instructions were provided. I personally saw and examined the patient and I agree with the above discussed plan of care.    Orders Placed This Encounter   Procedures    FL spine and pain procedure     Standing Status:   Future     Expected Date:   12/17/2024     Expiration Date:   12/17/2028     Reason for Exam::   NATALIIA     Is the patient pregnant?:   No     Anticoagulant hold needed?:   No    US guidance     Right subacromial bursa inj USGI     Standing Status:   Future     Expected Date:   12/17/2024     Expiration Date:   12/17/2028     Scheduling Instructions:      No prep required.        Please bring your insurance cards and a form of photo ID.  Bring your order/script for the test if from a non - [facility] provider.        Arrive 15 minutes prior to your appointment time to register.            To schedule this appointment, please contact Central Scheduling at [phone].           Is the patient pregnant?:   No     What is the patient's sedation requirement?:   No Sedation    XR shoulder 2+ vw right     Standing Status:   Future     Number of Occurrences:   1     Expected Date:   12/17/2024     Expiration Date:   12/17/2028     Scheduling Instructions:      Bring along any outside films relating to this procedure.           Is the patient pregnant?:   No     New Medications Ordered This Visit   Medications    gabapentin (NEURONTIN) 100 mg capsule     Sig: Take 1 capsule (100 mg total) by mouth 2 (two) times a day     Dispense:  60 capsule     Refill:  2       History of Present Illness    Peace Anderson is a 59 y.o. female presents to [facility] for initial evaluation regarding 2 to 3 months duration of neck and low back pain as well as right shoulder and right lower extremity pain.  Denies any significant inciting event or recent trauma.  During today's evaluation she reports severe pain  rated 6 out of 10 and interfering to activities.  Pain is nearly constant 60 to 95% of the time that is present throughout the day and night.  Describes symptoms as sharp, throbbing pain.  Denies any upper or lower extremity weakness or falls.  Symptoms are worse with lying down, sitting, bending, walking.  Reports no significant improvements with heat.  Denies smoking, marijuana use and reports alcohol use socially.  Currently using Motrin with no relief.  Presents today for initial evaluation.    I have personally reviewed and/or updated the patient's past medical history, past surgical history, family history, social history, current medications, allergies, and vital signs today.     Review of Systems   Musculoskeletal:  Positive for back pain and neck pain.       Patient Active Problem List   Diagnosis    Mild persistent asthma without complication    Multinodular thyroid    Subclinical hyperthyroidism    Vitamin D deficiency    Essential hypertension    Kidney stone    Ascorbic acid deficiency    JANE (generalized anxiety disorder)    IFG (impaired fasting glucose)    Pyridoxine deficiency    Reactive airway disease with acute exacerbation    Acute stress disorder    MDD (major depressive disorder), recurrent episode, moderate (HCC)    Primary osteoarthritis of right knee    Menorrhagia with regular cycle    Iron deficiency anemia    Vertigo    CAMERON (obstructive sleep apnea)    Hyperparathyroidism (HCC)    Cervical radiculopathy    Venous insufficiency    Family history of heart disease    Psoriasis of scalp    Hyperplastic polyp of sigmoid colon    Mixed incontinence urge and stress    Morbid obesity (HCC)    Allergic rhinitis due to pollen    Toenail deformity    Severe major depressive disorder (HCC)       Past Medical History:   Diagnosis Date    Ascorbic acid deficiency 04/20/2020    Asthma due to seasonal allergies 09/25/2020    COVID 6/23/2022    Depression     Disease of thyroid gland     Essential  hypertension 04/20/2020    JANE (generalized anxiety disorder) 04/20/2020    Helicobacter pylori gastrointestinal tract infection 04/20/2020    10/02/2017=egd 9/2017 Dr Cervantes    Helicobacter pylori gastrointestinal tract infection 04/20/2020    10/02/2017=egd 9/2017 Dr Cervantes      History of Holter monitoring 12/22/2017    The baseline rhythm was of sinus origin with an average heart rate of 90 bpm (range: 72 - 129 bpm). There were rare single APCs adn VPCs (less than 0.1 % total beats). There were no sustained cardiac dysrhythmais. There were no significant symptomatic pauses.     History of stress test 01/05/2018    Mildly abnormal exercise Myoview SPECT study with evidence of a fixed anterior defect. This is most likely related to breast attenuation artifact. There was no evidence of reversible myocardial ischemia. Gated wall motion analysis was normal with well preserved systolic function. The left ventricle was normal in size with calculated EF of 73%. No prior similar study is available for comparison.     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    5    Reactive airway disease with acute exacerbation 05/20/2020    Subclinical hyperthyroidism 04/20/2020    Thyroid nodule 09/25/2020    Vitamin D deficiency 04/20/2020    33       Past Surgical History:   Procedure Laterality Date    COLONOSCOPY  09/08/2017    3 polyps=next 2020 w Dr Cervantes    EGD  09/01/2017    egd biopsy     KNEE ARTHROSCOPY W/ ACL RECONSTRUCTION      KNEE SURGERY      torn mcl, acl,  meniscus left knee 2016    TUBAL LIGATION         Family History   Problem Relation Age of Onset    Lung disease Mother         lung nodules    Coronary artery disease Mother         calcium score > 1000    Hypertension Mother     Diabetes Father     Colon cancer Maternal Aunt 60 - 69    Cancer Maternal Uncle     Kidney disease Maternal Uncle        Social History     Occupational History    Occupation: [employer]     Tobacco Use    Smoking status: Former     Current packs/day: 0.00     Average packs/day: 0.3 packs/day for 24.0 years (6.0 ttl pk-yrs)     Types: Cigarettes     Start date: 1990     Quit date: 2014     Years since quitting: 10.9     Passive exposure: Past    Smokeless tobacco: Never    Tobacco comments:     Has smoked since age: 17- As per Lorena    Vaping Use    Vaping status: Never Used   Substance and Sexual Activity    Alcohol use: Yes     Alcohol/week: 2.0 standard drinks of alcohol     Types: 1 Glasses of wine, 1 Shots of liquor per week     Comment: Social and holidays    Drug use: Never    Sexual activity: Not Currently     Partners: Male     Birth control/protection: None       Current Outpatient Medications on File Prior to Visit   Medication Sig    chlorhexidine (PERIDEX) 0.12 % solution Apply to the mouth or throat 2 (two) times a day    chlorthalidone 50 MG tablet TAKE 1 TABLET BY MOUTH EVERY DAY    clotrimazole (LOTRIMIN) 1 % cream Apply topically daily as needed (rash)    clotrimazole-betamethasone (LOTRISONE) 1-0.05 % cream Apply topically 2 (two) times a day as needed (rash)    DULoxetine (CYMBALTA) 60 mg delayed release capsule Take 1 capsule (60 mg total) by mouth daily    fluticasone (FLONASE) 50 mcg/act nasal spray 1 spray into each nostril daily    hydrOXYzine HCL (ATARAX) 25 mg tablet Take 1-2 tablets (25-50 mg total) by mouth every 6 (six) hours as needed for anxiety    Insulin Pen Needle (BD Pen Needle Jaymie U/F) 32G X 4 MM MISC Use daily as needed with insulin pen     "loratadine (CLARITIN) 10 mg tablet Take 1 tablet (10 mg total) by mouth as needed for allergies    naproxen (Naprosyn) 500 mg tablet Take 1 tablet (500 mg total) by mouth 2 (two) times a day with meals for 7 days    nystatin (MYCOSTATIN) powder Apply topically 2 (two) times a day as needed (rash) To moist area    semaglutide, 1 mg/dose, (Ozempic, 1 MG/DOSE,) 4 mg/3 mL injection pen INJECT 0.75 ML (1 MG TOTAL) UNDER THE SKIN EVERY 7 DAYS    diazepam (VALIUM) 5 mg tablet Take 1 tablet (5 mg total) by mouth 2 (two) times a day (Patient not taking: Reported on 12/17/2024)    lidocaine (Lidoderm) 5 % Apply 1 patch topically over 12 hours daily Remove & Discard patch within 12 hours or as directed by MD (Patient taking differently: Apply 1 patch topically if needed Remove & Discard patch within 12 hours or as directed by MD)    methocarbamol (ROBAXIN) 500 mg tablet Take 1 tablet (500 mg total) by mouth 2 (two) times a day (Patient not taking: Reported on 10/29/2024)    methocarbamol (ROBAXIN) 500 mg tablet Take 1 tablet (500 mg total) by mouth 2 (two) times a day (Patient not taking: Reported on 12/17/2024)    methylPREDNISolone 4 MG tablet therapy pack Use as directed on package (Patient not taking: Reported on 10/29/2024)    [DISCONTINUED] lisinopril (ZESTRIL) 20 mg tablet Take 1 tablet (20 mg total) by mouth daily     No current facility-administered medications on file prior to visit.       No Known Allergies    Physical Exam    /95   Pulse 78   Ht 5' 2\" (1.575 m)   Wt 93.4 kg (206 lb)   LMP  (LMP Unknown) Comment: Taking birth control for abnormal bleeding. Has not had a period in 2 years  BMI 37.68 kg/m²     Constitutional: normal, well developed, well nourished, alert, in no distress and non-toxic and no overt pain behavior.  Eyes: anicteric  HEENT: grossly intact  Neck: supple, symmetric, trachea midline and no masses   Pulmonary:even and unlabored  Cardiovascular:No edema or pitting edema " present  Skin:Normal without rashes or lesions and well hydrated  Psychiatric:Mood and affect appropriate  Neurologic:Cranial Nerves II-XII grossly intact  Musculoskeletal:normal  Neck exam  Tenderness to palpation bilateral cervical paraspinals, decreased active and passive range of motion with cervical extension limited by pain, MMT 5 out of 5 bilateral upper extremities, sensation decreased to light touch in patchy distribution right upper extremity, positive Spurling with radicular symptoms into the right arm  Shoulder exam  Tenderness to palpation of right lateral shoulder, positive Neer's, Barney and empty can testing, negative scarf test right shoulder  Imaging

## 2024-12-18 ENCOUNTER — EVALUATION (OUTPATIENT)
Dept: PHYSICAL THERAPY | Facility: CLINIC | Age: 59
End: 2024-12-18
Payer: COMMERCIAL

## 2024-12-18 DIAGNOSIS — M54.16 LUMBAR RADICULOPATHY: Primary | ICD-10-CM

## 2024-12-18 PROCEDURE — 97110 THERAPEUTIC EXERCISES: CPT | Performed by: PHYSICAL THERAPIST

## 2024-12-18 PROCEDURE — 97161 PT EVAL LOW COMPLEX 20 MIN: CPT | Performed by: PHYSICAL THERAPIST

## 2024-12-18 PROCEDURE — 97112 NEUROMUSCULAR REEDUCATION: CPT | Performed by: PHYSICAL THERAPIST

## 2024-12-18 NOTE — PROGRESS NOTES
PT Evaluation     Today's date: 2024  Patient name: ePace Anderson  : 1965  MRN: [id number]  Referring provider: Atri Luna DO  Dx:   Encounter Diagnosis     ICD-10-CM    1. Lumbar radiculopathy  M54.16                      Assessment  Impairments: abnormal gait, abnormal or restricted ROM, activity intolerance, difficulty understanding, impaired balance, impaired physical strength, lacks appropriate home exercise program, pain with function, poor posture , poor body mechanics, activity limitations and endurance    Assessment details: Pt presents with signs and symptoms synonymous of admitting diagnosis as well as possible mechanical diagnosis of derangement with DP of ext principle.  Pt presents with pain, decreased strength, decreased range, flexibility, as well as tolerance to activity and postural awareness.  Pt would benefit skilled PT intervention in order to address these impairments in order to be able to perform all desired activities with minimal to nil symptom exacerbation.  Based on today she will likely have a strong outcome but if she fails to do so, appropriate referral will be performed.  Thank you very much for this referral.     Understanding of Dx/Px/POC: good     Prognosis: good    Goals  STG 4 Weeks:  Decrease pain at worst to 6  Improve range to LS range to mod  Improve strength to LE sustain to 5/5.    Independent with HEP  LTG 8 Weeks:  Decrease pain at worst to 3  Improve range to LS range to min  Improve strength to LE to sustain 5/5.    Able to perform all desired activities with minimal to nil symptom exacerbation      Plan  Patient would benefit from: skilled physical therapy  Planned modality interventions: cryotherapy and thermotherapy: hydrocollator packs    Planned therapy interventions: joint mobilization, abdominal trunk stabilization, activity modification, neuromuscular re-education, patient/caregiver education, strengthening, stretching, therapeutic  activities, therapeutic exercise, therapeutic training, transfer training, home exercise program, graded motor, graded exercise, graded activity, gait training, functional ROM exercises, flexibility, behavior modification and body mechanics training    Frequency: 2x week  Duration in weeks: 10  Treatment plan discussed with: patient        Subjective Evaluation    History of Present Illness  Date of onset: 12/18/2024  Mechanism of injury: Pt is a 59 yofemale who presents today stating that a few weeks ago she developed low back pain without JIM.  She decided in September she needed to go to the hospital where they did a CAT scan and determined that she had kidney stones.  Pt reports that she was educated to be able to clear them.  She reports that she was unable to, had more imagery indicated that there was only 1 left.  Pt reports that the back pain was coming and going and went back to the hospital on 12/2/24, and it was determined that the back pain she was experiencing was not coming from kidney stones.  Pt reports that at that visit received torodol, valium, and robaxin which the combination of all 3 did not assist with her back pain.  Pt reports that the low back pain began to calm down but there has been a base line annoyance for approximately 6 months.  Pt reports between hospital visits and her family physician referral to PT was appropriate and thus presents today.  Pt reports that she has constant low back pain R > L.  And has intermittent R thigh pain, not glute, not below the knee.  L LE is okay.  Pt reports no recent change in bowel or bladder.  Pt reports that bending can hurt, reports that moving can seem to assist, not aware if static positions assist or worsen.  Pt reports that sitting can cause issues, static sit to stand can be problematic.  Pt reports there can be challenges with sleeping, side lying is probably her best presentation.  Uncomfortable laying on back or stomach.  Pt reports she is  awakened by pain but can adjust and fall back asleep.  Pt reports that goals are to reduce pain, improve ability to sit, to perform tasks at work, her home and get back to how she was 7 months ago.    Is currently in pain management for UE and neck pain with Dr. Mann.    Quality of life: good    Patient Goals  Patient goals for therapy: increased strength, return to work, decreased pain and return to sport/leisure activities    Pain  Current pain rating: 3  At best pain rating: 3  At worst pain rating: 10  Quality: dull ache, tight and sharp  Relieving factors: heat and change in position  Progression: improved      Diagnostic Tests  CT scan: normal  Treatments  Previous treatment: medication        Objective     Active Range of Motion     Lumbar   Flexion:  with pain Restriction level: minimal  Extension:  with pain Restriction level: maximal  Left lateral flexion:  with pain Restriction level: minimal  Right lateral flexion:  Restriction level: minimal    Additional Active Range of Motion Details  Forward head, rounded shoulders, Lordosis  B/L Sensation intact to L3,4,5,S1,S2  DTR Patella 1+ R, 2+ L.  Sulema 2+ b/l   Postural correction low back R > L. NE.    Repeated motion   Flex  Ext low back.  REIL B.  UE fatigue.  REISitting D B  SB R  SB L  LE Strength  Hip   L Flex 4 Ext Abd Add  R Flex 4 Ext Abd Add  LE Screen strong and painless.    Joint Mobility  Palpation  STs               Precautions: Asthma, HTN.        Manuals 12/18                                                                Neuro Re-Ed             Postural Ed and progression 10 Min            Cut Finger/Bank Account/sit<>stand  performed                                      Eventual Hip Abd + Ext             Eventual Scap Add/Scaption                           Ther Ex             REIL 3 x 10            Progression?                          REISitting 3 x 10            Progression?                                                    Ther  Activity             LS ROM  better            Walking better            Continue to screen LE strength                                       Modalities             CP/HP

## 2024-12-19 ENCOUNTER — RESULTS FOLLOW-UP (OUTPATIENT)
Dept: PAIN MEDICINE | Facility: CLINIC | Age: 59
End: 2024-12-19

## 2024-12-19 NOTE — TELEPHONE ENCOUNTER
----- Message from Aguila Mann DO sent at 12/19/2024 11:07 AM EST -----  Please notify patient mild glenohumeral and AC joint arthritis  Please proceed with right subacromial bursa steroid injection under ultrasound guidance  Thank you  ----- Message -----  From: Interface, Radiology Results In  Sent: 12/19/2024  10:14 AM EST  To: Aguila Mann DO

## 2024-12-23 NOTE — TELEPHONE ENCOUNTER
S/w Pt, advised of the same. Pt verbalized understanding. Pt would like to proceed with right subacromial bursa steroid injection under ultrasound guidance. Please contact for scheduling.

## 2024-12-26 NOTE — TELEPHONE ENCOUNTER
Caller: fidencio Gupta    Doctor: Johnathan    Reason for call: pt stated that the gabapentin (NEURONTIN) 100 mg capsule is not helping with the pain at all. Pt would like to call back. Pain level is a 5.    Call back#: [phone]

## 2024-12-26 NOTE — RESULT ENCOUNTER NOTE
S/w pt, she is continuing with pain despite the Gabapentin 100 mg BID. RN explained this medication is titratable and we can further increase as long as no s/e. Pt confirmed no s/e, RN advised pt to increase as follows: 100 mg in the AM, 200 mg at HS for 3 days then 200 mg BID and call back with an update. Pt verbalized understanding.

## 2024-12-26 NOTE — TELEPHONE ENCOUNTER
Caller: pt    Doctor: DANIELLA    Reason for call: Returning call to nurse    Call back#: [phone]

## 2024-12-27 ENCOUNTER — OFFICE VISIT (OUTPATIENT)
Dept: PHYSICAL THERAPY | Facility: CLINIC | Age: 59
End: 2024-12-27
Payer: COMMERCIAL

## 2024-12-27 DIAGNOSIS — M54.16 LUMBAR RADICULOPATHY: Primary | ICD-10-CM

## 2024-12-27 PROCEDURE — 97110 THERAPEUTIC EXERCISES: CPT

## 2024-12-27 PROCEDURE — 97112 NEUROMUSCULAR REEDUCATION: CPT

## 2024-12-27 NOTE — PROGRESS NOTES
Daily Note     Today's date: 2024  Patient name: Peace Anderson  : 1965  MRN: [id number]  Referring provider: Arti Luna DO  Dx:   Encounter Diagnosis     ICD-10-CM    1. Lumbar radiculopathy  M54.16           Start Time: 910  Stop Time: 934  Total time in clinic (min): 24 minutes    Subjective: Pt presents to session 10 minutes late and was accommodated. Pt reports that she felt worse following the IE in her back, but her R thigh symptoms are intermittent.       Objective: See treatment diary below  Presents with R thigh pain above the knee and LBP    Assessment: Tolerated treatment well. Education on centralizing phenomenon. Responding well to extension.  Patient would benefit from continued PT      Plan: Continue per plan of care.      Precautions: Asthma, HTN.        Manuals                                                                Neuro Re-Ed             Postural Ed and progression 10 Min 5 min            Cut Finger/Bank Account/sit<>stand  performed                                      Eventual Hip Abd + Ext             Eventual Scap Add/Scaption                           Ther Ex             REIL 3 x 10 2x10   Ce, B           Progression?                          REISitting 3 x 10            JEFFRY standing   2x10              Progression?  2x10   Belt OP                                                  Ther Activity             LS ROM  better B           Walking better B           Continue to screen LE strength                                       Modalities             CP/HP

## 2024-12-30 ENCOUNTER — OFFICE VISIT (OUTPATIENT)
Dept: PHYSICAL THERAPY | Facility: CLINIC | Age: 59
End: 2024-12-30
Payer: COMMERCIAL

## 2024-12-30 DIAGNOSIS — M54.16 LUMBAR RADICULOPATHY: Primary | ICD-10-CM

## 2024-12-30 PROCEDURE — 97110 THERAPEUTIC EXERCISES: CPT | Performed by: PHYSICAL THERAPIST

## 2024-12-30 NOTE — PROGRESS NOTES
Daily Note     Today's date: 2024  Patient name: Peace Anderson  : 1965  MRN: [id number]  Referring provider: Arti Luna DO  Dx:   Encounter Diagnosis     ICD-10-CM    1. Lumbar radiculopathy  M54.16                      Subjective: Pt reports that she has been doing okay, the R thigh pain has been causing irritation and called her physician to increase Gabapentin dosage which did seem to help.  Night time pain is the worse.  Reports thigh pain has not been present since Saturday last week.        Objective: See treatment diary below  Presents with just low back pain, no thigh pain.     Assessment:  Did not peritonealized t/o entire session.  Semi loaded and unloaded positions were most beneficial for patient.  Continue to progress as able.       Plan: Continue per plan of care.      Precautions: Asthma, HTN.        Manuals                                                               Neuro Re-Ed             Postural Ed and progression 10 Min 5 min            Cut Finger/Bank Account/sit<>stand  performed                                      Eventual Hip Abd + Ext             Eventual Scap Add/Scaption                           Ther Ex             REIL 3 x 10 2x10   Ce, B 2 x 10          Progression?                          REISitting 3 x 10 2 x 10 3 x 10          JEFFRY standing   2x10    2 x 10          Progression?  2x10   Belt OP 2 x 10 belt                                                 Ther Activity             LS ROM  better B           Walking better B           Continue to screen LE strength  5/5.                                     Modalities             CP/HP    Prone CP x 10

## 2025-01-03 ENCOUNTER — OFFICE VISIT (OUTPATIENT)
Dept: PHYSICAL THERAPY | Facility: CLINIC | Age: 60
End: 2025-01-03
Payer: COMMERCIAL

## 2025-01-03 DIAGNOSIS — M54.16 LUMBAR RADICULOPATHY: Primary | ICD-10-CM

## 2025-01-03 PROCEDURE — 97110 THERAPEUTIC EXERCISES: CPT

## 2025-01-03 NOTE — PROGRESS NOTES
Daily Note     Today's date: 1/3/2025  Patient name: Peace Anderson  : 1965  MRN: [id number]  Referring provider: Arti Luna DO  Dx:   Encounter Diagnosis     ICD-10-CM    1. Lumbar radiculopathy  M54.16           Start Time: 1430  Stop Time: 1500  Total time in clinic (min): 30 minutes    Subjective: Pt reports that she feels about the same and continues with LE pain. She has noticed that the pain may be a little better following the increase of Gabapentin.Reports HEP performance about 2x a day.       Objective: See treatment diary below  Presents with LBP but no LE symptoms     Assessment:  Pt tolerated unloaded extension best and sustained extension was trialed due to UE fatigue.       Plan: Continue per plan of care.      Precautions: Asthma, HTN.        Manuals                                                              Neuro Re-Ed             Postural Ed and progression 10 Min 5 min   5 min          Cut Finger/Bank Account/sit<>stand  performed                                      Eventual Hip Abd + Ext             Eventual Scap Add/Scaption                           Ther Ex             REIL 3 x 10 2x10   Ce, B 2 x 10 2x10  D, B         Progression?                          REISitting 3 x 10 2 x 10 3 x 10 3x10         JEFFRY standing   2x10    2 x 10 2x10  I, W         Progression?  2x10   Belt OP 2 x 10 belt          Sustained ext    5 min early range  D B                                   Ther Activity             LS ROM  better B           Walking better B           Continue to screen LE strength  5/5.                                     Modalities             CP/HP    Prone CP x 10

## 2025-01-06 ENCOUNTER — APPOINTMENT (OUTPATIENT)
Dept: PHYSICAL THERAPY | Facility: CLINIC | Age: 60
End: 2025-01-06
Payer: COMMERCIAL

## 2025-01-09 ENCOUNTER — OFFICE VISIT (OUTPATIENT)
Dept: FAMILY MEDICINE CLINIC | Facility: CLINIC | Age: 60
End: 2025-01-09
Payer: COMMERCIAL

## 2025-01-09 ENCOUNTER — RESULTS FOLLOW-UP (OUTPATIENT)
Dept: FAMILY MEDICINE CLINIC | Facility: CLINIC | Age: 60
End: 2025-01-09

## 2025-01-09 ENCOUNTER — HOSPITAL ENCOUNTER (OUTPATIENT)
Dept: RADIOLOGY | Facility: HOSPITAL | Age: 60
Discharge: HOME | End: 2025-01-09
Payer: COMMERCIAL

## 2025-01-09 VITALS
RESPIRATION RATE: 20 BRPM | TEMPERATURE: 98.5 F | SYSTOLIC BLOOD PRESSURE: 136 MMHG | DIASTOLIC BLOOD PRESSURE: 90 MMHG | HEIGHT: 62 IN | HEART RATE: 93 BPM | BODY MASS INDEX: 38.09 KG/M2 | OXYGEN SATURATION: 94 % | WEIGHT: 207 LBS

## 2025-01-09 DIAGNOSIS — R06.02 SOB (SHORTNESS OF BREATH): ICD-10-CM

## 2025-01-09 DIAGNOSIS — R05.1 ACUTE COUGH: ICD-10-CM

## 2025-01-09 DIAGNOSIS — Z23 ENCOUNTER FOR IMMUNIZATION: ICD-10-CM

## 2025-01-09 DIAGNOSIS — I10 ESSENTIAL HYPERTENSION: ICD-10-CM

## 2025-01-09 DIAGNOSIS — G47.33 OSA (OBSTRUCTIVE SLEEP APNEA): ICD-10-CM

## 2025-01-09 DIAGNOSIS — R68.83 CHILLS: ICD-10-CM

## 2025-01-09 DIAGNOSIS — B34.9 VIRAL INFECTION, UNSPECIFIED: Primary | ICD-10-CM

## 2025-01-09 DIAGNOSIS — J45.30 MILD PERSISTENT ASTHMA WITHOUT COMPLICATION: ICD-10-CM

## 2025-01-09 LAB
SARS-COV-2 AG UPPER RESP QL IA: NEGATIVE
VALID CONTROL: NORMAL

## 2025-01-09 PROCEDURE — 90471 IMMUNIZATION ADMIN: CPT | Performed by: FAMILY MEDICINE

## 2025-01-09 PROCEDURE — 90673 RIV3 VACCINE NO PRESERV IM: CPT | Performed by: FAMILY MEDICINE

## 2025-01-09 PROCEDURE — 99214 OFFICE O/P EST MOD 30 MIN: CPT | Performed by: FAMILY MEDICINE

## 2025-01-09 PROCEDURE — 71046 X-RAY EXAM CHEST 2 VIEWS: CPT

## 2025-01-09 PROCEDURE — 87811 SARS-COV-2 COVID19 W/OPTIC: CPT | Performed by: FAMILY MEDICINE

## 2025-01-09 RX ORDER — ALBUTEROL SULFATE 90 UG/1
2 INHALANT RESPIRATORY (INHALATION) EVERY 4 HOURS PRN
Qty: 18 G | Refills: 0 | Status: SHIPPED | OUTPATIENT
Start: 2025-01-09 | End: 2025-01-19

## 2025-01-09 RX ORDER — BENZONATATE 100 MG/1
100 CAPSULE ORAL 3 TIMES DAILY PRN
Qty: 30 CAPSULE | Refills: 0 | Status: SHIPPED | OUTPATIENT
Start: 2025-01-09 | End: 2025-01-19

## 2025-01-09 NOTE — PROGRESS NOTES
COVID-19 Outpatient Progress Note  Name: Peace Anderson      : 1965      MRN: [id number]  Encounter Provider: Suzette Ornelas DO  Encounter Date: 2025   Encounter department: [facility]    Assessment & Plan  Viral infection, unspecified    Orders:  •  Poct Covid 19 Rapid Antigen Test    Essential hypertension         Mild persistent asthma without complication    Orders:  •  albuterol (PROVENTIL HFA,VENTOLIN HFA) 90 mcg/act inhaler; Inhale 2 puffs every 4 (four) hours as needed for shortness of breath or wheezing for up to 10 days    CAMERON (obstructive sleep apnea)         Encounter for immunization    Orders:  •  influenza vaccine, recombinant, PF, 0.5 mL IM (Flublok)    Acute cough    Orders:  •  XR chest pa and lateral; Future  •  benzonatate (TESSALON PERLES) 100 mg capsule; Take 1 capsule (100 mg total) by mouth 3 (three) times a day as needed for cough for up to 10 days    Chills    Orders:  •  XR chest pa and lateral; Future    SOB (shortness of breath)    Orders:  •  XR chest pa and lateral; Future      Disposition:     Rapid antigen testing was performed and the result is negative for COVID-19. Risks and benefits of COVID-19 vaccination was discussed with patient.     Discussed symptom directed medication options with patient.   Viral Illness / Cough / SOB / Chills / Asthma / HTN / CAMERON - Repeat COVID home test in 24 hours.  Check STAT Chest Xray - R/O Pneumonia.  Use Albuterol HFA PRN.  Start Tessalon 100mg TID PRN.          Advise Allan med sinus rinse kit, Mucinex, Claritin/Zyrtec/Allegra/Xyzal, Flonase / Nasacort nasal spray.  Avoid decongestants if you have high blood pressure.      I have spent a total time of 20 minutes on the day of the encounter for this patient including discussing diagnostic results, discussing prognosis, risks and benefits of treatment options, instructions for management, patient and family education, importance of treatment compliance,  risk factor reductions, impressions, counseling/coordination of care, documenting in the medical record, reviewing/ordering tests, medicine, procedures and obtaining or reviewing history.          Encounter provider: Suzette Ornelas DO     Provider located at: [facility]  [address]  [place] [address] [phone]     Recent Visits  No visits were found meeting these conditions.  Showing recent visits within past 7 days and meeting all other requirements  Today's Visits  Date Type Provider Dept   01/09/25 Office Visit Suzette Ornelas DO [facility]   Showing today's visits and meeting all other requirements  Future Appointments  No visits were found meeting these conditions.  Showing future appointments within next 150 days and meeting all other requirements    History of Present Illness      Subjective:   Peace Anderson is a 59 y.o. female who is concerned about COVID-19. Patient's symptoms include chills, fatigue, nasal congestion, rhinorrhea, sore throat, cough (Dry), shortness of breath (At rest and with activity), myalgias (Chronic) and headache (B/L Parietal). Patient denies fever, anosmia, loss of taste, chest tightness, abdominal pain, nausea, vomiting and diarrhea.     - Date of symptom onset: 1/6/2025      COVID-19 vaccination status: Fully vaccinated with booster    Exposure:   Contact with a person who is under investigation (PUI) for or who is positive for COVID-19 within the last 14 days?: No    PTO c daughter Dafne    Did not COVID test at home.      Using NyQuil and aleve prn s benefit.  Not using Albuterol HFA.      Lab Results   Component Value Date    SARSCOV2 Positive (A) 12/16/2023    SARSCOVAG Negative 01/09/2025       Review of Systems   Constitutional:  Positive for chills and fatigue. Negative for fever.   HENT:  Positive for congestion, rhinorrhea and sore throat.    Respiratory:  Positive for cough (Dry) and shortness of breath  "(At rest and with activity). Negative for chest tightness.    Gastrointestinal:  Negative for abdominal pain, diarrhea, nausea and vomiting.   Musculoskeletal:  Positive for myalgias (Chronic).   Neurological:  Positive for headaches (B/L Parietal).     Objective   /90   Pulse 93   Temp 98.5 °F (36.9 °C) (Temporal)   Resp 20   Ht 5' 2\" (1.575 m)   Wt 93.9 kg (207 lb)   LMP  (LMP Unknown) Comment: Taking birth control for abnormal bleeding. Has not had a period in 2 years  SpO2 94%   BMI 37.86 kg/m²     Physical Exam  Vitals and nursing note reviewed.   Constitutional:       General: She is not in acute distress.     Appearance: Normal appearance. She is well-developed. She is obese. She is not ill-appearing or toxic-appearing.   HENT:      Head: Normocephalic and atraumatic.      Right Ear: Tympanic membrane, ear canal and external ear normal.      Left Ear: Tympanic membrane, ear canal and external ear normal.      Nose: Nose normal.      Right Sinus: No maxillary sinus tenderness or frontal sinus tenderness.      Left Sinus: No maxillary sinus tenderness or frontal sinus tenderness.      Mouth/Throat:      Mouth: Mucous membranes are moist.      Pharynx: Oropharynx is clear. No oropharyngeal exudate or posterior oropharyngeal erythema.   Eyes:      Extraocular Movements: Extraocular movements intact.      Conjunctiva/sclera: Conjunctivae normal.   Cardiovascular:      Rate and Rhythm: Normal rate and regular rhythm.      Pulses: Normal pulses.      Heart sounds: Normal heart sounds. No murmur heard.  Pulmonary:      Effort: Pulmonary effort is normal. No respiratory distress.      Breath sounds: Normal breath sounds.   Musculoskeletal:         General: No swelling or tenderness.      Cervical back: Neck supple.      Right lower leg: No edema.      Left lower leg: No edema.   Lymphadenopathy:      Cervical: No cervical adenopathy.   Skin:     General: Skin is warm and dry.      Capillary Refill: " Capillary refill takes less than 2 seconds.   Neurological:      General: No focal deficit present.      Mental Status: She is alert and oriented to person, place, and time.   Psychiatric:         Mood and Affect: Mood normal.

## 2025-01-09 NOTE — PATIENT INSTRUCTIONS
Advise Allan med sinus rinse kit, Mucinex, Claritin/Zyrtec/Allegra/Xyzal, Flonase / Nasacort nasal spray.  Avoid decongestants if you have high blood pressure.

## 2025-01-09 NOTE — RESULT ENCOUNTER NOTE
X-ray is negative for new heart or lung disease.  There is no pneumonia seen.  Continue plan of care.    Message sent to patient via [facility] patient portal.

## 2025-01-09 NOTE — ASSESSMENT & PLAN NOTE
Orders:  •  albuterol (PROVENTIL HFA,VENTOLIN HFA) 90 mcg/act inhaler; Inhale 2 puffs every 4 (four) hours as needed for shortness of breath or wheezing for up to 10 days  
Never smoker

## 2025-01-15 ENCOUNTER — HOSPITAL ENCOUNTER (OUTPATIENT)
Dept: RADIOLOGY | Facility: HOSPITAL | Age: 60
Discharge: HOME | End: 2025-01-15
Attending: PHYSICAL MEDICINE & REHABILITATION
Payer: COMMERCIAL

## 2025-01-15 VITALS
RESPIRATION RATE: 17 BRPM | OXYGEN SATURATION: 98 % | SYSTOLIC BLOOD PRESSURE: 121 MMHG | DIASTOLIC BLOOD PRESSURE: 74 MMHG | HEART RATE: 76 BPM | TEMPERATURE: 97.2 F

## 2025-01-15 DIAGNOSIS — M48.02 FORAMINAL STENOSIS OF CERVICAL REGION: ICD-10-CM

## 2025-01-15 DIAGNOSIS — M25.511 ACUTE PAIN OF RIGHT SHOULDER: ICD-10-CM

## 2025-01-15 DIAGNOSIS — M75.51 SUBACROMIAL BURSITIS OF RIGHT SHOULDER JOINT: ICD-10-CM

## 2025-01-15 DIAGNOSIS — M50.120 CERVICAL DISC DISORDER WITH RADICULOPATHY OF MID-CERVICAL REGION: ICD-10-CM

## 2025-01-15 DIAGNOSIS — M54.50 CHRONIC RIGHT-SIDED LOW BACK PAIN WITHOUT SCIATICA: ICD-10-CM

## 2025-01-15 DIAGNOSIS — G89.29 CHRONIC RIGHT-SIDED LOW BACK PAIN WITHOUT SCIATICA: ICD-10-CM

## 2025-01-15 PROCEDURE — 62321 NJX INTERLAMINAR CRV/THRC: CPT | Performed by: PHYSICAL MEDICINE & REHABILITATION

## 2025-01-15 RX ORDER — METHYLPREDNISOLONE ACETATE 80 MG/ML
80 INJECTION, SUSPENSION INTRA-ARTICULAR; INTRALESIONAL; INTRAMUSCULAR; PARENTERAL; SOFT TISSUE ONCE
Status: COMPLETED | OUTPATIENT
Start: 2025-01-15 | End: 2025-01-15

## 2025-01-15 RX ADMIN — IOHEXOL 1 ML: 300 INJECTION, SOLUTION INTRAVENOUS at 10:48

## 2025-01-15 RX ADMIN — METHYLPREDNISOLONE ACETATE 80 MG: 80 INJECTION, SUSPENSION INTRA-ARTICULAR; INTRALESIONAL; INTRAMUSCULAR; SOFT TISSUE at 10:48

## 2025-01-15 NOTE — H&P
History of Present Illness: The patient is a 59 y.o. 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 There were rare single APCs adn VPCs (less than 0.1 % total beats). There were no sustained cardiac dysrhythmais. There were no significant symptomatic pauses.     History of stress test 01/05/2018    Mildly abnormal exercise Myoview SPECT study with evidence of a fixed anterior defect. This is most likely related to breast attenuation artifact. There was no evidence of reversible myocardial ischemia. Gated wall motion analysis was normal with well preserved systolic function. The left ventricle was normal in size with calculated EF of 73%. No prior similar study is available for comparison.     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    5    Reactive airway disease with acute exacerbation 05/20/2020    Subclinical hyperthyroidism 04/20/2020     Thyroid nodule 09/25/2020    Vitamin D deficiency 04/20/2020    33       Past Surgical History:   Procedure Laterality Date    COLONOSCOPY  09/08/2017    3 polyps=next 2020 w Dr Cervantes    EGD  09/01/2017    egd biopsy     KNEE ARTHROSCOPY W/ ACL RECONSTRUCTION      KNEE SURGERY      torn mcl, acl, meniscus left knee 2016    TUBAL LIGATION           Current Outpatient Medications:     albuterol (PROVENTIL HFA,VENTOLIN HFA) 90 mcg/act inhaler, Inhale 2 puffs every 4 (four) hours as needed for shortness of breath or wheezing for up to 10 days, Disp: 18 g, Rfl: 0    benzonatate (TESSALON PERLES) 100 mg capsule, Take 1 capsule (100 mg total) by mouth 3 (three) times a day as needed for cough for up to 10 days, Disp: 30 capsule, Rfl: 0    chlorhexidine (PERIDEX) 0.12 % solution, Apply to the mouth or throat 2 (two) times a day, Disp: , Rfl:     chlorthalidone 50 MG tablet, TAKE 1 TABLET BY MOUTH EVERY DAY, Disp: 90 tablet, Rfl: 0    clotrimazole (LOTRIMIN) 1 % cream, Apply topically daily as needed (rash), Disp: , Rfl:     clotrimazole-betamethasone (LOTRISONE) 1-0.05 % cream, Apply topically 2 (two) times a day as needed (rash), Disp: , Rfl:     DULoxetine (CYMBALTA) 60 mg delayed release capsule, Take 1 capsule (60 mg total) by mouth daily, Disp: 90 capsule, Rfl: 3    fluticasone (FLONASE) 50 mcg/act nasal spray, 1 spray into each nostril daily, Disp: 18 mL, Rfl: 11    gabapentin (NEURONTIN) 100 mg capsule, Take 1 capsule (100 mg total) by mouth 2 (two) times a day, Disp: 60 capsule, Rfl: 2    hydrOXYzine HCL (ATARAX) 25 mg tablet, Take 1-2 tablets (25-50 mg total) by mouth every 6 (six) hours as needed for anxiety, Disp: , Rfl:     Insulin Pen Needle (BD Pen Needle Jaymie U/F) 32G X 4 MM MISC, Use daily as needed with insulin pen, Disp: 100 each, Rfl: 2    loratadine (CLARITIN) 10 mg tablet, Take 1 tablet (10 mg total) by mouth as needed for allergies, Disp: , Rfl:     naproxen (Naprosyn) 500 mg tablet, Take 1 tablet  (500 mg total) by mouth 2 (two) times a day with meals for 7 days, Disp: 14 tablet, Rfl: 0    nystatin (MYCOSTATIN) powder, Apply topically 2 (two) times a day as needed (rash) To moist area, Disp: , Rfl:     semaglutide, 1 mg/dose, (Ozempic, 1 MG/DOSE,) 4 mg/3 mL injection pen, INJECT 0.75 ML (1 MG TOTAL) UNDER THE SKIN EVERY 7 DAYS, Disp: 9 mL, Rfl: 1    No Known Allergies    Physical Exam:   Vitals:    01/15/25 1035   BP: 131/76   Pulse: 85   Resp: 17   Temp: (!) 97.2 °F (36.2 °C)   SpO2: 97%     General: Awake, Alert, Oriented x 3, Mood and affect appropriate  Respiratory: Respirations even and unlabored  Cardiovascular: Peripheral pulses intact; no edema  Musculoskeletal Exam: tenderness to palpation bilateral cervical paraspinals     ASA Score: 2    Patient/Chart Verification  Patient ID Verified: Verbal  ID Band Applied: No  H&P( within 30 days) Verified: To be obtained in the Procedural area  Allergies Reviewed: Yes  Anticoag/NSAID held?: NA  Currently on antibiotics?: No    Assessment:   1. Chronic right-sided low back pain without sciatica    2. Acute pain of right shoulder    3. Subacromial bursitis of right shoulder joint    4. Cervical disc disorder with radiculopathy of mid-cervical region    5. Foraminal stenosis of cervical region        Plan: NATALIIA

## 2025-01-15 NOTE — DISCHARGE INSTR - LAB
Epidural Steroid Injection   WHAT YOU NEED TO KNOW:   An epidural steroid injection (CARROL) is a procedure to inject steroid medicine into the epidural space. The epidural space is between your spinal cord and vertebrae. Steroids reduce inflammation and fluid buildup in your spine that may be causing pain. You may be given pain medicine along with the steroids.          ACTIVITY  Do not drive or operate machinery today.  No strenuous activity today - bending, lifting, etc.  You may resume normal activites starting tomorrow - start slowly and as tolerated.  You may shower today, but no tub baths or hot tubs.  You may have numbness for several hours from the local anesthetic. Please use caution and common sense, especially with weight-bearing activities.    CARE OF THE INJECTION SITE  If you have soreness or pain, apply ice to the area today (20 minutes on/20 minutes off).  Starting tomorrow, you may use warm, moist heat or ice if needed.  You may have an increase or change in your discomfort for 36-48 hours after your treatment.  Apply ice and continue with any pain medication you have been prescribed.  Notify the [facility] if you have any of the following: redness, drainage, swelling, headache, stiff neck or fever above 100°F.    SPECIAL INSTRUCTIONS  Our office will contact you in approximately 14 days for a progress report.    MEDICATIONS  Continue to take all routine medications.  Our office may have instructed you to hold some medications.    As no general anesthesia was used in today's procedure, you should not experience any side effects related to anesthesia.     If you are diabetic, the steroids used in today's injection may temporarily increase your blood sugar levels after the first few days after your injection. Please keep a close eye on your sugars and alert the doctor who manages your diabetes if your sugars are significantly high from your baseline or you are symptomatic.     If you have a  problem specifically related to your procedure, please call our office at [phone].  Problems not related to your procedure should be directed to your primary care physician.

## 2025-01-29 ENCOUNTER — TELEPHONE (OUTPATIENT)
Dept: PAIN MEDICINE | Facility: CLINIC | Age: 60
End: 2025-01-29

## 2025-01-30 ENCOUNTER — PROCEDURE VISIT (OUTPATIENT)
Dept: PAIN MEDICINE | Facility: CLINIC | Age: 60
End: 2025-01-30
Payer: COMMERCIAL

## 2025-01-30 VITALS — DIASTOLIC BLOOD PRESSURE: 88 MMHG | RESPIRATION RATE: 16 BRPM | HEART RATE: 74 BPM | SYSTOLIC BLOOD PRESSURE: 130 MMHG

## 2025-01-30 DIAGNOSIS — M75.51 BURSITIS OF RIGHT SHOULDER: Primary | ICD-10-CM

## 2025-01-30 DIAGNOSIS — M54.2 NECK PAIN: ICD-10-CM

## 2025-01-30 PROCEDURE — 20611 DRAIN/INJ JOINT/BURSA W/US: CPT | Performed by: PHYSICAL MEDICINE & REHABILITATION

## 2025-01-30 RX ORDER — ROPIVACAINE HYDROCHLORIDE 2 MG/ML
2 INJECTION, SOLUTION EPIDURAL; INFILTRATION; PERINEURAL
Status: SHIPPED | OUTPATIENT
Start: 2025-01-30

## 2025-01-30 RX ORDER — METHYLPREDNISOLONE ACETATE 40 MG/ML
40 INJECTION, SUSPENSION INTRA-ARTICULAR; INTRALESIONAL; INTRAMUSCULAR; SOFT TISSUE ONCE
Status: COMPLETED | OUTPATIENT
Start: 2025-01-30 | End: 2025-01-30

## 2025-01-30 RX ORDER — GABAPENTIN 300 MG/1
300 CAPSULE ORAL 3 TIMES DAILY
Qty: 90 CAPSULE | Refills: 1 | Status: SHIPPED | OUTPATIENT
Start: 2025-01-30

## 2025-01-30 RX ADMIN — METHYLPREDNISOLONE ACETATE 40 MG: 40 INJECTION, SUSPENSION INTRA-ARTICULAR; INTRALESIONAL; INTRAMUSCULAR; SOFT TISSUE at 14:38

## 2025-01-30 RX ADMIN — ROPIVACAINE HYDROCHLORIDE 2 ML: 2 INJECTION, SOLUTION EPIDURAL; INFILTRATION; PERINEURAL at 14:38

## 2025-01-30 NOTE — PROGRESS NOTES
Indication: Shoulder pain  Preprocedure diagnosis: Shoulder bursitis  Postprocedure diagnosis: Shoulder bursitis  Procedure: Ultrasound-guided right subacromial/subdeltoid bursa injection  After discussing the risks, benefits, and alternatives to the procedure, the patient expressed understanding and wished to proceed. The patient was brought to the procedure suite and placed in the left side-lying position. A procedural pause was conducted to verify: correct patient identity, procedure to be performed and as applicable, correct side and site, correct patient position, and availability of implants, special equipment or special requirements. A simple surgical tray was used. Prior to the procedure, the shoulder was examined with a 12-MHz linear transducer to visualize the subacromial/subdeltoid bursa and determine the optimal needle path. Following this, the shoulder was prepared with a ChloraPrep scrub, then re-examined using the same transducer, a sterile ultrasound transducer cover, and sterile ultrasound transducer gel. Thereafter, using ultrasound guidance, a 2.5 inch 25 gague needle was advanced into the subacromial/subdeltoid bursa under continuous ultrasound guidance. After visualization of the tip in the target area and negative aspiration for blood, a mixture of 40 mg Depo-Medrol and 3 mL of 0.2% ropivacaine was injected into the subacromial/subdeltoid bursa. Following the injection the needle was withdrawn. The patient tolerated the procedure well and there were no apparent complications. After an appropriate amount of observation, the patient was dismissed from the clinic in good condition under their own power.

## 2025-02-01 DIAGNOSIS — I10 ESSENTIAL HYPERTENSION: ICD-10-CM

## 2025-02-03 RX ORDER — CHLORTHALIDONE 50 MG/1
50 TABLET ORAL DAILY
Qty: 90 TABLET | Refills: 0 | Status: SHIPPED | OUTPATIENT
Start: 2025-02-03

## 2025-02-13 ENCOUNTER — TELEPHONE (OUTPATIENT)
Dept: PAIN MEDICINE | Facility: CLINIC | Age: 60
End: 2025-02-13

## 2025-02-15 ENCOUNTER — APPOINTMENT (EMERGENCY)
Dept: RADIOLOGY | Facility: HOSPITAL | Age: 60
End: 2025-02-15
Payer: COMMERCIAL

## 2025-02-15 ENCOUNTER — HOSPITAL ENCOUNTER (EMERGENCY)
Facility: HOSPITAL | Age: 60
Discharge: HOME | End: 2025-02-15
Attending: EMERGENCY MEDICINE
Payer: COMMERCIAL

## 2025-02-15 VITALS
TEMPERATURE: 98.3 F | HEART RATE: 111 BPM | BODY MASS INDEX: 37.86 KG/M2 | DIASTOLIC BLOOD PRESSURE: 94 MMHG | OXYGEN SATURATION: 95 % | RESPIRATION RATE: 16 BRPM | SYSTOLIC BLOOD PRESSURE: 154 MMHG | WEIGHT: 207.01 LBS

## 2025-02-15 DIAGNOSIS — M79.602 MUSCULOSKELETAL ARM PAIN, LEFT: Primary | ICD-10-CM

## 2025-02-15 PROCEDURE — 73030 X-RAY EXAM OF SHOULDER: CPT

## 2025-02-15 PROCEDURE — 99284 EMERGENCY DEPT VISIT MOD MDM: CPT | Performed by: EMERGENCY MEDICINE

## 2025-02-15 PROCEDURE — 96372 THER/PROPH/DIAG INJ SC/IM: CPT

## 2025-02-15 PROCEDURE — 99283 EMERGENCY DEPT VISIT LOW MDM: CPT

## 2025-02-15 RX ORDER — ACETAMINOPHEN 325 MG/1
975 TABLET ORAL ONCE
Status: COMPLETED | OUTPATIENT
Start: 2025-02-15 | End: 2025-02-15

## 2025-02-15 RX ORDER — KETOROLAC TROMETHAMINE 30 MG/ML
30 INJECTION, SOLUTION INTRAMUSCULAR; INTRAVENOUS ONCE
Status: COMPLETED | OUTPATIENT
Start: 2025-02-15 | End: 2025-02-15

## 2025-02-15 RX ORDER — DIAZEPAM 5 MG/1
5 TABLET ORAL ONCE
Status: COMPLETED | OUTPATIENT
Start: 2025-02-15 | End: 2025-02-15

## 2025-02-15 RX ORDER — LIDOCAINE 50 MG/G
1 PATCH TOPICAL ONCE
Status: DISCONTINUED | OUTPATIENT
Start: 2025-02-15 | End: 2025-02-15 | Stop reason: HOSPADM

## 2025-02-15 RX ADMIN — ACETAMINOPHEN 975 MG: 325 TABLET, FILM COATED ORAL at 15:19

## 2025-02-15 RX ADMIN — DIAZEPAM 5 MG: 5 TABLET ORAL at 16:28

## 2025-02-15 RX ADMIN — LIDOCAINE 1 PATCH: 700 PATCH TOPICAL at 15:20

## 2025-02-15 RX ADMIN — KETOROLAC TROMETHAMINE 30 MG: 30 INJECTION, SOLUTION INTRAMUSCULAR; INTRAVENOUS at 15:19

## 2025-02-15 NOTE — ED PROVIDER NOTES
Time reflects when diagnosis was documented in both MDM as applicable and the Disposition within this note       Time User Action Codes Description Comment    2/15/2025  4:10 PM Serna Manas Add [M79.602] Musculoskeletal arm pain, left           ED Disposition       ED Disposition   Discharge    Condition   Stable    Date/Time   Sat Feb 15, 2025  4:10 PM    Comment   Peace Monica discharge to home/self care.                   Assessment & Plan       Medical Decision Making  Patient seen and evaluated,.  She has no focal neurologic deficits.  She has significant muscle tenderness.  Likely muscle spasm or as result of sleeping on it wrong.  No use of injection drugs.  Has been afebrile.  Do not see any external trauma.  No tenderness at the actual shoulder joint or distal clavicle.  Unlikely septic joint.  Plan for x-ray and supportive care.  Patient drove herself so explained that could not give her any sedating medications here if she did not have a ride.    Amount and/or Complexity of Data Reviewed  Radiology: ordered and independent interpretation performed. Decision-making details documented in ED Course.    Risk  OTC drugs.  Prescription drug management.        ED Course as of 02/15/25 1803   Sat Feb 15, 2025   1539 XR shoulder 2+ views LEFT  Do not appreciate any fracture or dislocation on my independent review of x-rays   1609 Reviewed finding of x-ray.  Patient states that the Toradol and acetaminophen, and lidocaine patch has done nothing.  Advised that I could give 1 dose of muscle relaxer but she would need to come for someone to pick her up.  She says that she will call her son for a ride.  She does have an appointment coming up with pain management on Tuesday and encouraged her to discuss musculoskeletal pain, however anticipate this will improve over the next couple days.       Medications   lidocaine (LIDODERM) 5 % patch 1 patch (1 patch Topical Medication Applied 2/15/25 1520)   acetaminophen  (TYLENOL) tablet 975 mg (975 mg Oral Given 2/15/25 1519)   ketorolac (TORADOL) injection 30 mg (30 mg Intramuscular Given 2/15/25 1519)   diazepam (VALIUM) tablet 5 mg (5 mg Oral Given 2/15/25 1628)       ED Risk Strat Scores                                                History of Present Illness       Chief Complaint   Patient presents with    Shoulder Pain     Pt reports L shoulder pain radiating to arm. Pt reports extreme pain to touch. Denies injury.        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 There were rare single APCs adn VPCs (less than 0.1 % total beats). There were no sustained cardiac dysrhythmais. There were no significant symptomatic pauses.     History of stress test 01/05/2018    Mildly abnormal exercise Myoview SPECT study with evidence of a fixed anterior defect. This is most likely related to breast attenuation artifact. There was no evidence of reversible myocardial ischemia. Gated wall motion analysis was normal with well preserved systolic function. The left ventricle was normal in size with calculated EF of 73%. No prior similar study is available for comparison.     Hypertension     Immunization deficiency 04/20/2020    Hep a/b/mumps nonimmune    Immunization deficiency 09/25/2020    Hep a/b/mmr nonimmune 6/19/2020, 9/25/2020, 2/2021=hep a/b    Iron deficiency 04/20/2020    Kidney stone 04/20/2020    Need for  pneumococcal vaccination 2021    Need for shingles vaccine 2021    Obesity (BMI 30-39.9) 2020    39.4, start wt 219 lb=goal 150 lb    Onychomycosis of toenail 2020    Prediabetes 2020    5.8    Pyridoxine deficiency 2020    5    Reactive airway disease with acute exacerbation 2020    Subclinical hyperthyroidism 2020    Thyroid nodule 2020    Vitamin D deficiency 2020    33      Past Surgical History:   Procedure Laterality Date    COLONOSCOPY  2017    3 polyps=next  w Dr Cervantes    EGD  2017    egd biopsy     KNEE ARTHROSCOPY W/ ACL RECONSTRUCTION      KNEE SURGERY      torn mcl, acl, meniscus left knee 2016    TUBAL LIGATION        Family History   Problem Relation Age of Onset    Lung disease Mother         lung nodules    Coronary artery disease Mother         calcium score > 1000    Hypertension Mother     Diabetes Father     Colon cancer Maternal Aunt 60 - 69    Cancer Maternal Uncle     Kidney disease Maternal Uncle       Social History     Tobacco Use    Smoking status: Former     Current packs/day: 0.00     Average packs/day: 0.3 packs/day for 24.0 years (6.0 ttl pk-yrs)     Types: Cigarettes     Start date:      Quit date:      Years since quittin.1     Passive exposure: Past    Smokeless tobacco: Never    Tobacco comments:     Has smoked since age: 17- As per [place]    Vaping Use    Vaping status: Never Used   Substance Use Topics    Alcohol use: Yes     Alcohol/week: 2.0 standard drinks of alcohol     Types: 1 Glasses of wine, 1 Shots of liquor per week     Comment: Social and holidays    Drug use: Never      E-Cigarette/Vaping    E-Cigarette Use Never User       E-Cigarette/Vaping Substances    Nicotine No     THC No     CBD No     Flavoring No     Other No     Unknown No       I have reviewed and agree with the history as documented.     59-year-old female presents for left upper extremity discomfort.  She notes that  she was sleeping on it wrong overnight.  She repositioned and was okay at 4 AM.  However since then the pain has worsened.  She tried taking ibuprofen at around 10 AM and then put some salonpas on it.  She is denying any neck pain or injury.  She is left-hand dominant.  Denying any numbness or paresthesia.  Denies allergies to medications.      History provided by:  Medical records and patient  Shoulder Pain  Associated symptoms: no back pain, no fever and no neck pain        Review of Systems   Constitutional:  Negative for fever.   Respiratory:  Negative for shortness of breath.    Cardiovascular:  Negative for chest pain.   Gastrointestinal:  Negative for abdominal pain, nausea and vomiting.   Musculoskeletal:  Negative for back pain, gait problem, joint swelling and neck pain.   Neurological:  Negative for weakness and numbness.           Objective       ED Triage Vitals [02/15/25 1439]   Temperature Pulse Blood Pressure Respirations SpO2 Patient Position - Orthostatic VS   98.3 °F (36.8 °C) (!) 111 154/94 16 95 % --      Temp src Heart Rate Source BP Location FiO2 (%) Pain Score    -- -- -- -- --      Vitals      Date and Time Temp Pulse SpO2 Resp BP Pain Score FACES Pain Rating User   02/15/25 1439 98.3 °F (36.8 °C) 111 95 % 16 154/94 -- -- JL            Physical Exam  Vitals and nursing note reviewed.   Constitutional:       General: She is in acute distress.      Appearance: She is not ill-appearing.   HENT:      Head: Normocephalic and atraumatic.      Right Ear: External ear normal.      Left Ear: External ear normal.      Nose: Nose normal.   Eyes:      Pupils: Pupils are equal, round, and reactive to light.   Neck:     Cardiovascular:      Rate and Rhythm: Normal rate.      Pulses: Normal pulses.      Heart sounds: Normal heart sounds.   Pulmonary:      Effort: Pulmonary effort is normal.      Breath sounds: Normal breath sounds.   Abdominal:      General: Abdomen is flat.      Palpations: Abdomen is  soft.      Tenderness: There is no abdominal tenderness.   Musculoskeletal:      Left shoulder: Tenderness present. No swelling, deformity or bony tenderness. Decreased range of motion. Normal pulse.      Cervical back: Normal range of motion and neck supple. Muscular tenderness present. No spinous process tenderness.        Back:       Right lower leg: No edema.      Left lower leg: No edema.      Comments: Patient is able to extend and flex at the elbow.  Able to give a thumbs up, extend second and fifth digits, able to flex and extend wrist.  +2 radial pulse.  No sensory deficits.  She is exquisitely tender in the left trapezius, left biceps area.   Skin:     General: Skin is warm and dry.      Capillary Refill: Capillary refill takes less than 2 seconds.   Neurological:      General: No focal deficit present.      Mental Status: She is alert.   Psychiatric:         Mood and Affect: Mood normal.         Behavior: Behavior is cooperative.         Results Reviewed       None            XR shoulder 2+ views LEFT   ED Interpretation by Manas Serna DO (02/15 1539)   No fracture or dislocation          Procedures    ED Medication and Procedure Management   Prior to Admission Medications   Prescriptions Last Dose Informant Patient Reported? Taking?   DULoxetine (CYMBALTA) 60 mg delayed release capsule  Self No No   Sig: Take 1 capsule (60 mg total) by mouth daily   Insulin Pen Needle (BD Pen Needle Jaymie U/F) 32G X 4 MM MISC  Self No No   Sig: Use daily as needed with insulin pen   chlorhexidine (PERIDEX) 0.12 % solution  Self Yes No   Sig: Apply to the mouth or throat 2 (two) times a day   chlorthalidone 50 MG tablet   No No   Sig: TAKE 1 TABLET BY MOUTH EVERY DAY   clotrimazole (LOTRIMIN) 1 % cream  Self No No   Sig: Apply topically daily as needed (rash)   clotrimazole-betamethasone (LOTRISONE) 1-0.05 % cream  Self No No   Sig: Apply topically 2 (two) times a day as needed (rash)   fluticasone  (FLONASE) 50 mcg/act nasal spray  Self No No   Si spray into each nostril daily   gabapentin (NEURONTIN) 300 mg capsule   No No   Sig: Take 1 capsule (300 mg total) by mouth 3 (three) times a day   hydrOXYzine HCL (ATARAX) 25 mg tablet  Self No No   Sig: Take 1-2 tablets (25-50 mg total) by mouth every 6 (six) hours as needed for anxiety   loratadine (CLARITIN) 10 mg tablet  Self No No   Sig: Take 1 tablet (10 mg total) by mouth as needed for allergies   naproxen (Naprosyn) 500 mg tablet   No No   Sig: Take 1 tablet (500 mg total) by mouth 2 (two) times a day with meals for 7 days   nystatin (MYCOSTATIN) powder  Self No No   Sig: Apply topically 2 (two) times a day as needed (rash) To moist area   semaglutide, 1 mg/dose, (Ozempic, 1 MG/DOSE,) 4 mg/3 mL injection pen  Self No No   Sig: INJECT 0.75 ML (1 MG TOTAL) UNDER THE SKIN EVERY 7 DAYS      Facility-Administered Medications Last Administration Doses Remaining   ropivacaine (NAROPIN) injection 2 mL 2025  2:38 PM         Discharge Medication List as of 2/15/2025  4:12 PM        CONTINUE these medications which have NOT CHANGED    Details   chlorhexidine (PERIDEX) 0.12 % solution Apply to the mouth or throat 2 (two) times a day, Starting 2024, Historical Med      chlorthalidone 50 MG tablet TAKE 1 TABLET BY MOUTH EVERY DAY, Starting Mon 2/3/2025, Normal      clotrimazole (LOTRIMIN) 1 % cream Apply topically daily as needed (rash), Starting 2024, No Print      clotrimazole-betamethasone (LOTRISONE) 1-0.05 % cream Apply topically 2 (two) times a day as needed (rash), Starting 2024, OTC      DULoxetine (CYMBALTA) 60 mg delayed release capsule Take 1 capsule (60 mg total) by mouth daily, Starting 2024, Normal      fluticasone (FLONASE) 50 mcg/act nasal spray 1 spray into each nostril daily, Starting Tue 2024, Normal      gabapentin (NEURONTIN) 300 mg capsule Take 1 capsule (300 mg total) by mouth 3 (three) times a day,  Starting Thu 1/30/2025, Normal      hydrOXYzine HCL (ATARAX) 25 mg tablet Take 1-2 tablets (25-50 mg total) by mouth every 6 (six) hours as needed for anxiety, Starting Fri 9/13/2024, No Print      Insulin Pen Needle (BD Pen Needle Jaymie U/F) 32G X 4 MM MISC Use daily as needed with insulin pen, Normal      loratadine (CLARITIN) 10 mg tablet Take 1 tablet (10 mg total) by mouth as needed for allergies, Starting Fri 9/13/2024, OTC      naproxen (Naprosyn) 500 mg tablet Take 1 tablet (500 mg total) by mouth 2 (two) times a day with meals for 7 days, Starting Sun 8/25/2024, Until Tue 12/17/2024, Normal      nystatin (MYCOSTATIN) powder Apply topically 2 (two) times a day as needed (rash) To moist area, Starting Fri 9/13/2024, No Print      semaglutide, 1 mg/dose, (Ozempic, 1 MG/DOSE,) 4 mg/3 mL injection pen INJECT 0.75 ML (1 MG TOTAL) UNDER THE SKIN EVERY 7 DAYS, Normal           No discharge procedures on file.  ED SEPSIS DOCUMENTATION   Time reflects when diagnosis was documented in both MDM as applicable and the Disposition within this note       Time User Action Codes Description Comment    2/15/2025  4:10 PM Manas Serna Add [M79.602] Musculoskeletal arm pain, left                  Manas Serna, DO  02/15/25 1533

## 2025-02-15 NOTE — DISCHARGE INSTRUCTIONS
Please follow-up with your family physician.  Encourage follow-up to your pain management appointment on Tuesday    Please use Tylenol and ibuprofen as needed for pain

## 2025-02-16 ENCOUNTER — TELEPHONE (OUTPATIENT)
Dept: FAMILY MEDICINE CLINIC | Facility: CLINIC | Age: 60
End: 2025-02-16

## 2025-02-16 NOTE — TELEPHONE ENCOUNTER
This patient has been on the [facility] wait list since 2022. She has been referred by multiple doctors. She has now been removed from the wait list, but still needs services.  She is following with our behavioral health person here in our office, but she needs medication management. Can you  please help her get an appointment.

## 2025-02-18 ENCOUNTER — OFFICE VISIT (OUTPATIENT)
Dept: PAIN MEDICINE | Facility: CLINIC | Age: 60
End: 2025-02-18
Payer: COMMERCIAL

## 2025-02-18 DIAGNOSIS — M75.52 SUBACROMIAL BURSITIS OF LEFT SHOULDER JOINT: Primary | ICD-10-CM

## 2025-02-18 DIAGNOSIS — M25.511 ACUTE PAIN OF RIGHT SHOULDER: ICD-10-CM

## 2025-02-18 PROCEDURE — 99214 OFFICE O/P EST MOD 30 MIN: CPT

## 2025-02-18 RX ORDER — IBUPROFEN 600 MG/1
600 TABLET, FILM COATED ORAL EVERY 8 HOURS PRN
Qty: 90 TABLET | Refills: 1 | Status: SHIPPED | OUTPATIENT
Start: 2025-02-18

## 2025-02-18 NOTE — PROGRESS NOTES
Scribe Attestation      I,:  SHARON Caba am acting as a scribe while in the presence of the attending physician.:       I,:  SHARON Kapoor personally performed the services described in this documentation    as scribed in my presence.:               Assessment:  1. Subacromial bursitis of left shoulder joint    2. Acute pain of right shoulder        Plan:  Prescription written for ibuprofen 600 mg every 8 hours as needed.  Will schedule left subacromial bursa injection with Dr. Mann in the office.  Follow-up as needed for right shoulder and neck pain.    My impressions and treatment recommendations were discussed in detail with the patient who verbalized understanding and had no further questions.  Discharge instructions were provided. I personally saw and examined the patient and I agree with the above discussed plan of care.    Orders Placed This Encounter   Procedures    US msk guidance     Left subacromial bursa injection     Standing Status:   Future     Expected Date:   2/18/2025     Expiration Date:   2/18/2029     Indicate laterality/region of interest (please specify in COMMENTS):   Left     New Medications Ordered This Visit   Medications    ibuprofen (MOTRIN) 600 mg tablet     Sig: Take 1 tablet (600 mg total) by mouth every 8 (eight) hours as needed for mild pain     Dispense:  90 tablet     Refill:  1       History of Present Illness:  Peace Anderson is a 59 y.o. female who presents to [facility] for a follow up office visit in regards to left shoulder pain. The patients current symptoms have been present for 4 days and radiates to her left elbow. Symptoms are worse with any type of activity. Alleviating factors identifiable by the patient are ibuprofen. On a scale of 1-10, the pain typically increases to max of 6, which is currently impacting their quality of life.    Patient reports being seen in the emergency department on 2/15 for severe left  shoulder pain that she rated a 10 out of 10 on pain scale.  She reports no known injury and that the pain started spontaneously.  Patient also reports that she is unable to put on her clothes due to the severe pain.  X-rays were done in the emergency department and found no abnormalities.  Patient was given diazepam 5 mg x 1 in the emergency department.    Patient is also following up status post right subacromial bursa injection done on 1/30/25 with Dr. Mann.  She reports that she has had 80% relief since then and occasionally has intermittent sharp pain at times.  She also had a NATALIIA on 1/15/2025 which she reports having relief from as well.      I have personally reviewed and/or updated the patient's past medical history, past surgical history, family history, social history, current medications, allergies, and vital signs today.     Review of Systems   Respiratory:  Negative for shortness of breath.    Cardiovascular:  Negative for chest pain.   Gastrointestinal:  Negative for constipation, diarrhea, nausea and vomiting.   Musculoskeletal:  Negative for arthralgias, gait problem, joint swelling and myalgias.        Left shoulder pain   Skin:  Negative for rash.   Neurological:  Negative for dizziness, seizures and weakness.   All other systems reviewed and are negative.      Patient Active Problem List   Diagnosis    Mild persistent asthma without complication    Multinodular thyroid    Subclinical hyperthyroidism    Vitamin D deficiency    Essential hypertension    Kidney stone    Ascorbic acid deficiency    JANE (generalized anxiety disorder)    IFG (impaired fasting glucose)    Pyridoxine deficiency    Reactive airway disease with acute exacerbation    Acute stress disorder    MDD (major depressive disorder), recurrent episode, moderate (HCC)    Primary osteoarthritis of right knee    Menorrhagia with regular cycle    Iron deficiency anemia    Vertigo    CAMERON (obstructive sleep apnea)    Hyperparathyroidism  (HCC)    Cervical radiculopathy    Venous insufficiency    Family history of heart disease    Psoriasis of scalp    Hyperplastic polyp of sigmoid colon    Mixed incontinence urge and stress    Morbid obesity (HCC)    Allergic rhinitis due to pollen    Toenail deformity    Severe major depressive disorder (HCC)    Cervical disc disorder with radiculopathy of mid-cervical region    Bursitis of right shoulder    Neck pain       Past Medical History:   Diagnosis Date    Ascorbic acid deficiency 04/20/2020    Asthma due to seasonal allergies 09/25/2020    COVID 6/23/2022    Depression     Disease of thyroid gland     Essential hypertension 04/20/2020    JANE (generalized anxiety disorder) 04/20/2020    Helicobacter pylori gastrointestinal tract infection 04/20/2020    10/02/2017=egd 9/2017 Dr Cervantes    Helicobacter pylori gastrointestinal tract infection 04/20/2020    10/02/2017=egd 9/2017 Dr Cervantes      History of Holter monitoring 12/22/2017    The baseline rhythm was of sinus origin with an average heart rate of 90 bpm (range: 72 - 129 bpm). There were rare single APCs adn VPCs (less than 0.1 % total beats). There were no sustained cardiac dysrhythmais. There were no significant symptomatic pauses.     History of stress test 01/05/2018    Mildly abnormal exercise Myoview SPECT study with evidence of a fixed anterior defect. This is most likely related to breast attenuation artifact. There was no evidence of reversible myocardial ischemia. Gated wall motion analysis was normal with well preserved systolic function. The left ventricle was normal in size with calculated EF of 73%. No prior similar study is available for comparison.     Hypertension     Immunization deficiency 04/20/2020    Hep a/b/mumps nonimmune    Immunization deficiency 09/25/2020    Hep a/b/mmr nonimmune 6/19/2020, 9/25/2020, 2/2021=hep a/b    Iron deficiency 04/20/2020    Kidney stone 04/20/2020    Need for pneumococcal vaccination 06/04/2021    Need  for shingles vaccine 2021    Obesity (BMI 30-39.9) 2020    39.4, start wt 219 lb=goal 150 lb    Onychomycosis of toenail 2020    Prediabetes 2020    5.8    Pyridoxine deficiency 2020    5    Reactive airway disease with acute exacerbation 2020    Subclinical hyperthyroidism 2020    Thyroid nodule 2020    Vitamin D deficiency 2020    33       Past Surgical History:   Procedure Laterality Date    COLONOSCOPY  2017    3 polyps=next  w Dr Cervantes    EGD  2017    egd biopsy     KNEE ARTHROSCOPY W/ ACL RECONSTRUCTION      KNEE SURGERY      torn mcl, acl, meniscus left knee 2016    TUBAL LIGATION         Family History   Problem Relation Age of Onset    Lung disease Mother         lung nodules    Coronary artery disease Mother         calcium score > 1000    Hypertension Mother     Diabetes Father     Colon cancer Maternal Aunt 60 - 69    Cancer Maternal Uncle     Kidney disease Maternal Uncle        Social History     Occupational History    Occupation: [employer]     Tobacco Use    Smoking status: Former     Current packs/day: 0.00     Average packs/day: 0.3 packs/day for 24.0 years (6.0 ttl pk-yrs)     Types: Cigarettes     Start date:      Quit date:      Years since quittin.1     Passive exposure: Past    Smokeless tobacco: Never    Tobacco comments:     Has smoked since age: 17- As per [place]    Vaping Use    Vaping status: Never Used   Substance and Sexual Activity    Alcohol use: Yes     Alcohol/week: 2.0 standard drinks of alcohol     Types: 1 Glasses of wine, 1 Shots of liquor per week     Comment: Social and holidays    Drug use: Never    Sexual activity: Not Currently     Partners: Male     Birth control/protection: None       Current Outpatient Medications on File Prior to Visit   Medication Sig    chlorhexidine (PERIDEX) 0.12 % solution Apply to the mouth or throat 2 (two) times a day    chlorthalidone 50 MG tablet TAKE 1  TABLET BY MOUTH EVERY DAY    clotrimazole (LOTRIMIN) 1 % cream Apply topically daily as needed (rash)    clotrimazole-betamethasone (LOTRISONE) 1-0.05 % cream Apply topically 2 (two) times a day as needed (rash)    DULoxetine (CYMBALTA) 60 mg delayed release capsule Take 1 capsule (60 mg total) by mouth daily    fluticasone (FLONASE) 50 mcg/act nasal spray 1 spray into each nostril daily    gabapentin (NEURONTIN) 300 mg capsule Take 1 capsule (300 mg total) by mouth 3 (three) times a day    hydrOXYzine HCL (ATARAX) 25 mg tablet Take 1-2 tablets (25-50 mg total) by mouth every 6 (six) hours as needed for anxiety    Insulin Pen Needle (BD Pen Needle Jaymie U/F) 32G X 4 MM MISC Use daily as needed with insulin pen    loratadine (CLARITIN) 10 mg tablet Take 1 tablet (10 mg total) by mouth as needed for allergies    nystatin (MYCOSTATIN) powder Apply topically 2 (two) times a day as needed (rash) To moist area    semaglutide, 1 mg/dose, (Ozempic, 1 MG/DOSE,) 4 mg/3 mL injection pen INJECT 0.75 ML (1 MG TOTAL) UNDER THE SKIN EVERY 7 DAYS    [DISCONTINUED] lisinopril (ZESTRIL) 20 mg tablet Take 1 tablet (20 mg total) by mouth daily    [DISCONTINUED] naproxen (Naprosyn) 500 mg tablet Take 1 tablet (500 mg total) by mouth 2 (two) times a day with meals for 7 days     Current Facility-Administered Medications on File Prior to Visit   Medication    ropivacaine (NAROPIN) injection 2 mL       No Known Allergies    Physical Exam:    LMP  (LMP Unknown) Comment: Taking birth control for abnormal bleeding. Has not had a period in 2 years    Constitutional:normal, well developed, well nourished, alert, in no distress and non-toxic and no overt pain behavior.  Eyes:anicteric  HEENT:grossly intact  Neck:supple, symmetric, trachea midline and no masses   Pulmonary:even and unlabored  Cardiovascular:No edema or pitting edema present  Skin:Normal without rashes or lesions and well hydrated  Psychiatric:Mood and affect  appropriate  Neurologic:Cranial Nerves II-XII grossly intact  Musculoskeletal:normal    Shoulder Exam    Appearance:  Normal with no swelling or bruising and no obvious joint deformity  Palpation/Tenderness:  Positive tenderness worse over left AC joint, no tenderness to scapula or over the lateral humerus  Active Range of Motion:  Flexion: Minimally limited, Extension: Minimally limited, Abduction: Minimally limited, External Rotation: Minimally limited, and Internal Rotation: Minimally limited      Imaging 2/15/2025    XR SHOULDER 2+ VW LEFT     INDICATION: left shoulder pain.     COMPARISON: None     FINDINGS:     No acute fracture or dislocation.     No significant degenerative changes.     No lytic or blastic osseous lesion.     Unremarkable soft tissues.     IMPRESSION:     No acute osseous abnormality.

## 2025-03-04 ENCOUNTER — PROCEDURE VISIT (OUTPATIENT)
Dept: PAIN MEDICINE | Facility: CLINIC | Age: 60
End: 2025-03-04
Payer: COMMERCIAL

## 2025-03-04 VITALS
BODY MASS INDEX: 36.68 KG/M2 | WEIGHT: 207 LBS | HEART RATE: 80 BPM | SYSTOLIC BLOOD PRESSURE: 133 MMHG | HEIGHT: 63 IN | DIASTOLIC BLOOD PRESSURE: 79 MMHG | RESPIRATION RATE: 14 BRPM

## 2025-03-04 DIAGNOSIS — M75.52 BURSITIS OF LEFT SHOULDER: Primary | ICD-10-CM

## 2025-03-04 PROCEDURE — 20611 DRAIN/INJ JOINT/BURSA W/US: CPT | Performed by: PHYSICAL MEDICINE & REHABILITATION

## 2025-03-04 RX ORDER — METHYLPREDNISOLONE ACETATE 40 MG/ML
40 INJECTION, SUSPENSION INTRA-ARTICULAR; INTRALESIONAL; INTRAMUSCULAR; SOFT TISSUE ONCE
Status: COMPLETED | OUTPATIENT
Start: 2025-03-04 | End: 2025-03-04

## 2025-03-04 RX ORDER — ROPIVACAINE HYDROCHLORIDE 2 MG/ML
2 INJECTION, SOLUTION EPIDURAL; INFILTRATION; PERINEURAL ONCE
Status: COMPLETED | OUTPATIENT
Start: 2025-03-04 | End: 2025-03-04

## 2025-03-04 RX ADMIN — ROPIVACAINE HYDROCHLORIDE 2 ML: 2 INJECTION, SOLUTION EPIDURAL; INFILTRATION; PERINEURAL at 14:48

## 2025-03-04 RX ADMIN — METHYLPREDNISOLONE ACETATE 40 MG: 40 INJECTION, SUSPENSION INTRA-ARTICULAR; INTRALESIONAL; INTRAMUSCULAR; SOFT TISSUE at 14:47

## 2025-03-04 NOTE — PROGRESS NOTES
Indication: Shoulder pain  Preprocedure diagnosis: Shoulder bursitis  Postprocedure diagnosis: Shoulder bursitis  Procedure: Ultrasound-guided left subacromial/subdeltoid bursa injection  After discussing the risks, benefits, and alternatives to the procedure, the patient expressed understanding and wished to proceed. The patient was brought to the procedure suite and placed in the right side-lying position. A procedural pause was conducted to verify: correct patient identity, procedure to be performed and as applicable, correct side and site, correct patient position, and availability of implants, special equipment or special requirements. A simple surgical tray was used. Prior to the procedure, the shoulder was examined with a 12-MHz linear transducer to visualize the subacromial/subdeltoid bursa and determine the optimal needle path. Following this, the shoulder was prepared with a ChloraPrep scrub, then re-examined using the same transducer, a sterile ultrasound transducer cover, and sterile ultrasound transducer gel. Thereafter, using ultrasound guidance, a 2.5 inch 25 gague needle was advanced into the subacromial/subdeltoid bursa under continuous ultrasound guidance. After visualization of the tip in the target area and negative aspiration for blood, a mixture of 40 mg Depo-Medrol and 3 mL of 0.2% ropivacaine was injected into the subacromial/subdeltoid bursa. Following the injection the needle was withdrawn. The patient tolerated the procedure well and there were no apparent complications. After an appropriate amount of observation, the patient was dismissed from the clinic in good condition under their own power.

## 2025-03-05 ENCOUNTER — TELEPHONE (OUTPATIENT)
Age: 60
End: 2025-03-05

## 2025-03-05 NOTE — TELEPHONE ENCOUNTER
Renan  S/w pt. S/p left bursae injection 3/4. States left arm pain is increased since procedure. Pt taking ibuprofen tid and applying ice without relief. Advised increased pain is normal and should subside over the next 24 hours. Advised she can alternate tylenol and ibuprofen over the next 24 hours until symptoms improve. Advised she cb if no improvement or worsening pain

## 2025-03-05 NOTE — TELEPHONE ENCOUNTER
Caller: fidencio Hand    Doctor: Dr. Mann    Reason for call: pt called stating she received a shot in her arm yesterday and the pain is horrible.  Pt mentioned that she had one in the other arm previously and the pain wasn't this bad    Please advise    Call back#: [phone]   Oral Minoxidil Counseling- I discussed with the patient the risks of oral minoxidil including but not limited to shortness of breath, swelling of the feet or ankles, dizziness, lightheadedness, unwanted hair growth and allergic reaction.  The patient verbalized understanding of the proper use and possible adverse effects of oral minoxidil.  All of the patient's questions and concerns were addressed.

## 2025-03-13 DIAGNOSIS — E66.01 MORBID OBESITY (HCC): ICD-10-CM

## 2025-03-18 ENCOUNTER — TELEPHONE (OUTPATIENT)
Dept: PAIN MEDICINE | Facility: CLINIC | Age: 60
End: 2025-03-18

## 2025-03-19 RX ORDER — SEMAGLUTIDE 1.34 MG/ML
INJECTION, SOLUTION SUBCUTANEOUS
Qty: 9 ML | Refills: 1 | Status: SHIPPED | OUTPATIENT
Start: 2025-03-19

## 2025-04-25 ENCOUNTER — PATIENT MESSAGE (OUTPATIENT)
Dept: FAMILY MEDICINE CLINIC | Facility: CLINIC | Age: 60
End: 2025-04-25

## 2025-04-25 DIAGNOSIS — L60.8 DISCOLORATION OF NAIL: Primary | ICD-10-CM

## 2025-04-26 ENCOUNTER — APPOINTMENT (OUTPATIENT)
Dept: LAB | Age: 60
End: 2025-04-26
Payer: COMMERCIAL

## 2025-04-26 DIAGNOSIS — I10 ESSENTIAL HYPERTENSION: ICD-10-CM

## 2025-04-26 LAB
CHOLEST SERPL-MCNC: 173 MG/DL (ref ?–200)
HDLC SERPL-MCNC: 41 MG/DL
LDLC SERPL CALC-MCNC: 112 MG/DL (ref 0–100)
TRIGL SERPL-MCNC: 101 MG/DL (ref ?–150)

## 2025-04-26 PROCEDURE — 36415 COLL VENOUS BLD VENIPUNCTURE: CPT

## 2025-04-26 PROCEDURE — 80061 LIPID PANEL: CPT

## 2025-04-28 ENCOUNTER — RESULTS FOLLOW-UP (OUTPATIENT)
Dept: CARDIOLOGY CLINIC | Facility: CLINIC | Age: 60
End: 2025-04-28

## 2025-05-01 ENCOUNTER — OFFICE VISIT (OUTPATIENT)
Dept: CARDIOLOGY CLINIC | Facility: CLINIC | Age: 60
End: 2025-05-01
Payer: COMMERCIAL

## 2025-05-01 ENCOUNTER — DOCUMENTATION (OUTPATIENT)
Dept: ENDOCRINOLOGY | Facility: CLINIC | Age: 60
End: 2025-05-01

## 2025-05-01 VITALS
SYSTOLIC BLOOD PRESSURE: 124 MMHG | OXYGEN SATURATION: 97 % | HEIGHT: 63 IN | HEART RATE: 81 BPM | DIASTOLIC BLOOD PRESSURE: 60 MMHG | BODY MASS INDEX: 35.79 KG/M2 | TEMPERATURE: 98.4 F | WEIGHT: 202 LBS

## 2025-05-01 DIAGNOSIS — E66.812 CLASS 2 OBESITY DUE TO EXCESS CALORIES WITHOUT SERIOUS COMORBIDITY WITH BODY MASS INDEX (BMI) OF 35.0 TO 35.9 IN ADULT: ICD-10-CM

## 2025-05-01 DIAGNOSIS — Z82.49 FAMILY HISTORY OF EARLY CAD: ICD-10-CM

## 2025-05-01 DIAGNOSIS — E05.90 SUBCLINICAL HYPERTHYROIDISM: Primary | ICD-10-CM

## 2025-05-01 DIAGNOSIS — E66.09 CLASS 2 OBESITY DUE TO EXCESS CALORIES WITHOUT SERIOUS COMORBIDITY WITH BODY MASS INDEX (BMI) OF 35.0 TO 35.9 IN ADULT: ICD-10-CM

## 2025-05-01 DIAGNOSIS — I10 ESSENTIAL HYPERTENSION: Primary | ICD-10-CM

## 2025-05-01 DIAGNOSIS — R73.03 PREDIABETES: ICD-10-CM

## 2025-05-01 PROCEDURE — 99214 OFFICE O/P EST MOD 30 MIN: CPT | Performed by: INTERNAL MEDICINE

## 2025-05-01 PROCEDURE — 93000 ELECTROCARDIOGRAM COMPLETE: CPT | Performed by: INTERNAL MEDICINE

## 2025-05-01 RX ORDER — CHLORTHALIDONE 50 MG/1
50 TABLET ORAL DAILY
Qty: 90 TABLET | Refills: 1 | Status: SHIPPED | OUTPATIENT
Start: 2025-05-01

## 2025-05-01 NOTE — PROGRESS NOTES
[facility]  [address] [id number] [phone]                                                Cardiology Office Follow up  Peace Anderson, 59 y.o. female  YOB: 1965  MRN: [id number] Encounter: [id number]      PCP - Arti Luna DO  Referring Provider - No ref. provider found    No chief complaint on file.      Assessment  Hypertension   Bilateral lower extremity edema   Exercise ECG stress - 3/2020 - did 7:30 min, negative for ischemia  Exercise stress echo - 1/11/23 - 7:31 min, 9.3 METS, 100% MPHR, stress ECg & echo negative for ischemia. EF 65%, normal PASP, IVC normal  Obesity, Body mass index is 35.78 kg/m².   Hyperthyroidism  CAMERON    Plan  Bilateral lower extremity edema, Shortness of breath  Overview  Initially presented with B/L lower extremity edema and progressive shortness of breath, associated with weight gain  She did not have evaluation or any other signs of volume overload, it was felt to be mainly venous insufficiency related to weight gain  Stress echo subsequently was negative for ischemia, and PASP was normal  Weight had trended up at that time (195 lbs in 6/2022 --> 216 lbs in 12/2022 --> 221 lbs in 1/2023) --> down to 214 lbs in 3/2023 --> back up to 222 lbs (10/2023) -->   Wt no down to 202 lbs (5/2025) with ozempic , edema improved  Impression  Appears to be predominantly related to venous insufficiency  Weight gain related to inactivity (from inability to exercise due to knee pain)  Plan  Continue chlorthalidone 50 mg daily  Monitor BMP   Low-sodium diet  Weight loss recommended     Hypertension with LVH  Stress echo 1/2023: mild-moderate LVH, LVEF 65%  Blood pressure well-controlled, 124/60, overall improving with weight loss on Ozempic  Continue chlorthalidone 50 mg daily, metoprolol succinate 50 mg daily  Counseled regarding need to monitor blood work for potassium and renal function --> check CMP now    Family  history of early CAD  She reports that her younger brother recently had coronary artery stent placed at the age of 40  Stress echo in January 2023 was negative for ischemia  Counseled regarding coronary artery disease risks, and means to do stratify further  Check CT coronary artery calcium score  Optimize cardiac risk factors including lipids, obesity, pre-diabetes    Pre-diabetes    A1c was 5.9%, and is likely improved with weight loss on Ozempic  Check A1c  Follow-up with endocrinologist    Results for orders placed or performed in visit on 05/01/25   POCT ECG    Impression    Normal sinus rhythm  Possible left atrial enlargement       Orders Placed This Encounter   Procedures    CT coronary calcium score    Comprehensive metabolic panel    Hemoglobin A1C    POCT ECG       Return in about 6 months (around 11/1/2025), or if symptoms worsen or fail to improve.      History of Present Illness   59 y.o. female, who works as an  doing desk work for a local company, comes in as a new patient for consultation regarding uncontrolled hypertension and increased lower extremity edema.  She previously used to follow with cardiologist Dr. Mehul Arroyo, and is now establishing care with us.     Today she reports having increased lower extremity edema, predominantly in the left leg.  This has been ongoing and worsening over the last several months.  There is no associated symptoms of shortness of breath, orthopnea or PND.  She does report significant discomfort in the left anterior leg, as well as numbness in the left hand - occurring intermittently.  She underwent lower extremity duplex on 12/27/2022, which was negative for DVT.    She has no known prior history of coronary artery disease or heart failure.  No complaints of chest pain.     She does have a longstanding history of intermittent palpitations, and has been on metoprolol succinate for several years.  She has been doing well with this and has not  had any recent issues with palpitations.    Family history  Father -  of renal failure in 60s, was on dialysis, limited medical information  Mother - alive at 79, stroke at 78, HTN, no known heart problems  Siblings:   1 maternal half-sibling (younger brother) - had CAD s/p stent at 40, HLD    Interval history - 2023  She comes back for follow-up after about 1 month.  In the interim, she has made the adjustments to her antihypertensive therapy and has been monitoring her blood pressure at home, and is doing well.  She completed the stress echocardiogram, which did not show any significant ischemia or volume overload.    Interval history - 3/31/2023  He comes in for follow-up after about 2 months.  In the interim she has made adjustments to her diuretics and has been doing well.  She was started on Ozempic by Dr. Joy, and appears to have lost 7 pounds over the last 6 weeks. Her edema is improved as well.    Interval history - 10/4/2023  She comes back for follow-up after about 6 months.  In the interim, she remains on Ozempic but has not lost any weight and her weight is in fact up by 10 pounds!  No current complaints of chest pain or shortness of breath.  No complaints of palpitations or dizziness.  She has poor recollection of her actual medication list but does seem to be taking metoprolol and chlorthalidone (unclear doses)    Interval history - 2025  She returns for follow-up with me after about 1.5 years.  In the interim, she did see different in 2024.  She denies any major symptoms of chest pain or shortness of breath, and she reports having lost about 20 pounds with Ozempic over the last year. She tells me her younger brother recently had coronary stent placed for severe chest pain at the age of 40 and as result he was initially concerned.    Historical Information   Past Medical History:   Diagnosis Date    Ascorbic acid deficiency 2020    Asthma due to seasonal allergies  09/25/2020    COVID 6/23/2022    Depression     Disease of thyroid gland     Essential hypertension 04/20/2020    JANE (generalized anxiety disorder) 04/20/2020    Helicobacter pylori gastrointestinal tract infection 04/20/2020    10/02/2017=egd 9/2017 Dr Cervantes    Helicobacter pylori gastrointestinal tract infection 04/20/2020    10/02/2017=egd 9/2017 Dr Cervantes      History of Holter monitoring 12/22/2017    The baseline rhythm was of sinus origin with an average heart rate of 90 bpm (range: 72 - 129 bpm). There were rare single APCs adn VPCs (less than 0.1 % total beats). There were no sustained cardiac dysrhythmais. There were no significant symptomatic pauses.     History of stress test 01/05/2018    Mildly abnormal exercise Myoview SPECT study with evidence of a fixed anterior defect. This is most likely related to breast attenuation artifact. There was no evidence of reversible myocardial ischemia. Gated wall motion analysis was normal with well preserved systolic function. The left ventricle was normal in size with calculated EF of 73%. No prior similar study is available for comparison.     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    5    Reactive airway disease with acute exacerbation 05/20/2020    Subclinical hyperthyroidism 04/20/2020    Thyroid nodule 09/25/2020    Vitamin D deficiency 04/20/2020    33     Past Surgical History:   Procedure Laterality Date    COLONOSCOPY  09/08/2017    3 polyps=next 2020 w Dr Cervantes    EGD  09/01/2017    egd biopsy      KNEE ARTHROSCOPY W/ ACL RECONSTRUCTION      KNEE SURGERY      torn mcl, acl, meniscus left knee 2016    TUBAL LIGATION       Family History   Problem Relation Age of Onset    Lung disease Mother         lung nodules    Coronary artery disease Mother         calcium score > 1000    Hypertension Mother     Diabetes Father     Colon cancer Maternal Aunt 60 - 69    Cancer Maternal Uncle     Kidney disease Maternal Uncle      Current Outpatient Medications on File Prior to Visit   Medication Sig Dispense Refill    chlorhexidine (PERIDEX) 0.12 % solution Apply to the mouth or throat 2 (two) times a day      clotrimazole (LOTRIMIN) 1 % cream Apply topically daily as needed (rash)      clotrimazole-betamethasone (LOTRISONE) 1-0.05 % cream Apply topically 2 (two) times a day as needed (rash)      fluticasone (FLONASE) 50 mcg/act nasal spray 1 spray into each nostril daily 18 mL 11    gabapentin (NEURONTIN) 300 mg capsule Take 1 capsule (300 mg total) by mouth 3 (three) times a day 90 capsule 1    hydrOXYzine HCL (ATARAX) 25 mg tablet Take 1-2 tablets (25-50 mg total) by mouth every 6 (six) hours as needed for anxiety      ibuprofen (MOTRIN) 600 mg tablet Take 1 tablet (600 mg total) by mouth every 8 (eight) hours as needed for mild pain 90 tablet 1    Insulin Pen Needle (BD Pen Needle Jaymie U/F) 32G X 4 MM MISC Use daily as needed with insulin pen 100 each 2    loratadine (CLARITIN) 10 mg tablet Take 1 tablet (10 mg total) by mouth as needed for allergies      nystatin (MYCOSTATIN) powder Apply topically 2 (two) times a day as needed (rash) To moist area      semaglutide, 1 mg/dose, (Ozempic, 1 MG/DOSE,) 4 mg/3 mL injection pen INJECT 0.75 ML (1 MG TOTAL) UNDER THE SKIN EVERY 7 DAYS 9 mL 1    [DISCONTINUED] chlorthalidone 50 MG tablet TAKE 1 TABLET BY MOUTH EVERY DAY 90 tablet 0    DULoxetine (CYMBALTA) 60 mg delayed release capsule Take 1 capsule (60 mg total) by mouth daily (Patient not taking: Reported on 5/1/2025) 90  capsule 3    [DISCONTINUED] lisinopril (ZESTRIL) 20 mg tablet Take 1 tablet (20 mg total) by mouth daily 30 tablet 0     Current Facility-Administered Medications on File Prior to Visit   Medication Dose Route Frequency Provider Last Rate Last Admin    ropivacaine (NAROPIN) injection 2 mL  2 mL Epidural Titrated    2 mL at 25 1438     No Known Allergies  Social History     Socioeconomic History    Marital status:      Spouse name: None    Number of children: 5    Years of education: 12    Highest education level: None   Occupational History    Occupation: [employer]     Tobacco Use    Smoking status: Former     Current packs/day: 0.00     Average packs/day: 0.3 packs/day for 24.0 years (6.0 ttl pk-yrs)     Types: Cigarettes     Start date:      Quit date:      Years since quittin.3     Passive exposure: Past    Smokeless tobacco: Never    Tobacco comments:     Has smoked since age: 17- As per [place]    Vaping Use    Vaping status: Never Used   Substance and Sexual Activity    Alcohol use: Yes     Alcohol/week: 2.0 standard drinks of alcohol     Types: 1 Glasses of wine, 1 Shots of liquor per week     Comment: Social and holidays    Drug use: Never    Sexual activity: Not Currently     Partners: Male     Birth control/protection: None   Other Topics Concern    None   Social History Narrative    ** Merged History Encounter **         · Most recent tobacco use screenin2020      · Do you currently or have you served in the [place] [employer]:   No      · Were you activated, into active duty, as a member of the [employer] or as a Reservist:   No      · Has patient visited an area known to be high risk for 2019 n-CoV:   No      · In the 14 days before symptom onset, did the patient spend time in a high risk country:   No      · If patient spent time in a high risk country - Does the patient live in the high risk country:   No      · Advance directive:   No      · Live alone or with  "others:   with others      · Exercise level:   Occasional      · Overweight:   Yes      · Obese:   Yes      · General stress level:   High      · Diet:   Regular      · Caffeine intake:   Moderate      · Guns present in home:   No      · Seat belts used routinely:   No      · Sunscreen used routinely:   Yes     · Smoke alarm in home:   Yes      · Legally blind in one or both eyes:   No      · Hard of hearing or deaf in one or both ears:   No lives w roomates     As per [place]      Social Drivers of Health     Financial Resource Strain: Not on file   Food Insecurity: Not on file   Transportation Needs: Not on file   Physical Activity: Not on file   Stress: Not on file   Social Connections: Not on file   Intimate Partner Violence: Unknown (11/20/2020)    [facility]    Intimate Partner Violence     Within the last year, have you been afraid of your partner, ex-partner or family member?: Not on file     Within the last year, have you been humiliated or emotionally abused in other ways by your partner, ex-partner or family member?: Not on file     Within the last year, have you been kicked, hit, slapped, or otherwise physically hurt by your partner, ex-partner or family member?: Not on file     Within the last year, have you been raped or forced to have any kind of sexual activity by your partner, ex-partner or family member?: Not on file   Housing Stability: Not on file        Review of Systems   All other systems reviewed and are negative.      Vitals:  Vitals:    05/01/25 1517   BP: 124/60   BP Location: Left arm   Patient Position: Sitting   Cuff Size: Adult   Pulse: 81   Temp: 98.4 °F (36.9 °C)   TempSrc: Tympanic   SpO2: 97%   Weight: 91.6 kg (202 lb)   Height: 5' 3\" (1.6 m)     BMI - Body mass index is 35.78 kg/m².  Wt Readings from Last 7 Encounters:   05/01/25 91.6 kg (202 lb)   03/04/25 93.9 kg (207 lb)   02/15/25 93.9 kg (207 lb 0.2 oz)   01/09/25 93.9 kg " (207 lb)   12/17/24 93.4 kg (206 lb)   12/03/24 91.4 kg (201 lb 6.4 oz)   12/02/24 93.8 kg (206 lb 12.7 oz)       Physical Exam  Vitals and nursing note reviewed.   Constitutional:       General: She is not in acute distress.     Appearance: Normal appearance. She is well-developed. She is obese. She is not ill-appearing.   HENT:      Head: Normocephalic and atraumatic.      Nose: No congestion.   Eyes:      General: No scleral icterus.     Conjunctiva/sclera: Conjunctivae normal.   Neck:      Vascular: No carotid bruit or JVD.   Cardiovascular:      Rate and Rhythm: Normal rate and regular rhythm.      Pulses: Normal pulses.      Heart sounds: Normal heart sounds. No murmur heard.     No friction rub. No gallop.   Pulmonary:      Effort: Pulmonary effort is normal. No respiratory distress.      Breath sounds: Normal breath sounds. No rales.   Abdominal:      General: There is no distension.      Palpations: Abdomen is soft.      Tenderness: There is no abdominal tenderness.   Musculoskeletal:         General: No swelling or tenderness.      Cervical back: Neck supple.      Right lower leg: Edema present.      Left lower leg: Edema present.      Comments: 1+ bilateral lower extremity edema (l>R)   Skin:     General: Skin is warm.   Neurological:      General: No focal deficit present.      Mental Status: She is alert and oriented to person, place, and time. Mental status is at baseline.   Psychiatric:         Mood and Affect: Mood normal.         Behavior: Behavior normal.         Thought Content: Thought content normal.         Labs:  CBC:   Lab Results   Component Value Date    WBC 6.47 06/22/2024    RBC 4.71 06/22/2024    HGB 13.8 06/22/2024    HCT 41.5 06/22/2024    MCV 88 06/22/2024     06/22/2024    RDW 13.3 06/22/2024       CMP:   Lab Results   Component Value Date    K 3.9 06/22/2024     06/22/2024    CO2 28 06/22/2024    BUN 10 06/22/2024    CREATININE 0.73 06/22/2024    EGFR 91 06/22/2024     "CALCIUM 9.8 06/22/2024    AST 34 06/22/2024    ALT 42 06/22/2024    ALKPHOS 72 06/22/2024       Magnesium:  Lab Results   Component Value Date    MG 1.9 02/11/2020       Lipid Profile:   Lab Results   Component Value Date    HDL 41 (L) 04/26/2025    TRIG 101 04/26/2025    LDLCALC 112 (H) 04/26/2025       Thyroid Studies:   Lab Results   Component Value Date    MLV6WOYHPKMU <0.010 (L) 06/22/2024    T3FREE 2.68 02/22/2022    FREET4 1.06 06/22/2024    A3YRUFA 0.90 11/15/2022       A1c:  No components found for: \"HGA1C\"    INR:  Lab Results   Component Value Date    INR 0.98 02/10/2020   5    Imaging: XR chest 2 views    Result Date: 12/27/2022  Narrative: CHEST INDICATION:   cough, fever. COMPARISON:  CXR 2/10/2020. EXAM PERFORMED/VIEWS:  XR CHEST PA & LATERAL FINDINGS: Cardiomediastinal silhouette appears unremarkable. The lungs are clear.  No pneumothorax or pleural effusion. Osseous structures appear within normal limits for patient age.     Impression: No acute cardiopulmonary disease. Workstation performed: [id number]     VAS lower limb venous duplex study, unilateral/limited    Result Date: 12/27/2022  Narrative:  [facility] REPORT [facility]INICAL: Indications: Patient c/o swelling of the left leg for several months with a new onset of pain in the last 4 days as well as worsening SOB and a cough. No history of DVT/PE/CA recent surgery or trauma. Operative History: denies cardiovascular surgeries   CONCLUSION: Impression: RIGHT LOWER LIMB LIMITED: Evaluation shows no evidence of thrombus in the common femoral vein. Doppler evaluation shows a normal response to augmentation maneuvers.  LEFT LOWER LIMB: No evidence of acute or chronic deep vein thrombosis No evidence of superficial thrombophlebitis noted.  Technically difficult/limited study   Technical findings were given to Rayne Ruiz PA-C immediately following exam.  SIGNATURE: Electronically Signed by: REINALDO CASH MD, RPVI on 2022-12-27 04:50:56 " PM      Cardiac testing:   No results found for this or any previous visit.    No results found for this or any previous visit.    No results found for this or any previous visit.    No results found for this or any previous visit.      XR shoulder 2+ views LEFT  Narrative: XR SHOULDER 2+ VW LEFT    INDICATION: left shoulder pain.    COMPARISON: None    FINDINGS:    No acute fracture or dislocation.    No significant degenerative changes.    No lytic or blastic osseous lesion.    Unremarkable soft tissues.  Impression: No acute osseous abnormality.    Computerized Assisted Algorithm (CAA) may have been used to analyze all applicable images.    Workstation performed: [id number]

## 2025-05-03 ENCOUNTER — APPOINTMENT (OUTPATIENT)
Dept: LAB | Age: 60
End: 2025-05-03
Attending: INTERNAL MEDICINE
Payer: COMMERCIAL

## 2025-05-03 DIAGNOSIS — E05.90 SUBCLINICAL HYPERTHYROIDISM: ICD-10-CM

## 2025-05-03 DIAGNOSIS — R73.03 PREDIABETES: ICD-10-CM

## 2025-05-03 DIAGNOSIS — I10 ESSENTIAL HYPERTENSION: ICD-10-CM

## 2025-05-03 LAB
ALBUMIN SERPL BCG-MCNC: 3.8 G/DL (ref 3.5–5)
ALP SERPL-CCNC: 81 U/L (ref 34–104)
ALT SERPL W P-5'-P-CCNC: 16 U/L (ref 7–52)
ANION GAP SERPL CALCULATED.3IONS-SCNC: 9 MMOL/L (ref 4–13)
AST SERPL W P-5'-P-CCNC: 19 U/L (ref 13–39)
BILIRUB SERPL-MCNC: 0.87 MG/DL (ref 0.2–1)
BUN SERPL-MCNC: 18 MG/DL (ref 5–25)
CALCIUM SERPL-MCNC: 9.7 MG/DL (ref 8.4–10.2)
CHLORIDE SERPL-SCNC: 101 MMOL/L (ref 96–108)
CO2 SERPL-SCNC: 29 MMOL/L (ref 21–32)
CREAT SERPL-MCNC: 0.81 MG/DL (ref 0.6–1.3)
EST. AVERAGE GLUCOSE BLD GHB EST-MCNC: 111 MG/DL
GFR SERPL CREATININE-BSD FRML MDRD: 79 ML/MIN/1.73SQ M
GLUCOSE P FAST SERPL-MCNC: 86 MG/DL (ref 65–99)
HBA1C MFR BLD: 5.5 %
POTASSIUM SERPL-SCNC: 3.6 MMOL/L (ref 3.5–5.3)
PROT SERPL-MCNC: 7.5 G/DL (ref 6.4–8.4)
SODIUM SERPL-SCNC: 139 MMOL/L (ref 135–147)
T4 FREE SERPL-MCNC: 1.26 NG/DL (ref 0.61–1.12)
TSH SERPL DL<=0.05 MIU/L-ACNC: <0.01 UIU/ML (ref 0.45–4.5)

## 2025-05-03 PROCEDURE — 84439 ASSAY OF FREE THYROXINE: CPT

## 2025-05-03 PROCEDURE — 36415 COLL VENOUS BLD VENIPUNCTURE: CPT

## 2025-05-03 PROCEDURE — 80053 COMPREHEN METABOLIC PANEL: CPT

## 2025-05-03 PROCEDURE — 84443 ASSAY THYROID STIM HORMONE: CPT

## 2025-05-03 PROCEDURE — 83036 HEMOGLOBIN GLYCOSYLATED A1C: CPT

## 2025-05-06 ENCOUNTER — RESULTS FOLLOW-UP (OUTPATIENT)
Dept: ENDOCRINOLOGY | Facility: CLINIC | Age: 60
End: 2025-05-06

## 2025-05-06 ENCOUNTER — TELEPHONE (OUTPATIENT)
Dept: PAIN MEDICINE | Facility: CLINIC | Age: 60
End: 2025-05-06

## 2025-05-06 ENCOUNTER — OFFICE VISIT (OUTPATIENT)
Dept: FAMILY MEDICINE CLINIC | Facility: CLINIC | Age: 60
End: 2025-05-06
Payer: COMMERCIAL

## 2025-05-06 VITALS
HEART RATE: 80 BPM | WEIGHT: 205 LBS | TEMPERATURE: 98.1 F | OXYGEN SATURATION: 95 % | SYSTOLIC BLOOD PRESSURE: 110 MMHG | RESPIRATION RATE: 16 BRPM | HEIGHT: 62 IN | DIASTOLIC BLOOD PRESSURE: 66 MMHG | BODY MASS INDEX: 37.73 KG/M2

## 2025-05-06 DIAGNOSIS — I10 ESSENTIAL HYPERTENSION: ICD-10-CM

## 2025-05-06 DIAGNOSIS — M53.3 SACROILIAC JOINT DYSFUNCTION OF RIGHT SIDE: Primary | ICD-10-CM

## 2025-05-06 DIAGNOSIS — E66.9 OBESITY (BMI 30-39.9): ICD-10-CM

## 2025-05-06 DIAGNOSIS — D17.23 LIPOMA OF RIGHT LOWER EXTREMITY: ICD-10-CM

## 2025-05-06 DIAGNOSIS — M54.31 RIGHT SIDED SCIATICA: ICD-10-CM

## 2025-05-06 DIAGNOSIS — F33.1 MDD (MAJOR DEPRESSIVE DISORDER), RECURRENT EPISODE, MODERATE (HCC): ICD-10-CM

## 2025-05-06 PROCEDURE — 99214 OFFICE O/P EST MOD 30 MIN: CPT | Performed by: FAMILY MEDICINE

## 2025-05-06 NOTE — PROGRESS NOTES
Assessment/Plan:  Assessment & Plan  Sacroiliac joint dysfunction of right side  Stable.  Home Exercise Program given.  To Comp Spine PT, Pain Management per PRN.      You may use Motrin (Ibuprofen) up to 800mg 3 times daily with food (in 24 hours) as needed for pain or fever OR Aleve up to 2 pill twice daily as needed for pain.      You may use Tylenol (Acetaminophen) up to 3,000mg daily (in 24 hours) as needed for pain or fever.      Orders:    Ambulatory Referral to Comprehensive Spine PT; Future    Right sided sciatica  Stable.  Home Exercise Program given.  To Comp Spine PT, Pain Management per PRN.      You may use Motrin (Ibuprofen) up to 800mg 3 times daily with food (in 24 hours) as needed for pain or fever OR Aleve up to 2 pill twice daily as needed for pain.      You may use Tylenol (Acetaminophen) up to 3,000mg daily (in 24 hours) as needed for pain or fever.  Orders:    Ambulatory Referral to Comprehensive Spine PT; Future    Essential hypertension  Stable.  Check blood pressure outside of office.  Recommend lifestyle modifications.         MDD (major depressive disorder), recurrent episode, moderate (HCC)  Depression Screening Follow-up Plan: Patient's depression screening was positive with a PHQ-9 score of 8. Patient advised to follow-up with PCP for further management.    Management per PCP.  Continue Cymbalta 60mg QD.         Obesity (BMI 30-39.9)  Stable.  Recommend lifestyle modifications.           Lipoma of right lower extremity  Stable.  Monitor.            Return in 6 weeks (on 6/17/2025), or if symptoms worsen or fail to improve, for With PCP Dr. Luna - Physical / F/U CC.      Future Appointments   Date Time Provider Department Center   5/8/2025  1:20 PM Lemuel Joy MD [facility]   5/9/2025  8:45 AM Magdy Sherwood DPM [facility]   5/9/2025  2:15 PM MO MAMMO RBC 1 MO RBC Mammo MO RBC   5/16/2025  5:30 PM AL CT 1 AL CT [facility]   8/5/2025  2:30 PM Arti  DO [facility]   2025  2:40 PM Dylon Acuña MD [facility]        Subjective:     Peace is a 59 y.o. female who presents today for a follow-up on her acute medical conditions.        HPI:  Chief Complaint   Patient presents with    sciatica     -- Above per clinical staff and reviewed. --    HPI      Today:      Sciatica - Symptoms x 1 day.  Left SI joint radiating into left anterior thigh.  Sharp.  Currently 6-7/10, Max 10/10.  No aggravating or alleviating symptoms.  Using Aleve 220mg 2 pills once daily today c benefit.  Denies loff of bowel or bladder function, LE numbness or weakness.  Denies trauma.      Previously seen by Pain Management per Dr. Mann for chronic right-sided low back pain s sciatica.  On Neurontin 300mg TID, not Motrin 600mg q8 hours PRN per Pain Management; Taking Cymbalta 60mg QD Rx per PCP Dr. Luna.  S/p PT .        Reviewed:  Labs 5/3/25    PHQ-2/9 Depression Screening    Little interest or pleasure in doing things: 1 - several days  Feeling down, depressed, or hopeless: 2 - more than half the days  Trouble falling or staying asleep, or sleeping too much: 1 - several days  Feeling tired or having little energy: 1 - several days  Poor appetite or overeatin - not at all  Feeling bad about yourself - or that you are a failure or have let yourself or your family down: 1 - several days  Trouble concentrating on things, such as reading the newspaper or watching television: 2 - more than half the days  Moving or speaking so slowly that other people could have noticed. Or the opposite - being so fidgety or restless that you have been moving around a lot more than usual: 0 - not at all  Thoughts that you would be better off dead, or of hurting yourself in some way: 0 - not at all  PHQ-9 Score: 8  PHQ-9 Interpretation: Mild depression           The following portions of the patient's history were reviewed and updated as appropriate:  allergies, current medications, past family history, past medical history, past social history, past surgical history and problem list.      Review of Systems   Constitutional:  Negative for appetite change, chills, diaphoresis, fatigue and fever.   Respiratory:  Negative for chest tightness and shortness of breath.    Cardiovascular:  Negative for chest pain.   Gastrointestinal:  Negative for abdominal pain, blood in stool, diarrhea, nausea and vomiting.   Genitourinary:  Negative for dysuria.   Musculoskeletal:  Positive for back pain.        Current Outpatient Medications   Medication Sig Dispense Refill    chlorhexidine (PERIDEX) 0.12 % solution Apply to the mouth or throat 2 (two) times a day      chlorthalidone 50 MG tablet Take 1 tablet (50 mg total) by mouth daily 90 tablet 1    clotrimazole (LOTRIMIN) 1 % cream Apply topically daily as needed (rash)      clotrimazole-betamethasone (LOTRISONE) 1-0.05 % cream Apply topically 2 (two) times a day as needed (rash)      DULoxetine (CYMBALTA) 60 mg delayed release capsule Take 1 capsule (60 mg total) by mouth daily 90 capsule 3    fluticasone (FLONASE) 50 mcg/act nasal spray 1 spray into each nostril daily 18 mL 11    gabapentin (NEURONTIN) 300 mg capsule Take 1 capsule (300 mg total) by mouth 3 (three) times a day 90 capsule 1    hydrOXYzine HCL (ATARAX) 25 mg tablet Take 1-2 tablets (25-50 mg total) by mouth every 6 (six) hours as needed for anxiety      Insulin Pen Needle (BD Pen Needle Jaymie U/F) 32G X 4 MM MISC Use daily as needed with insulin pen 100 each 2    loratadine (CLARITIN) 10 mg tablet Take 1 tablet (10 mg total) by mouth as needed for allergies      semaglutide, 1 mg/dose, (Ozempic, 1 MG/DOSE,) 4 mg/3 mL injection pen INJECT 0.75 ML (1 MG TOTAL) UNDER THE SKIN EVERY 7 DAYS 9 mL 1    ibuprofen (MOTRIN) 600 mg tablet Take 1 tablet (600 mg total) by mouth every 8 (eight) hours as needed for mild pain (Patient not taking: Reported on 5/6/2025) 90 tablet  "1     Current Facility-Administered Medications   Medication Dose Route Frequency Provider Last Rate Last Admin    ropivacaine (NAROPIN) injection 2 mL  2 mL Epidural Titrated    2 mL at 01/30/25 1438       Objective:  /66 (BP Location: Left arm, Patient Position: Sitting, Cuff Size: Large)   Pulse 80   Temp 98.1 °F (36.7 °C) (Oral)   Resp 16   Ht 5' 2\" (1.575 m)   Wt 93 kg (205 lb)   LMP  (LMP Unknown) Comment: Taking birth control for abnormal bleeding. Has not had a period in 2 years  SpO2 95%   BMI 37.49 kg/m²    Wt Readings from Last 3 Encounters:   05/06/25 93 kg (205 lb)   05/01/25 91.6 kg (202 lb)   03/04/25 93.9 kg (207 lb)      BP Readings from Last 3 Encounters:   05/06/25 110/66   05/01/25 124/60   03/04/25 133/79          Physical Exam  Vitals and nursing note reviewed.   Constitutional:       General: She is not in acute distress.     Appearance: Normal appearance. She is well-developed. She is obese. She is not ill-appearing or toxic-appearing.   HENT:      Head: Normocephalic and atraumatic.   Eyes:      Conjunctiva/sclera: Conjunctivae normal.   Neck:      Thyroid: No thyromegaly.   Cardiovascular:      Rate and Rhythm: Normal rate and regular rhythm.      Pulses: Normal pulses.      Heart sounds: Normal heart sounds.   Pulmonary:      Effort: Pulmonary effort is normal.      Breath sounds: Normal breath sounds.   Musculoskeletal:         General: Tenderness (Right SI joint) present. No swelling.      Cervical back: Neck supple.      Right lower leg: No edema.      Left lower leg: No edema.      Comments: Negative SLR B/L.  Decreased AROM Flexion, Extension, L sidebending.     Lymphadenopathy:      Cervical: No cervical adenopathy.   Skin:     Comments: Right anterior proximal thigh c 2cm x 1 cm fatty, nontender, mobile mass (suspect lipoma)   Neurological:      General: No focal deficit present.      Mental Status: She is alert and oriented to person, place, and time.      Cranial " "Nerves: No cranial nerve deficit.      Sensory: No sensory deficit.      Motor: No weakness.      Coordination: Coordination normal.      Gait: Gait normal.      Deep Tendon Reflexes: Reflexes normal.   Psychiatric:         Mood and Affect: Mood normal.         Behavior: Behavior normal.         Thought Content: Thought content normal.         Judgment: Judgment normal.         Lab Results:      Lab Results   Component Value Date    WBC 6.47 06/22/2024    HGB 13.8 06/22/2024    HCT 41.5 06/22/2024     06/22/2024    TRIG 101 04/26/2025    HDL 41 (L) 04/26/2025    ALT 16 05/03/2025    AST 19 05/03/2025    K 3.6 05/03/2025     05/03/2025    CREATININE 0.81 05/03/2025    BUN 18 05/03/2025    CO2 29 05/03/2025    INR 0.98 02/10/2020    GLUF 86 05/03/2025    HGBA1C 5.5 05/03/2025     Lab Results   Component Value Date    URICACID 4.4 02/22/2022     Invalid input(s): \"BASENAME\" Vitamin D    No results found.     POCT Labs        Depression Screening and Follow-up Plan: Patient's depression screening was positive with a PHQ-9 score of 8.   Patient advised to follow-up with PCP for further management.                     "  used

## 2025-05-06 NOTE — ASSESSMENT & PLAN NOTE
Depression Screening Follow-up Plan: Patient's depression screening was positive with a PHQ-9 score of 8. Patient advised to follow-up with PCP for further management.    Management per PCP.  Continue Cymbalta 60mg QD.

## 2025-05-06 NOTE — PATIENT INSTRUCTIONS
You may use Motrin (Ibuprofen) up to 800mg 3 times daily with food (in 24 hours) as needed for pain or fever OR Aleve up to 2 pill twice daily as needed for pain.      You may use Tylenol (Acetaminophen) up to 3,000mg daily (in 24 hours) as needed for pain or fever.

## 2025-05-08 ENCOUNTER — OFFICE VISIT (OUTPATIENT)
Dept: ENDOCRINOLOGY | Facility: CLINIC | Age: 60
End: 2025-05-08
Payer: COMMERCIAL

## 2025-05-08 VITALS
DIASTOLIC BLOOD PRESSURE: 82 MMHG | HEART RATE: 69 BPM | WEIGHT: 208.4 LBS | OXYGEN SATURATION: 98 % | HEIGHT: 62 IN | SYSTOLIC BLOOD PRESSURE: 130 MMHG | TEMPERATURE: 96.2 F | BODY MASS INDEX: 38.35 KG/M2

## 2025-05-08 DIAGNOSIS — E04.2 MULTINODULAR THYROID: ICD-10-CM

## 2025-05-08 DIAGNOSIS — E55.9 VITAMIN D DEFICIENCY: ICD-10-CM

## 2025-05-08 DIAGNOSIS — E05.90 HYPERTHYROIDISM: Primary | ICD-10-CM

## 2025-05-08 DIAGNOSIS — E66.01 MORBID OBESITY (HCC): ICD-10-CM

## 2025-05-08 DIAGNOSIS — E21.3 HYPERPARATHYROIDISM (HCC): ICD-10-CM

## 2025-05-08 PROCEDURE — 99214 OFFICE O/P EST MOD 30 MIN: CPT | Performed by: INTERNAL MEDICINE

## 2025-05-08 NOTE — PROGRESS NOTES
"    Follow-up Patient Progress Note      CC: hypothyroidism     History of Present Illness:   56 yr female with HTN, HLD, perimenopausal symptoms, morbid obesity BMI 40, 1 episode of nephrolithiasis s/p laparoscopic extraction 2020, bilateral thyroid nodules associated with subclinical hyperthyroidism and high normal PTH. Last visit was 5/21/24.     Since last visit, she lost 1 lbs.     No History of external radiation, recent Iodine loading or radiological diagnostic studies.   No difficulty with swallowing or breathing or change in voice.     Thyroid nodule Hx: ENAMORADO uptake - 2018 and 2/3/20 nml uptake at 6 hrs and 24 hrs but potential hot nodules noted. US thyroid 2013 showed multiple sub-centimeter thyroid nodules bilaterally but only 1 nodule 1cm rt side.   Prior TSI was negative.     3/21/24 US thyroid:     Right lobe: 7.3 x 2.0 x 2.8 cm. Volume 19.5 mL  Left lobe: 6.9 x 1.2 x 2.8 cm. Volume 10.8 mL  Isthmus: 0.2 cm.     Nodule #1. Image 20.  RIGHT upper pole nodule measuring 1.2cmTR 3, FNA if >2.5 cm.  Follow if >1.5 cm.  Nodule #2. RIGHT lower pole nodule measuring 1.1 cm.TR 2 (2 points), Not suspicious. No FNA.  Nodule #3. Right isthmic exophytic nodule versus accessory ectopic thyroid parenchyma 1.7 cm. TR 3. FNA if >2.5 cm.      Physical Exam:  Body mass index is 38.12 kg/m².  /82 (BP Location: Left arm, Patient Position: Sitting)   Pulse 69   Temp (!) 96.2 °F (35.7 °C) (Temporal)   Ht 5' 2\" (1.575 m)   Wt 94.5 kg (208 lb 6.4 oz)   LMP  (LMP Unknown) Comment: Taking birth control for abnormal bleeding. Has not had a period in 2 years  SpO2 98%   BMI 38.12 kg/m²    Vitals:    05/08/25 1317   Weight: 94.5 kg (208 lb 6.4 oz)        Physical Exam  Constitutional:       General: She is not in acute distress.     Appearance: She is well-developed.   HENT:      Head: Normocephalic and atraumatic.      Nose: Nose normal.   Eyes:      Conjunctiva/sclera: Conjunctivae normal.   Pulmonary:      Effort: " Pulmonary effort is normal.   Abdominal:      General: There is no distension.   Musculoskeletal:      Cervical back: Normal range of motion and neck supple.   Skin:     Findings: No rash.      Comments: No icterus   Neurological:      Mental Status: She is alert and oriented to person, place, and time.         Labs:   Lab Results   Component Value Date    HGBA1C 5.5 05/03/2025       Lab Results   Component Value Date    XVJ8CZENNLDK <0.010 (L) 05/03/2025    F1RLBGY 0.90 11/15/2022       Lab Results   Component Value Date    CREATININE 0.81 05/03/2025    CREATININE 0.73 06/22/2024    CREATININE 0.95 12/16/2023    BUN 18 05/03/2025    K 3.6 05/03/2025     05/03/2025    CO2 29 05/03/2025     eGFR   Date Value Ref Range Status   05/03/2025 79 ml/min/1.73sq m Final       Lab Results   Component Value Date    ALT 16 05/03/2025    AST 19 05/03/2025    ALKPHOS 81 05/03/2025       Lab Results   Component Value Date    CHOLESTEROL 173 04/26/2025    CHOLESTEROL 157 02/22/2022    CHOLESTEROL 151 03/24/2020     Lab Results   Component Value Date    HDL 41 (L) 04/26/2025    HDL 53 02/22/2022    HDL 49 (L) 03/24/2020     Lab Results   Component Value Date    TRIG 101 04/26/2025    TRIG 105 02/22/2022    TRIG 107 03/24/2020     Lab Results   Component Value Date    NONHDLC 91 02/11/2020         Assessment/Plan:    1. Hyperthyroidism  Assessment & Plan:  She lost about 13 lbs. TSH remains suppressed and fT4 is slightly high. Given nodules, r/o toxic adenoma.    Will check an uptake scan.  Follow up in 3 months.  Orders:  -     T4, free; Future  -     T3; Future  -     TSH, 3rd generation; Future  -     Thyroid stimulating immunoglobulin; Future  -     Thyroid Antibodies Panel; Future  -     NM thyroid imaging w uptake(s); Future; Expected date: 05/08/2025  2. Hyperparathyroidism (HCC)  Assessment & Plan:  Recent corrected calcium was OK. Advised daily vitamin D.  Will repeat calcium and PTH panel to establish potential  etiology and severity in future.    3. Multinodular thyroid  Assessment & Plan:  We agreed to continue surveillance but risk is low.    We agreed to continue surveillance with repeat US thyroid in 1 year.  4. Morbid obesity (HCC)  -     semaglutide, 2 mg/dose, (Ozempic, 2 MG/DOSE,) 8 mg/ mL injection pen; Inject 0.75 mL (2 mg total) under the skin every 7 days  5. Vitamin D deficiency        I have spent a total time of  minutes on 05/08/25 in caring for this patient including greater than 50% of this time was spent in counseling/coordination of care as listed above.       Discussed with the patient and all questioned fully answered. She will contact me with concerns.    Lemuel Joy

## 2025-05-09 ENCOUNTER — TELEPHONE (OUTPATIENT)
Age: 60
End: 2025-05-09

## 2025-05-09 ENCOUNTER — HOSPITAL ENCOUNTER (OUTPATIENT)
Dept: MAMMOGRAPHY | Facility: CLINIC | Age: 60
End: 2025-05-09
Payer: COMMERCIAL

## 2025-05-09 ENCOUNTER — OFFICE VISIT (OUTPATIENT)
Dept: PODIATRY | Facility: CLINIC | Age: 60
End: 2025-05-09
Attending: FAMILY MEDICINE
Payer: COMMERCIAL

## 2025-05-09 VITALS — HEIGHT: 62 IN | WEIGHT: 208 LBS | BODY MASS INDEX: 38.28 KG/M2

## 2025-05-09 DIAGNOSIS — L60.1 ONYCHOLYSIS OF TOENAIL: ICD-10-CM

## 2025-05-09 DIAGNOSIS — L60.8 DISCOLORATION OF NAIL: ICD-10-CM

## 2025-05-09 DIAGNOSIS — R92.8 ABNORMAL MAMMOGRAM: ICD-10-CM

## 2025-05-09 DIAGNOSIS — B35.3 TINEA PEDIS OF LEFT FOOT: ICD-10-CM

## 2025-05-09 DIAGNOSIS — S90.219A SUBUNGUAL HEMATOMA OF GREAT TOE: Primary | ICD-10-CM

## 2025-05-09 PROCEDURE — 77066 DX MAMMO INCL CAD BI: CPT

## 2025-05-09 PROCEDURE — 88312 SPECIAL STAINS GROUP 1: CPT | Performed by: PATHOLOGY

## 2025-05-09 PROCEDURE — 88304 TISSUE EXAM BY PATHOLOGIST: CPT | Performed by: PATHOLOGY

## 2025-05-09 PROCEDURE — 99204 OFFICE O/P NEW MOD 45 MIN: CPT

## 2025-05-09 PROCEDURE — G0279 TOMOSYNTHESIS, MAMMO: HCPCS

## 2025-05-09 RX ORDER — CLOTRIMAZOLE 1 %
CREAM (GRAM) TOPICAL 2 TIMES DAILY
Qty: 42 G | Refills: 0 | Status: SHIPPED | OUTPATIENT
Start: 2025-05-09 | End: 2025-06-08

## 2025-05-09 NOTE — PROGRESS NOTES
Name: Peace Anderson      : 1965      MRN: [id number]  Encounter Provider: Magdy Sherwood DPM  Encounter Date: 2025   Encounter department: [facility]  :  Assessment & Plan  Discoloration of nail    Orders:    Ambulatory referral to Podiatry    Tissue Exam; Future    Subungual hematoma of great toe         Onycholysis of toenail         Tinea pedis of left foot    Orders:    clotrimazole (LOTRIMIN) 1 % cream; Apply topically 2 (two) times a day Apply thin layer to affected areas twice daily.    Plan:    Nail culture taken and sent to lab today.    Patient's nail discoloration is likely subungual hematoma with onycholysis.  I recommend discontinuing gel nail polish for the time being and try over-the-counter cuticle oil, tea tree oil daily.    Today I discussed with the patient treatment for fungal skin infection.     Treatments can include over-the-counter medications such as topical antifungal medications like clotrimazole.  These need to be used twice daily to be effective.     I also discussed the option of prescription oral antifungal medication in addition to topical steroid.      If no improvement can consider oral antifungal medication with evaluation of labs.     Discussed with patient to wash socks in hot water.  Additionally can use Lysol disinfectant spray in shoes. Change socks during the day.  Wash bathroom surfaces that are shared and have anyone that shares common areas treated if he/she has a similar rash on the foot to prevent continued reinfection.       Can follow up in 2-3 months    History of Present Illness   HPI  Peace Anderson is a 59 y.o. female who presents today with pain and green color on left great toenail.  The pain is described as aching. This pain is located at left great toe. They has had this for 1 month .  Patient denies any injury to foot.      History obtained from: patient    Review of Systems  Current Outpatient Medications on File Prior to  "Visit   Medication Sig Dispense Refill    chlorhexidine (PERIDEX) 0.12 % solution Apply to the mouth or throat 2 (two) times a day      chlorthalidone 50 MG tablet Take 1 tablet (50 mg total) by mouth daily 90 tablet 1    clotrimazole (LOTRIMIN) 1 % cream Apply topically daily as needed (rash)      clotrimazole-betamethasone (LOTRISONE) 1-0.05 % cream Apply topically 2 (two) times a day as needed (rash)      DULoxetine (CYMBALTA) 60 mg delayed release capsule Take 1 capsule (60 mg total) by mouth daily 90 capsule 3    fluticasone (FLONASE) 50 mcg/act nasal spray 1 spray into each nostril daily 18 mL 11    gabapentin (NEURONTIN) 300 mg capsule Take 1 capsule (300 mg total) by mouth 3 (three) times a day 90 capsule 1    hydrOXYzine HCL (ATARAX) 25 mg tablet Take 1-2 tablets (25-50 mg total) by mouth every 6 (six) hours as needed for anxiety      Insulin Pen Needle (BD Pen Needle Jaymie U/F) 32G X 4 MM MISC Use daily as needed with insulin pen 100 each 2    loratadine (CLARITIN) 10 mg tablet Take 1 tablet (10 mg total) by mouth as needed for allergies      semaglutide, 2 mg/dose, (Ozempic, 2 MG/DOSE,) 8 mg/ mL injection pen Inject 0.75 mL (2 mg total) under the skin every 7 days 9 mL 1    [DISCONTINUED] lisinopril (ZESTRIL) 20 mg tablet Take 1 tablet (20 mg total) by mouth daily 30 tablet 0     Current Facility-Administered Medications on File Prior to Visit   Medication Dose Route Frequency Provider Last Rate Last Admin    ropivacaine (NAROPIN) injection 2 mL  2 mL Epidural Titrated    2 mL at 01/30/25 1438         Objective   Ht 5' 2\" (1.575 m)   Wt 94.3 kg (208 lb)   LMP  (LMP Unknown) Comment: Taking birth control for abnormal bleeding. Has not had a period in 2 years  BMI 38.04 kg/m²      Physical Exam    Left great toenail stable subungual hematoma covering approximately 50% of nail, without signs of active infection: no purulence, no malodor, no ascending erythema, no crepitus, no fluctuance.  Noted osteolysis " at distal aspect of nail with very thin nail.    Annular lesions noted on plantar left foot in moccasin distribution, and fourth inter webspace.

## 2025-05-09 NOTE — TELEPHONE ENCOUNTER
,Ozempic will not be covered with or without a prior authorization, Ozempic is not FDA approved for obesity, prediabetes, etc. Ozempic is only FDA Approved for Type 2 Diabetes and will only be Approved via a Prior Authorization if patient has a diagnosis of Type 2 Diabetes.

## 2025-05-12 ENCOUNTER — RESULTS FOLLOW-UP (OUTPATIENT)
Dept: OBGYN CLINIC | Facility: CLINIC | Age: 60
End: 2025-05-12

## 2025-05-12 DIAGNOSIS — Z12.31 ENCOUNTER FOR SCREENING MAMMOGRAM FOR MALIGNANT NEOPLASM OF BREAST: Primary | ICD-10-CM

## 2025-05-23 ENCOUNTER — NURSE TRIAGE (OUTPATIENT)
Age: 60
End: 2025-05-23

## 2025-05-23 NOTE — TELEPHONE ENCOUNTER
"FOLLOW UP: advised pt she should be seen in ER or urgent care, pt says she will go to [facility] ER.     REASON FOR CONVERSATION: Leg Swelling    SYMPTOMS: pt having unilateral pain and swelling for 1 week.  Pain is from her right thigh down to shin.  She has swelling around her right knee.  Denies redness or s/s infection.  Pain 6/10, constant.  She is having trouble walking and standing due to pain.     DISPOSITION: Go to ED/UCC Now (Or to Office with PCP Approval)      Reason for Disposition   Can't walk or can barely stand (new-onset)    Answer Assessment - Initial Assessment Questions  1. ONSET: \"When did the swelling start?\" (e.g., minutes, hours, days)      1 week ago  2. LOCATION: \"What part of the leg is swollen?\"  \"Are both legs swollen or just one leg?\"      Right knee- side of knee  3. SEVERITY: \"How bad is the swelling?\" (e.g., localized; mild, moderate, severe)      Moderate   4. REDNESS: \"Does the swelling look red or infected?\"      Denies redness, denies s/s infection  5. PAIN: \"Is the swelling painful to touch?\" If Yes, ask: \"How painful is it?\"   (Scale 1-10; mild, moderate or severe)      6/10, constant   6. FEVER: \"Do you have a fever?\" If Yes, ask: \"What is it, how was it measured, and when did it start?\"       Denies   7. CAUSE: \"What do you think is causing the leg swelling?\"      Unsure   8. MEDICAL HISTORY: \"Do you have a history of blood clots (e.g., DVT), cancer, heart failure, kidney disease, or liver failure?\"      Denies   9. RECURRENT SYMPTOM: \"Have you had leg swelling before?\" If Yes, ask: \"When was the last time?\" \"What happened that time?\"      Yes, in knee a long time- unsure of chest  10. OTHER SYMPTOMS: \"Do you have any other symptoms?\" (e.g., chest pain, difficulty breathing)        Denies    Protocols used: Leg Swelling and Edema-Adult-[place]    "

## 2025-05-27 ENCOUNTER — APPOINTMENT (EMERGENCY)
Dept: RADIOLOGY | Facility: HOSPITAL | Age: 60
End: 2025-05-27
Payer: COMMERCIAL

## 2025-05-27 ENCOUNTER — HOSPITAL ENCOUNTER (EMERGENCY)
Facility: HOSPITAL | Age: 60
Discharge: HOME | End: 2025-05-27
Attending: INTERNAL MEDICINE | Admitting: INTERNAL MEDICINE
Payer: COMMERCIAL

## 2025-05-27 VITALS
DIASTOLIC BLOOD PRESSURE: 71 MMHG | TEMPERATURE: 97.8 F | RESPIRATION RATE: 18 BRPM | BODY MASS INDEX: 37.22 KG/M2 | OXYGEN SATURATION: 95 % | HEART RATE: 88 BPM | WEIGHT: 203.48 LBS | SYSTOLIC BLOOD PRESSURE: 125 MMHG

## 2025-05-27 DIAGNOSIS — M25.561 ACUTE PAIN OF RIGHT KNEE: Primary | ICD-10-CM

## 2025-05-27 PROCEDURE — 99284 EMERGENCY DEPT VISIT MOD MDM: CPT | Performed by: INTERNAL MEDICINE

## 2025-05-27 PROCEDURE — 99283 EMERGENCY DEPT VISIT LOW MDM: CPT

## 2025-05-27 PROCEDURE — 73564 X-RAY EXAM KNEE 4 OR MORE: CPT

## 2025-05-27 RX ORDER — ACETAMINOPHEN 325 MG/1
975 TABLET ORAL ONCE
Status: COMPLETED | OUTPATIENT
Start: 2025-05-27 | End: 2025-05-27

## 2025-05-27 RX ORDER — SENNOSIDES 8.6 MG
650 CAPSULE ORAL EVERY 8 HOURS PRN
Qty: 30 TABLET | Refills: 0 | Status: SHIPPED | OUTPATIENT
Start: 2025-05-27

## 2025-05-27 RX ADMIN — DICLOFENAC SODIUM 2 G: 10 GEL TOPICAL at 10:46

## 2025-05-27 RX ADMIN — ACETAMINOPHEN 975 MG: 325 TABLET, FILM COATED ORAL at 10:46

## 2025-05-27 NOTE — ASSESSMENT & PLAN NOTE
Recent corrected calcium was OK. Advised daily vitamin D.  Will repeat calcium and PTH panel to establish potential etiology and severity in future.

## 2025-05-27 NOTE — Clinical Note
Peace Anderson was seen and treated in our emergency department on 5/27/2025.                Diagnosis:     Peace  may return to work on return date.    She may return on this date: 05/28/2025         If you have any questions or concerns, please don't hesitate to call.      Polina Lomeli MD    ______________________________           _______________          _______________  Hospital Representative                              Date                                Time
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
## 2025-05-27 NOTE — ASSESSMENT & PLAN NOTE
We agreed to continue surveillance but risk is low.    We agreed to continue surveillance with repeat US thyroid in 1 year.

## 2025-05-27 NOTE — ASSESSMENT & PLAN NOTE
She lost about 13 lbs. TSH remains suppressed and fT4 is slightly high. Given nodules, r/o toxic adenoma.    Will check an uptake scan.  Follow up in 3 months.

## 2025-05-29 ENCOUNTER — HOSPITAL ENCOUNTER (OUTPATIENT)
Dept: NUCLEAR MEDICINE | Facility: HOSPITAL | Age: 60
Discharge: HOME | End: 2025-05-29
Attending: INTERNAL MEDICINE
Payer: COMMERCIAL

## 2025-05-29 DIAGNOSIS — E05.90 HYPERTHYROIDISM: ICD-10-CM

## 2025-05-29 PROCEDURE — A9516 IODINE I-123 SOD IODIDE MIC: HCPCS

## 2025-05-29 PROCEDURE — 78014 THYROID IMAGING W/BLOOD FLOW: CPT

## 2025-05-30 ENCOUNTER — HOSPITAL ENCOUNTER (OUTPATIENT)
Dept: CT IMAGING | Facility: HOSPITAL | Age: 60
Discharge: HOME | End: 2025-05-30
Attending: INTERNAL MEDICINE
Payer: COMMERCIAL

## 2025-05-30 ENCOUNTER — HOSPITAL ENCOUNTER (OUTPATIENT)
Dept: NUCLEAR MEDICINE | Facility: HOSPITAL | Age: 60
Discharge: HOME | End: 2025-05-30
Attending: INTERNAL MEDICINE
Payer: COMMERCIAL

## 2025-05-30 DIAGNOSIS — Z82.49 FAMILY HISTORY OF EARLY CAD: ICD-10-CM

## 2025-05-30 PROCEDURE — 75571 CT HRT W/O DYE W/CA TEST: CPT

## 2025-06-06 NOTE — RESULT ENCOUNTER NOTE
Left detailed message for patient to review results on my chart calcium test was 0 or call the [facility] office

## 2025-06-10 ENCOUNTER — TELEPHONE (OUTPATIENT)
Dept: PAIN MEDICINE | Facility: CLINIC | Age: 60
End: 2025-06-10

## 2025-06-10 NOTE — TELEPHONE ENCOUNTER
Peace Mann patient     Patient missed her 8 am this morning I offered next available with DT 7/1 at 9:30 am she said she will call back.

## 2025-06-28 DIAGNOSIS — M54.2 NECK PAIN: ICD-10-CM

## 2025-06-28 DIAGNOSIS — M75.51 BURSITIS OF RIGHT SHOULDER: ICD-10-CM

## 2025-06-30 RX ORDER — GABAPENTIN 300 MG/1
300 CAPSULE ORAL 3 TIMES DAILY
Qty: 90 CAPSULE | Refills: 1 | OUTPATIENT
Start: 2025-06-30

## 2025-07-08 NOTE — PROGRESS NOTES
Name: Peace Anderson      : 1965      MRN: [id number]  Encounter Provider: SHARON Campuzano  Encounter Date: 7/10/2025   Encounter department: [facility]  :  Assessment & Plan  Hyperthyroidism  Counseled on basic anatomy and physiology of the thyroid gland.  At this time, her hyperthyroidism is believed to be secondary to a hormonally active thyroid nodule.  However, we do need to rule out Graves' disease.  Patient will complete labs today for further evaluation.   A comprehensive discussion was held regarding potential treatment options, including methimazole, radioactive iodine ablation, and surgical removal of the right side of the thyroid gland.   The current plan is that methimazole will be initiated to treat elevated free T4 and suppressed TSH. The patient was informed about the side effects of methimazole, including stomach upset and rare rashes, and advised to take it with food. Educational material on nodular goiter was provided along with treatment for nodular goiter.  Patient is to notify me with any further questions or concerns.  Will follow-up with me in 4 months.  Orders:  •  TSH, 3rd generation  •  T4, free  •  Thyroid stimulating immunoglobulin  •  T3  •  Thyroid Antibodies Panel    Essential hypertension  BP well-controlled at 126/72.  Continue 50 mg of chlorthalidone daily.         Multinodular thyroid  Reviewed thyroid ultrasound results with patient.  Risk of malignancy is low.  No nodule meets current ACR criteria for requiring biopsy but follow-up ultrasound is recommended in 1 year.  Will continue to follow.         Morbid obesity (HCC)  Patient continues to follow a healthy diet.  She is tolerating Ozempic 2 mg once weekly well.  5 pound weight loss since last appointment noted.           Assessment & Plan        History of Present Illness   History of Present Illness  Peace Anderson is a 59 y.o. female with hyperthyroidism,  hyperparathyroidism, multinodular goiter, and obesity presenting to the office today for follow-up.    Patient last evaluated by Dr. Rufino MD on 5/8/2025.  At that time, it was recommended that she complete a nuclear med thyroid imaging with uptake.  This was completed on 5/30/2025.  This demonstrated mildly increased uptake in the right thyroid upper pole suggesting hyperfunctioning nodule with otherwise normal radiotracer uptake throughout the thyroid.  Labs were also ordered for patient to complete including TSI, TSH, T3, free T4, and thyroid antibodies panel.    Denies history of external radiation or family history of thyroid dysfunction.    Patient has a history of an isolated elevated PTH of 11.3 in 2022.  With corrected vitamin D, serum calcium and PTH have improved.  Phosphorus also noted to be normal.    She reports persistent fatigue and sluggishness, along with occasional anxiety and insomnia. She has not experienced any unintentional weight loss or changes in bowel habits. She does not take any biotin-containing supplements. She also mentions difficulty swallowing at times.    She is currently on Ozempic and reports feeling well with it. She does not require any refills at this time.  She has lost 5 pounds since her last appointment.    FAMILY HISTORY  She reports no family history of thyroid dysfunction.                Review of Systems   Constitutional:  Positive for fatigue. Negative for activity change, appetite change and unexpected weight change.   HENT:  Negative for dental problem, sore throat, trouble swallowing and voice change.    Eyes:  Negative for visual disturbance.   Respiratory:  Negative for cough, chest tightness and shortness of breath.    Cardiovascular:  Positive for palpitations. Negative for chest pain and leg swelling.   Gastrointestinal:  Negative for constipation, diarrhea, nausea and vomiting.   Endocrine: Positive for heat intolerance. Negative for cold intolerance,  "polydipsia, polyphagia and polyuria.   Genitourinary:  Negative for frequency.   Musculoskeletal:  Negative for arthralgias, back pain, gait problem and myalgias.   Skin:  Negative for wound.   Allergic/Immunologic: Negative for environmental allergies and food allergies.   Neurological:  Positive for tremors. Negative for dizziness, weakness, light-headedness, numbness and headaches.   Psychiatric/Behavioral:  Positive for sleep disturbance. Negative for decreased concentration and dysphoric mood. The patient is not nervous/anxious.           Objective   /72 (Patient Position: Sitting, Cuff Size: Large)   Pulse 85   Temp 97.5 °F (36.4 °C) (Tympanic)   Ht 5' 2\" (1.575 m)   Wt 92.3 kg (203 lb 6.4 oz)   LMP  (LMP Unknown) Comment: Taking birth control for abnormal bleeding. Has not had a period in 2 years  SpO2 98%   BMI 37.20 kg/m²      Physical Exam  Vitals reviewed.   Constitutional:       General: She is not in acute distress.     Appearance: She is well-developed. She is obese. She is not ill-appearing.   HENT:      Head: Normocephalic and atraumatic.     Eyes:      Conjunctiva/sclera: Conjunctivae normal.     Neck:      Thyroid: Thyroid mass present. No thyromegaly or thyroid tenderness.       Cardiovascular:      Rate and Rhythm: Normal rate and regular rhythm.      Pulses: Normal pulses.      Heart sounds: Normal heart sounds. No murmur heard.  Pulmonary:      Effort: Pulmonary effort is normal. No respiratory distress.      Breath sounds: Normal breath sounds.   Abdominal:      Palpations: Abdomen is soft.      Tenderness: There is no abdominal tenderness.     Musculoskeletal:         General: No swelling.      Cervical back: Normal range of motion and neck supple.      Right lower leg: No edema.      Left lower leg: No edema.     Skin:     General: Skin is warm and dry.      Capillary Refill: Capillary refill takes less than 2 seconds.     Neurological:      Mental Status: She is alert. "     Psychiatric:         Mood and Affect: Mood normal.     Physical Exam  Head and Neck: Thyroid palpation revealed a nodule in the right upper pole.  Cardiovascular: Heart auscultation was performed.  Respiratory: Lung auscultation was performed.    Results  Labs:  Thyroid stimulating hormone (TSH) is low. Free T4 is high.    Imaging:  Thyroid scan showed focal mildly increased uptake in the right thyroid upper lobe suggesting a hyperfunctioning nodule. Ultrasound from March showed no significant change in the right upper pole from July 2022, right lower pole has enlarged due to an increased cystic component.

## 2025-07-10 ENCOUNTER — OFFICE VISIT (OUTPATIENT)
Dept: ENDOCRINOLOGY | Facility: CLINIC | Age: 60
End: 2025-07-10
Payer: COMMERCIAL

## 2025-07-10 ENCOUNTER — APPOINTMENT (OUTPATIENT)
Dept: LAB | Age: 60
End: 2025-07-10
Payer: COMMERCIAL

## 2025-07-10 VITALS
OXYGEN SATURATION: 98 % | SYSTOLIC BLOOD PRESSURE: 126 MMHG | WEIGHT: 203.4 LBS | HEART RATE: 85 BPM | BODY MASS INDEX: 37.43 KG/M2 | HEIGHT: 62 IN | DIASTOLIC BLOOD PRESSURE: 72 MMHG | TEMPERATURE: 97.5 F

## 2025-07-10 DIAGNOSIS — E66.01 MORBID OBESITY (HCC): ICD-10-CM

## 2025-07-10 DIAGNOSIS — E04.2 MULTINODULAR THYROID: ICD-10-CM

## 2025-07-10 DIAGNOSIS — I10 ESSENTIAL HYPERTENSION: ICD-10-CM

## 2025-07-10 DIAGNOSIS — E05.90 HYPERTHYROIDISM: Primary | ICD-10-CM

## 2025-07-10 LAB
T3 SERPL-MCNC: 1 NG/ML (ref 0.9–1.8)
T4 FREE SERPL-MCNC: 1.25 NG/DL (ref 0.61–1.12)
TSH SERPL DL<=0.05 MIU/L-ACNC: <0.01 UIU/ML (ref 0.45–4.5)

## 2025-07-10 PROCEDURE — 86376 MICROSOMAL ANTIBODY EACH: CPT | Performed by: NURSE PRACTITIONER

## 2025-07-10 PROCEDURE — 84443 ASSAY THYROID STIM HORMONE: CPT | Performed by: NURSE PRACTITIONER

## 2025-07-10 PROCEDURE — 86800 THYROGLOBULIN ANTIBODY: CPT | Performed by: NURSE PRACTITIONER

## 2025-07-10 PROCEDURE — 36415 COLL VENOUS BLD VENIPUNCTURE: CPT | Performed by: NURSE PRACTITIONER

## 2025-07-10 PROCEDURE — 84439 ASSAY OF FREE THYROXINE: CPT | Performed by: NURSE PRACTITIONER

## 2025-07-10 PROCEDURE — 84445 ASSAY OF TSI GLOBULIN: CPT | Performed by: NURSE PRACTITIONER

## 2025-07-10 PROCEDURE — 84480 ASSAY TRIIODOTHYRONINE (T3): CPT | Performed by: NURSE PRACTITIONER

## 2025-07-10 PROCEDURE — 99214 OFFICE O/P EST MOD 30 MIN: CPT | Performed by: NURSE PRACTITIONER

## 2025-07-10 NOTE — ASSESSMENT & PLAN NOTE
Patient continues to follow a healthy diet.  She is tolerating Ozempic 2 mg once weekly well.  5 pound weight loss since last appointment noted.

## 2025-07-10 NOTE — ASSESSMENT & PLAN NOTE
Reviewed thyroid ultrasound results with patient.  Risk of malignancy is low.  No nodule meets current ACR criteria for requiring biopsy but follow-up ultrasound is recommended in 1 year.  Will continue to follow.

## 2025-07-10 NOTE — ASSESSMENT & PLAN NOTE
Counseled on basic anatomy and physiology of the thyroid gland.  At this time, her hyperthyroidism is believed to be secondary to a hormonally active thyroid nodule.  However, we do need to rule out Graves' disease.  Patient will complete labs today for further evaluation.   A comprehensive discussion was held regarding potential treatment options, including methimazole, radioactive iodine ablation, and surgical removal of the right side of the thyroid gland.   The current plan is that methimazole will be initiated to treat elevated free T4 and suppressed TSH. The patient was informed about the side effects of methimazole, including stomach upset and rare rashes, and advised to take it with food. Educational material on nodular goiter was provided along with treatment for nodular goiter.  Patient is to notify me with any further questions or concerns.  Will follow-up with me in 4 months.  Orders:    TSH, 3rd generation    T4, free    Thyroid stimulating immunoglobulin    T3    Thyroid Antibodies Panel

## 2025-07-10 NOTE — ASSESSMENT & PLAN NOTE
BP well-controlled at 126/72.  Continue 50 mg of chlorthalidone daily.          overview of results, discussion with family test results and plan of care. complexity of care, discussion with family plan of care discussion with family (daughter and son in law), plan of care.

## 2025-07-11 LAB
THYROGLOB AB SERPL-ACNC: <1 IU/ML (ref 0–0.9)
THYROPEROXIDASE AB SERPL-ACNC: 13 IU/ML (ref 0–34)

## 2025-07-13 LAB — TSI SER-ACNC: <0.1 IU/L (ref 0–0.55)

## 2025-07-21 ENCOUNTER — TELEPHONE (OUTPATIENT)
Age: 60
End: 2025-07-21

## 2025-07-21 NOTE — TELEPHONE ENCOUNTER
Pt on [facility] for [facility].    Called Pt to offer a virtual appt w/ Dr Pierce. No answer, lvm for a callback at [phone].

## 2025-08-01 NOTE — TELEPHONE ENCOUNTER
Patient called in response to voicemail from previous writer.     Writer successfully transferred patient to intake for further assistance.

## 2025-08-01 NOTE — TELEPHONE ENCOUNTER
Received call from CC to schedule pt of med mgmt [facility]. Writer verified information and started completing intake, during intake writer verified if pt is seeking therapy or med mgmt services as pt shared has seen 3 therapists and none helped. Writer explained the difference as pt was unsure. Pt shared is seeking therapy services not med mgmt. Writer shared at this time there is an [facility], pt understood and shared preferences. Pt is aware is on therapy [facility] and will be contacted upon availability.     mark Mendiola prov pref, open to virtual

## 2025-08-02 DIAGNOSIS — F41.1 GAD (GENERALIZED ANXIETY DISORDER): ICD-10-CM

## 2025-08-03 NOTE — TELEPHONE ENCOUNTER
Phone call reviewed - please let psych know pt was referred for medicine management. Please update wait list preference.

## 2025-08-04 RX ORDER — DULOXETIN HYDROCHLORIDE 60 MG/1
60 CAPSULE, DELAYED RELEASE ORAL DAILY
Qty: 90 CAPSULE | Refills: 1 | Status: SHIPPED | OUTPATIENT
Start: 2025-08-04 | End: 2025-08-05

## 2025-08-05 ENCOUNTER — OFFICE VISIT (OUTPATIENT)
Dept: FAMILY MEDICINE CLINIC | Facility: CLINIC | Age: 60
End: 2025-08-05
Payer: COMMERCIAL

## 2025-08-05 VITALS
HEART RATE: 81 BPM | SYSTOLIC BLOOD PRESSURE: 148 MMHG | WEIGHT: 204.2 LBS | BODY MASS INDEX: 37.58 KG/M2 | OXYGEN SATURATION: 98 % | HEIGHT: 62 IN | TEMPERATURE: 98.5 F | DIASTOLIC BLOOD PRESSURE: 96 MMHG

## 2025-08-05 DIAGNOSIS — Z00.00 WELL ADULT EXAM: Primary | ICD-10-CM

## 2025-08-05 DIAGNOSIS — F32.2 SEVERE MAJOR DEPRESSIVE DISORDER (HCC): ICD-10-CM

## 2025-08-05 DIAGNOSIS — F41.9 ANXIETY: ICD-10-CM

## 2025-08-05 PROCEDURE — 99396 PREV VISIT EST AGE 40-64: CPT | Performed by: FAMILY MEDICINE

## 2025-08-05 RX ORDER — DULOXETIN HYDROCHLORIDE 30 MG/1
30 CAPSULE, DELAYED RELEASE ORAL DAILY
Qty: 30 CAPSULE | Refills: 1 | Status: SHIPPED | OUTPATIENT
Start: 2025-08-05

## 2025-08-11 ENCOUNTER — OFFICE VISIT (OUTPATIENT)
Dept: PAIN MEDICINE | Facility: CLINIC | Age: 60
End: 2025-08-11
Payer: COMMERCIAL

## 2025-08-15 ENCOUNTER — PROCEDURE VISIT (OUTPATIENT)
Dept: PAIN MEDICINE | Facility: CLINIC | Age: 60
End: 2025-08-15
Payer: COMMERCIAL

## 2025-08-20 ENCOUNTER — NURSE TRIAGE (OUTPATIENT)
Age: 60
End: 2025-08-20

## 2025-08-20 ENCOUNTER — TELEPHONE (OUTPATIENT)
Dept: PAIN MEDICINE | Facility: CLINIC | Age: 60
End: 2025-08-20

## 2025-08-21 ENCOUNTER — OFFICE VISIT (OUTPATIENT)
Dept: FAMILY MEDICINE CLINIC | Facility: CLINIC | Age: 60
End: 2025-08-21
Payer: COMMERCIAL

## 2025-08-21 VITALS
WEIGHT: 198 LBS | HEART RATE: 99 BPM | DIASTOLIC BLOOD PRESSURE: 82 MMHG | BODY MASS INDEX: 37.38 KG/M2 | TEMPERATURE: 97.9 F | OXYGEN SATURATION: 96 % | RESPIRATION RATE: 16 BRPM | HEIGHT: 61 IN | SYSTOLIC BLOOD PRESSURE: 122 MMHG

## 2025-08-21 DIAGNOSIS — J45.21 MILD INTERMITTENT ASTHMA WITH EXACERBATION: Primary | ICD-10-CM

## 2025-08-21 DIAGNOSIS — J45.21 MILD INTERMITTENT ASTHMA WITH EXACERBATION: ICD-10-CM

## 2025-08-21 PROCEDURE — 99214 OFFICE O/P EST MOD 30 MIN: CPT | Performed by: FAMILY MEDICINE

## 2025-08-21 RX ORDER — PREDNISONE 50 MG/1
50 TABLET ORAL DAILY
Qty: 5 TABLET | Refills: 0 | Status: SHIPPED | OUTPATIENT
Start: 2025-08-21 | End: 2025-08-26

## 2025-08-21 RX ORDER — ALBUTEROL SULFATE 90 UG/1
2 INHALANT RESPIRATORY (INHALATION) EVERY 4 HOURS PRN
Qty: 18 G | Refills: 1 | Status: SHIPPED | OUTPATIENT
Start: 2025-08-21

## 2025-08-22 RX ORDER — LEVALBUTEROL TARTRATE 45 UG/1
AEROSOL, METERED ORAL
Refills: 0 | OUTPATIENT
Start: 2025-08-22